# Patient Record
Sex: FEMALE | Race: BLACK OR AFRICAN AMERICAN | NOT HISPANIC OR LATINO | Employment: OTHER | URBAN - METROPOLITAN AREA
[De-identification: names, ages, dates, MRNs, and addresses within clinical notes are randomized per-mention and may not be internally consistent; named-entity substitution may affect disease eponyms.]

---

## 2017-01-16 ENCOUNTER — OFFICE VISIT (OUTPATIENT)
Dept: CARDIOLOGY | Facility: CLINIC | Age: 62
End: 2017-01-16
Payer: COMMERCIAL

## 2017-01-16 ENCOUNTER — CLINICAL SUPPORT (OUTPATIENT)
Dept: CARDIOLOGY | Facility: CLINIC | Age: 62
End: 2017-01-16
Payer: COMMERCIAL

## 2017-01-16 VITALS
SYSTOLIC BLOOD PRESSURE: 120 MMHG | DIASTOLIC BLOOD PRESSURE: 76 MMHG | BODY MASS INDEX: 39.09 KG/M2 | WEIGHT: 229 LBS | HEART RATE: 69 BPM | HEIGHT: 64 IN

## 2017-01-16 DIAGNOSIS — T45.1X5A CARDIOMYOPATHY DUE TO CHEMOTHERAPY: Primary | ICD-10-CM

## 2017-01-16 DIAGNOSIS — I10 BENIGN ESSENTIAL HTN: ICD-10-CM

## 2017-01-16 DIAGNOSIS — I10 BENIGN ESSENTIAL HTN: Primary | ICD-10-CM

## 2017-01-16 DIAGNOSIS — I42.7 CARDIOMYOPATHY DUE TO CHEMOTHERAPY: ICD-10-CM

## 2017-01-16 DIAGNOSIS — T45.1X5A CARDIOMYOPATHY DUE TO CHEMOTHERAPY: ICD-10-CM

## 2017-01-16 DIAGNOSIS — I42.7 CARDIOMYOPATHY DUE TO CHEMOTHERAPY: Primary | ICD-10-CM

## 2017-01-16 DIAGNOSIS — I10 ESSENTIAL HYPERTENSION: Chronic | ICD-10-CM

## 2017-01-16 PROCEDURE — 3078F DIAST BP <80 MM HG: CPT | Mod: S$GLB,,, | Performed by: NUCLEAR MEDICINE

## 2017-01-16 PROCEDURE — 99999 PR PBB SHADOW E&M-EST. PATIENT-LVL III: CPT | Mod: PBBFAC,,, | Performed by: NUCLEAR MEDICINE

## 2017-01-16 PROCEDURE — 3074F SYST BP LT 130 MM HG: CPT | Mod: S$GLB,,, | Performed by: NUCLEAR MEDICINE

## 2017-01-16 PROCEDURE — 99214 OFFICE O/P EST MOD 30 MIN: CPT | Mod: S$GLB,,, | Performed by: NUCLEAR MEDICINE

## 2017-01-16 PROCEDURE — 1159F MED LIST DOCD IN RCRD: CPT | Mod: S$GLB,,, | Performed by: NUCLEAR MEDICINE

## 2017-01-16 PROCEDURE — 93000 ELECTROCARDIOGRAM COMPLETE: CPT | Mod: S$GLB,,, | Performed by: NUCLEAR MEDICINE

## 2017-01-16 RX ORDER — CARVEDILOL PHOSPHATE 80 MG/1
80 CAPSULE, EXTENDED RELEASE ORAL DAILY
Qty: 90 CAPSULE | Refills: 3 | Status: SHIPPED | OUTPATIENT
Start: 2017-01-16 | End: 2018-01-10 | Stop reason: SDUPTHER

## 2017-01-16 NOTE — PROGRESS NOTES
Subjective:   Patient ID:  Josette Altamirano is a 61 y.o. female who presents for follow-up of Congestive Heart Failure (Re-establish care) and Hypertension      HPI CHRONIC HF- CHEMO INDUCED CMP-  EF HAS IMPROVED SIGNIFICANTLY- LAST ECHO 6/2015  EF 55%  NO RECENT HOSPITALIZATIONS OR ED VISITS FOR ACS  OR ADHF  NO PALPITATIONS. NO NEAR SYNCOPE OR SYNCOPE  NO EDEMA. NO INTERMITTENT CLAUDICATION. NO ABDOMINAL PAIN  NO UNUSUAL CALDERÓN WITH ORDINARY DAILY ACTIVITIES. NO ORTHOPNEA OR PND  NO FOCAL CNS SYMPTOMS OR SIGNS TO SUGGEST TIA OR STROKE  BBS. WELL TOLERATED      Review of Systems   Constitution: Negative for chills, fever, weakness, night sweats, weight gain and weight loss.   HENT: Negative for headaches and nosebleeds.    Eyes: Negative for blurred vision, double vision and visual disturbance.   Cardiovascular: Negative for chest pain, dyspnea on exertion, irregular heartbeat, leg swelling, orthopnea, palpitations, paroxysmal nocturnal dyspnea and syncope.   Respiratory: Negative for cough, hemoptysis and wheezing.    Endocrine: Negative for polydipsia and polyuria.   Hematologic/Lymphatic: Does not bruise/bleed easily.   Skin: Negative for rash.   Musculoskeletal: Negative for joint pain, joint swelling, muscle weakness and myalgias.   Gastrointestinal: Negative for abdominal pain, hematemesis, jaundice and melena.   Genitourinary: Negative for dysuria, hematuria and nocturia.   Neurological: Negative for dizziness, focal weakness and sensory change.   Psychiatric/Behavioral: Negative for depression. The patient does not have insomnia and is not nervous/anxious.      Family History   Problem Relation Age of Onset    Diabetes Mother     Diabetes Father     Stroke Father     Cancer Maternal Aunt 55     breast cancer    Stroke Paternal Aunt     Melanoma Neg Hx     Psoriasis Neg Hx     Lupus Neg Hx     Eczema Neg Hx      Past Medical History   Diagnosis Date    Arthritis of right knee     Breast cancer      She  has a history of a Stage IIA, 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 She subsequently underwent left mastectomy and sentinel node biopsy followed by axillary dissection March 23, 2007. Histopathologic evaluation demonstrated again multiple foci of intracystic papillary carcinoma, as well as DCIS noncomed    Cardiomyopathy      CHEMO INDUCED- RESOLVED    CHF (congestive heart failure)      systolic    Hypertension     Uterine fibroid      Current Outpatient Prescriptions on File Prior to Visit   Medication Sig Dispense Refill    [DISCONTINUED] carvedilol (COREG CR) 80 MG 24 hr capsule Take 1 capsule (80 mg total) by mouth once daily. 90 capsule 3     No current facility-administered medications on file prior to visit.      Review of patient's allergies indicates:  No Known Allergies    Objective:     Physical Exam   Constitutional: She is oriented to person, place, and time. She appears well-developed. No distress.   HENT:   Head: Normocephalic.   Eyes: Conjunctivae are normal. Pupils are equal, round, and reactive to light. No scleral icterus.   Neck: Normal range of motion. Neck supple. Normal carotid pulses, no hepatojugular reflux and no JVD present. Carotid bruit is not present. No edema present. No thyroid mass and no thyromegaly present.   Cardiovascular: Normal rate, regular rhythm, S1 normal, S2 normal, normal heart sounds and intact distal pulses.  PMI is not displaced.  Exam reveals no gallop and no friction rub.    No murmur heard.  Pulses:       Carotid pulses are 2+ on the right side, and 2+ on the left side.       Radial pulses are 2+ on the right side, and 2+ on the left side.        Femoral pulses are 2+ on the right side, and 2+ on the left side.       Popliteal pulses are 2+ on the right side, and 2+ on the left side.        Dorsalis pedis pulses are 2+ on the right side, and 2+ on the left side.         Posterior tibial pulses are 2+ on the right side, and 2+ on the left side.   Pulmonary/Chest: Effort normal and breath sounds normal. She has no wheezes. She has no rales. She exhibits no tenderness.   Abdominal: Soft. Bowel sounds are normal. She exhibits no pulsatile midline mass and no mass. There is no hepatosplenomegaly. There is no tenderness.   Musculoskeletal: Normal range of motion. She exhibits no edema or tenderness.        Cervical back: Normal.        Thoracic back: Normal.        Lumbar back: Normal.   Lymphadenopathy:     She has no cervical adenopathy.     She has no axillary adenopathy.        Right: No supraclavicular adenopathy present.        Left: No supraclavicular adenopathy present.   Neurological: She is alert and oriented to person, place, and time. She has normal strength and normal reflexes. No sensory deficit. Gait normal.   Skin: Skin is warm. No rash noted. No cyanosis. No pallor. Nails show no clubbing.   Psychiatric: She has a normal mood and affect. Her speech is normal and behavior is normal. Cognition and memory are normal.       Assessment:     1. Cardiomyopathy due to chemotherapy    2. Essential hypertension      ECG TODAY- SR. WNL  NO CLINICAL EVIDENCE OF ACUTE HF  NO EVIDENCE OF ACTIVE MYOCARDIAL ISCHEMIA-  NO ARRHYTHMIAS  CNS STATUS STABLE  CARD MED WELL TOLERATED  Plan:     Cardiomyopathy due to chemotherapy    Essential hypertension    1- CONTINUE PRESENT CARD MANAGEMENT    2- SCHEDULE ECHO, CMP ,BNP AND LIPIDS    3- PHONE CALL TO REVIEW RESULTS AND FURTHER RECOMMENDATIONS.

## 2017-01-16 NOTE — MR AVS SNAPSHOT
[facility] - Cardiology  [address] [facility] [address] [place] [address]  Phone: [phone]  Fax: [phone]                  Josette Altamirano   2017 9:40 AM   Office Visit    Description:  Female : 1955   Provider:  Tyrone Kwong MD   Department:  [facility]           Reason for Visit     Congestive Heart Failure     Hypertension           Diagnoses this Visit        Comments    Cardiomyopathy due to chemotherapy    -  Primary     Essential hypertension                To Do List           Future Appointments        Provider Department Dept Phone    2017 8:40 AM Omar Miranda IV, MD [facility]rology [phone]      Goals (5 Years of Data)     None      Follow-Up and Disposition     Return PHONE CALL, REVIEW RESULTS.      [facility]     [facility] On Call Nurse Care Line -  Assistance  Registered nurses in the [facility] provide clinical advisement, health education, appointment booking, and other advisory services.  Call for this free service at [phone].             Medications           Message regarding Medications     Verify the changes and/or additions to your medication regime listed below are the same as discussed with your clinician today.  If any of these changes or additions are incorrect, please notify your healthcare provider.             Verify that the below list of medications is an accurate representation of the medications you are currently taking.  If none reported, the list may be blank. If incorrect, please contact your healthcare provider. Carry this list with you in case of emergency.           Current Medications     carvedilol (COREG CR) 80 MG 24 hr capsule Take 1 capsule (80 mg total) by mouth once daily.           Clinical Reference Information           Vital Signs - Last Recorded  Most recent update: 2017 10:11 AM by Belkys Rai RN    BP Pulse Ht Wt BMI    120/76 (BP Location: Right arm, Patient Position: Sitting, BP  "Method: Manual) 69 5' 4" (1.626 m) 103.9 kg (229 lb) 39.31 kg/m2      Blood Pressure          Most Recent Value    Reason for not completing BP on both arms  (P) mastectomy    BP  120/76      Allergies as of 1/16/2017     No Known Allergies      Immunizations Administered on Date of Encounter - 1/16/2017     None      Orders Placed During Today's Visit     Future Labs/Procedures Expected by Expires    Brain natriuretic peptide  1/16/2017 (Approximate) 1/16/2018    Comprehensive metabolic panel  1/16/2017 (Approximate) 1/16/2018    Lipid panel  1/16/2017 (Approximate) 1/16/2018    2D echo with color flow doppler  As directed 1/16/2018      "

## 2017-02-07 ENCOUNTER — CLINICAL SUPPORT (OUTPATIENT)
Dept: CARDIOLOGY | Facility: CLINIC | Age: 62
End: 2017-02-07
Payer: COMMERCIAL

## 2017-02-07 ENCOUNTER — LAB VISIT (OUTPATIENT)
Dept: LAB | Facility: HOSPITAL | Age: 62
End: 2017-02-07
Attending: NUCLEAR MEDICINE
Payer: COMMERCIAL

## 2017-02-07 DIAGNOSIS — T45.1X5A CARDIOMYOPATHY DUE TO CHEMOTHERAPY: ICD-10-CM

## 2017-02-07 DIAGNOSIS — I42.7 CARDIOMYOPATHY DUE TO CHEMOTHERAPY: ICD-10-CM

## 2017-02-07 DIAGNOSIS — I10 ESSENTIAL HYPERTENSION: Chronic | ICD-10-CM

## 2017-02-07 LAB
ALBUMIN SERPL BCP-MCNC: 3.8 G/DL
ALP SERPL-CCNC: 88 U/L
ALT SERPL W/O P-5'-P-CCNC: 18 U/L
ANION GAP SERPL CALC-SCNC: 6 MMOL/L
AST SERPL-CCNC: 18 U/L
BILIRUB SERPL-MCNC: 1.2 MG/DL
BNP SERPL-MCNC: 50 PG/ML
BUN SERPL-MCNC: 16 MG/DL
CALCIUM SERPL-MCNC: 8.7 MG/DL
CHLORIDE SERPL-SCNC: 106 MMOL/L
CHOLEST/HDLC SERPL: 2.7 {RATIO}
CO2 SERPL-SCNC: 28 MMOL/L
CREAT SERPL-MCNC: 0.8 MG/DL
DIASTOLIC DYSFUNCTION: NO
EST. GFR  (AFRICAN AMERICAN): >60 ML/MIN/1.73 M^2
EST. GFR  (NON AFRICAN AMERICAN): >60 ML/MIN/1.73 M^2
ESTIMATED PA SYSTOLIC PRESSURE: 23.52
GLUCOSE SERPL-MCNC: 74 MG/DL
HDL/CHOLESTEROL RATIO: 37.3 %
HDLC SERPL-MCNC: 220 MG/DL
HDLC SERPL-MCNC: 82 MG/DL
LDLC SERPL CALC-MCNC: 127 MG/DL
NONHDLC SERPL-MCNC: 138 MG/DL
POTASSIUM SERPL-SCNC: 3.7 MMOL/L
PROT SERPL-MCNC: 7.5 G/DL
RETIRED EF AND QEF - SEE NOTES: 45 (ref 55–65)
SODIUM SERPL-SCNC: 140 MMOL/L
TRIGL SERPL-MCNC: 55 MG/DL

## 2017-02-07 PROCEDURE — 93306 TTE W/DOPPLER COMPLETE: CPT | Mod: S$GLB,,, | Performed by: INTERNAL MEDICINE

## 2017-02-07 PROCEDURE — 83880 ASSAY OF NATRIURETIC PEPTIDE: CPT

## 2017-02-07 PROCEDURE — 80061 LIPID PANEL: CPT

## 2017-02-07 PROCEDURE — 36415 COLL VENOUS BLD VENIPUNCTURE: CPT | Mod: PO

## 2017-02-07 PROCEDURE — 80053 COMPREHEN METABOLIC PANEL: CPT

## 2017-02-15 ENCOUNTER — TELEPHONE (OUTPATIENT)
Dept: CARDIOLOGY | Facility: CLINIC | Age: 62
End: 2017-02-15

## 2017-02-15 NOTE — TELEPHONE ENCOUNTER
Patient returned call, scheduled followup appointment.    Letter sent to patient to call and schedule appointment has been rescinded.

## 2017-02-15 NOTE — TELEPHONE ENCOUNTER
----- Message from Sergey Mulligan MD sent at 2/8/2017  1:39 PM CST -----  Pls. call the pt that lipid level WNL.    F/u as scheduled.    Jasmyne Rincon

## 2017-02-23 ENCOUNTER — OFFICE VISIT (OUTPATIENT)
Dept: CARDIOLOGY | Facility: CLINIC | Age: 62
End: 2017-02-23
Payer: COMMERCIAL

## 2017-02-23 VITALS
BODY MASS INDEX: 40.32 KG/M2 | WEIGHT: 242 LBS | DIASTOLIC BLOOD PRESSURE: 80 MMHG | SYSTOLIC BLOOD PRESSURE: 120 MMHG | HEART RATE: 80 BPM | HEIGHT: 65 IN

## 2017-02-23 DIAGNOSIS — I42.7 CARDIOMYOPATHY DUE TO CHEMOTHERAPY: Primary | ICD-10-CM

## 2017-02-23 DIAGNOSIS — I10 ESSENTIAL HYPERTENSION: Chronic | ICD-10-CM

## 2017-02-23 DIAGNOSIS — T45.1X5A CARDIOMYOPATHY DUE TO CHEMOTHERAPY: Primary | ICD-10-CM

## 2017-02-23 PROCEDURE — 3079F DIAST BP 80-89 MM HG: CPT | Mod: S$GLB,,, | Performed by: NUCLEAR MEDICINE

## 2017-02-23 PROCEDURE — 99214 OFFICE O/P EST MOD 30 MIN: CPT | Mod: S$GLB,,, | Performed by: NUCLEAR MEDICINE

## 2017-02-23 PROCEDURE — 99999 PR PBB SHADOW E&M-EST. PATIENT-LVL III: CPT | Mod: PBBFAC,,, | Performed by: NUCLEAR MEDICINE

## 2017-02-23 PROCEDURE — 1160F RVW MEDS BY RX/DR IN RCRD: CPT | Mod: S$GLB,,, | Performed by: NUCLEAR MEDICINE

## 2017-02-23 PROCEDURE — 3074F SYST BP LT 130 MM HG: CPT | Mod: S$GLB,,, | Performed by: NUCLEAR MEDICINE

## 2017-02-23 NOTE — PROGRESS NOTES
Subjective:   Patient ID:  Josette Altamirano is a 61 y.o. female who presents for follow-up of Congestive Heart Failure (followup); Results (Review Echocardiogram); and Hypertension      HPI 1- CMP POST CHEMO- IMPROVED HF STAGE C, FC 2  2- ESSENTIAL HTN  RECENT ECHO WAS REVIEWED . LVEF ABOUT 55-60%, RATHER THAN 45%-  NO SEGMENTAL WMA.  NO HX OF INCREASE CALDERÓN. NO CALDERÓN WITH ORDINARY DAILY ACTIVITIES. NO ORTHOPNEA OR PND  NO EDEMA. NO CALVE TENDERNESS  NO PALPITATIONS. NO SYNCOPE  NO FOCAL CNS SYMPTOMS OR SIGNS TO SUGGEST TIA OR STROKE  BBS WELL TOLERATED    Review of Systems   Constitution: Negative for chills, fever, weakness, night sweats, weight gain and weight loss.   HENT: Negative for headaches and nosebleeds.    Eyes: Negative for blurred vision, double vision and visual disturbance.   Cardiovascular: Negative for chest pain, dyspnea on exertion, irregular heartbeat, leg swelling, orthopnea, palpitations, paroxysmal nocturnal dyspnea and syncope.   Respiratory: Negative for cough, hemoptysis and wheezing.    Endocrine: Negative for polydipsia and polyuria.   Hematologic/Lymphatic: Does not bruise/bleed easily.   Skin: Negative for rash.   Musculoskeletal: Negative for joint pain, joint swelling, muscle weakness and myalgias.   Gastrointestinal: Negative for abdominal pain, hematemesis, jaundice and melena.   Genitourinary: Negative for dysuria, hematuria and nocturia.   Neurological: Negative for dizziness, focal weakness and sensory change.   Psychiatric/Behavioral: Negative for depression. The patient does not have insomnia and is not nervous/anxious.      Family History   Problem Relation Age of Onset    Diabetes Mother     Diabetes Father     Stroke Father     Cancer Maternal Aunt 55     breast cancer    Stroke Paternal Aunt     Melanoma Neg Hx     Psoriasis Neg Hx     Lupus Neg Hx     Eczema Neg Hx      Past Medical History   Diagnosis Date    Arthritis of right knee     Breast cancer      She has a  history of a Stage IIA, 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 She subsequently underwent left mastectomy and sentinel node biopsy followed by axillary dissection March 23, 2007. Histopathologic evaluation demonstrated again multiple foci of intracystic papillary carcinoma, as well as DCIS noncomed    Cardiomyopathy      CHEMO INDUCED- RESOLVED    CHF (congestive heart failure)      systolic    Hypertension     Uterine fibroid      Current Outpatient Prescriptions on File Prior to Visit   Medication Sig Dispense Refill    carvedilol (COREG CR) 80 MG 24 hr capsule Take 1 capsule (80 mg total) by mouth once daily. 90 capsule 3     No current facility-administered medications on file prior to visit.      Review of patient's allergies indicates:  No Known Allergies    Objective:     Physical Exam   Constitutional: She is oriented to person, place, and time. She appears well-developed. No distress.   HENT:   Head: Normocephalic.   Eyes: Conjunctivae are normal. Pupils are equal, round, and reactive to light. No scleral icterus.   Neck: Normal range of motion. Neck supple. Normal carotid pulses, no hepatojugular reflux and no JVD present. Carotid bruit is not present. No edema present. No thyroid mass and no thyromegaly present.   Cardiovascular: Normal rate, regular rhythm, S1 normal, S2 normal, normal heart sounds and intact distal pulses.  PMI is not displaced.  Exam reveals no gallop and no friction rub.    No murmur heard.  Pulses:       Carotid pulses are 2+ on the right side, and 2+ on the left side.       Radial pulses are 2+ on the right side, and 2+ on the left side.        Femoral pulses are 2+ on the right side, and 2+ on the left side.       Popliteal pulses are 2+ on the right side, and 2+ on the left side.        Dorsalis pedis pulses are 2+ on the right side, and 2+ on the left side.        Posterior tibial  pulses are 2+ on the right side, and 2+ on the left side.   Pulmonary/Chest: Effort normal and breath sounds normal. She has no wheezes. She has no rales. She exhibits no tenderness.   Abdominal: Soft. Bowel sounds are normal. She exhibits no pulsatile midline mass and no mass. There is no hepatosplenomegaly. There is no tenderness.   Musculoskeletal: Normal range of motion. She exhibits no edema or tenderness.        Cervical back: Normal.        Thoracic back: Normal.        Lumbar back: Normal.   Lymphadenopathy:     She has no cervical adenopathy.     She has no axillary adenopathy.        Right: No supraclavicular adenopathy present.        Left: No supraclavicular adenopathy present.   Neurological: She is alert and oriented to person, place, and time. She has normal strength and normal reflexes. No sensory deficit. Gait normal.   Skin: Skin is warm. No rash noted. No cyanosis. No pallor. Nails show no clubbing.   Psychiatric: She has a normal mood and affect. Her speech is normal and behavior is normal. Cognition and memory are normal.       Assessment:     1. Cardiomyopathy due to chemotherapy    2. Essential hypertension      STABLE CV STATUS- NO EVIDENCE OF ADHF  NO ARRHYTHMIAS. NO ACTIVE MYOCARDIAL ISCHEMIA  CNS STATUS STABLE  BP WELL CONTROLLED  CARD MED WELL TOLERATED  Plan:     Cardiomyopathy due to chemotherapy    Essential hypertension    1- CONTINUE PRESENT CARD MANAGEMENT    2- RETURN IN 6 MONTHS.

## 2017-02-23 NOTE — MR AVS SNAPSHOT
"    [facility]  [address] [place] [address]  Phone: [phone]  Fax: [phone]                  Josette Altamirano   2017 3:20 PM   Office Visit    Description:  Female : 1955   Provider:  Tyrone Kwong MD   Department:  [facility]           Reason for Visit     Congestive Heart Failure     Results     Hypertension           Diagnoses this Visit        Comments    Cardiomyopathy due to chemotherapy    -  Primary     Essential hypertension                To Do List           Future Appointments        Provider Department Dept Phone    2017 8:40 AM Omar Miranda IV, MD [facility]rology [phone]      Goals (5 Years of Data)     None      Follow-Up and Disposition     Return in about 6 months (around 2017).      [facility]     [facility] On Call Nurse [facility] -  Assistance  Registered nurses in the [facility] provide clinical advisement, health education, appointment booking, and other advisory services.  Call for this free service at [phone].             Medications           Message regarding Medications     Verify the changes and/or additions to your medication regime listed below are the same as discussed with your clinician today.  If any of these changes or additions are incorrect, please notify your healthcare provider.             Verify that the below list of medications is an accurate representation of the medications you are currently taking.  If none reported, the list may be blank. If incorrect, please contact your healthcare provider. Carry this list with you in case of emergency.           Current Medications     carvedilol (COREG CR) 80 MG 24 hr capsule Take 1 capsule (80 mg total) by mouth once daily.           Clinical Reference Information           Your Vitals Were     BP Pulse Height Weight BMI    120/80 (BP Location: Right arm, Patient Position: Sitting, BP Method: Manual) 80 5' 5" (1.651 m) 109.8 kg (242 " lb) 40.27 kg/m2      Blood Pressure          Most Recent Value    BP  120/80      Allergies as of 2/23/2017     No Known Allergies      Immunizations Administered on Date of Encounter - 2/23/2017     None      Language Assistance Services     ATTENTION: Language assistance services are available, free of charge. Please call [phone].      ATENCIÓN: Si habla leonora, tiene a alvarez disposición servicios gratuitos de asistencia lingüística. Llame al [phone].     CHÚ Ý: N?u b?n nói Ti?ng Vi?t, có các d?ch v? h? tr? ngôn ng? mi?n phí dành cho b?n. G?i s? [phone].         [facility] complies with applicable Federal civil rights laws and does not discriminate on the basis of race, color, national origin, age, disability, or sex.

## 2017-05-09 ENCOUNTER — TELEPHONE (OUTPATIENT)
Dept: UROLOGY | Facility: CLINIC | Age: 62
End: 2017-05-09

## 2017-05-09 NOTE — TELEPHONE ENCOUNTER
Called patient to reschedule appointment with Dr. Miranda. No answer; left messages at both numbers.

## 2017-06-07 ENCOUNTER — HOSPITAL ENCOUNTER (OUTPATIENT)
Dept: RADIOLOGY | Facility: HOSPITAL | Age: 62
Discharge: HOME | End: 2017-06-07
Attending: NURSE PRACTITIONER
Payer: COMMERCIAL

## 2017-06-07 ENCOUNTER — OFFICE VISIT (OUTPATIENT)
Dept: SURGERY | Facility: CLINIC | Age: 62
End: 2017-06-07
Payer: COMMERCIAL

## 2017-06-07 VITALS
OXYGEN SATURATION: 97 % | DIASTOLIC BLOOD PRESSURE: 84 MMHG | BODY MASS INDEX: 40.52 KG/M2 | WEIGHT: 243.19 LBS | HEART RATE: 70 BPM | HEIGHT: 65 IN | SYSTOLIC BLOOD PRESSURE: 132 MMHG | TEMPERATURE: 96 F | RESPIRATION RATE: 18 BRPM

## 2017-06-07 DIAGNOSIS — M54.50 BACK PAIN, LUMBOSACRAL: ICD-10-CM

## 2017-06-07 DIAGNOSIS — C50.412 MALIGNANT NEOPLASM OF UPPER-OUTER QUADRANT OF LEFT FEMALE BREAST: Primary | ICD-10-CM

## 2017-06-07 DIAGNOSIS — R79.9 ABNORMAL BLOOD CHEMISTRY: ICD-10-CM

## 2017-06-07 DIAGNOSIS — Z12.31 VISIT FOR SCREENING MAMMOGRAM: ICD-10-CM

## 2017-06-07 DIAGNOSIS — R07.89 LEFT-SIDED CHEST WALL PAIN: ICD-10-CM

## 2017-06-07 DIAGNOSIS — Z08 ENCOUNTER FOR FOLLOW-UP SURVEILLANCE OF BREAST CANCER: ICD-10-CM

## 2017-06-07 DIAGNOSIS — C50.412 MALIGNANT NEOPLASM OF UPPER-OUTER QUADRANT OF LEFT FEMALE BREAST: ICD-10-CM

## 2017-06-07 DIAGNOSIS — Z85.3 ENCOUNTER FOR FOLLOW-UP SURVEILLANCE OF BREAST CANCER: ICD-10-CM

## 2017-06-07 DIAGNOSIS — Z85.3 PERSONAL HISTORY OF BREAST CANCER: Primary | ICD-10-CM

## 2017-06-07 PROCEDURE — 99999 PR PBB SHADOW E&M-EST. PATIENT-LVL IV: CPT | Mod: PBBFAC,,, | Performed by: NURSE PRACTITIONER

## 2017-06-07 PROCEDURE — 99214 OFFICE O/P EST MOD 30 MIN: CPT | Mod: S$GLB,,, | Performed by: NURSE PRACTITIONER

## 2017-06-07 PROCEDURE — 72100 X-RAY EXAM L-S SPINE 2/3 VWS: CPT | Mod: 26,,, | Performed by: RADIOLOGY

## 2017-06-07 PROCEDURE — 72100 X-RAY EXAM L-S SPINE 2/3 VWS: CPT | Mod: TC,PO

## 2017-06-07 RX ORDER — TIZANIDINE 2 MG/1
2 TABLET ORAL NIGHTLY PRN
Qty: 30 TABLET | Refills: 1 | Status: SHIPPED | OUTPATIENT
Start: 2017-06-07 | End: 2018-01-03

## 2017-06-07 NOTE — PROGRESS NOTES
CC: Breast cancer follow-up     HPI: breast cancer follow-up    Pt  has a history of a Stage IIA, 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 She subsequently underwent left mastectomy and sentinel node biopsy followed by axillary dissection  March 23, 2007. Histopathologic evaluation demonstrated again multiple foci of intracystic papillary carcinoma, as well as DCIS noncomedo type. One of three sentinel lymph nodes demonstrated micrometastatic disease. Twelve additional axillary lymph nodes were obtained, all of which were negative for disease. Estrogen receptors were 11%. Progesterone receptors 4%. HER2/bob was not over-expressed. She subsequently underwent additional studies including a MUGA scan demonstrating an injection  fraction of 39%, thus she was treated with six cycles of Cytoxan and Taxotere which were well tolerated.    9/28/07 hormone studies suggested the pt was menopausal so she was started on Arimidex for post adjuvant therapy. Therapy completed 9/2012.    Patient presents for her 6 mon f/u exam and has the following complaints:    1. Couple of weeks has noted left chest wall - mastectomy side- soreness with movement intermittently- spasm like at times with specific movements- when questioned about any change in activity pt relates did rearrange cabinets a couple of weeks ago and activity involved some lifting and twisting- it was after this that she began to feel discomfort. Has not tried any nsaids or heat/cold topicals.     2. Low bilateral back pain that has been intermittent for a few years but over the past 2 weeks - after the increased PA noted above- has been every day- occurs mainly in the morning upon arising. Rates a 10. Describes as an ache - does not radiate to any other location and does not have any lower extremity weakness or burning.  After gets up and gets to moving it gradually improves and goes  away. Standing helps but sitting on a hard surface makes her back hurt again. Does not awaken her in her sleep. Feels it occasionally when she changes position in bed.     PMH: Chronic systolic heart failure discovered prior to chemo. Obesity, HTN, Breast cancer left, Fibrocystic breast condition. Achilles tendonitis, history of hematuria with negative workup    PSH: Lap jonathan in 2004, left modified radical mastectomy with sentinel and axillary node biospy. Multiple breast biopsies. Mediport placement and removal.    SOCIAL:Patient is . She is a teacher. She occasionally drinks alcohol. No tobacco use.     FH: Multiple relatives with diabetes.    PCP: Caridad Harrington    Last mammo: 10/4/16 - wnl.     Last gyn visit- 8/18/16    ROS: Denies any headaches, nausea or emesis. No chest pain, cough or SOB. No fever or night sweats. No abd pain or cramping. No change in bowel or bladder function or character. No hematuria- history of DUB with negative biopsy in June- attributed to fibroids.  No wt loss or wt gain. No lower extremity edema. No weakness or fatigue out of the ordinary. Denies any dysuria, no suprapubic pain, back pain as noted above, no increase in urinary frequency, no change in bowel movement frequency or character. No hematochezia, no more episodes of discolored urine.  Denies any right breast pain or nipple discharge. No breast lumps. Not exercising.     PHYSICAL EXAM:  HEAD: Atraumatic and normocephalic.  EYES:Conjunctivae nonicteric, PERRLA  NARIES: Patent with pale mucosa, scant clear mucus.  MOUTH: No oral lesions, no posterior pharyngeal exudates or erythema.  NECK: Supple. Soft, Symmetric. Trachea midline. No thyromegaly.  LYMPHATICS: There is no supraclavicular, infraclavicular or cervical adenopathy. No axillary adenopathy.  LUNGS: Clear to auscultation. Unlabored respiratory effort. No CVA tenderness  HEART: RRR with no murmur or gallop.  BREASTS: Right breast and left chest wall reveals no  visible mass, erythema, rash, dimpling or peaudeorange appearance. No palpable mass in the right breast or over the left chest wall. Her palpable tenderness over the left chest wall is over the muscle and rib spaces. No palpable abnormality. No nipple discharge noted from the right breast with compression. Has an island of glandular tissue in the upper outer quadrant of the right breast that blends with the surrounding breast tissue.   ABDOMEN: Soft, nontender with no hepatosplenomegaly, guarding or rebound. No CVA tenderness. No suprapubic tenderness. No palpable evidence of groin hernia dallin.   EXT: Bilateral lower extremity exam reveals 2+ pedal pulses. No edema. No asymmetry of the hands or feet. 5/5 strength in upper and lower extremities that is symmetric. No evidence of lymph edema left arm. No visible or palpable swelling right knee. Palpable tenderness medially. No popping or grinding noted with ROM. Low back inspection does not reveal any asymmetry and no palpable tenderness over spine or bilateral lower back region.  SKIN: Warm and dry to touch with no rash. No scaliness or dryness.  PSYCH: Affect appropriate.    LABS reviewed and revealed:     Ref. Range 2/7/2017 15:10   Sodium Latest Ref Range: 136 - 145 mmol/L 140   Potassium Latest Ref Range: 3.5 - 5.1 mmol/L 3.7   Chloride Latest Ref Range: 95 - 110 mmol/L 106   CO2 Latest Ref Range: 23 - 29 mmol/L 28   Anion Gap Latest Ref Range: 8 - 16 mmol/L 6 (L)   BUN, Bld Latest Ref Range: 6 - 20 mg/dL 16   Creatinine Latest Ref Range: 0.5 - 1.4 mg/dL 0.8   eGFR if non African American Latest Ref Range: >60 mL/min/1.73 m^2 >60.0   eGFR if African American Latest Ref Range: >60 mL/min/1.73 m^2 >60.0   Glucose Latest Ref Range: 70 - 110 mg/dL 74   Calcium Latest Ref Range: 8.7 - 10.5 mg/dL 8.7   Alkaline Phosphatase Latest Ref Range: 55 - 135 U/L 88   Total Protein Latest Ref Range: 6.0 - 8.4 g/dL 7.5   Albumin Latest Ref Range: 3.5 - 5.2 g/dL 3.8   Total  Bilirubin Latest Ref Range: 0.1 - 1.0 mg/dL 1.2 (H)   AST Latest Ref Range: 10 - 40 U/L 18   ALT Latest Ref Range: 10 - 44 U/L 18   Triglycerides Latest Ref Range: 30 - 150 mg/dL 55   Cholesterol Latest Ref Range: 120 - 199 mg/dL 220 (H)   HDL Latest Ref Range: 40 - 75 mg/dL 82 (H)   LDL Cholesterol Latest Ref Range: 63.0 - 159.0 mg/dL 127.0   Total Cholesterol/HDL Ratio Latest Ref Range: 2.0 - 5.0  2.7   BNP Latest Ref Range: 0 - 99 pg/mL 50     ASSESSMENT:  1. Breast cancer as previously described. Arimidex therapy completed.  2. Heart failure stable and followed by cardiology.  3. Obesity. Encouraged pt to continue with exercise and dietary changes.   4. hematuria workup benign- followed by urology  5. History of vaginal discharge scant with tiny blood clot - fibroids- no other episodes  6. Vaginal dryness  7. Mildly elevated bilirubin 2/2017  8. Left chest wall pain and low back pain may be related to the unusual physical activity she did prior to onset about 2 weeks ago.     PLAN/REC:  1. cbc, tsh and cmp to [facility].- [facility]  2. Lumbar spine plain films, Refer to Dr. Bonilla for evaluation and treatment of the back pain - cancel MRI if she thinks is not necessary  3. MRI of th lumbar spine to rule out recurrence and further evaluate back pain  4. Tizanidine 2 mg hs for back pain - warned pt makes sleepy and to avoid driving, operating machinery or making important decisions after taking.   5. PT for left chest wall muscle pain and low back pain evaluation and treatment.   6. BSE monthly.Call for any changes.  7. Signs of recurrence discussed with pt who verbalizes understanding.  8. rtc October for mammo and exam    Kathleen Campuzano, RN, MSN, NP-C

## 2017-06-13 ENCOUNTER — INITIAL CONSULT (OUTPATIENT)
Dept: PHYSICAL MEDICINE AND REHAB | Facility: CLINIC | Age: 62
End: 2017-06-13
Payer: COMMERCIAL

## 2017-06-13 VITALS
HEIGHT: 65 IN | HEART RATE: 69 BPM | DIASTOLIC BLOOD PRESSURE: 79 MMHG | BODY MASS INDEX: 40.48 KG/M2 | SYSTOLIC BLOOD PRESSURE: 123 MMHG | WEIGHT: 243 LBS | RESPIRATION RATE: 14 BRPM

## 2017-06-13 DIAGNOSIS — M47.818 SI JOINT ARTHRITIS: ICD-10-CM

## 2017-06-13 DIAGNOSIS — M47.816 SPONDYLOSIS OF LUMBAR REGION WITHOUT MYELOPATHY OR RADICULOPATHY: Primary | ICD-10-CM

## 2017-06-13 DIAGNOSIS — M16.0 PRIMARY OSTEOARTHRITIS OF BOTH HIPS: ICD-10-CM

## 2017-06-13 DIAGNOSIS — M62.89 MUSCLE TIGHTNESS: ICD-10-CM

## 2017-06-13 PROCEDURE — 99204 OFFICE O/P NEW MOD 45 MIN: CPT | Mod: S$GLB,,, | Performed by: PHYSICAL MEDICINE & REHABILITATION

## 2017-06-13 PROCEDURE — 99999 PR PBB SHADOW E&M-EST. PATIENT-LVL III: CPT | Mod: PBBFAC,,, | Performed by: PHYSICAL MEDICINE & REHABILITATION

## 2017-06-13 NOTE — PROGRESS NOTES
[facility] NEW PATIENT HISTORY & PHYSICAL :    Referring Provider: RAYSHAWN Campuzano    Chief Complaint   Patient presents with    Back Pain     low back pain       HPI: This is a 61 y.o.  female being seen in clinic today for evaluation of deep, achy low back pain over the past few weeks-months.  In the morning she reports tightness and stiffness across her low back that resolves with movement for about 40 minutes.  With increased activity around her house she notices increased pain.  She denies numbness, tingling, or weakness into her legs.  Zanaflex has provided some relief.     History obtained from patient    Functional History:  Walking: Not limited  Transfers: Independent  Assistive devices: No  Power mobility: No  Falls: None   Directional preference:    Employment status:educator    Needs help with:  Nothing - all ADLS normal    Review of Systems:     General- denies lethargy, weight change, fever, chills  Head/neck- denies swallowing difficulties  ENT- denies hearing changes  Cardiovascular-denies chest pain  Pulmonary- denies shortness of breath  GI- denies constipation or bowel incontinence  - denies bladder incontinence  Skin- denies wounds or rashes  Musculoskeletal- denies weakness, +pain  Neurologic- denies numbness and tingling  Psychiatric- denies depressive or psychotic features, denies anxiety  Lymphatic-denies swelling  Endocrine- denies hypoglycemic symptoms/DM history    Physical Examination:  General: Well developed, well nourished female, NAD    Spinal Examination: CERVICAL  Active ROM is within normal limits.  Inspection: No deformity of spinal alignment.  Palpation: No vertebral tenderness to percussion.    Spurling test:     Spinal Examination: LUMBAR or THORACIC  Active ROM is limited in full flex and extension  Inspection: No deformity of spinal alignment.  No palpable olisthesis.  Palpation: No vertebral tenderness to percussion.  ttp at si joints, tight paraspinals   Mild +facet loading on  right  SLR Test (seated ):negativedegrees bilaterally  Able to stand on heels and toes    Bilateral Upper and Lower Extremities:  Pulses are 2+ at radial, bilaterally.  Shoulder/Elbow/Wrist/Hand ROM   Hip/Knee/Ankle ROM wnl  Bilateral Extremities show normal capillary refill.  No signs of cyanosis, rubor, edema, skin changes, or dysvascular changes of appendages.  Nails appear intact.    Neurological Exam:  Cranial Nerves:  II-XII grossly intact    Manual Muscle Testing: (Motor 5=normal)  RIGHT Lower extremity: Hip flexion 5/5, Hip Abduction 4/5, Knee extension 5/5, Knee flexion 5/5, Ankle dorsiflexion 5/5, Extensor hallucis longus 5/5, Ankle plantarflexion 5/5  LEFT Lower extremity:  Hip flexion 5/5, Hip Abduction 4/5, Knee extension 5/5, Knee flexion 5/5, Ankle dorsiflexion 5/5, Extensor hallucis longus 5/5, Ankle plantarflexion 5/5    No focal atrophy is noted of either upper or lower extremity.    Bilateral Reflexes:hypo at patellar  No clonus at knee or ankle.    Sensation: tested to light touch  - intact in legs    Gait: Narrow base and good arm swing.    Cerebellar: Normal FNF and tandem gait.     IMPRESSION/PLAN: This is a 61 y.o.  female with lumbar DJD/DDD, si joint DJD, hip DJD, paraspinal tightness, morbid obesity:Body mass index is 40.44 kg/m².    1. PT-here?will check for official appt-core strength, hip strength, stretch, ROM, modalities  2. Ice/heat modaliteis  3. Cont zanaflex, try otc nsaid at least once to twice daily  4. Handouts on back care and exercise provided  5. Xray reviewed with patient  6. Luis Bonilla M.D.  Physical Medicine and Rehab

## 2017-06-13 NOTE — PATIENT INSTRUCTIONS
Degenerative Disk Disease    Spinal disks are gel-filled cushions between the bones, or vertebrae, of the spine. The disks act like shock absorbers. Over time, the disks may break down. This is called degenerative disk disease.  This condition can affect the neck or back. It is one of the most common causes of low back pain. It is the leading cause of disability in people under age 45 in the [place]. The pain often remains localized to the lower back or neck. Muscle spasm is often present and adds to the pain.  Disk degeneration is a natural part of aging. But it is not painful for most people. It may also occur as a result of repeated minor injuries due to daily activities, sports, or accidents. It may lead to osteoarthritis of the spine. Back pain related to disk disease may come and go. Or it may become chronic and last for months or years. The disk may bulge or rupture. This is called a slipped disk or herniated disk. That can put pressure on a nearby spinal nerve and cause neck or back pain that spreads down one arm or leg.  X-rays or an MRI may help to diagnose this condition. For acute pain, treatment includes anti-inflammatory medicines, muscle relaxants, rest, ice, or heat. Strong prescription pain medicines, called opioids, may be needed for short-term treatment if pain suddenly gets worse. Opioid medicines can be addictive. So they are not advised for long-term pain management. Other types of medicines are preferred. Surgery is generally not used to treat this condition unless there is a complication.    Home care  · For neck pain: Use a comfortable pillow that supports the head and keeps the spine in a neutral position. Your head should not be tilted forward or backward.  · For back pain: Avoid sitting for long periods of time. This puts more stress on the lower back than standing or walking. Starting a regular exercise program to strengthen the supporting muscles of the spine will make it easier  to live with degenerative disk disease.  · Apply an ice pack over the injured area for no more than 15 to 20 minutes. Do this every 3 to 6 hours for the first 24 to 48 hours. To make an ice pack, put ice cubes in a plastic bag that seals at the top. Wrap the bag in a clean, thin towel or cloth. Never put ice or an ice pack directly on the skin. Keep using ice packs to ease pain and swelling as needed. After 48 hours, apply heat (warm shower or warm bath) for 20 minutes several times a day, or switch between ice and heat.   · You may use over-the-counter pain medicine to control pain, unless another pain medicine was prescribed. If you have chronic liver or kidney disease or ever had a stomach ulcer or GI bleeding, talk with your provider before using these medicines.  Follow-up care  Follow up with your healthcare provider, or as directed.  If X-rays, a CT scan or an MRI were done, you will be notified of any new findings that may affect your care.  When to seek medical advice  Contact your healthcare provider right away if any of these occur:  · Increasing back pain  · Your foot drags when you walk, a condition called foot drop  · You have new weakness, numbness, or pain in one or both arms or legs  · Loss of bowel or bladder control  · Numbness or tingling in the buttock or groin area  Date Last Reviewed: 11/23/2015  © 3173-7453 [employer]. [address] [place], [place] [id number]. All rights reserved. This information is not intended as a substitute for professional medical care. Always follow your healthcare professional's instructions.        Back Care Tips    Caring for your back  These are things you can do to prevent a recurrence of acute back pain and to reduce symptoms from chronic back pain:  · Maintain a healthy weight. If you are overweight, losing weight will help most types of back pain.  · Exercise is an important part of recovery from most types of back pain. The muscles behind and  in front of the spine support the back. This means strengthening both the back muscles and the abdominal muscles will provide better support for your spine.   · Swimming and brisk walking are good overall exercises to improve your fitness level.  · Practice safe lifting methods (below).  · Practice good posture when sitting, standing and walking. Avoid prolonged sitting. This puts more stress on the lower back than standing or walking.  · Wear quality shoes with sufficient arch support. Foot and ankle alignment can affect back symptoms. Women should avoid wearing high heels.  · Therapeutic massage can help relax the back muscles without stretching them.  · During the first 24 to 72 hours after an acute injury or flare-up of chronic back pain, apply an ice pack to the painful area for 20 minutes and then remove it for 20 minutes, over a period of 60 to 90 minutes, or several times a day. As a safety precaution, do not use a heating pad at bedtime. Sleeping on a heating pad can lead to skin burns or tissue damage.  · You can alternate ice and heat therapies.  Medications  Talk to your healthcare provider before using medicines, especially if you have other medical problems or are taking other medicines.  · You may use acetaminophen or ibuprofen to control pain, unless your healthcare provider prescribed other pain medicine. If you have chronic conditions like diabetes, liver or kidney disease, stomach ulcers, or gastrointestinal bleeding, or are taking blood thinners, talk with your healthcare provider before taking any medicines.  · Be careful if you are given prescription pain medicines, narcotics, or medicine for muscle spasm. They can cause drowsiness, affect your coordination, reflexes, and judgment. Do not drive or operate heavy machinery while taking these types of medicines. Take prescription pain medicine only as prescribed by your healthcare provider.  Lumbar stretch  Here is a simple stretching exercise  that will help relax muscle spasm and keep your back more limber. If exercise makes your back pain worse, dont do it.  · Lie on your back with your knees bent and both feet on the ground.  · Slowly raise your left knee to your chest as you flatten your lower back against the floor. Hold for 5 seconds.  · Relax and repeat the exercise with your right knee.  · Do 10 of these exercises for each leg.  Safe lifting method  · Dont bend over at the waist to lift an object off the floor.  Instead, bend your knees and hips in a squat.   · Keep your back and head upright  · Hold the object close to your body, directly in front of you.  · Straighten your legs to lift the object.   · Lower the object to the floor in the reverse fashion.  · If you must slide something across the floor, push it.  Posture tips  Sitting  Sit in chairs with straight backs or low-back support. Keep your knees lower than your hips, with your feet flat on the floor.  When driving, sit up straight. Adjust the seat forward so you are not leaning toward the steering wheel.  A small pillow or rolled towel behind your lower back may help if you are driving long distances.   Standing  When standing for long periods, shift most of your weight to one leg at a time. Alternate legs every few minutes.   Sleeping  The best way to sleep is on your side with your knees bent. Put a low pillow under your head to support your neck in a neutral spine position. Avoid thick pillows that bend your neck to one side. Put a pillow between your legs to further relax your lower back. If you sleep on your back, put pillows under your knees to support your legs in a slightly flexed position. Use a firm mattress. If your mattress sags, replace it, or use a 1/2-inch plywood board under the mattress to add support.  Follow-up care  Follow up with your healthcare provider, or as advised.  If X-rays, a CT scan or an MRI scan were taken, they will be reviewed by a radiologist. You  will be notified of any new findings that may affect your care.  Call 911  Seek emergency medical care if any of the following occur:  · Trouble breathing  · Confusion  · Very drowsy  · Fainting or loss of consciousness  · Rapid or very slow heart rate  · Loss of  bowel or bladder control  When to seek medical care  Call your healthcare provider if any of the following occur:  · Pain becomes worse or spreads to your arms or legs  · Weakness or numbness in one or both arms or legs  · Numbness in the groin area  Date Last Reviewed: 6/1/2016  © 5542-2330 [employer]. [address] [place] [address]. All rights reserved. This information is not intended as a substitute for professional medical care. Always follow your healthcare professional's instructions.        Exercises to Strengthen Your Lower Back  Strong lower back and abdominal muscles work together to support your spine. The exercises below will help strengthen the lower back. It is important that you begin exercising slowly and increase levels gradually.  Always begin any exercise program with stretching. If you feel pain while doing any of these exercises, stop and talk to your doctor about a more specific exercise program that better suits your condition.   Low back stretch  The point of stretching is to make you more flexible and increase your range of motion. Stretch only as much as you are able. Stretch slowly. Do not push your stretch to the limit. If at any point you feel pain while stretching, this is your (temporary) limit.  · Lie on your back with your knees bent and both feet on the ground.  · Slowly raise your left knee to your chest as you flatten your lower back against the floor. Hold for 5 seconds.  · Relax and repeat the exercise with your right knee.  · Do 10 of these exercises for each leg.  · Repeat hugging both knees to your chest at the same time.  Building lower back strength  Start your exercise routine with 10  to 30 minutes a day, 1 to 3 times a day.  Initial exercises  Lying on your back:  1. Ankle pumps: Move your foot up and down, towards your head, and then away. Repeat 10 times with each foot.  2. Heel slides: Slowly bend your knee, drawing the heel of your foot towards you. Then slide your heel/foot from you, straightening your knee. Do not lift your foot off the floor (this is not a leg lift).  3. Abdominal contraction: Bend your knees and put your hands on your stomach. Tighten your stomach muscles. Hold for 5 seconds, then relax. Repeat 10 times.  4. Straight leg raise: Bend one leg at the knee and keep the other leg straight. Tighten your stomach muscles. Slowly lift your straight leg 6 to 12 inches off the floor and hold for up to 5 seconds. Repeat 10 times on each side.  Standin. Wall squats: Stand with your back against the wall. Move your feet about 12 inches away from the wall. Tighten your stomach muscles, and slowly bend your knees until they are at about a 45 degree angle. Do not go down too far. Hold about 5 seconds. Then slowly return to your starting position. Repeat 10 times.  2. Heel raises: Stand facing the wall. Slowly raise the heels of your feet up and down, while keeping your toes on the floor. If you have trouble balancing, you can touch the wall with your hands. Repeat 10 times.  More advanced exercises  When you feel comfortable enough, try these exercises.  1. Kneeling lumbar extension: Begin on your hands and knees. At the same time, raise and straighten your right arm and left leg until they are parallel to the ground. Hold for 2 seconds and come back slowly to a starting position. Repeat with left arm and right leg, alternating 10 times.  2. Prone lumbar extension: Lie face down, arms extended overhead, palms on the floor. At the same time, raise your right arm and left leg as high as comfortably possible. Hold for 10 seconds and slowly return to start. Repeat with left arm and  right leg, alternating 10 times. Gradually build up to 20 times. (Advanced: Repeat this exercise raising both arms and both legs a few inches off the floor at the same time. Hold for 5 seconds and release.)  3. Pelvic tilt: Lie on the floor on your back with your knees bent at 90 degrees. Your feet should be flat on the floor. Inhale, exhale, then slowly contract your abdominal muscles bringing your navel toward your spine. Let your pelvis rock back until your lower back is flat on the floor. Hold for 10 seconds while breathing smoothly.  4. Abdominal crunch: Perform a pelvic tilt (above) flattening your lower back against the floor. Holding the tension in your abdominal muscles, take another breath and raise your shoulder blades off the ground (this is not a full sit-up). Keep your head in line with your body (dont bend your neck forward). Hold for 2 seconds, then slowly lower.  Date Last Reviewed: 6/1/2016  © 4001-3639 [employer]. [address] [place] [address]. All rights reserved. This information is not intended as a substitute for professional medical care. Always follow your healthcare professional's instructions.        Self-Care for Low Back Pain    Most people have low back pain now and then. In many cases, it isnt serious and self-care can help. Sometimes low back pain can be a sign of a bigger problem. Call your healthcare provider if your pain returns often or gets worse over time. For the long-term care of your back, get regular exercise, lose any excess weight and learn good posture.  Take a short rest  Lying down during the day may be beneficial for short periods of time if severe pain increases with sitting or standing. Long-term bed rest could be detrimental.  Reduce pain and swelling  Cold reduces swelling. Both cold and heat can reduce pain. Protect your skin by placing a towel between your body and the ice or heat source.  · For the first few days, apply an ice pack  for 15 to 20 minutes .  · After the first few days, try heat for 15 minutes at a time to ease pain. Never sleep on a heating pad.  · Over-the-counter medicine can help control pain and swelling. Try aspirin or ibuprofen.  Exercise  Exercise can help your back heal. It also helps your back get stronger and more flexible, preventing any reinjury. Ask your healthcare provider about specific exercises for your back.  Use good posture to avoid reinjury  · When moving, bend at the hips and knees. Dont bend at the waist or twist around.  · When lifting, keep the object close to your body. Dont try to lift more than you can handle.  · When sitting, keep your lower back supported. Use a rolled-up towel as needed.  Seek immediate medical care if:  · Youre unable to stand or walk.  · You have a temperature over 100.4°F (38.0°C)  · You have frequent, painful, or bloody urination.  · You have severe abdominal pain.  · You have a sharp, stabbing pain.  · Your pain is constant.  · You have pain or numbness in your leg.  · You feel pain in a new area of your back.  · You notice that the pain isnt decreasing after more than a week.   Date Last Reviewed: 9/29/2015  © 4158-6912 [employer]. [address] [place] [address], [place] [address]. All rights reserved. This information is not intended as a substitute for professional medical care. Always follow your healthcare professional's instructions.        Relieving Back Pain  Back pain is a common problem. You can strain back muscles by lifting too much weight or just by moving the wrong way. Back strain can be uncomfortable, even painful. And it can take weeks or months to improve. To help yourself feel better and prevent future back strains, try these tips.  Important Note: Do not give aspirin to children or teens without first discussing it with your healthcare provider.      ? Ice    Ice reduces muscle pain and swelling. It helps most during the first 24 to 48 hours after  an injury.  · Wrap an ice pack or a bag of frozen peas in a thin towel. (Never place ice directly on your skin.)  · Place the ice where your back hurts the most.  · Dont ice for more than 20 minutes at a time.  · You can use ice several times a day.  ? Medicines  Over-the-counter pain relievers can include acetaminophen and anti-inflammatory medicines, which includes aspirin or ibuprofen. They can help ease discomfort. Some also reduce swelling.  · Tell your healthcare provider about any medicines you are already taking.  · Take medicines only as directed.  ? Heat  After the first 48 hours, heat can relax sore muscles and improve blood flow.  · Try a warm bath or shower. Or use a heating pad set on low. To prevent a burn, keep a cloth between you and the heating pad.  · Dont use a heating pad for more than 15 minutes at a time. Never sleep on a heating pad.  Date Last Reviewed: 9/1/2015  © 4237-5772 [employer]. [address] [place] [address]. All rights reserved. This information is not intended as a substitute for professional medical care. Always follow your healthcare professional's instructions.        Caring for Your Back Throughout the Day  Take care of your back throughout the day. You will likely have fewer back problems if you do. Try to warm up before you move. Shift positions often. Also do your best to form healthy habits.    Warm up for the day  Do a few slow, catlike stretches before starting your day. This simple warmup can soften your disks, stretch your back muscles, and help prevent injuries.  Shift positions often  At work and at home, change positions often. This helps keep your body from getting stiff. Stand up or lean back while you sit. If you can, get up and move every 1/2 hour.  Form healthy habits  Here are some suggestions:   · Keep a healthy weight. When you weigh too much, your back is under excess strain. But losing just a few extra pounds can help a  lot.  · Try not to overeat. Learn about serving sizes. The size of a serving depends on the food and the food group. Many foods list serving sizes on the labels.  · Handle minor aches with cold and heat. Apply cold the first 24 to 48 hours. Use heat after that. Always place a thin cloth between your skin and the source of cold or heat.  · Take medicines as directed. This helps keep pain under control. Always read labels, and call your healthcare provider or pharmacist if you have any questions.  Walk each day  A daily walk keeps your back and thigh muscles stretched and strong. This gives your back better support. Be sure to walk with your spines three curves aligned, by keeping your head, hips, and toes connected by a vertical line.   Date Last Reviewed: 10/18/2015  © 4046-3346 [employer]. [address] [place] [address], [place] [address]. All rights reserved. This information is not intended as a substitute for professional medical care. Always follow your healthcare professional's instructions.        Relieving Tension in Your Back  Being relaxed helps keep your mind healthy and your back ready to move. Take short breaks often. Walk around. Stretch. Switch tasks. Also give the following a try.  Make time to relax. Start by setting aside 5 minutes daily.   Deep breathing    Deep breathing is a simple way to reduce stress. You can do it almost any time you need to relax.  · Inhale slowly through your nose. Let your lungs and stomach expand.  · Hold your breath for 2 to 3 seconds.  · Exhale slowly through your mouth until your lungs feel empty. Repeat 3 to 4 times.  Relieve tension  Muscle tension can create tender spots called trigger points. The tips below may help relieve muscle tension.  · Press the trigger point if you can reach it. If not, lie on a soft tennis ball, or ask a friend to press the spot. Use steady pressure for 10 to 15 seconds. Breathe deeply. Repeat a few times.  · Massage trigger points  with ice for 2 to 5 minutes. Press lightly at first. Slowly increase firmness.  Date Last Reviewed: 10/18/2015  © 3778-1654 [employer]. [address] [place] [address]. All rights reserved. This information is not intended as a substitute for professional medical care. Always follow your healthcare professional's instructions.        Back Exercises: Back Press    Do this exercise on your hands and knees. Keep your knees under your hips and your hands under your shoulders. Keep your spine in a neutral position (not arched or sagging). Be sure to maintain your necks natural curve:  · Tighten your stomach and buttock muscles to press your back upward. Let your head drop slightly.  · Hold for 5 seconds. Return to starting position.  · Repeat 5 times.  Date Last Reviewed: 10/11/2015  © 9286-6218 [employer]. [address] [place] [address]. All rights reserved. This information is not intended as a substitute for professional medical care. Always follow your healthcare professional's instructions.        Back Exercises: Back Release  Do this exercise on your hands and knees. Keep your knees under your hips and your hands under your shoulders.  · Relax your abdominal and buttocks muscles, lift your head, and let your back sag. Be sure to keep your weight evenly distributed. Dont sit back on your hips.   · Hold for 5 seconds.  · Return to starting position.  · Tuck your head and lift (arch) your back.  · Hold for 5 seconds  · Return to starting position.  · Repeat 5 times.  Date Last Reviewed: 8/16/2015  © 3150-6017 [employer]. [address] [place] [address]. All rights reserved. This information is not intended as a substitute for professional medical care. Always follow your healthcare professional's instructions.        Back Exercises: Leg Pull        To start, lie on your back with your knees bent and feet flat on the floor. Dont press your  neck or lower back to the floor. Breathe deeply. You should feel comfortable and relaxed in this position.  · Pull one knee to your chest.  · Hold for 30 to 60 seconds. Return to starting position.  · Repeat 2 times.  · Switch legs.  · For a double leg pull, pull both legs to your chest at the same time. Repeat 2 times.  For your safety, check with your healthcare provider before starting an exercise program.   Date Last Reviewed: 8/16/2015 © 2000-2016 [employer]. [address] [place] [address]. All rights reserved. This information is not intended as a substitute for professional medical care. Always follow your healthcare professional's instructions.      Back Exercise to Keep Fit for Low Back Pain  To help in your recovery and to prevent further back problems, keep your back, abdominal muscles and legs strong. Walk daily as soon as you can. Gradually add other physical activities such as swimming and biking, which can help improve lower back strength. Begin as soon as you can do them comfortably. Do not do any exercises that make your pain a lot worse. The following are some back exercises that can help relieve low back pain.     Pelvic tilt   Repeat 5-10 times, twice per day.  Lie flat on your back (or stand with your back to a wall), knees bent, feet flat on the floor, body relaxed. Tighten your abdominal and buttock muscles, and tilt your pelvis. The curve of the small of your back should flatten towards the floor (or wall). Hold 10 seconds and then relax.       Knee raise     Repeat 5-10 times, twice per day.    Lie flat on your back, knees bent. Bring one knee slowly to your chest. Hug your knee gently. Then lower your leg toward the floor, keeping your knee bent. Do not straighten your legs. Repeat exercise with your other leg.              Partial press-up     Lie face down on a soft, firm surface. Do not turn your head to either side. Rest your arms bent at the elbows alongside  your body. Relax for a few minutes. Then raise your upper body enough to lean on your elbows. Relax your lower back and legs as much as possible. Hold this position for 30 seconds at first. Gradually work up to five minutes. Or try slow press-ups. Hold each for five seconds, and repeat five to six times.              Copyright © 2012 by [employer]   Chaitanya MATT, Yaneth D, Carlos J, Isrrael B, Regan R, Ayala B, Justin K, Patrick C, Nury B, Dev S, Gricel L, Saurabh R. [employer]. Adult Acute and Subacute Low Back Pain. Updated November 2012.     Back Exercises: Lower Back Rotation    To start, lie on your back with your knees bent and feet flat on the floor. Dont press your neck or lower back to the floor. Breathe deeply. You should feel comfortable and relaxed in this position.  · Drop both knees to one side. Turn your head to the other side. Keep your shoulders flat on the floor.  · Do not push through pain.  · Hold for 20 seconds.  · Slowly switch sides.  · Repeat 2 to 5 times.  Date Last Reviewed: 10/11/2015  © 3518-0542 [employer]. [address] [place] [address]. All rights reserved. This information is not intended as a substitute for professional medical care. Always follow your healthcare professional's instructions.        Back Exercises: Lower Back Stretch                        To start, sit in a chair with your feet flat on the floor. Shift your weight slightly forward. Relax, and keep your ears, shoulders, and hips aligned.  · Sit with your feet well apart.  · Bend forward and touch the floor with the backs of your hands. Relax and let your body drop.  · Hold for 20 seconds. Return to starting position.  · Repeat 2 times.   Date Last Reviewed: 8/16/2015  © 6509-3823 [employer]. [address] [place] [address]. All rights reserved. This information is not intended as a substitute  for professional medical care. Always follow your healthcare professional's instructions.        Lumbar Stretch (Flexibility)    1. Lie on your back on the floor, with your knees bent and your feet flat on the floor. Dont press your neck or lower back to the floor.  2. Pull one knee up toward your chest. Clasp your hands under your thigh to help pull.  3. Hold for 30 to 60 seconds. Lower your leg back down to the floor.  4. Repeat 2 times, or as instructed.  5. Switch legs and repeat.  Date Last Reviewed: 3/10/2016  © 8059-9285 [employer]. [address] [place] [address]. All rights reserved. This information is not intended as a substitute for professional medical care. Always follow your healthcare professional's instructions.        Lumbar Flexion (Flexibility)    6. Lie on your back on the floor, with your knees bent and your feet flat on the floor.  7. Gently pull your knees up toward your chest. Put your hands under your thighs to help pull your knees up.  8. Press your lower back down to the floor. Hold for 20 seconds.  9. Lower your legs back down to the floor and relax.  10. Repeat 2 times, or as instructed.  Date Last Reviewed: 3/10/2016  © 1188-3215 [employer]. [address] [place] [address]. All rights reserved. This information is not intended as a substitute for professional medical care. Always follow your healthcare professional's instructions.        Back Safety: Bending  Bending can strain or even injure your back. Follow the tips below to move safely and protect your back as you perform everyday activities.  Bending over    · Keep your feet shoulder-width apart.  · Move your whole body as one unit.  · Bend at your hips and knees, not at your waist.  · Flatten your stomach and tighten your leg muscles.  · To keep your spine straight, let your buttocks move out behind you. Dont try to tuck them under.  · If you need to, place one hand on a sturdy  object for support.     Bending to the floor  · Lower yourself to one knee. If you can, rest one hand on a sturdy object to help lower yourself.  · Rest one arm on your raised knee.  · Dont bend at the waist.  · Do not hunch your back or neck to reach to the floor. Instead, bend more at your hips and knees to get closer.  Date Last Reviewed: 8/31/2015 © 2000-2016 [employer]. [address] [place] [address]. All rights reserved. This information is not intended as a substitute for professional medical care. Always follow your healthcare professional's instructions.        Back Safety: Lifting  Lifting can strain or even injure your back. Follow these tips to keep your back safe while you bend, lift, and carry.      Protect Your Back While Lifting       Step 1:  · Face the object.  · With your back straight, get down on one knee.  · If you can, tilt the object so one side lifts off the ground.  · Keep the object close to you. Step 2:  · Tighten your stomach muscles.  · Use your legs, arms, and buttocks to lift, not your back.  · Avoid twisting.  · Lift the object to your knee.  · Grasp the object firmly. Step 3:  · Lift with your arms and legs, not your back.  · Move quickly to help make this easier.   To carry an object:  · Hold it close to your body.  · Bend your knees slightly as you walk. The heavier the object, the more you should bend your knees.  · Get help with heavy or unbalanced objects.  Date Last Reviewed: 8/31/2015 © 2000-2016 [employer]. [address] [place] [address]. All rights reserved. This information is not intended as a substitute for professional medical care. Always follow your healthcare professional's instructions.        Back Safety: Sleeping Positions  Good posture protects your back when you sit, stand, and walk. It is also important while sleeping. Keep your ears, shoulders, and hips in line. Try the tips below. Also, be sure to follow  any guidelines from your health care provider.  If you lie on your back  Safe sleeping     Place pillows under your legs from your thighs to your ankles.     Ask your healthcare provider how firm your mattress should be.  · Find a position that keeps your back aligned and comfortable.  · Fill gaps between your body and the mattress with pillows.  · Never sleep on your back without bending your legs.  · Never sleep on your stomach.  If you lie on your side  Turning in bed     Support your upper body and top leg with pillows.    · If you change positions, you will need to move your pillows. This can become so natural that you hardly wake up.  · When you turn in bed, move your whole body as one unit.  · Tighten your stomach muscles. Bend your knees slightly toward your chest.  · Roll to one side, keeping your ears, shoulders, and hips in line. Keep a pillow between your legs.  · Be careful not to bend or twist at the waist.  Date Last Reviewed: 8/31/2015  © 5287-8147 [employer]. [address] [place] [address], [place] [address]. All rights reserved. This information is not intended as a substitute for professional medical care. Always follow your healthcare professional's instructions.

## 2017-06-13 NOTE — LETTER
June 13, 2017      Kathleen Campuzano NP  [address] [place] [address]           [facility] - Physiatry  [address] [place] [address]  Phone: [phone]  Fax: [phone]          Patient: Josette Altamirano   MR Number: [id number]   YOB: 1955   Date of Visit: 6/13/2017       Dear Kathleen Campuzano:    Thank you for referring Josette Altamirano to me for evaluation. Attached you will find relevant portions of my assessment and plan of care.    If you have questions, please do not hesitate to call me. I look forward to following Josette Altamirano along with you.    Sincerely,    Sarika Bonilla MD    Enclosure  CC:  No Recipients    If you would like to receive this communication electronically, please contact [email or web address] or [phone] to request more information on [employer] access.    For providers and/or their staff who would like to refer a patient to [facility], please contact us through our one-stop-shop provider referral line, [facility], at [phone].    If you feel you have received this communication in error or would no longer like to receive these types of communications, please e-mail [email or web address]

## 2017-06-15 ENCOUNTER — TELEPHONE (OUTPATIENT)
Dept: RADIOLOGY | Facility: HOSPITAL | Age: 62
End: 2017-06-15

## 2017-06-16 ENCOUNTER — HOSPITAL ENCOUNTER (OUTPATIENT)
Dept: RADIOLOGY | Facility: HOSPITAL | Age: 62
Discharge: HOME | End: 2017-06-16
Attending: NURSE PRACTITIONER
Payer: COMMERCIAL

## 2017-06-16 DIAGNOSIS — C50.412 MALIGNANT NEOPLASM OF UPPER-OUTER QUADRANT OF LEFT FEMALE BREAST: ICD-10-CM

## 2017-06-16 DIAGNOSIS — R79.9 ABNORMAL BLOOD CHEMISTRY: ICD-10-CM

## 2017-06-16 DIAGNOSIS — M54.50 BACK PAIN, LUMBOSACRAL: ICD-10-CM

## 2017-06-16 PROCEDURE — 72148 MRI LUMBAR SPINE W/O DYE: CPT | Mod: TC,PO

## 2017-06-16 PROCEDURE — 72148 MRI LUMBAR SPINE W/O DYE: CPT | Mod: 26,,, | Performed by: RADIOLOGY

## 2017-08-02 ENCOUNTER — OFFICE VISIT (OUTPATIENT)
Dept: PHYSICAL MEDICINE AND REHAB | Facility: CLINIC | Age: 62
End: 2017-08-02
Payer: COMMERCIAL

## 2017-08-02 ENCOUNTER — HOSPITAL ENCOUNTER (OUTPATIENT)
Dept: RADIOLOGY | Facility: HOSPITAL | Age: 62
Discharge: HOME | End: 2017-08-02
Attending: PHYSICAL MEDICINE & REHABILITATION
Payer: COMMERCIAL

## 2017-08-02 VITALS
WEIGHT: 243 LBS | RESPIRATION RATE: 14 BRPM | HEART RATE: 62 BPM | BODY MASS INDEX: 40.48 KG/M2 | SYSTOLIC BLOOD PRESSURE: 149 MMHG | DIASTOLIC BLOOD PRESSURE: 79 MMHG | HEIGHT: 65 IN

## 2017-08-02 DIAGNOSIS — M75.41 ROTATOR CUFF IMPINGEMENT SYNDROME OF RIGHT SHOULDER: Primary | ICD-10-CM

## 2017-08-02 DIAGNOSIS — M75.41 ROTATOR CUFF IMPINGEMENT SYNDROME OF RIGHT SHOULDER: ICD-10-CM

## 2017-08-02 PROCEDURE — 99999 PR PBB SHADOW E&M-EST. PATIENT-LVL III: CPT | Mod: PBBFAC,,, | Performed by: PHYSICAL MEDICINE & REHABILITATION

## 2017-08-02 PROCEDURE — 73030 X-RAY EXAM OF SHOULDER: CPT | Mod: TC,PO,RT

## 2017-08-02 PROCEDURE — 73030 X-RAY EXAM OF SHOULDER: CPT | Mod: 26,RT,, | Performed by: RADIOLOGY

## 2017-08-02 PROCEDURE — 99214 OFFICE O/P EST MOD 30 MIN: CPT | Mod: S$GLB,,, | Performed by: PHYSICAL MEDICINE & REHABILITATION

## 2017-08-02 RX ORDER — METHYLPREDNISOLONE 4 MG/1
TABLET ORAL
Qty: 1 PACKAGE | Refills: 0 | Status: SHIPPED | OUTPATIENT
Start: 2017-08-02 | End: 2017-08-23

## 2017-08-02 NOTE — PROGRESS NOTES
PM&R CLINIC NOTE    Chief Complaint   Patient presents with    Arm Pain     right, from shoulder down       HPI: This is a 61 y.o.  female being seen in clinic today for evaluation of right arm pain over the past month.  She feels a nagging, gnawing soreness in her shoulder with radiation into her upper arm.  With heavy lifting or lying on her left side, her arm bothers her.  Lying on her right side or resting her arm provides some relief.  She denies weakness or numbness and tingling.    History obtained from patient    Functional History:  Walking: Not limited  Transfers: Independent  Assistive devices: No  Power mobility: No  Falls: None   Directional preference:    Employment status:educator    Needs help with:  Nothing - all ADLS normal    Review of Systems:     General- denies lethargy, weight change, fever, chills  Head/neck- denies swallowing difficulties  ENT- denies hearing changes  Cardiovascular-denies chest pain  Pulmonary- denies shortness of breath  GI- denies constipation or bowel incontinence  - denies bladder incontinence  Skin- denies wounds or rashes  Musculoskeletal- denies weakness, +pain  Neurologic- denies numbness and tingling  Psychiatric- denies depressive or psychotic features, denies anxiety  Lymphatic-denies swelling  Endocrine- denies hypoglycemic symptoms/DM history    Physical Examination:  General: Well developed, well nourished female, NAD    Spinal Examination: CERVICAL  Active ROM is within normal limits.  Inspection: No deformity of spinal alignment.  Palpation: No vertebral tenderness to percussion.  ttp at right trapezius with tight bands, teres minor, deltoid  Spurling test: neg    Spinal Examination: LUMBAR or THORACIC  Active ROM is limited in full flex and extension  Inspection: No deformity of spinal alignment.      Bilateral Upper and Lower Extremities:  Pulses are 2+ at radial, bilaterally.  Shoulder/Elbow/Wrist/Hand ROM wnl except limited in right shoulder  abduction, IR, ttp at ac jt anteriorly, ttp at deltoid bursa, biceps tendon, neg speeds, +chirinos and neers, neg drop arm   Hip/Knee/Ankle ROM wnl  Bilateral Extremities show normal capillary refill.  No signs of cyanosis, rubor, edema, skin changes, or dysvascular changes of appendages.  Nails appear intact.    Neurological Exam:  Cranial Nerves:  II-XII grossly intact    Manual Muscle Testing: (Motor 5=normal)  5/5 strength bilateral upper extremities except for 3+p/5 at right sab  No focal atrophy is noted of either upper or lower extremity.    Bilateral Reflexes:hypo at patellar  No clonus at knee or ankle.    Sensation: tested to light touch  - intact in arms  Gait: Narrow base and good arm swing.    Cerebellar: tandem gait.     IMPRESSION/PLAN: This is a 61 y.o.  female with right rotator cuff impingement, h/o lumbar DJD/DDD, si joint and hip DJD, morbid obesity:Body mass index is 40.44 kg/m².    1. Handouts on shoulder exercise, modalities, impingement info. Discussed with patient about dx and treatment plan. She voiced understanding and is agreeable   2. Ice modalities 20 min  3. Cont zanaflex, medrol dose pack  4. Xray shoulder today-will send results via [facility]  5. If not improving will refer to formal PT    Sarika Bonilla M.D.  Physical Medicine and Rehab

## 2017-08-02 NOTE — PATIENT INSTRUCTIONS
Shoulder Impingement Syndrome  The rotator cuff is a group of muscles and tendons that surround the shoulder joint. These muscles and tendons hold the arm in its joint. They help the shoulder move. The rotator cuff muscles and tendons can become irritated from repeated rubbing against the shoulder bone. This is called shoulder impingement syndrome or rotator cuff tendonitis.    If your case is mild, you may only need to rest the shoulder and then do certain exercises to strengthen the muscles. You can also take anti-inflammatory medicines. Steroid injections into the shoulder can ease inflammation. But you can have only a limited number of these. If the condition gets worse, your shoulder muscles may become thin and weak. This can lead to a rotator cuff tear.  Symptoms of shoulder impingement syndrome may include:  · Shoulder pain that gets worse when you raise your arm overhead  · Weakness of the shoulder muscles when you use your arm overhead  · Popping and clicking when you move your shoulder  · Shoulder pain that wakes you up at night, especially when you sleep on the affected shoulder  · Sudden pain in your shoulder when you lift or reach  Home care  Follow these tips to take care of yourself at home:  · Avoid activities that make your pain worse. These include raising your arms overhead, repeating the same motion over and over, or lifting heavy objects.  · Dont hold your arm in one position for a long time. Keep it moving.  · Put an ice pack on the sore area for 20 minutes every 1 to 2 hours for the first day. You can make an ice pack by putting ice cubes in a plastic bag. Wrap the bag in a towel before putting it on your shoulder. A frozen bag of peas or something similar can also be used as an ice pack. Use the ice packs 3 to 4 times a day for the next 2 days. Continue using the ice to relieve of pain and swelling as needed.  · You may take acetaminophen or ibuprofen to control pain, unless another  medicine was prescribed. If prednisone was prescribed, dont take anti-inflammatory medicines. If you have chronic liver or kidney disease or ever had a stomach ulcer or gastrointestinal bleeding, talk with your doctor before using these medicines.  · After your symptoms ease, you may get physical therapy or start a home exercise program. This can strengthen your shoulder muscles and help your range of motion. Talk with your doctor about what is best for your condition.  Follow-up care  Follow up with your healthcare provider, or as advised.  When to seek medical advice  Call your healthcare provider right away if any of these occur:  · Shoulder pain that gets worse and wakes you up at night  · Your shoulder or arm swells  · Numbness, tingling, or pain that travels down the arm to the hand  · Loss of shoulder strength  · Fever or chills  Date Last Reviewed: 8/1/2016  © 9213-7209 [employer]. [address] [place] [address]. All rights reserved. This information is not intended as a substitute for professional medical care. Always follow your healthcare professional's instructions.        Understanding Shoulder Impingement Syndrome    Shoulder impingement syndrome is a problem with the shoulder joint. It occurs when certain parts within the joint swell and are pinched. This can cause nagging pain and problems with moving the arm.  What causes shoulder impingement syndrome?  It is possible to develop impingement after years of normal shoulder use. But in most cases the condition occurs because of repeated overhead movements. These include such things as stocking shelves, painting, swimming, and throwing. These movements can irritate parts of the shoulder, leading to swelling. Swollen parts of the shoulder take up more room, making the joint space smaller. Some parts that may be involved include:  · A sac of fluid (bursa) that cushions the shoulder joint.  When the bursa is irritated, it may  lead to a condition called bursitis. This is when the bursa swells with fluid, filling and squeezing the joint space.  · Fibrous tissues (tendons) that connect muscle to bone. When tendons are irritated, they may become swollen. This is called tendonitis.  · The end (acromion) of the shoulder blade. This bone may be flat or hooked. If the acromion is hooked, the joint space may be smaller than normal. Growths (bone spurs) on the acromion can also narrow the joint space. Acromion problems dont often cause impingement. But they can make it worse.  Symptoms of shoulder impingement syndrome  Symptoms include pain, pinching, or stiffness in your shoulder. Pain often comes with movement, particularly reaching overhead or backward. It may also be felt when the shoulder is at rest. Pain at night during sleep is common.  Treatment for shoulder impingement syndrome  Treatment will depend on the cause of the problem, how bad the problem is, and if other parts of the shoulder are damaged. Treatment may include the following:  · Active rest. This allows the shoulder to heal. It means using the arm and shoulder, but avoiding activities that cause pain. These likely include reaching overhead or sleeping on your shoulder.  · Cold packs. These help reduce swelling and relieve pain.  · Prescription or over-the-counter pain medicines. These help relieve pain and swelling.  · Arm and shoulder exercises. These help keep your shoulder joint mobile as it heals. They also help improve muscle strength around the joint.  · Shots of medicine into the joint. This can help reduce swelling and pain for a short time.  If other measures dont work to relieve symptoms, you may need surgery. This opens up space in the joint to allow pain-free motion.  Possible complications of shoulder impingement syndrome  It might be tempting to stop using your shoulder completely to avoid pain. But doing so may lead to a condition called frozen shoulder. To help  prevent this, follow instructions you are given for active rest and for doing exercises to help your shoulder heal.  When to call your healthcare provider  Call your healthcare provider right away if you have any of these:  · Fever of 100.4°F (38°C) or higher, or as directed  · Symptoms that dont get better, or get worse  · New symptoms   Date Last Reviewed: 3/10/2016  © 4768-6832 [employer]. [address] [place] [address], [place] [address]. All rights reserved. This information is not intended as a substitute for professional medical care. Always follow your healthcare professional's instructions.        Nonsurgical Treatment Options for Shoulder Impingement    Rest is key to healing your shoulder. If an activity hurts, dont do it. Otherwise, you may prevent healing and increase pain. Your shoulder needs active rest. This means avoiding overhead movements and activities that cause pain. But DO NOT stop using your shoulder completely. This can cause it to stiffen or freeze. In addition to rest, impingement can be treated a number of ways. Your healthcare provider can help you find which of these is best for you.  A physical therapist can also help you with exercises specific for your condition.  ? Ice  Ice reduces inflammation and relieves pain. Apply an ice pack for about 15 minutes, 3 times a day. You can also use a bag of frozen peas instead of an ice pack. A pillow placed under your arm may help make you more comfortable.  Note: Dont put the cold item directly on your skin. Place it on top of your shirt, or wrap it in a thin towel or washcloth.  ? Heat  Heat may soothe aching muscles, but it wont reduce inflammation. Use a heating pad or take a warm shower or bath. Do this for 15 minutes at a time.  Note: Avoid heat when pain is constant. Heat is best when used for warming up before an activity. You can also alternate ice and heat.  ? Medicine  To relieve pain and inflammation, try over-the-counter  pain relievers, such as acetaminophen or ibuprofen. Or, your healthcare provider may prescribe medicines. Ask how and when to take your medicine. Be sure to follow all instructions youre given.  ? Electrical stimulation  Electrical stimulation can help reduce pain and swelling. Your healthcare provider attaches small pads to your shoulder. A mild electric current then flows into your shoulder. You may feel tingling, but you should not feel pain.  ? Ultrasound  Ultrasound can help reduce pain. First a slick gel or medicated cream is applied to your shoulder. Then your healthcare provider places a small device over the area. The device uses sound waves to loosen shoulder tightness. This treatment should be pain-free.  ? Injection therapy  Injection therapy may be used to help diagnose your problem. It may also be used to reduce pain and inflammation. The injection typically includes two medicines. One is an anesthetic to numb the shoulder. The other is a steroid, such as cortisone, to help reduce painful swelling. It can take from a few hours to a couple of days before the injection helps. Talk to your healthcare provider about the possible risks and benefits of this therapy.  Date Last Reviewed: 10/14/2015  © 7112-8801 [employer]. [address] [place] [address]. All rights reserved. This information is not intended as a substitute for professional medical care. Always follow your healthcare professional's instructions.        Preventing Repetitive Motion Injury: Shoulder    Making changes in how you use your shoulder can lessen your chances of repetitive motion injuries (RMIs). Use your shoulder wisely by following the tips below.  Position yourself well  ?Here are suggestions to prevent RMIs:  · Avoid raising your arms when working above shoulder level.  · Use a stool or stepladder when needed to prevent awkward reaching.  · Keep your work within easy reach. This helps you avoid stretching  or twisting your arms and shoulders.  Vary your tasks  Avoid repetition in the following ways:  · Vary your on-the-job activity to help reduce the risk of RMIs.  · Give your shoulder enough time to rest and recover from stressful tasks.  · If one task causes you to feel pain, stop. Rest your shoulder. Go to another task if possible.  Limit force  Here are tips to reduce strain:  · Ask for help when you need it.  · Limit activities that could strain shoulder muscles and tendons. These include heavy lifting, pushing, and pulling especially overhead.. Get help when needed, or use dollies and wheelbarrows.  · Find the best tools for each activity. The best tool lets you use the least force.  · Rest before repeating a task that requires a lot of force. The more frequent the force, the greater the risk of RMIs.  Date Last Reviewed: 10/14/2015  © 4207-9165 [employer]. [address] [place] [address]. All rights reserved. This information is not intended as a substitute for professional medical care. Always follow your healthcare professional's instructions.        Exercises at Your Workstation: Shoulders  Tight shoulders? Aching back? A few easy moves can help your shoulders and back feel better. Take a few minutes during your day to do these exercises, right at your desk. They'll loosen up your muscles, keep you more alert, and make a big difference in how you work and feel.  Breathe deeply as you do your exercises. Inhale through your nose, and exhale through your mouth.     For your shoulders  Warm-up  · Drop your head gently to your chest. While breathing in, slowly roll your head up to your left shoulder. While breathing out, slowly roll your head back to center. Repeat to the right.  · Repeat 3 times on each side.  Shoulder raise  · Slowly raise your shoulders toward your ears. Hold for a few seconds.  · Slowly bring your shoulders down and relax.  · Repeat 3 times.    Back press  · Put your  hands up, forearms raised.  · Push your arms back, squeezing your shoulder blades. Hold for a few seconds, then relax.  · Repeat 3 times.  If you feel pain while doing these stretching exercises, please stop and consult your doctor.   Date Last Reviewed: 12/28/2015  © 7611-1532 [employer]. [address] [place] [address]. All rights reserved. This information is not intended as a substitute for professional medical care. Always follow your healthcare professional's instructions.        Parts of the Shoulder  The shoulder is the most flexible part of the body. The main joint in the shoulder is called the glenohumeral joint. This is where the arm bone (humerus) rests in a shallow socket called the glenoid. The bones in the shoulder are connected by ligaments, muscles, and other strong tissues. When the shoulder is healthy, you can move your arm in almost any direction (a full range of motion).    Date Last Reviewed: 9/10/2015  © 4135-8429 [employer]. [address] [place] [address]. All rights reserved. This information is not intended as a substitute for professional medical care. Always follow your healthcare professional's instructions.        Doorway Pectoral Stretch (Flexibility)    1.  an open doorway. Raise each arm up to the side, bent at 90-degree angles with palms forward. Rest your palms on the door frame.  2. Slowly step forward with one foot. Feel the stretch in your shoulders and chest. Stand upright and dont lean forward.  3. Hold for 30 seconds. Step back and relax.  4. Repeat 3 times, or as instructed.  Date Last Reviewed: 3/10/2016  © 1302-3785 [employer]. [address] [place] [address]. All rights reserved. This information is not intended as a substitute for professional medical care. Always follow your healthcare professional's instructions.        Shoulder and Upper Back Stretch  To start, stand tall with your  ears, shoulders, and hips in line. Your feet should be slightly apart, positioned just under your hips. Focus your eyes directly in front of you.  this position for a few seconds before starting your exercise. This helps increase your awareness of proper posture.  Reach overhead and slightly back with both arms. Keep your shoulders and neck aligned and your elbows behind your shoulders:  · With your palms facing the ceiling, turn your fingers inward.  · Take a deep breath. Breathe out, and lower your elbows toward your buttocks. Hold for 5 seconds, then return to starting position.  · Repeat 3 times.       Date Last Reviewed: 8/16/2015  © 8572-8462 [employer]. [address] [place] [address]. All rights reserved. This information is not intended as a substitute for professional medical care. Always follow your healthcare professional's instructions.        Shoulder Clock Exercise    To start, stand tall with your ears, shoulders, and hips in line. Your feet should be slightly apart, positioned just under your hips. Focus your eyes directly in front of you.  this position for a few seconds before starting your exercise. This helps increase your awareness of proper posture.  · Imagine that your right shoulder is the center of a clock. With the outer point of your shoulder, roll it around to slowly trace the outer edge of the clock.  · Move clockwise first, then counterclockwise.  · Repeat 3 to 5 times. Switch shoulders.   Date Last Reviewed: 10/2/2015  © 6199-3180 [employer]. [address] [place] [address]. All rights reserved. This information is not intended as a substitute for professional medical care. Always follow your healthcare professional's instructions.        Shoulder Girdle Stretch      To start, sit in a chair with your feet flat on the floor. Your weight should be slightly forward so that youre balanced evenly on your buttocks. Relax  your shoulders and keep your head level. Using a chair with arms may help you keep your balance:  · Place 1 hand on the outside elbow of the other arm.  · Pull the arm across your body. Hold for 30 to 60 seconds. Repeat once.  · Switch sides.  For your safety, check with your healthcare provider before starting an exercise program.   Date Last Reviewed: 8/16/2015  © 9988-4769 [employer]. [address] [place] [address]. All rights reserved. This information is not intended as a substitute for professional medical care. Always follow your healthcare professional's instructions.        Shoulder Exercises      To start, sit in a chair with your feet flat on the floor. Your weight should be slightly forward so that youre balanced evenly on your buttocks. Relax your shoulders and keep your head level. Avoid arching your back or rounding your shoulders. Using a chair with arms may help you keep your balance.  · Raise your arms, elbows bent, to shoulder height.  · Slowly move your forearms together. Hold for 5 seconds.  · Return to starting position. Repeat 5 times.  Date Last Reviewed: 10/1/2015  © 6310-5500 [employer]. [address] [place] [address]. All rights reserved. This information is not intended as a substitute for professional medical care. Always follow your healthcare professional's instructions.        Pendulum (Flexibility)    5. Lean over next to a table, with your left arm supporting your weight on the table.  6. Relax your right arm and let it hang straight down.  7. Slowly begin to swing your right arm in a small Saxman. Gradually make the Saxman bigger if you can. Change direction after 1 minute of motion.  8. Next, swing your right arm backward and forward. Then move it side to side. Change direction after 1 minute of motion.  9. Repeat these movements for about 5 minutes.  10. Do this exercise 3 times a day, or as often as instructed.  Date Last  "Reviewed: 3/10/2016  © 1114-9294 [employer]. [address] [place] [address]. All rights reserved. This information is not intended as a substitute for professional medical care. Always follow your healthcare professional's instructions.        "Reaching Up" for Shoulder Flexibility    This stretch can help restore shoulder flexibility and relieve pain over time. When stretching, be sure to breathe deeply. And follow any special instructions from your doctor or therapist.  11. Raise the hand on the side you want to stretch as high as you can. Then grasp a stable surface, such as a bookcase or a doorframe, with the same hand.  12. Keeping your arm straight, lower your body by bending your knees. Stop when you feel the stretch in the shoulder. Try to hold the stretch for 5 seconds.  13. Work up to doing 3 sets of this stretch, 3 times a day. Work up to holding the stretch for 30 to 60 seconds.  Note: Your back should remain straight. To enhance the stretch over time, try to bend your knees lower. Or, raise your arm higher at the start of the stretch.     Frozen shoulder  Frozen shoulder is another name for adhesive capsulitis. This causes restricted movement in the shoulder. If you have frozen shoulder, this stretch may cause discomfort, especially when you first get started. A few months may pass before you achieve the results you want. But once your shoulder heals, it rarely becomes frozen again. So stick to your stretching program. If you have any questions, be sure to ask your healthcare provider.   Date Last Reviewed: 10/14/2015  © 4433-8324 [employer]. [address] [place] [address]. All rights reserved. This information is not intended as a substitute for professional medical care. Always follow your healthcare professional's instructions.        Exercises for Shoulder Flexibility: Broom Stretch    Improving your flexibility can reduce pain. Stretching " exercises also can help increase your range of pain-free motion. Breathe normally when you exercise. Try to use smooth, fluid movements. Never force a stretch.  · Stand up or lie on the floor. Place the palm of your hand over the end of a broomstick or cane. Grasp the stick farther down with the other hand, palm facing down.  · Push the end of the stick up on the side of your injured shoulder as high as is reasonably comfortable.  · Hold for a few seconds. Return to the starting position.  · Repeat 3 to 5 times. Build up to holding each stretch for 10 to 30 seconds.  Note: Follow any special instructions you are given. If you feel pain, stop the exercise. If the pain continues after stopping, call your healthcare provider.   Date Last Reviewed: 10/14/2015  © 8087-0697 [employer]. [address] [place] [address], [place] [address]. All rights reserved. This information is not intended as a substitute for professional medical care. Always follow your healthcare professional's instructions.

## 2017-08-03 ENCOUNTER — PATIENT MESSAGE (OUTPATIENT)
Dept: PHYSICAL MEDICINE AND REHAB | Facility: CLINIC | Age: 62
End: 2017-08-03

## 2017-11-08 ENCOUNTER — OFFICE VISIT (OUTPATIENT)
Dept: INTERNAL MEDICINE | Facility: CLINIC | Age: 62
End: 2017-11-08
Payer: COMMERCIAL

## 2017-11-08 VITALS
RESPIRATION RATE: 18 BRPM | DIASTOLIC BLOOD PRESSURE: 62 MMHG | WEIGHT: 242.5 LBS | HEART RATE: 81 BPM | BODY MASS INDEX: 40.4 KG/M2 | SYSTOLIC BLOOD PRESSURE: 104 MMHG | TEMPERATURE: 100 F | HEIGHT: 65 IN

## 2017-11-08 DIAGNOSIS — M76.62 TENDONITIS, ACHILLES, LEFT: ICD-10-CM

## 2017-11-08 DIAGNOSIS — R05.9 COUGH: ICD-10-CM

## 2017-11-08 DIAGNOSIS — J06.9 UPPER RESPIRATORY TRACT INFECTION, UNSPECIFIED TYPE: ICD-10-CM

## 2017-11-08 DIAGNOSIS — R50.9 FEVER, UNSPECIFIED FEVER CAUSE: Primary | ICD-10-CM

## 2017-11-08 PROCEDURE — 99999 PR PBB SHADOW E&M-EST. PATIENT-LVL III: CPT | Mod: PBBFAC,,, | Performed by: FAMILY MEDICINE

## 2017-11-08 PROCEDURE — 96372 THER/PROPH/DIAG INJ SC/IM: CPT | Mod: S$GLB,,, | Performed by: FAMILY MEDICINE

## 2017-11-08 PROCEDURE — 99214 OFFICE O/P EST MOD 30 MIN: CPT | Mod: 25,S$GLB,, | Performed by: FAMILY MEDICINE

## 2017-11-08 RX ORDER — METHYLPREDNISOLONE ACETATE 80 MG/ML
80 INJECTION, SUSPENSION INTRA-ARTICULAR; INTRALESIONAL; INTRAMUSCULAR; SOFT TISSUE
Status: COMPLETED | OUTPATIENT
Start: 2017-11-08 | End: 2017-11-08

## 2017-11-08 RX ORDER — PREDNISONE 20 MG/1
40 TABLET ORAL DAILY
Qty: 8 TABLET | Refills: 0 | Status: SHIPPED | OUTPATIENT
Start: 2017-11-08 | End: 2017-11-12

## 2017-11-08 RX ORDER — MELOXICAM 7.5 MG/1
7.5 TABLET ORAL 2 TIMES DAILY
Qty: 20 TABLET | Refills: 0 | Status: SHIPPED | OUTPATIENT
Start: 2017-11-08 | End: 2018-01-03

## 2017-11-08 RX ADMIN — METHYLPREDNISOLONE ACETATE 80 MG: 80 INJECTION, SUSPENSION INTRA-ARTICULAR; INTRALESIONAL; INTRAMUSCULAR; SOFT TISSUE at 10:11

## 2017-11-08 NOTE — PATIENT INSTRUCTIONS
Use over the counter ankle brace for next few weeks to help immobilize ankle joint.  Drink plenty of fluids and get plenty of rest.  Start prednisone tomorrow.  Understanding Achilles Tendonitis    Achilles tendonitis is an overuse injury. It results in inflammation of the Achilles tendon. This tendon is found on the back of the ankle. It links the calf muscle to the heel bone. It helps you do pushing-off movements like running or standing on your toes.     How to say it  -[facility], [place], [facility]   What causes Achilles tendonitis?  Achilles tendonitis can happen if you do an activity like running, walking, or jumping too much. This overuse can strain, or pull, the tendon. It may lead to minor tearing of the tendon. An injury to the lower leg or foot can also cause it.  If you dont warm up before taking part in sports such as basketball, you are more likely to suffer from this condition. You are also more prone to it if you do too much of such an activity too quickly. Proper training and rest can help prevent it.  Symptoms of Achilles tendonitis  The main symptom of Achilles tendonitis is pain. This pain mostly happens when you move the ankle. The tendon may also feel stiff after a period of no activity, such as sleeping. It may also become swollen. You may hear a crackling sound when you move your ankle.  Treatment for Achilles tendonitis  Symptoms often get better after starting treatment. A full recovery may take several months. Treatments include:  · Rest. You should stop or change the activity that caused the injury. The tendon will then have time to heal.  · Cold or heat pack. These help reduce pain and swelling.  · Prescription or over-the-counter pain medicines. These help reduce pain and swelling.  · Shoe inserts. These devices can reduce strain on the Achilles tendon when you move. You may then feel less pain.  · Stretching and strengthening exercises. Certain exercises can help you regain flexibility and  strength in your Achilles tendon.  · Surgery. This option can fix the injured tendon. But you dont often need it unless other treatments dont work.     When to call your healthcare provider   Call your healthcare provider right away if you have any of these:  · Fever of 100.4°F (38°C) or higher, or as directed  · Pain that gets worse  · Symptoms that dont get better, or get worse  · New symptoms    Date Last Reviewed: 3/10/2016  © 2482-6217 [employer]. [address] [place] [address], [place] [address]. All rights reserved. This information is not intended as a substitute for professional medical care. Always follow your healthcare professional's instructions.

## 2017-11-08 NOTE — PROGRESS NOTES
Subjective:       Patient ID: Josette Altamirano is a 62 y.o. female.    Chief Complaint: URI (Patient complains of uri sx)      Patient reports she has been feeling bad for about 3 days now. Reports she didn't wear a coat over the weekend and started feeling bad the next day. Complaining of congestion, cough, body aches, headache, increased fatigue, subjective fevers and chills. Works as teacher with multiple sick contacts.  Also complaining of pain in left posterior heel intermittently for a couple months now. Does have to be on her feet during most of the day at work but no injury or trauma to area.      URI    Associated symptoms include congestion, coughing, headaches, nausea and a sore throat. Pertinent negatives include no abdominal pain, chest pain, diarrhea, ear pain, rash, sinus pain, vomiting or wheezing.     Review of Systems   Constitutional: Positive for activity change, appetite change, chills, fatigue and fever.   HENT: Positive for congestion, postnasal drip and sore throat. Negative for ear discharge, ear pain, sinus pain and sinus pressure.    Eyes: Negative for visual disturbance.   Respiratory: Positive for cough. Negative for chest tightness, shortness of breath and wheezing.    Cardiovascular: Negative for chest pain.   Gastrointestinal: Positive for nausea. Negative for abdominal pain, diarrhea and vomiting.   Endocrine: Negative for polyuria.   Musculoskeletal: Negative for gait problem and joint swelling.   Skin: Negative for color change and rash.   Allergic/Immunologic: Negative for immunocompromised state.   Neurological: Positive for headaches. Negative for dizziness.   Hematological: Negative for adenopathy. Does not bruise/bleed easily.   Psychiatric/Behavioral: Negative for sleep disturbance.     Past Medical History:   Diagnosis Date    Arthritis of right knee     Breast cancer     She has a history of a Stage IIA, TXN1M0 intracystic papillary carcinoma with multifocal DCIS and one of  "three positive sentinel lymph nodes diagnosed in Feb 2007. She subsequently underwent left mastectomy and sentinel node biopsy followed by axillary dissection March 23, 2007. Histopathologic evaluation demonstrated again multiple foci of intracystic papillary carcinoma, as well as DCIS noncomed    Cardiomyopathy     CHEMO INDUCED- RESOLVED    CHF (congestive heart failure)     systolic    Hypertension     Uterine fibroid      Past Surgical History:   Procedure Laterality Date    BREAST BIOPSY      BREAST SURGERY      CHOLECYSTECTOMY      MASTECTOMY      left    MEDIPORT INSERTION, SINGLE       Family History   Problem Relation Age of Onset    Diabetes Mother     Diabetes Father     Stroke Father     Cancer Maternal Aunt 55     breast cancer    Stroke Paternal Aunt     Melanoma Neg Hx     Psoriasis Neg Hx     Lupus Neg Hx     Eczema Neg Hx      Social History     Social History    Marital status:      Spouse name: N/A    Number of children: N/A    Years of education: N/A     Occupational History    Not on file.     Social History Main Topics    Smoking status: Never Smoker    Smokeless tobacco: Not on file    Alcohol use 0.0 oz/week      Comment: Ocassionally    Drug use: No    Sexual activity: Yes     Partners: Male     Birth control/ protection: None, Post-menopausal     Other Topics Concern    Not on file     Social History Narrative    No narrative on file     Review of patient's allergies indicates:  No Known Allergies    Objective:       /62   Pulse 81   Temp 100.1 °F (37.8 °C) (Tympanic)   Resp 18   Ht 5' 5" (1.651 m)   Wt 110 kg (242 lb 8.1 oz)   BMI 40.36 kg/m²   Physical Exam   Constitutional: She is oriented to person, place, and time. Vital signs are normal. She appears well-developed and well-nourished. No distress.   HENT:   Head: Normocephalic and atraumatic.   Right Ear: Hearing, tympanic membrane, external ear and ear canal normal.   Left Ear: Hearing, " tympanic membrane, external ear and ear canal normal.   Nose: Nose normal.   Mouth/Throat: Uvula is midline and mucous membranes are normal. Oropharyngeal exudate and posterior oropharyngeal erythema present.   Eyes: Conjunctivae and EOM are normal. Pupils are equal, round, and reactive to light.   Neck: No tracheal deviation present. No thyromegaly present.   Cardiovascular: Normal rate, regular rhythm, normal heart sounds and intact distal pulses.    No murmur heard.  Pulmonary/Chest: Effort normal and breath sounds normal. No respiratory distress.   Abdominal: Soft. Bowel sounds are normal. No hernia.   Musculoskeletal: Normal range of motion. She exhibits no edema or deformity. Tenderness: left achilles tendon.   Normal ROM left ankle, normal pulses, cap refill.   Tenderness to achilles tendon only   Lymphadenopathy:     She has no cervical adenopathy.   Neurological: She is alert and oriented to person, place, and time.   Skin: Skin is warm and dry. Capillary refill takes less than 2 seconds. She is not diaphoretic.   Psychiatric: She has a normal mood and affect. Her behavior is normal. Judgment and thought content normal.   Nursing note and vitals reviewed.    Assessment:     1. Fever, unspecified fever cause    2. Cough    3. Tendonitis, Achilles, left    4. Upper respiratory tract infection, unspecified type      Plan:   Fever, unspecified fever cause  -     POCT Influenza A/B    Cough  -     POCT Influenza A/B    Tendonitis, Achilles, left    Upper respiratory tract infection, unspecified type    Other orders  -     meloxicam (MOBIC) 7.5 MG tablet; Take 1 tablet (7.5 mg total) by mouth 2 (two) times daily.  Dispense: 20 tablet; Refill: 0  -     predniSONE (DELTASONE) 20 MG tablet; Take 2 tablets (40 mg total) by mouth once daily.  Dispense: 8 tablet; Refill: 0  -     methylPREDNISolone acetate injection 80 mg; Inject 1 mL (80 mg total) into the muscle one time.      Medication List with Changes/Refills    New Medications    MELOXICAM (MOBIC) 7.5 MG TABLET    Take 1 tablet (7.5 mg total) by mouth 2 (two) times daily.    PREDNISONE (DELTASONE) 20 MG TABLET    Take 2 tablets (40 mg total) by mouth once daily.   Current Medications    CARVEDILOL (COREG CR) 80 MG 24 HR CAPSULE    Take 1 capsule (80 mg total) by mouth once daily.    TIZANIDINE (ZANAFLEX) 2 MG TABLET    Take 1 tablet (2 mg total) by mouth nightly as needed (for back pain).

## 2017-11-08 NOTE — LETTER
November 8, 2017      [facility]  [id number] [place] [id number]  Phone: [phone]       Patient: Josette Altamirano   YOB: 1955  Date of Visit: 11/08/2017    To Whom It May Concern:    Jess Altamirano  was at [facility] on 11/08/2017. She may return to work/school on 11/13/2017 with no restrictions. If you have any questions or concerns, or if I can be of further assistance, please do not hesitate to contact me.    Sincerely,    Juanita Phillips LPN/LORNA Dominguez M.D.

## 2018-01-02 NOTE — PROGRESS NOTES
CC: Breast cancer follow-up     HPI: breast cancer follow-up     Pt has a history of a Stage IIA, 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 She subsequently underwent left mastectomy and sentinel node biopsy followed by axillary dissection  March 23, 2007. Histopathologic evaluation demonstrated again multiple foci of intracystic papillary carcinoma, as well as DCIS noncomedo type. One of three sentinel lymph nodes demonstrated micrometastatic disease. Twelve additional axillary lymph nodes were obtained, all of which were negative for disease. Estrogen receptors were 11%. Progesterone receptors 4%. HER2/bob was not over-expressed. She subsequently underwent additional studies including a MUGA scan demonstrating an injection  fraction of 39%, thus she was treated with six cycles of Cytoxan and Taxotere which were well tolerated.    9/28/07 hormone studies suggested the pt was menopausal so she was started on Arimidex for post adjuvant therapy. Therapy completed 9/2012.    Patient presents for her 6 mon f/u exam and had her annual mammo today- results are pending.    Pt states her previous back pain has not been a bother lately. States her right shoulder and left shoulder are aching and she feels like they are getting worse. Has to sleep on her back due to the discomfort in her shoulder if she lays on one side. Denies any numbness into her hands. Describes as an aching and throbbing sensation in the shoulders that is exacerbated with movement.    Pt did not have PT but states has been doing the exercises given. Has not followed up with Dr. Bonilla regarding her persistent discomfort. States the prednisone she gave her helped but at the end of the script she began to have extremely dark urine that resolved after completion of the med. No other episodes.    Pt has not had f/u with Urology as recommended for her hematuria- denies  any episodes of visible hematuria, no vaginal discharge and no blood in her bowel movements. Due for f/u with Dr. Kwong- pt states she will make the appt soon.     PMH: Chronic systolic heart failure discovered prior to chemo. Obesity, HTN, Breast cancer left, Fibrocystic breast condition. Achilles tendonitis, history of hematuria with negative workup, DDD and bulging disc    PSH: Lap jonathan in 2004, left modified radical mastectomy with sentinel and axillary node biospy. Multiple breast biopsies. Mediport placement and removal.    SOCIAL:Patient is . She is a teacher. She occasionally drinks alcohol. No tobacco use.     FH: Multiple relatives with diabetes.    PCP: Caridad Harrington    Last mammo: 10/4/16 - wnl.     Last gyn visit- 8/18/16    ROS: Denies any headaches, nausea or emesis. No chest pain, cough or SOB. No fever or night sweats. No abd pain or cramping. No change in bowel or bladder function or character. No hematuria- history of DUB with negative biopsy attributed to fibroids.  No wt loss or wt gain. No lower extremity edema. No weakness or fatigue out of the ordinary. Denies any dysuria, no suprapubic pain, back pain improved, no increase in urinary frequency, no change in bowel movement frequency or character. No hematochezia, no more episodes of discolored urine.  Denies any right breast pain or nipple discharge. No breast lumps. Not exercising.     PHYSICAL EXAM:  HEAD: Atraumatic and normocephalic.  EYES:Conjunctivae nonicteric, PERRLA  NARIES: Patent with pale mucosa, scant clear mucus.  MOUTH: No oral lesions, no posterior pharyngeal exudates or erythema.  NECK: Supple. Soft, Symmetric. Trachea midline. No thyromegaly.  LYMPHATICS: There is no supraclavicular, infraclavicular or cervical adenopathy. No axillary adenopathy.  LUNGS: Clear to auscultation. Unlabored respiratory effort. No CVA tenderness  HEART: RRR with no murmur or gallop.  BREASTS: Right breast and left chest wall  reveals no visible mass, erythema, rash, dimpling or peaudeorange appearance. No palpable mass in the right breast or over the left chest wall. Her palpable tenderness over the left chest wall is over the muscle and rib spaces. No palpable abnormality. No nipple discharge noted from the right breast with compression. Has an island of glandular tissue in the upper outer quadrant of the right breast that blends with the surrounding breast tissue.   ABDOMEN: Soft, nontender with no hepatosplenomegaly, guarding or rebound. No CVA tenderness. No suprapubic tenderness. No palpable evidence of groin hernia dallin.   EXT: Bilateral lower extremity exam reveals 2+ pedal pulses. No edema. No asymmetry of the hands or feet. 5/5 strength in upper and lower extremities that is symmetric. No evidence of lymph edema left arm. Bilateral shoulder crepitus with ROM- negative drop sign bilaterally.   SKIN: Warm and dry to touch with no rash. No scaliness or dryness.  PSYCH: Affect appropriate.    Mammogram today- results pending    ASSESSMENT:  1. Breast cancer as previously described. Arimidex therapy completed.  2. Heart failure stable and followed by cardiology.  3. Obesity. Encouraged pt to continue with exercise and dietary changes.   4. hematuria workup benign- followed by urology - needs f/u appt  5. History of vaginal discharge scant with tiny blood clot - fibroids- no other episodes  6. Vaginal dryness  7. Mildly elevated bilirubin 2/2017 and stable June 2017- will recheck today  8. Left chest wall pain resolved. Low back pain resolved.     PLAN/REC:  1. cbc, and cmp to [facility].- will communicate lab results through [facility] - If abnormal will evaluate further  2. F/u with Dr. Bonilla regarding her bilateral shoulder pain that has persisted and exacerbated.   3. Encouraged exercise  4. BSE monthly.Call for any changes.  5. Signs of recurrence discussed with pt who verbalizes understanding.  6. Will notify of mammo results-  if wnl plan right diagnostic mammo 1/2019 and exam same day. 6 mon f/u July 2018 for clinical exam    Kathleen Campuzano, RN, MSN, NP-C

## 2018-01-03 ENCOUNTER — OFFICE VISIT (OUTPATIENT)
Dept: SURGERY | Facility: CLINIC | Age: 63
End: 2018-01-03
Payer: COMMERCIAL

## 2018-01-03 ENCOUNTER — HOSPITAL ENCOUNTER (OUTPATIENT)
Dept: RADIOLOGY | Facility: HOSPITAL | Age: 63
Discharge: HOME | End: 2018-01-03
Attending: NURSE PRACTITIONER
Payer: COMMERCIAL

## 2018-01-03 VITALS
DIASTOLIC BLOOD PRESSURE: 84 MMHG | WEIGHT: 242 LBS | RESPIRATION RATE: 18 BRPM | HEIGHT: 65 IN | BODY MASS INDEX: 40.55 KG/M2 | OXYGEN SATURATION: 96 % | SYSTOLIC BLOOD PRESSURE: 122 MMHG | WEIGHT: 243.38 LBS | HEART RATE: 71 BPM | HEIGHT: 65 IN | BODY MASS INDEX: 40.32 KG/M2

## 2018-01-03 DIAGNOSIS — M25.512 CHRONIC PAIN OF BOTH SHOULDERS: ICD-10-CM

## 2018-01-03 DIAGNOSIS — Z12.31 VISIT FOR SCREENING MAMMOGRAM: ICD-10-CM

## 2018-01-03 DIAGNOSIS — C50.412 MALIGNANT NEOPLASM OF UPPER-OUTER QUADRANT OF LEFT BREAST IN FEMALE, ESTROGEN RECEPTOR POSITIVE: Primary | ICD-10-CM

## 2018-01-03 DIAGNOSIS — G89.29 CHRONIC PAIN OF BOTH SHOULDERS: ICD-10-CM

## 2018-01-03 DIAGNOSIS — M25.511 CHRONIC PAIN OF BOTH SHOULDERS: ICD-10-CM

## 2018-01-03 DIAGNOSIS — Z17.0 MALIGNANT NEOPLASM OF UPPER-OUTER QUADRANT OF LEFT BREAST IN FEMALE, ESTROGEN RECEPTOR POSITIVE: Primary | ICD-10-CM

## 2018-01-03 DIAGNOSIS — E66.01 OBESITY, CLASS III, BMI 40-49.9 (MORBID OBESITY): ICD-10-CM

## 2018-01-03 DIAGNOSIS — Z85.3 PERSONAL HISTORY OF BREAST CANCER: ICD-10-CM

## 2018-01-03 PROCEDURE — 77067 SCR MAMMO BI INCL CAD: CPT | Mod: 26,52,, | Performed by: RADIOLOGY

## 2018-01-03 PROCEDURE — 77067 SCR MAMMO BI INCL CAD: CPT | Mod: 52,TC,PO

## 2018-01-03 PROCEDURE — 99214 OFFICE O/P EST MOD 30 MIN: CPT | Mod: S$GLB,,, | Performed by: NURSE PRACTITIONER

## 2018-01-03 PROCEDURE — 99999 PR PBB SHADOW E&M-EST. PATIENT-LVL III: CPT | Mod: PBBFAC,,, | Performed by: NURSE PRACTITIONER

## 2018-01-04 DIAGNOSIS — D50.9 MICROCYTIC ANEMIA: Primary | ICD-10-CM

## 2018-01-04 NOTE — PROGRESS NOTES
Tried to reach pt with lab results- mild microcytic anemia - needs iron studies and alpha globulin gene rearrangement study to rule out thalassemia.     Left message for her to call for results.

## 2018-01-05 ENCOUNTER — LAB VISIT (OUTPATIENT)
Dept: LAB | Facility: HOSPITAL | Age: 63
End: 2018-01-05
Attending: NURSE PRACTITIONER
Payer: COMMERCIAL

## 2018-01-05 DIAGNOSIS — D50.9 MICROCYTIC ANEMIA: ICD-10-CM

## 2018-01-05 LAB
FERRITIN SERPL-MCNC: 194 NG/ML
IRON SERPL-MCNC: 80 UG/DL
RETICS/RBC NFR AUTO: 1.5 %
SATURATED IRON: 22 %
TOTAL IRON BINDING CAPACITY: 357 UG/DL
TRANSFERRIN SERPL-MCNC: 241 MG/DL

## 2018-01-05 PROCEDURE — 83540 ASSAY OF IRON: CPT

## 2018-01-05 PROCEDURE — 82728 ASSAY OF FERRITIN: CPT

## 2018-01-05 PROCEDURE — 83020 HEMOGLOBIN ELECTROPHORESIS: CPT

## 2018-01-05 PROCEDURE — 85045 AUTOMATED RETICULOCYTE COUNT: CPT

## 2018-01-09 LAB
HGB A2 MFR BLD HPLC: 2.5 %
HGB FRACT BLD ELPH-IMP: NORMAL
HGB FRACT BLD ELPH-IMP: NORMAL

## 2018-01-10 DIAGNOSIS — I42.7 CARDIOMYOPATHY DUE TO CHEMOTHERAPY: ICD-10-CM

## 2018-01-10 DIAGNOSIS — T45.1X5A CARDIOMYOPATHY DUE TO CHEMOTHERAPY: ICD-10-CM

## 2018-01-11 RX ORDER — CARVEDILOL PHOSPHATE 80 MG/1
CAPSULE, EXTENDED RELEASE ORAL
Qty: 90 CAPSULE | Refills: 3 | Status: SHIPPED | OUTPATIENT
Start: 2018-01-11 | End: 2019-01-11 | Stop reason: SDUPTHER

## 2018-01-15 ENCOUNTER — OFFICE VISIT (OUTPATIENT)
Dept: PHYSICAL MEDICINE AND REHAB | Facility: CLINIC | Age: 63
End: 2018-01-15
Payer: COMMERCIAL

## 2018-01-15 VITALS
WEIGHT: 243 LBS | SYSTOLIC BLOOD PRESSURE: 144 MMHG | RESPIRATION RATE: 12 BRPM | DIASTOLIC BLOOD PRESSURE: 85 MMHG | HEIGHT: 65 IN | HEART RATE: 69 BPM | BODY MASS INDEX: 40.48 KG/M2

## 2018-01-15 DIAGNOSIS — M75.41 ROTATOR CUFF IMPINGEMENT SYNDROME OF RIGHT SHOULDER: ICD-10-CM

## 2018-01-15 DIAGNOSIS — M79.18 MYOFASCIAL PAIN: ICD-10-CM

## 2018-01-15 DIAGNOSIS — M75.50: Primary | ICD-10-CM

## 2018-01-15 PROCEDURE — 99999 PR PBB SHADOW E&M-EST. PATIENT-LVL IV: CPT | Mod: PBBFAC,,, | Performed by: PHYSICAL MEDICINE & REHABILITATION

## 2018-01-15 PROCEDURE — 99214 OFFICE O/P EST MOD 30 MIN: CPT | Mod: S$GLB,,, | Performed by: PHYSICAL MEDICINE & REHABILITATION

## 2018-01-15 RX ORDER — METHYLPREDNISOLONE 4 MG/1
TABLET ORAL
Qty: 1 PACKAGE | Refills: 0 | Status: SHIPPED | OUTPATIENT
Start: 2018-01-15 | End: 2018-02-05

## 2018-01-15 NOTE — PROGRESS NOTES
PM&R CLINIC NOTE    Chief Complaint   Patient presents with    Shoulder Pain     right       HPI: This is a 62 y.o.  female being seen in clinic today for return of right shoulder/arm pain with new left shoulder similar pain.  She has difficulty lying on her sides at night and isn't able to lift much with her right arm.   She denies numbness and tingling.    History obtained from patient    Functional History:  Walking: Not limited  Transfers: Independent  Assistive devices: No  Power mobility: No  Falls: None   Directional preference:    Employment status:educator    Needs help with:  Nothing - all ADLS normal    Review of Systems:     General- denies lethargy, weight change, fever, chills  Head/neck- denies swallowing difficulties  ENT- denies hearing changes  Cardiovascular-denies chest pain  Pulmonary- denies shortness of breath  GI- denies constipation or bowel incontinence  - denies bladder incontinence  Skin- denies wounds or rashes  Musculoskeletal- denies weakness, +pain  Neurologic- denies numbness and tingling  Psychiatric- denies depressive or psychotic features, denies anxiety  Lymphatic-denies swelling  Endocrine- denies hypoglycemic symptoms/DM history    Physical Examination:  General: Well developed, well nourished female, NAD  HEENT: NCAT EOMI  Pulmonary: Normal respirations    Spinal Examination: CERVICAL  Active ROM is within normal limits.  Inspection: No deformity of spinal alignment.  Palpation:  Tight and mild  ttp at bilateral trapezius, very ttp at bilateral deltoid bursas    Spinal Examination: LUMBAR or THORACIC  Active ROM is limited in full flex and extension  Inspection: No deformity of spinal alignment.      Bilateral Upper and Lower Extremities:  Pulses are 2+ at radial, bilaterally.  Shoulder/Elbow/Wrist/Hand ROM wnl except limited in full shoulder abduction bilaterally-worse on right, ttp at ac jt anteriorly, ttp at deltoid bursa, neg drop arm  Hip/Knee/Ankle ROM wnl  Bilateral  Extremities show normal capillary refill.  No signs of cyanosis, rubor, edema, skin changes, or dysvascular changes of appendages.  Nails appear intact.    Neurological Exam:  Cranial Nerves:  II-XII grossly intact    Manual Muscle Testing: (Motor 5=normal)  5/5 strength bilateral upper extremities except for 4p/5 at sab  No focal atrophy is noted of either upper or lower extremity.    Bilateral Reflexes:hypo at patellar  No clonus at knee or ankle.    Sensation: tested to light touch  - intact in arms  Gait: Narrow base and good arm swing.    Cerebellar: tandem gait.     IMPRESSION/PLAN: This is a 62 y.o.  female with bilateral mild rotator cuff impingement, deltoid bursitis, mild myofascial pain, morbid obesity:Body mass index is 40.44 kg/m².    1. Handouts on shoulder exercise, modalities, ROM. Discussed in detail with patient about dx and treatment plan. She voiced understanding and is agreeable   2. Ice modalities 20 min  3. medrol dose pack  4. formal PT-phuong--ROM, strengthening, myofascial release, stretch, modalities, HEP, dec pain  5. If not improving, will consider referral to odette Bonilla M.D.  Physical Medicine and Rehab

## 2018-01-15 NOTE — PATIENT INSTRUCTIONS
Pendulum (Flexibility)    1. Lean over next to a table, with your left arm supporting your weight on the table.  2. Relax your right arm and let it hang straight down.  3. Slowly begin to swing your right arm in a small [place]. Gradually make the [place] bigger if you can. Change direction after 1 minute of motion.  4. Next, swing your right arm backward and forward. Then move it side to side. Change direction after 1 minute of motion.  5. Repeat these movements for about 5 minutes.  6. Do this exercise 3 times a day, or as often as instructed.  Date Last Reviewed: 3/10/2016  © 7415-5319 [employer]. [address] [place] [address], [place] [address]. All rights reserved. This information is not intended as a substitute for professional medical care. Always follow your healthcare professional's instructions.        Rotator Cuff Tear  The rotator cuff is a group of muscles and tendons that surround the shoulder joint. These muscles and tendons hold the arm in its joint. They also help the shoulder to rotate. The rotator cuff can be torn from overuse or injury. Gradual wear and tear can lead to inflammation of these tendons. This can progress to gradual or sudden tears.  Symptoms of a torn rotator cuff include:  · Shoulder pain that gets worse when you raise your arm overhead  · Weakness of the shoulder muscles with overhead activity  · Popping and clicking when you move your shoulder  · Shoulder pain that wakes you up at night when sleeping on the hurt shoulder  Diagnosis is made by an MRI or arthroscopy. This is a surgical procedure to look inside the joint through a small tube. Partial rotator cuff tears can be treated by first resting, then strengthening the rotator cuff muscles.  Anti-inflammatory medicines, such as ibuprofen or naproxen, are useful. A limited number of steroid injections can be given. Surgery may be recommended for complete tears and partial tears that do not respond to medical  treatment.  Home care  · Avoid activities that make your pain worse. This includes overhead activities, doing the same motion over and over, and heavy lifting.  · You may use over-the-counter pain medicines to control pain, unless another medicine was prescribed. If you have chronic liver or kidney disease or ever had a stomach ulcer or GI bleeding, talk with your healthcare provider before using these medicines.  · If you were given a sling, use it for comfort. After your pain decreases, dont keep your arm in the sling all the time. Take your arm out several times a day and move the shoulder joint, as you are able.  · Your healthcare provider may recommend gentle pendulum exercises. Stand or sit with your arm vertical and close to your side. Relax your shoulder muscles and gently swing the arm forward and back, side to side, and in small circles for about 5 minutes. Do this once or twice a day. There should be only slight pain with this exercise.  · You may benefit from physical therapy or a home exercise program to strengthen your shoulder muscles. This will also increase your pain-free range of motion. Applying heat prior to exercises can help prepare the muscles and joint for activity. Talk to your healthcare provider about what is best for your condition.  Follow-up care  Follow up with your healthcare provider, or as advised.  When to seek medical advice  Call your healthcare provider right away if any of the following occur:  · Increasing shoulder pain  · Rapid swelling in the involved shoulder or arm  · Numbness, tingling, or pain radiating down the arm to the hand  · Loss of strength in the affected arm  Date Last Reviewed: 11/23/2015  © 5212-8485 [employer]. [address] [place] [address], [place] [address]. All rights reserved. This information is not intended as a substitute for professional medical care. Always follow your healthcare professional's instructions.        Shoulder Impingement  Syndrome  The rotator cuff is a group of muscles and tendons that surround the shoulder joint. These muscles and tendons hold the arm in its joint. They help the shoulder move. The rotator cuff muscles and tendons can become irritated from repeated rubbing against the shoulder bone. This is called shoulder impingement syndrome or rotator cuff tendonitis.     If your case is mild, you may only need to rest the shoulder and then do certain exercises to strengthen the muscles. You can also take anti-inflammatory medicines. Steroid injections into the shoulder can ease inflammation. But you can have only a limited number of these. If the condition gets worse, your shoulder muscles may become thin and weak. This can lead to a rotator cuff tear.  Symptoms of shoulder impingement syndrome may include:  · Shoulder pain that gets worse when you raise your arm overhead  · Weakness of the shoulder muscles when you use your arm overhead  · Popping and clicking when you move your shoulder  · Shoulder pain that wakes you up at night, especially when you sleep on the affected shoulder  · Sudden pain in your shoulder when you lift or reach  Home care  Follow these tips to take care of yourself at home:  · Avoid activities that make your pain worse. These include raising your arms overhead, repeating the same motion over and over, or lifting heavy objects.  · Dont hold your arm in one position for a long time. Keep it moving.  · Put an ice pack on the sore area for 20 minutes every 1 to 2 hours for the first day. You can make an ice pack by putting ice cubes in a plastic bag. Wrap the bag in a towel before putting it on your shoulder. A frozen bag of peas or something similar can also be used as an ice pack. Use the ice packs 3 to 4 times a day for the next 2 days. Continue using the ice to relieve of pain and swelling as needed.  · You may take acetaminophen or ibuprofen to control pain, unless another medicine was prescribed. If  prednisone was prescribed, dont take anti-inflammatory medicines. If you have chronic liver or kidney disease or ever had a stomach ulcer or gastrointestinal bleeding, talk with your doctor before using these medicines.  · After your symptoms ease, you may get physical therapy or start a home exercise program. This can strengthen your shoulder muscles and help your range of motion. Talk with your doctor about what is best for your condition.  Follow-up care  Follow up with your healthcare provider, or as advised.  When to seek medical advice  Call your healthcare provider right away if any of these occur:  · Shoulder pain that gets worse and wakes you up at night  · Your shoulder or arm swells  · Numbness, tingling, or pain that travels down the arm to the hand  · Loss of shoulder strength  · Fever or chills  Date Last Reviewed: 8/1/2016 © 2000-2017 [employer]. [address] [place] [address]. All rights reserved. This information is not intended as a substitute for professional medical care. Always follow your healthcare professional's instructions.        Exercises for Shoulder Flexibility: External Rotation    This stretch can help restore shoulder flexibility and relieve pain over time. When stretching, be sure to breathe deeply. Follow any special instructions from your doctor or physical therapist:  7.  a doorway. Grasp the doorjamb with the hand on the frozen side. Your arm should be bent.  8. With the other hand, hold the elbow on the frozen side firmly against your body.  9. Standing in the same spot, rotate your body away from the doorjamb. Stop when you feel the stretch in the shoulder. At first, try to hold the stretch for 5 seconds.  10. Work up to doing 3 sets of this stretch, 3 times a day. Work up to holding the stretch for 30 to 60 seconds.  Note: Keep your arms as still as you can. Over time, rotate your body a little more to enhance the stretch. But be careful  not to twist your back.  Frozen shoulder  Frozen shoulder is another name for adhesive capsulitis, which causes restricted movement in the shoulder. If you have frozen shoulder, this stretch may cause discomfort, especially when you first get started. A few months may pass before you achieve the results you want. But once your shoulder heals, it rarely becomes frozen again. So stick to your stretching program. If you have any questions, be sure to ask your doctor.   Date Last Reviewed: 8/16/2015 © 2000-2017 [employer]. [address] [place] [address]. All rights reserved. This information is not intended as a substitute for professional medical care. Always follow your healthcare professional's instructions.        Exercises for Shoulder Flexibility: Internal Rotation    This stretch can help restore shoulder flexibility and relieve pain over time. When stretching, be sure to breathe deeply. Follow any special instructions from your healthcare provider or physical therapist.  11. While seated, move the arm on the side you want to stretch toward the middle of your back. The palm of your hand should face out.  12. Cup your other hand under the hand thats behind your back. Gently push your cupped hand upward until you feel the stretch in the shoulder. Try to hold the stretch for 5 seconds.  13. Work up to doing 3 sets of this stretch, 3 times a day. Work up to holding the stretch for 30 to 60 seconds.  Note: Keep your back straight. Its OK if your hand cant reach the middle of your back. Instead, start the stretch with your hand as close as you can get it to the middle of your back.     Frozen shoulder  Frozen shoulder is another name for adhesive capsulitis. This causes restricted movement in the shoulder. If you have frozen shoulder, this stretch may cause discomfort, especially when you first get started. A few months may pass before you achieve the results you want. But once your  shoulder heals, it rarely becomes frozen again. So stick to your stretching program. If you have any questions, be sure to ask your healthcare provider.   Date Last Reviewed: 10/14/2015  © 0738-3215 [employer]. [address] [place] [address]. All rights reserved. This information is not intended as a substitute for professional medical care. Always follow your healthcare professional's instructions.        Exercises for Shoulder Flexibility: Adduction (Reaching Across)    This stretch can help restore shoulder flexibility and relieve pain over time. When stretching, be sure to breathe deeply. And follow any special instructions from your doctor or physical therapist:  14. Put the hand from the side you want to stretch on your opposite shoulder. Your elbow should point away from your body. Try to raise your elbow as close to shoulder height as you can.  15. With your other hand, push the raised elbow toward the opposite shoulder. Avoid turning your head. Stop when you feel the stretch. Try to hold the stretch for 5 seconds.  16. Work up to doing 3 sets of this stretch, 3 times a day. Work up to holding the stretch for 30 to 60 seconds.  Note: Be sure to push your elbow across your chest, not up toward your chin. Over time, try to push your elbow farther across your chest to enhance the stretch.  Frozen shoulder  Frozen shoulder is another name for adhesive capsulitis, which causes restricted movement in the shoulder. If you have frozen shoulder, this stretch may cause discomfort, especially when you first get started. A few months may pass before you achieve the results you want. Once your shoulder heals, it rarely becomes frozen again. So stick to your stretching program. If you have any questions, be sure to ask your doctor.   Date Last Reviewed: 8/16/2015  © 7519-1985 [employer]. [address] [place] [address]. All rights reserved. This information is not  intended as a substitute for professional medical care. Always follow your healthcare professional's instructions.        Exercises for Shoulder Flexibility: Back Scratch    Improving your flexibility can reduce pain. Stretching exercises also can help increase your range of pain-free motion. Breathe normally when you exercise. Try to use smooth, fluid movements. Never force a stretch.  Note: Follow any special instructions you are given. If you feel pain, stop the exercise. If the pain continues after stopping, call your healthcare provider.  · Stand straight, placing the back of your hand on the side you want to stretch flat against your lower back.  · Throw one end of a towel over your shoulder. Grab it behind your back with your other hand.  · Pull down gently on the towel with your front arm. Let your back arm slide up as high as is comfortable. Youll feel a stretch in your shoulder. Hold the stretch for a few seconds.  · Repeat 3 to 5 times. Build up to holding each stretch for 30 to 60 seconds.  For your safety, check with your healthcare provider before starting an exercise program.   Date Last Reviewed: 8/26/2015 © 2000-2017 [employer]. [address] [place] [address]. All rights reserved. This information is not intended as a substitute for professional medical care. Always follow your healthcare professional's instructions.        Exercises for Shoulder Flexibility: Wall Walk    Improving your flexibility can reduce pain. Stretching exercises also can help increase your range of pain-free motion. Breathe normally when you exercise. Use smooth, fluid movements.  Note: Follow any special instructions you are given. If you feel pain, stop the exercise. If the pain continues after stopping, call your healthcare provider:  · Stand with your shoulder about 2 feet from the wall.  · Raise your arm to shoulder level and gently walk your fingers up the wall as high as you can.  · Hold  for a few seconds. Then walk your fingers back down.  · Repeat 3 times. Move closer to the wall as you repeat.  · Build up to holding each stretch for 30 seconds.  Caution: Do this stretch only if your healthcare provider recommends it. Dont do it when you are first injured.       Date Last Reviewed: 8/16/2015  © 1635-2382 [employer]. [address] [place] [address]. All rights reserved. This information is not intended as a substitute for professional medical care. Always follow your healthcare professional's instructions.        Shoulder and Upper Back Stretch  To start, stand tall with your ears, shoulders, and hips in line. Your feet should be slightly apart, positioned just under your hips. Focus your eyes directly in front of you.  this position for a few seconds before starting your exercise. This helps increase your awareness of proper posture.          Reach overhead and slightly back with both arms. Keep your shoulders and neck aligned and your elbows behind your shoulders:  · With your palms facing the ceiling, turn your fingers inward.  · Take a deep breath. Breathe out, and lower your elbows toward your buttocks. Hold for 5 seconds, then return to starting position.  · Repeat 3 times.  Date Last Reviewed: 8/16/2015 © 2000-2017 [employer]. [address] [place] [address]. All rights reserved. This information is not intended as a substitute for professional medical care. Always follow your healthcare professional's instructions.        Shoulder Shrug Exercise    To start, sit in a chair with your feet flat on the floor. Shift your weight slightly forward to avoid rounding your back. Relax. Keep your ears, shoulders, and hips aligned:  · Raise both of your shoulders as high as you can, as if you were trying to touch them to your ears. Keep your head and neck still and relaxed.  · Hold for a count of 10. Release.  · Repeat 5 times.  For your  safety, check with your healthcare provider before starting an exercise program.   Date Last Reviewed: 8/16/2015  © 6487-8525 [employer]. [address] [place] [address]. All rights reserved. This information is not intended as a substitute for professional medical care. Always follow your healthcare professional's instructions.        Shoulder Squeeze Exercise    To start, sit in a chair with your feet flat on the floor. Shift your weight slightly forward to avoid rounding your back. Relax. Keep your ears, shoulders, and hips aligned:  · Raise your arms to shoulder height, elbows bent and palms forward.  · Move your arms back, squeezing your shoulder blades together.  · Hold for 10 seconds. Return to starting position.   · Repeat 5 times.   For your safety, check with your healthcare provider before starting an exercise program.   Date Last Reviewed: 8/16/2015  © 9689-2664 [employer]. [address] [place] [address]. All rights reserved. This information is not intended as a substitute for professional medical care. Always follow your healthcare professional's instructions.

## 2018-01-31 ENCOUNTER — PATIENT MESSAGE (OUTPATIENT)
Dept: HEMATOLOGY/ONCOLOGY | Facility: CLINIC | Age: 63
End: 2018-01-31

## 2018-01-31 ENCOUNTER — TELEPHONE (OUTPATIENT)
Dept: HEMATOLOGY/ONCOLOGY | Facility: CLINIC | Age: 63
End: 2018-01-31

## 2018-01-31 DIAGNOSIS — D50.9 MICROCYTIC ANEMIA: ICD-10-CM

## 2018-01-31 DIAGNOSIS — D56.3 ALPHA THALASSEMIA TRAIT: Primary | ICD-10-CM

## 2018-01-31 NOTE — TELEPHONE ENCOUNTER
----- Message from Diana Ruano LPN sent at 1/31/2018  5:07 PM CST -----      ----- Message -----  From: Kathleen Campuzano NP  Sent: 1/31/2018   6:23 AM  To: Diana Ruano LPN    Please schedule patient for a CBC in April at the location of her choice.  It is not necessary for her to see me afterwards we will communicates to the patient portal her results.    Thank you

## 2018-03-27 ENCOUNTER — TELEPHONE (OUTPATIENT)
Dept: PODIATRY | Facility: CLINIC | Age: 63
End: 2018-03-27

## 2018-03-27 DIAGNOSIS — M79.672 FOOT PAIN, LEFT: Primary | ICD-10-CM

## 2018-04-02 ENCOUNTER — OFFICE VISIT (OUTPATIENT)
Dept: UROLOGY | Facility: CLINIC | Age: 63
End: 2018-04-02
Payer: COMMERCIAL

## 2018-04-02 VITALS
BODY MASS INDEX: 40.81 KG/M2 | OXYGEN SATURATION: 98 % | HEIGHT: 65 IN | DIASTOLIC BLOOD PRESSURE: 78 MMHG | HEART RATE: 70 BPM | WEIGHT: 244.94 LBS | SYSTOLIC BLOOD PRESSURE: 136 MMHG

## 2018-04-02 DIAGNOSIS — N28.1 RENAL CYST: Primary | ICD-10-CM

## 2018-04-02 LAB
BILIRUB SERPL-MCNC: NORMAL MG/DL
BLOOD URINE, POC: NORMAL
COLOR, POC UA: YELLOW
GLUCOSE UR QL STRIP: NORMAL
KETONES UR QL STRIP: NORMAL
LEUKOCYTE ESTERASE URINE, POC: NORMAL
NITRITE, POC UA: NORMAL
PH, POC UA: 6
PROTEIN, POC: NORMAL
SPECIFIC GRAVITY, POC UA: 1.01
UROBILINOGEN, POC UA: NORMAL

## 2018-04-02 PROCEDURE — 81002 URINALYSIS NONAUTO W/O SCOPE: CPT | Mod: S$GLB,,, | Performed by: UROLOGY

## 2018-04-02 PROCEDURE — 3075F SYST BP GE 130 - 139MM HG: CPT | Mod: CPTII,S$GLB,, | Performed by: UROLOGY

## 2018-04-02 PROCEDURE — 3078F DIAST BP <80 MM HG: CPT | Mod: CPTII,S$GLB,, | Performed by: UROLOGY

## 2018-04-02 PROCEDURE — 99999 PR PBB SHADOW E&M-EST. PATIENT-LVL III: CPT | Mod: PBBFAC,,, | Performed by: UROLOGY

## 2018-04-02 PROCEDURE — 99213 OFFICE O/P EST LOW 20 MIN: CPT | Mod: 25,S$GLB,, | Performed by: UROLOGY

## 2018-04-02 NOTE — PROGRESS NOTES
Chief Complaint: Renal Cyst    HPI:   4/2/18: Returns for followup.  No hematuria in the interval.  MRI shows known renal cysts unchanged from 2015 to 2017.  10/19/15: CT Urogram normal except for benign left upper pole renal cyst. Cysto normal.  10/12/15: 61 yo woman referred by RAYSHAWN Campuzano for gross hematuria presumed to be UTI and not resolving with continued microhematuria after UTI treated.  No abd/pelvic pain and no exac/rel factors; except some crampy discomfort sometime.  No prior hematuria.  No urolithiasis.  No urinary bother.  No  history.  Normal sexual function.  UA confirms hematuria with -UCx on two occasions.  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    Allergies:  Patient has no known allergies.    Medications: has a current medication list which includes the following prescription(s): carvedilol.    Review of Systems:  General: No fever, chills, fatigability, or weight loss.  Skin: No rashes, itching, or changes in color or texture of skin.  Chest: Denies CALDERÓN, cyanosis, wheezing, cough, and sputum production.  Abdomen: Appetite fine. No weight loss. Denies diarrhea, abdominal pain, hematemesis, or blood in stool.  Musculoskeletal: No joint stiffness or swelling. Denies back pain.  : As above.  All other review of systems negative.    PMH:   has a past medical history of Arthritis of right knee; Breast cancer; Cardiomyopathy; CHF (congestive heart failure); Hypertension; and Uterine fibroid.    PSH:   has a past surgical history that includes Cholecystectomy; Breast biopsy; Mediport insertion, single; Breast surgery; Mastectomy; and Breast cyst excision (Right).    FamHx: family history includes Breast cancer in her maternal aunt; Cancer (age of onset: 55) in her maternal aunt; Diabetes in her father and mother; Stroke in her father and paternal aunt.    SocHx:  reports that she has never smoked. She does not have any smokeless tobacco history on file. She reports that she drinks alcohol. She  reports that she does not use drugs.     Physical Exam:  Vitals:   Vitals:    04/02/18 1019   BP: 136/78   Pulse: 70     General: A&Ox3. No apparent distress. No deformities.  Neck: No masses. Normal thyroid.  Lungs: normal inspiration. No use of accessory muscles.  Heart: normal pulse. No arrhythmias.  Abdomen: Soft. NT. ND.   Skin: The skin is warm and dry. No jaundice.  Ext: No c/c/e.  : deferred to cysto    Labs/Studies:   Urinalysis performed in clinic, summary: UA normal exc +leuk/tr prot    Impression/Plan:   1. Hematuria workup complete no adverse findings last visit.  Interval MRI reassuring.   RTC 1 year with US.

## 2018-04-04 ENCOUNTER — CLINICAL SUPPORT (OUTPATIENT)
Dept: CARDIOLOGY | Facility: CLINIC | Age: 63
End: 2018-04-04
Payer: COMMERCIAL

## 2018-04-04 ENCOUNTER — OFFICE VISIT (OUTPATIENT)
Dept: CARDIOLOGY | Facility: CLINIC | Age: 63
End: 2018-04-04
Payer: COMMERCIAL

## 2018-04-04 VITALS
WEIGHT: 248.44 LBS | DIASTOLIC BLOOD PRESSURE: 80 MMHG | SYSTOLIC BLOOD PRESSURE: 120 MMHG | BODY MASS INDEX: 41.39 KG/M2 | HEART RATE: 87 BPM | HEIGHT: 65 IN

## 2018-04-04 DIAGNOSIS — I10 BENIGN ESSENTIAL HTN: ICD-10-CM

## 2018-04-04 DIAGNOSIS — I10 BENIGN ESSENTIAL HTN: Primary | ICD-10-CM

## 2018-04-04 DIAGNOSIS — I50.9 CONGESTIVE HEART FAILURE, UNSPECIFIED CONGESTIVE HEART FAILURE CHRONICITY, UNSPECIFIED CONGESTIVE HEART FAILURE TYPE: ICD-10-CM

## 2018-04-04 DIAGNOSIS — T45.1X5A CARDIOMYOPATHY DUE TO CHEMOTHERAPY: Primary | ICD-10-CM

## 2018-04-04 DIAGNOSIS — I50.22 CHRONIC SYSTOLIC HEART FAILURE: ICD-10-CM

## 2018-04-04 DIAGNOSIS — I10 ESSENTIAL HYPERTENSION: Chronic | ICD-10-CM

## 2018-04-04 DIAGNOSIS — I42.7 CARDIOMYOPATHY DUE TO CHEMOTHERAPY: Primary | ICD-10-CM

## 2018-04-04 PROCEDURE — 3074F SYST BP LT 130 MM HG: CPT | Mod: CPTII,S$GLB,, | Performed by: NUCLEAR MEDICINE

## 2018-04-04 PROCEDURE — 99214 OFFICE O/P EST MOD 30 MIN: CPT | Mod: S$GLB,,, | Performed by: NUCLEAR MEDICINE

## 2018-04-04 PROCEDURE — 99999 PR PBB SHADOW E&M-EST. PATIENT-LVL III: CPT | Mod: PBBFAC,,, | Performed by: NUCLEAR MEDICINE

## 2018-04-04 PROCEDURE — 93000 ELECTROCARDIOGRAM COMPLETE: CPT | Mod: S$GLB,,, | Performed by: NUCLEAR MEDICINE

## 2018-04-04 PROCEDURE — 3079F DIAST BP 80-89 MM HG: CPT | Mod: CPTII,S$GLB,, | Performed by: NUCLEAR MEDICINE

## 2018-04-04 RX ORDER — NAPROXEN SODIUM 220 MG
220 TABLET ORAL
Status: ON HOLD | COMMUNITY
End: 2018-09-10

## 2018-04-04 NOTE — PROGRESS NOTES
Subjective:   Patient ID:  Josette Altamirano is a 62 y.o. female who presents for follow-up of Hypertension and Congestive Heart Failure (CHEMO- PRISCA)      HPI1- CHRONIC HFrEF, STAGE C, FC 2- POST CHEMO= PRISCA= CA OF LEFT BREAST   2- ESSENTIAL HYPERTENSION  DOING WELL. NO RECENT HOSPITALIZATIONS OR ED VISITS FOR ADHF. ARRHYTHMIAS OR ACS  NO UNUSUAL CALDERÓN. NO ORTHOPNEA OR PND  NO PALPITATIONS.NO SYNCOPE  NO EDEMA.NO ABDOMINAL DISCOMFORT  NO INTERMITTENT CLAUDICATION  NO FOCAL CNS SYMPTOMS OR SIGNS TO SUGGEST TIA OR STROKE  ECG - TODAY- SR. NO ACUTE ST T CHANGES.NO ARRHYTHMIAS  CARD MED GOOD COMPLIANCE    Review of Systems   Constitution: Negative for chills, fever, weakness, night sweats, weight gain and weight loss.   HENT: Negative for nosebleeds.    Eyes: Negative for blurred vision, double vision and visual disturbance.   Cardiovascular: Negative for chest pain, dyspnea on exertion, irregular heartbeat, leg swelling, orthopnea, palpitations, paroxysmal nocturnal dyspnea and syncope.   Respiratory: Negative for cough, hemoptysis and wheezing.    Endocrine: Negative for polydipsia and polyuria.   Hematologic/Lymphatic: Does not bruise/bleed easily.   Skin: Negative for rash.   Musculoskeletal: Negative for joint pain, joint swelling, muscle weakness and myalgias.   Gastrointestinal: Negative for abdominal pain, hematemesis, jaundice and melena.   Genitourinary: Negative for dysuria, hematuria and nocturia.   Neurological: Negative for dizziness, focal weakness, headaches and sensory change.   Psychiatric/Behavioral: Negative for depression. The patient does not have insomnia and is not nervous/anxious.      Family History   Problem Relation Age of Onset    Diabetes Mother     Diabetes Father     Stroke Father     Cancer Maternal Aunt 55     breast cancer    Breast cancer Maternal Aunt     Stroke Paternal Aunt     Melanoma Neg Hx     Psoriasis Neg Hx     Lupus Neg Hx     Eczema Neg Hx      Past Medical History:    Diagnosis Date    Arthritis of right knee     Breast cancer     She has a history of a Stage IIA, 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 She subsequently underwent left mastectomy and sentinel node biopsy followed by axillary dissection March 23, 2007. Histopathologic evaluation demonstrated again multiple foci of intracystic papillary carcinoma, as well as DCIS noncomed    Cardiomyopathy     CHEMO INDUCED- RESOLVED    CHF (congestive heart failure)     systolic    Hypertension     Uterine fibroid      Current Outpatient Prescriptions on File Prior to Visit   Medication Sig Dispense Refill    carvedilol (COREG CR) 80 MG 24 hr capsule take 1 capsule by mouth once daily 90 capsule 3     No current facility-administered medications on file prior to visit.      Review of patient's allergies indicates:  No Known Allergies    Objective:     Physical Exam   Constitutional: She is oriented to person, place, and time. She appears well-developed. No distress.   HENT:   Head: Normocephalic.   Eyes: Conjunctivae are normal. Pupils are equal, round, and reactive to light. No scleral icterus.   Neck: Normal range of motion. Neck supple. Normal carotid pulses, no hepatojugular reflux and no JVD present. Carotid bruit is not present. No edema present. No thyroid mass and no thyromegaly present.   Cardiovascular: Normal rate, regular rhythm, S1 normal, S2 normal, normal heart sounds and intact distal pulses.  PMI is not displaced.  Exam reveals no gallop and no friction rub.    No murmur heard.  Pulses:       Carotid pulses are 2+ on the right side, and 2+ on the left side.       Radial pulses are 2+ on the right side, and 2+ on the left side.        Femoral pulses are 2+ on the right side, and 2+ on the left side.       Popliteal pulses are 2+ on the right side, and 2+ on the left side.        Dorsalis pedis pulses are 2+ on the  right side, and 2+ on the left side.        Posterior tibial pulses are 2+ on the right side, and 2+ on the left side.   Pulmonary/Chest: Effort normal and breath sounds normal. She has no wheezes. She has no rales. She exhibits no tenderness.   Abdominal: Soft. Bowel sounds are normal. She exhibits no pulsatile midline mass and no mass. There is no hepatosplenomegaly. There is no tenderness.   Musculoskeletal: Normal range of motion. She exhibits no edema or tenderness.        Cervical back: Normal.        Thoracic back: Normal.        Lumbar back: Normal.   Lymphadenopathy:     She has no cervical adenopathy.     She has no axillary adenopathy.        Right: No supraclavicular adenopathy present.        Left: No supraclavicular adenopathy present.   Neurological: She is alert and oriented to person, place, and time. She has normal strength and normal reflexes. No sensory deficit. Gait normal.   Skin: Skin is warm. No rash noted. No cyanosis. No pallor. Nails show no clubbing.   Psychiatric: She has a normal mood and affect. Her speech is normal and behavior is normal. Cognition and memory are normal.       Assessment:     1. Cardiomyopathy due to chemotherapy    2. Chronic systolic heart failure    3. Essential hypertension      CLINICALLY WELL COMPENSATED HF  CONTROLLED BP  NO EVIDENCE OF ACTIVE MYOCARDIAL ISCHEMIA  NO ARRHYTHMIAS  CNS STATUS STABLE  CARD MED WELL TOLERATED  Plan:     Cardiomyopathy due to chemotherapy    Chronic systolic heart failure    Essential hypertension      1-CONTINUE PRESENT CARD MANAGEMENT    2- SCHEDULE ECHO    3- RETURN IN 6 MONTHS.

## 2018-04-17 ENCOUNTER — CLINICAL SUPPORT (OUTPATIENT)
Dept: CARDIOLOGY | Facility: CLINIC | Age: 63
End: 2018-04-17
Attending: NUCLEAR MEDICINE
Payer: COMMERCIAL

## 2018-04-17 DIAGNOSIS — I42.7 CARDIOMYOPATHY DUE TO CHEMOTHERAPY: ICD-10-CM

## 2018-04-17 DIAGNOSIS — T45.1X5A CARDIOMYOPATHY DUE TO CHEMOTHERAPY: ICD-10-CM

## 2018-04-17 DIAGNOSIS — I50.22 CHRONIC SYSTOLIC HEART FAILURE: ICD-10-CM

## 2018-04-17 LAB
DIASTOLIC DYSFUNCTION: YES
ESTIMATED PA SYSTOLIC PRESSURE: 20.14
MITRAL VALVE REGURGITATION: ABNORMAL
RETIRED EF AND QEF - SEE NOTES: 45 (ref 55–65)

## 2018-04-17 PROCEDURE — 93306 TTE W/DOPPLER COMPLETE: CPT | Mod: S$GLB,,, | Performed by: INTERNAL MEDICINE

## 2018-04-26 ENCOUNTER — OFFICE VISIT (OUTPATIENT)
Dept: CARDIOLOGY | Facility: CLINIC | Age: 63
End: 2018-04-26
Payer: COMMERCIAL

## 2018-04-26 VITALS
SYSTOLIC BLOOD PRESSURE: 120 MMHG | BODY MASS INDEX: 41.32 KG/M2 | WEIGHT: 248 LBS | HEART RATE: 76 BPM | HEIGHT: 65 IN | DIASTOLIC BLOOD PRESSURE: 76 MMHG

## 2018-04-26 DIAGNOSIS — I50.22 CHRONIC SYSTOLIC HEART FAILURE: Primary | ICD-10-CM

## 2018-04-26 DIAGNOSIS — T45.1X5A CARDIOMYOPATHY DUE TO CHEMOTHERAPY: ICD-10-CM

## 2018-04-26 DIAGNOSIS — I42.7 CARDIOMYOPATHY DUE TO CHEMOTHERAPY: ICD-10-CM

## 2018-04-26 PROCEDURE — 99213 OFFICE O/P EST LOW 20 MIN: CPT | Mod: S$GLB,,, | Performed by: NUCLEAR MEDICINE

## 2018-04-26 PROCEDURE — 3074F SYST BP LT 130 MM HG: CPT | Mod: CPTII,S$GLB,, | Performed by: NUCLEAR MEDICINE

## 2018-04-26 PROCEDURE — 99999 PR PBB SHADOW E&M-EST. PATIENT-LVL III: CPT | Mod: PBBFAC,,, | Performed by: NUCLEAR MEDICINE

## 2018-04-26 PROCEDURE — 3078F DIAST BP <80 MM HG: CPT | Mod: CPTII,S$GLB,, | Performed by: NUCLEAR MEDICINE

## 2018-04-26 NOTE — PROGRESS NOTES
CARD NOTE    PRESENTS FOR REVIEW AND DISCUSSION OF RECENT ECHO RESULTS AND INITIATION OF THERAPY FOR CHF WITH ENTRESTO    ECHO- LV EF 45%- UNCHANGED FROM PREVIOUS ECHOES - LAST 2 YRS    INDICATIONS, RATIONALITY AND POSSIBLE  ADVERSE EFFECTS OF ENTRESTO WERE REVIEWED AND DISCUSSED    NATURAL HX OF CHF  AND PROGNOSIS WERE REVIEWED AND DISCUSSED    PLAN- START ENTRESTO 24/26 MG BID     RETURN IN 4 WEEKS WITH BMP

## 2018-05-28 ENCOUNTER — OFFICE VISIT (OUTPATIENT)
Dept: PODIATRY | Facility: CLINIC | Age: 63
End: 2018-05-28
Payer: COMMERCIAL

## 2018-05-28 ENCOUNTER — HOSPITAL ENCOUNTER (OUTPATIENT)
Dept: RADIOLOGY | Facility: HOSPITAL | Age: 63
Discharge: HOME | End: 2018-05-28
Attending: PODIATRIST
Payer: COMMERCIAL

## 2018-05-28 VITALS
HEART RATE: 75 BPM | DIASTOLIC BLOOD PRESSURE: 78 MMHG | BODY MASS INDEX: 40.5 KG/M2 | WEIGHT: 243.06 LBS | HEIGHT: 65 IN | SYSTOLIC BLOOD PRESSURE: 128 MMHG

## 2018-05-28 DIAGNOSIS — M76.61 ACHILLES TENDINITIS OF RIGHT LOWER EXTREMITY: ICD-10-CM

## 2018-05-28 DIAGNOSIS — M76.62 ACHILLES TENDINITIS OF LEFT LOWER EXTREMITY: ICD-10-CM

## 2018-05-28 DIAGNOSIS — M21.6X2 ACQUIRED EQUINUS DEFORMITY OF LEFT FOOT: ICD-10-CM

## 2018-05-28 DIAGNOSIS — M79.672 INFLAMMATORY HEEL PAIN, LEFT: ICD-10-CM

## 2018-05-28 DIAGNOSIS — M76.62 ACHILLES TENDINITIS OF LEFT LOWER EXTREMITY: Primary | ICD-10-CM

## 2018-05-28 PROCEDURE — 3008F BODY MASS INDEX DOCD: CPT | Mod: CPTII,S$GLB,, | Performed by: PODIATRIST

## 2018-05-28 PROCEDURE — 73630 X-RAY EXAM OF FOOT: CPT | Mod: 26,LT,, | Performed by: RADIOLOGY

## 2018-05-28 PROCEDURE — 3074F SYST BP LT 130 MM HG: CPT | Mod: CPTII,S$GLB,, | Performed by: PODIATRIST

## 2018-05-28 PROCEDURE — 3078F DIAST BP <80 MM HG: CPT | Mod: CPTII,S$GLB,, | Performed by: PODIATRIST

## 2018-05-28 PROCEDURE — 99999 PR PBB SHADOW E&M-EST. PATIENT-LVL III: CPT | Mod: PBBFAC,,, | Performed by: PODIATRIST

## 2018-05-28 PROCEDURE — 99214 OFFICE O/P EST MOD 30 MIN: CPT | Mod: S$GLB,,, | Performed by: PODIATRIST

## 2018-05-28 PROCEDURE — 73630 X-RAY EXAM OF FOOT: CPT | Mod: TC,LT

## 2018-05-28 RX ORDER — MELOXICAM 15 MG/1
15 TABLET ORAL DAILY
Qty: 30 TABLET | Refills: 0 | Status: ON HOLD | OUTPATIENT
Start: 2018-05-28 | End: 2018-09-10

## 2018-05-28 NOTE — PATIENT INSTRUCTIONS
Understanding Achilles Tendonitis    Achilles tendonitis is an overuse injury. It results in inflammation of the Achilles tendon. This tendon is found on the back of the ankle. It links the calf muscle to the heel bone. It helps you do pushing-off movements like running or standing on your toes.     How to say it  [id number]   What causes Achilles tendonitis?  Achilles tendonitis can happen if you do an activity like running, walking, or jumping too much. This overuse can strain, or pull, the tendon. It may lead to minor tearing of the tendon. An injury to the lower leg or foot can also cause it.  If you dont warm up before taking part in sports such as basketball, you are more likely to suffer from this condition. You are also more prone to it if you do too much of such an activity too quickly. Proper training and rest can help prevent it.  Symptoms of Achilles tendonitis  The main symptom of Achilles tendonitis is pain. This pain mostly happens when you move the ankle. The tendon may also feel stiff after a period of no activity, such as sleeping. It may also become swollen. You may hear a crackling sound when you move your ankle.  Treatment for Achilles tendonitis  Symptoms often get better after starting treatment. A full recovery may take several months. Treatments include:  · Rest. You should stop or change the activity that caused the injury. The tendon will then have time to heal.  · Cold or heat pack. These help reduce pain and swelling.  · Prescription or over-the-counter pain medicines. These help reduce pain and swelling.  · Shoe inserts. These devices can reduce strain on the Achilles tendon when you move. You may then feel less pain.  · Stretching and strengthening exercises. Certain exercises can help you regain flexibility and strength in your Achilles tendon.  · Surgery. This option can fix the injured tendon. But you dont often need it unless other treatments dont work.     When  to call your healthcare provider   Call your healthcare provider right away if you have any of these:  · Fever of 100.4°F (38°C) or higher, or as directed  · Pain that gets worse  · Symptoms that dont get better, or get worse  · New symptoms    Date Last Reviewed: 3/10/2016  © 6684-5463 [employer]. [address] [place] [address], [place] [address]. All rights reserved. This information is not intended as a substitute for professional medical care. Always follow your healthcare professional's instructions.

## 2018-06-06 ENCOUNTER — LAB VISIT (OUTPATIENT)
Dept: LAB | Facility: HOSPITAL | Age: 63
End: 2018-06-06
Attending: NUCLEAR MEDICINE
Payer: COMMERCIAL

## 2018-06-06 DIAGNOSIS — T45.1X5A CARDIOMYOPATHY DUE TO CHEMOTHERAPY: ICD-10-CM

## 2018-06-06 DIAGNOSIS — I42.7 CARDIOMYOPATHY DUE TO CHEMOTHERAPY: ICD-10-CM

## 2018-06-06 DIAGNOSIS — I50.22 CHRONIC SYSTOLIC HEART FAILURE: ICD-10-CM

## 2018-06-06 LAB
ANION GAP SERPL CALC-SCNC: 5 MMOL/L
BUN SERPL-MCNC: 14 MG/DL
CALCIUM SERPL-MCNC: 8.7 MG/DL
CHLORIDE SERPL-SCNC: 106 MMOL/L
CO2 SERPL-SCNC: 29 MMOL/L
CREAT SERPL-MCNC: 0.7 MG/DL
EST. GFR  (AFRICAN AMERICAN): >60 ML/MIN/1.73 M^2
EST. GFR  (NON AFRICAN AMERICAN): >60 ML/MIN/1.73 M^2
GLUCOSE SERPL-MCNC: 109 MG/DL
POTASSIUM SERPL-SCNC: 4.4 MMOL/L
SODIUM SERPL-SCNC: 140 MMOL/L

## 2018-06-06 PROCEDURE — 80048 BASIC METABOLIC PNL TOTAL CA: CPT

## 2018-06-06 PROCEDURE — 36415 COLL VENOUS BLD VENIPUNCTURE: CPT | Mod: PO

## 2018-06-08 NOTE — PROGRESS NOTES
Subjective:     Patient ID: Josette Altamirano is a 62 y.o. female.    Chief Complaint: Tendonitis (left foot achilles, no current pain)    Josette is a 62 y.o. female who presents to the clinic complaining of heel pain in the left foot, especially with the first step in the morning. The pain is described as Aching, Throbbing and Tight. The onset of the pain was gradual and has worsened over the past several weeks. Josette rates the pain as 4/10. She denies a history of trauma. Prior treatments include none.        Patient Active Problem List   Diagnosis    Breast cancer    CHF (congestive heart failure)    Arthritis of right knee    Periostitis, without mention of osteomyelitis, ankle and foot    Enthesopathy of ankle and tarsus, unspecified    Syringoma of eyelid - Both Eyes    Refractive error - Both Eyes    Cardiomyopathy due to chemotherapy    Essential hypertension    Rotator cuff impingement syndrome of right shoulder    Alpha thalassemia trait    Chronic systolic heart failure       Medication List with Changes/Refills   New Medications    MELOXICAM (MOBIC) 15 MG TABLET    Take 1 tablet (15 mg total) by mouth once daily.   Current Medications    CARVEDILOL (COREG CR) 80 MG 24 HR CAPSULE    take 1 capsule by mouth once daily    NAPROXEN SODIUM (ANAPROX) 220 MG TABLET    Take 220 mg by mouth as needed.    SACUBITRIL-VALSARTAN (ENTRESTO) 24-26 MG PER TABLET    Take 1 tablet by mouth 2 (two) times daily. Please , start pre approval process       Review of patient's allergies indicates:  No Known Allergies    Past Surgical History:   Procedure Laterality Date    BREAST BIOPSY      BREAST CYST EXCISION Right     BREAST SURGERY      CHOLECYSTECTOMY      MASTECTOMY      left    MEDIPORT INSERTION, SINGLE         Family History   Problem Relation Age of Onset    Diabetes Mother     Diabetes Father     Stroke Father     Cancer Maternal Aunt 55        breast cancer    Breast cancer Maternal Aunt      "Stroke Paternal Aunt     Melanoma Neg Hx     Psoriasis Neg Hx     Lupus Neg Hx     Eczema Neg Hx        Social History     Social History    Marital status:      Spouse name: N/A    Number of children: N/A    Years of education: N/A     Occupational History    Not on file.     Social History Main Topics    Smoking status: Never Smoker    Smokeless tobacco: Never Used    Alcohol use 0.0 oz/week      Comment: Ocassionally    Drug use: No    Sexual activity: Yes     Partners: Male     Birth control/ protection: None, Post-menopausal     Other Topics Concern    Not on file     Social History Narrative    No narrative on file       Vitals:    05/28/18 0910   BP: 128/78   Pulse: 75   Weight: 110.2 kg (243 lb 0.9 oz)   Height: 5' 5" (1.651 m)   PainSc: 0-No pain       Hemoglobin A1C   Date Value Ref Range Status   10/26/2006 5.4 4.5 - 6.2 % Final       Review of Systems   Constitutional: Negative for chills and fever.   Respiratory: Negative for shortness of breath.    Cardiovascular: Negative for chest pain, palpitations, orthopnea, claudication and leg swelling.   Gastrointestinal: Negative for diarrhea, nausea and vomiting.   Musculoskeletal: Negative for joint pain.   Skin: Negative for rash.   Neurological: Negative for dizziness, tingling, sensory change, focal weakness and weakness.   Psychiatric/Behavioral: Negative.              Objective:      PHYSICAL EXAM: Apperance: Alert and orient in no distress,well developed, and with good attention to grooming and body habits  Patient presents ambulating in flats..  Lower Extremity Exam  VASCULAR: Dorsalis pedis pulses 2/4 bilateral and Posterior Tibial pulses 2/4 bilateral. Capillary fill time <3 seconds bilateral. No edema observed bilateral. Varicosities absent bilateral. Skin temperature of the lower extremities is warm to warm, proximal to distal. Hair growth WNL bilateral.  DERMATOLOGICAL: No skin rashes, subcutaneous nodules, lesions, or " ulcers observed bilateral.   NEUROLOGICAL: Light touch, sharp-dull, proprioception all present and equal bilaterally.    MUSCULOSKELETAL: Muscle strength 5/5 for all foot inverters, everters, plantarflexors, and dorsiflexors bilateral. Ankle joints left shows decreased ROM. left ankle ROM shows greater decrease in dorsiflexion with knee extended. Ankle joint ROM is pain free and without crepitus left. Pain with palpation of left posterior 1/3 calcaneus at region of Achilles tendon insertion. Negative pain to palpation left plantar medial tubercle. Negative pain on medial-lateral compression of the calcaneus.        Assessment:       Encounter Diagnoses   Name Primary?    Achilles tendinitis of left lower extremity - Left Foot Yes    Inflammatory heel pain, left     Acquired equinus deformity of left foot     Achilles tendinitis of right lower extremity          Plan:   Achilles tendinitis of left lower extremity - Left Foot  -     X-Ray Foot Complete Left; Future; Expected date: 05/28/2018    Inflammatory heel pain, left    Acquired equinus deformity of left foot    Achilles tendinitis of right lower extremity  -     Cancel: X-Ray Foot Complete Right; Future; Expected date: 05/28/2018  -     meloxicam (MOBIC) 15 MG tablet; Take 1 tablet (15 mg total) by mouth once daily.  Dispense: 30 tablet; Refill: 0      I counseled the patient on her conditions, regarding findings of my examination, my impressions, and usual treatment plan.   I explained to the patient that etiology and treatment options for heel pain including rest,  ice messages, stretching exercises, strappings/tappings, NSAID's, injections, new shoegear with orthotic inserts, and/or surgical treatment.   Patient agreed to oral and immobilization therapy today.   Ordered left foot x-ray to be completed today.   I gave written and verbal instructions on heel cord stretching and this was demonstrated for the patient. Patient expressed  understanding.  Prescribed Meloxicam 15mg to be taken once daily with food for the next 7 days and then as needed for inflammation. Patient advised on the possible elevation of blood pressure or heart effects and caution to take pills as needed and to discontinue use if symptoms arise, patient agreed.  Patient instructed on adequate icing techniques. Patient should ice the affected area at least once per day x 10 minutes for 10 days . I advised the  patient that extra icing would also be beneficial to ensure adequate anti inflammatory effect.   Patient was fitted with short walking boot and instructed on proper usage. This should be worn daily for a minimum of 2 weeks at all times when ambulating. Patient instructed not to drive with walking boot.   The patient and I reviewed the types of shoes she should be wearing, my recommendation includes generally the best time of the day for a shoe fitting is the afternoon, shoes with a wide toe box, very good cushion, and tennis shoes with removable inner soles. The patient and I reviewed my recommendations for over-the-counter orthotic inserts.   Patient to return in 6 weeks.                Kathryn Melvin DPM  [facility]

## 2018-06-13 ENCOUNTER — OFFICE VISIT (OUTPATIENT)
Dept: CARDIOLOGY | Facility: CLINIC | Age: 63
End: 2018-06-13
Payer: COMMERCIAL

## 2018-06-13 VITALS
SYSTOLIC BLOOD PRESSURE: 116 MMHG | HEIGHT: 65 IN | WEIGHT: 251 LBS | HEART RATE: 80 BPM | BODY MASS INDEX: 41.82 KG/M2 | DIASTOLIC BLOOD PRESSURE: 74 MMHG

## 2018-06-13 DIAGNOSIS — I50.22 CHRONIC SYSTOLIC HEART FAILURE: Primary | ICD-10-CM

## 2018-06-13 PROCEDURE — 99214 OFFICE O/P EST MOD 30 MIN: CPT | Mod: S$GLB,,, | Performed by: NUCLEAR MEDICINE

## 2018-06-13 PROCEDURE — 3008F BODY MASS INDEX DOCD: CPT | Mod: CPTII,S$GLB,, | Performed by: NUCLEAR MEDICINE

## 2018-06-13 PROCEDURE — 3074F SYST BP LT 130 MM HG: CPT | Mod: CPTII,S$GLB,, | Performed by: NUCLEAR MEDICINE

## 2018-06-13 PROCEDURE — 3078F DIAST BP <80 MM HG: CPT | Mod: CPTII,S$GLB,, | Performed by: NUCLEAR MEDICINE

## 2018-06-13 PROCEDURE — 99999 PR PBB SHADOW E&M-EST. PATIENT-LVL III: CPT | Mod: PBBFAC,,, | Performed by: NUCLEAR MEDICINE

## 2018-06-13 NOTE — PROGRESS NOTES
Subjective:   Patient ID:  Josette Altamirano is a 62 y.o. female who presents for follow-up of Congestive Heart Failure      HPI1-CHRONIC HFrEF, STAGE C, FC 2    NON ISCHEMIC CMP- POST CHEMO  STARTED ENTRESTO  2 WEEKS AGO-  NO SIDE EFFECTS  PATTERN OF CALDERÓN UNCHANGED. NO ORTHOPNEA OR PND. NO NOCTURIA  NO ABDOMINAL DISCOMFORT  NO EDEMA. NO CALVE TENDERNESS  NO PALPITATIONS  NO NEAR SYNCOPE OR SYNCOPE  NO FOCAL CNS SYMPTOMS OR SIGNS TO SUGGEST TIA OR STROKE  NO INTERMITTENT CLAUDICATION  RECENT BMP REVIEWED AND DISCUSSED- CR AND ELECTROLYTES STABLE    Review of Systems   Constitution: Negative for chills, fever, weakness, night sweats, weight gain and weight loss.   HENT: Negative for nosebleeds.    Eyes: Negative for blurred vision, double vision and visual disturbance.   Cardiovascular: Negative for chest pain, dyspnea on exertion, irregular heartbeat, leg swelling, orthopnea, palpitations, paroxysmal nocturnal dyspnea and syncope.   Respiratory: Negative for cough, hemoptysis and wheezing.    Endocrine: Negative for polydipsia and polyuria.   Hematologic/Lymphatic: Does not bruise/bleed easily.   Skin: Negative for rash.   Musculoskeletal: Negative for joint pain, joint swelling, muscle weakness and myalgias.   Gastrointestinal: Negative for abdominal pain, hematemesis, jaundice and melena.   Genitourinary: Negative for dysuria, hematuria and nocturia.   Neurological: Negative for dizziness, focal weakness, headaches and sensory change.   Psychiatric/Behavioral: Negative for depression. The patient does not have insomnia and is not nervous/anxious.      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 She subsequently underwent left mastectomy and sentinel node biopsy followed by axillary dissection March 23, 2007. Histopathologic evaluation demonstrated again multiple foci of intracystic papillary carcinoma, as well as DCIS noncomed    Cardiomyopathy     CHEMO INDUCED- RESOLVED    CHF (congestive heart failure)     systolic    Hypertension     Uterine fibroid      Current Outpatient Prescriptions on File Prior to Visit   Medication Sig Dispense Refill    carvedilol (COREG CR) 80 MG 24 hr capsule take 1 capsule by mouth once daily 90 capsule 3    sacubitril-valsartan (ENTRESTO) 24-26 mg per tablet Take 1 tablet by mouth 2 (two) times daily. Please , start pre approval process 60 tablet 2    meloxicam (MOBIC) 15 MG tablet Take 1 tablet (15 mg total) by mouth once daily. 30 tablet 0    naproxen sodium (ANAPROX) 220 MG tablet Take 220 mg by mouth as needed.       No current facility-administered medications on file prior to visit.      Review of patient's allergies indicates:  No Known Allergies    Objective:     Physical Exam   Constitutional: She is oriented to person, place, and time. She appears well-developed. No distress.   HENT:   Head: Normocephalic.   Eyes: Conjunctivae are normal. Pupils are equal, round, and reactive to light. No scleral icterus.   Neck: Normal range of motion. Neck supple. Normal carotid pulses, no hepatojugular reflux and no JVD present. Carotid bruit is not present. No edema present. No thyroid mass and no thyromegaly present.   Cardiovascular: Normal rate, regular rhythm, S1 normal, S2 normal, normal heart sounds and intact distal pulses.  PMI is not displaced.  Exam reveals no gallop and no friction rub.    No murmur heard.  Pulses:       Carotid pulses are 2+ on the right side, and 2+ on the left  side.       Radial pulses are 2+ on the right side, and 2+ on the left side.        Femoral pulses are 2+ on the right side, and 2+ on the left side.       Popliteal pulses are 2+ on the right side, and 2+ on the left side.        Dorsalis pedis pulses are 2+ on the right side, and 2+ on the left side.        Posterior tibial pulses are 2+ on the right side, and 2+ on the left side.   Pulmonary/Chest: Effort normal and breath sounds normal. She has no wheezes. She has no rales. She exhibits no tenderness.   Abdominal: Soft. Bowel sounds are normal. She exhibits no pulsatile midline mass and no mass. There is no hepatosplenomegaly. There is no tenderness.   Musculoskeletal: Normal range of motion. She exhibits no edema or tenderness.        Cervical back: Normal.        Thoracic back: Normal.        Lumbar back: Normal.   Lymphadenopathy:     She has no cervical adenopathy.     She has no axillary adenopathy.        Right: No supraclavicular adenopathy present.        Left: No supraclavicular adenopathy present.   Neurological: She is alert and oriented to person, place, and time. She has normal strength and normal reflexes. No sensory deficit. Gait normal.   Skin: Skin is warm. No rash noted. No cyanosis. No pallor. Nails show no clubbing.   Psychiatric: She has a normal mood and affect. Her speech is normal and behavior is normal. Cognition and memory are normal.       Assessment:     1. Chronic systolic heart failure      CLINICALLY WELL COMPENSATED HF  TOLERATING ENTRESTO  NO ARRHYTHMIAS  NO ACTIVE MYOCARDIAL ISCHEMIA  CNS STATUS STABLE  Plan:     Chronic systolic heart failure      1- CONTINUE PRESENT CARD MED    2- RETURN IN 4 WEEKS WITH BMP

## 2018-07-09 NOTE — PROGRESS NOTES
CC: Breast cancer follow-up     HPI: breast cancer follow-up     Pt has a history of a Stage IIA, 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 She subsequently underwent left mastectomy and sentinel node biopsy followed by axillary dissection  March 23, 2007. Histopathologic evaluation demonstrated again multiple foci of intracystic papillary carcinoma, as well as DCIS noncomedo type. One of three sentinel lymph nodes demonstrated micrometastatic disease. Twelve additional axillary lymph nodes were obtained, all of which were negative for disease. Estrogen receptors were 11%. Progesterone receptors 4%. HER2/bob was not over-expressed. She subsequently underwent additional studies including a MUGA scan demonstrating an injection  fraction of 39%, thus she was treated with six cycles of Cytoxan and Taxotere which were well tolerated.    9/28/07 hormone studies suggested the pt was menopausal so she was started on Arimidex for post adjuvant therapy. Therapy completed 9/2012.    History of chemo induced cardiomyopathy. CHF- followed by Cardiology- started new medication and last visit was in June.     Patient presents for her 6 mon f/u exam mammo 1/3/8 -  wnl.    Pt states her previous back pain has not been a bother lately. States her right shoulder and left shoulder are aching and she feels like they are getting worse. Has to sleep on her back due to the discomfort in her shoulder if she lays on one side. Denies any numbness into her hands. Describes as an aching and throbbing sensation in the shoulders that is exacerbated with movement.    Pt did not have PT but states has been doing the exercises given. Has not followed up with Dr. Bonilla regarding her persistent discomfort. States the prednisone she gave her helped but at the end of the script she began to have extremely dark urine that resolved after completion of the med. No  other episodes.    Pt has had f/u with Urology as recommended for her hematuria- denies any episodes of visible hematuria, no vaginal discharge and no blood in her bowel movements.     PMH: Chronic systolic heart failure discovered prior to chemo. Obesity, HTN, Breast cancer left, Fibrocystic breast condition. Achilles tendonitis, history of hematuria with negative workup, DDD and bulging disc    PSH: Lap jonathan in 2004, left modified radical mastectomy with sentinel and axillary node biospy. Multiple breast biopsies. Mediport placement and removal.    SOCIAL:Patient is . She is a teacher. She occasionally drinks alcohol. No tobacco use.     FH: Multiple relatives with diabetes.    PCP: Caridad Harrington    Last mammo: 1/3/18 - wnl.     Last gyn visit- 8/18/16    Colonoscopy- 12/2008- due this year    ROS: Denies any headaches, nausea or emesis. No chest pain, cough or SOB. No fever or night sweats. No abd pain or cramping. No change in bowel or bladder function or character. No hematuria- history of DUB with negative biopsy attributed to fibroids.  No wt loss or wt gain. No lower extremity edema. No weakness or fatigue out of the ordinary. Denies any dysuria, no suprapubic pain, back pain improved, no increase in urinary frequency, no change in bowel movement frequency or character. No hematochezia, no more episodes of discolored urine.  Denies any right breast pain or nipple discharge. No breast lumps. Walking for 30 minutes about 3 times a week.      PHYSICAL EXAM:  HEAD: Atraumatic and normocephalic.  EYES:Conjunctivae nonicteric, PERRLA  NARIES: Patent with pale mucosa, scant clear mucus.  MOUTH: No oral lesions, no posterior pharyngeal exudates or erythema.  NECK: Supple. Soft, Symmetric. Trachea midline. No thyromegaly.  LYMPHATICS: There is no supraclavicular, infraclavicular or cervical adenopathy. No axillary adenopathy.  LUNGS: Clear to auscultation. Unlabored respiratory effort. No CVA  tenderness  HEART: RRR with no murmur or gallop.  BREASTS: Right breast and left chest wall reveals no visible mass, erythema, rash, dimpling or peaudeorange appearance. No palpable mass in the right breast or over the left chest wall. Her palpable tenderness over the left chest wall is over the muscle and rib spaces. No palpable abnormality. No nipple discharge noted from the right breast with compression. Has an island of glandular tissue in the upper outer quadrant of the right breast that blends with the surrounding breast tissue.   ABDOMEN: Soft, nontender with no hepatosplenomegaly, guarding or rebound. No CVA tenderness. No suprapubic tenderness. No palpable evidence of groin hernia dallin.   EXT: Bilateral lower extremity exam reveals 2+ pedal pulses. No edema. No asymmetry of the hands or feet. 5/5 strength in upper and lower extremities that is symmetric. No evidence of lymph edema left arm. Bilateral shoulder crepitus with ROM- negative drop sign bilaterally.   SKIN: Warm and dry to touch with no rash. No scaliness or dryness.  PSYCH: Affect appropriate.    Mammogram 1/3/18- wnl    ASSESSMENT:  1. Breast cancer as previously described. Arimidex therapy completed.  2. Heart failure stable and followed by cardiology.  3. Obesity. Encouraged pt to continue with exercise and dietary changes.   4. hematuria workup benign- followed by urology - and is up to date  5. History of vaginal discharge scant with tiny blood clot - fibroids- no other episodes due for gyn f/u  6. Vaginal dryness  7. Mildly elevated bilirubin 2/2017 and stable since  8. Left chest wall pain resolved. Low back pain resolved.   9. History of alpha thalassemia trait  10. Shoulder pain persists- did not do PT  11. Due for colonoscopy    PLAN/REC:  1. cbc, and cmp and right breast diagnostic mammo Jan 2019 and clinical exam  2. Encouraged exercise  3. BSE monthly.Call for any changes.  4. Signs of recurrence discussed with pt who verbalizes  understanding.  5. Cbc today- f/u with Dr. Bonilla for shoulder pain that persists bilaterally  6. Colonoscopy orders placed    Kathleen Campuzano RN, MSN, NP-C    Answers for HPI/ROS submitted by the patient on 7/11/2018   appetite change : No  unexpected weight change: No  visual disturbance: No  cough: No  shortness of breath: No  chest pain: No  abdominal pain: No  diarrhea: No  frequency: No  back pain: No  rash: No  headaches: No  adenopathy: No  nervous/ anxious: No

## 2018-07-11 ENCOUNTER — LAB VISIT (OUTPATIENT)
Dept: LAB | Facility: HOSPITAL | Age: 63
End: 2018-07-11
Attending: NURSE PRACTITIONER
Payer: COMMERCIAL

## 2018-07-11 ENCOUNTER — OFFICE VISIT (OUTPATIENT)
Dept: PODIATRY | Facility: CLINIC | Age: 63
End: 2018-07-11
Payer: COMMERCIAL

## 2018-07-11 ENCOUNTER — OFFICE VISIT (OUTPATIENT)
Dept: HEMATOLOGY/ONCOLOGY | Facility: CLINIC | Age: 63
End: 2018-07-11
Payer: COMMERCIAL

## 2018-07-11 VITALS
WEIGHT: 249.13 LBS | OXYGEN SATURATION: 99 % | HEIGHT: 64 IN | DIASTOLIC BLOOD PRESSURE: 78 MMHG | HEART RATE: 83 BPM | BODY MASS INDEX: 42.53 KG/M2 | SYSTOLIC BLOOD PRESSURE: 122 MMHG

## 2018-07-11 VITALS
BODY MASS INDEX: 42.68 KG/M2 | HEIGHT: 64 IN | HEART RATE: 65 BPM | RESPIRATION RATE: 20 BRPM | SYSTOLIC BLOOD PRESSURE: 118 MMHG | WEIGHT: 250 LBS | DIASTOLIC BLOOD PRESSURE: 71 MMHG

## 2018-07-11 DIAGNOSIS — D56.3 ALPHA THALASSEMIA TRAIT: ICD-10-CM

## 2018-07-11 DIAGNOSIS — C50.412 MALIGNANT NEOPLASM OF UPPER-OUTER QUADRANT OF LEFT BREAST IN FEMALE, ESTROGEN RECEPTOR POSITIVE: ICD-10-CM

## 2018-07-11 DIAGNOSIS — C50.412 MALIGNANT NEOPLASM OF UPPER-OUTER QUADRANT OF LEFT BREAST IN FEMALE, ESTROGEN RECEPTOR POSITIVE: Primary | ICD-10-CM

## 2018-07-11 DIAGNOSIS — Z17.0 MALIGNANT NEOPLASM OF UPPER-OUTER QUADRANT OF LEFT BREAST IN FEMALE, ESTROGEN RECEPTOR POSITIVE: Primary | ICD-10-CM

## 2018-07-11 DIAGNOSIS — M76.62 ACHILLES TENDINITIS OF LEFT LOWER EXTREMITY: Primary | ICD-10-CM

## 2018-07-11 DIAGNOSIS — Z12.11 COLON CANCER SCREENING: ICD-10-CM

## 2018-07-11 DIAGNOSIS — I50.9 CONGESTIVE HEART FAILURE, UNSPECIFIED HF CHRONICITY, UNSPECIFIED HEART FAILURE TYPE: ICD-10-CM

## 2018-07-11 DIAGNOSIS — Z17.0 MALIGNANT NEOPLASM OF UPPER-OUTER QUADRANT OF LEFT BREAST IN FEMALE, ESTROGEN RECEPTOR POSITIVE: ICD-10-CM

## 2018-07-11 LAB
BASOPHILS # BLD AUTO: 0.03 K/UL
BASOPHILS NFR BLD: 0.6 %
DIFFERENTIAL METHOD: ABNORMAL
EOSINOPHIL # BLD AUTO: 0.3 K/UL
EOSINOPHIL NFR BLD: 6.2 %
ERYTHROCYTE [DISTWIDTH] IN BLOOD BY AUTOMATED COUNT: 15.3 %
HCT VFR BLD AUTO: 38.5 %
HGB BLD-MCNC: 11.3 G/DL
IMM GRANULOCYTES # BLD AUTO: 0.01 K/UL
IMM GRANULOCYTES NFR BLD AUTO: 0.2 %
LYMPHOCYTES # BLD AUTO: 1.6 K/UL
LYMPHOCYTES NFR BLD: 35 %
MCH RBC QN AUTO: 23.4 PG
MCHC RBC AUTO-ENTMCNC: 29.4 G/DL
MCV RBC AUTO: 80 FL
MONOCYTES # BLD AUTO: 0.4 K/UL
MONOCYTES NFR BLD: 7.7 %
NEUTROPHILS # BLD AUTO: 2.4 K/UL
NEUTROPHILS NFR BLD: 50.3 %
NRBC BLD-RTO: 0 /100 WBC
PLATELET # BLD AUTO: 263 K/UL
PMV BLD AUTO: 10.4 FL
RBC # BLD AUTO: 4.82 M/UL
WBC # BLD AUTO: 4.69 K/UL

## 2018-07-11 PROCEDURE — 36415 COLL VENOUS BLD VENIPUNCTURE: CPT | Mod: PO

## 2018-07-11 PROCEDURE — 3008F BODY MASS INDEX DOCD: CPT | Mod: CPTII,S$GLB,, | Performed by: NURSE PRACTITIONER

## 2018-07-11 PROCEDURE — 99214 OFFICE O/P EST MOD 30 MIN: CPT | Mod: S$GLB,,, | Performed by: NURSE PRACTITIONER

## 2018-07-11 PROCEDURE — 3078F DIAST BP <80 MM HG: CPT | Mod: CPTII,S$GLB,, | Performed by: PODIATRIST

## 2018-07-11 PROCEDURE — 99999 PR PBB SHADOW E&M-EST. PATIENT-LVL III: CPT | Mod: PBBFAC,,, | Performed by: NURSE PRACTITIONER

## 2018-07-11 PROCEDURE — 3078F DIAST BP <80 MM HG: CPT | Mod: CPTII,S$GLB,, | Performed by: NURSE PRACTITIONER

## 2018-07-11 PROCEDURE — 3074F SYST BP LT 130 MM HG: CPT | Mod: CPTII,S$GLB,, | Performed by: NURSE PRACTITIONER

## 2018-07-11 PROCEDURE — 99213 OFFICE O/P EST LOW 20 MIN: CPT | Mod: S$GLB,,, | Performed by: PODIATRIST

## 2018-07-11 PROCEDURE — 3008F BODY MASS INDEX DOCD: CPT | Mod: CPTII,S$GLB,, | Performed by: PODIATRIST

## 2018-07-11 PROCEDURE — 99999 PR PBB SHADOW E&M-EST. PATIENT-LVL III: CPT | Mod: PBBFAC,,, | Performed by: PODIATRIST

## 2018-07-11 PROCEDURE — 3074F SYST BP LT 130 MM HG: CPT | Mod: CPTII,S$GLB,, | Performed by: PODIATRIST

## 2018-07-11 PROCEDURE — 85025 COMPLETE CBC W/AUTO DIFF WBC: CPT

## 2018-07-11 NOTE — PROGRESS NOTES
Subjective:     Patient ID: Josette Altamirano is a 62 y.o. female.    Chief Complaint: Follow-up (left foot achilles)    Josette is a 62 y.o. female who presents to the clinic complaining of heel pain in the left foot, especially with the first step in the morning. Patient states the stretching exercises and the medication has helped a lot. The pain is described as Aching, Throbbing and Tight. Josette rates the pain as 1/10. She denies a history of trauma. Patient has no other pedal complaints st this time.       Patient Active Problem List   Diagnosis    Breast cancer    CHF (congestive heart failure)    Arthritis of right knee    Periostitis, without mention of osteomyelitis, ankle and foot    Enthesopathy of ankle and tarsus, unspecified    Syringoma of eyelid - Both Eyes    Refractive error - Both Eyes    Cardiomyopathy due to chemotherapy    Essential hypertension    Rotator cuff impingement syndrome of right shoulder    Alpha thalassemia trait    Chronic systolic heart failure       Medication List with Changes/Refills   New Medications    SODIUM,POTASSIUM,MAG SULFATES (SUPREP BOWEL PREP KIT) 17.5-3.13-1.6 GRAM SOLR    Use as directed.   Current Medications    CARVEDILOL (COREG CR) 80 MG 24 HR CAPSULE    take 1 capsule by mouth once daily    MELOXICAM (MOBIC) 15 MG TABLET    Take 1 tablet (15 mg total) by mouth once daily.    NAPROXEN SODIUM (ANAPROX) 220 MG TABLET    Take 220 mg by mouth as needed.    SACUBITRIL-VALSARTAN (ENTRESTO) 24-26 MG PER TABLET    Take 1 tablet by mouth 2 (two) times daily. Please , start pre approval process       Review of patient's allergies indicates:  No Known Allergies    Past Surgical History:   Procedure Laterality Date    BREAST BIOPSY      BREAST CYST EXCISION Right     BREAST SURGERY      CHOLECYSTECTOMY      MASTECTOMY      left    MEDIPORT INSERTION, SINGLE         Family History   Problem Relation Age of Onset    Diabetes Mother     Diabetes Father     Stroke  "Father     Cancer Maternal Aunt 55        breast cancer    Breast cancer Maternal Aunt     Stroke Paternal Aunt     Melanoma Neg Hx     Psoriasis Neg Hx     Lupus Neg Hx     Eczema Neg Hx        Social History     Social History    Marital status:      Spouse name: N/A    Number of children: N/A    Years of education: N/A     Occupational History    Not on file.     Social History Main Topics    Smoking status: Never Smoker    Smokeless tobacco: Never Used    Alcohol use 0.0 oz/week      Comment: Ocassionally    Drug use: No    Sexual activity: Yes     Partners: Male     Birth control/ protection: None, Post-menopausal     Other Topics Concern    Not on file     Social History Narrative    No narrative on file       Vitals:    07/11/18 0955   BP: 118/71   Pulse: 65   Resp: 20   Weight: 113.4 kg (250 lb)   Height: 5' 4" (1.626 m)   PainSc: 0-No pain       Hemoglobin A1C   Date Value Ref Range Status   10/26/2006 5.4 4.5 - 6.2 % Final       Review of Systems   Constitutional: Negative for chills and fever.   Respiratory: Negative for shortness of breath.    Cardiovascular: Negative for chest pain, palpitations, orthopnea, claudication and leg swelling.   Gastrointestinal: Negative for diarrhea, nausea and vomiting.   Musculoskeletal: Negative for joint pain.   Skin: Negative for rash.   Neurological: Negative for dizziness, tingling, sensory change, focal weakness and weakness.   Psychiatric/Behavioral: Negative.              Objective:      PHYSICAL EXAM: Apperance: Alert and orient in no distress,well developed, and with good attention to grooming and body habits  Patient presents ambulating in flats..  Lower Extremity Exam  VASCULAR: Dorsalis pedis pulses 2/4 bilateral and Posterior Tibial pulses 2/4 bilateral. Capillary fill time <3 seconds bilateral. No edema observed bilateral. Varicosities absent bilateral. Skin temperature of the lower extremities is warm to warm, proximal to distal. " Hair growth WNL bilateral.  DERMATOLOGICAL: No skin rashes, subcutaneous nodules, lesions, or ulcers observed bilateral.   NEUROLOGICAL: Light touch, sharp-dull, proprioception all present and equal bilaterally.    MUSCULOSKELETAL: Muscle strength 5/5 for all foot inverters, everters, plantarflexors, and dorsiflexors bilateral. Ankle joints left shows decreased ROM. left ankle ROM shows greater decrease in dorsiflexion with knee extended. Ankle joint ROM is pain free and without crepitus left. No pain with palpation of left posterior 1/3 calcaneus at region of Achilles tendon insertion. Negative pain to palpation left plantar medial tubercle. Negative pain on medial-lateral compression of the calcaneus.    TEST RESULTS: Radiographs of left foot reveals there is no radiographic evidence of acute osseous, articular, or soft tissue abnormality.  There is hallux valgus deformity and mild degenerative changes present at the great toe MTP joint.  No erosive changes demonstrated. Dorsal and plantar surface calcaneal enthesophytes are present.        Assessment:       Encounter Diagnosis   Name Primary?    Achilles tendinitis of left lower extremity - Left Foot Yes         Plan:   Achilles tendinitis of left lower extremity - Left Foot      I counseled the patient on her conditions, regarding findings of my examination, my impressions, and usual treatment plan.   I explained to the patient that etiology and treatment options for heel pain including rest,  ice messages, stretching exercises, strappings/tappings, NSAID's, injections, new shoegear with orthotic inserts, and/or surgical treatment.   Patient agreed to continue stretching exercises and paying attention to proper shoe wear.   Reviewed x-rays from last visit in exam room with patient.    I gave written and verbal instructions on heel cord stretching and this was demonstrated for the patient. Patient expressed understanding.  Patient instructed on adequate icing  techniques. Patient should ice the affected area at least once per day x 10 minutes for 10 days . I advised the  patient that extra icing would also be beneficial to ensure adequate anti inflammatory effect.   Patient was fitted with short walking boot and instructed on proper usage. This should be worn daily for a minimum of 2 weeks at all times when ambulating. Patient instructed not to drive with walking boot.   The patient and I reviewed the types of shoes she should be wearing, my recommendation includes generally the best time of the day for a shoe fitting is the afternoon, shoes with a wide toe box, very good cushion, and tennis shoes with removable inner soles. The patient and I reviewed my recommendations for over-the-counter orthotic inserts.   Patient to return for possible physical therapy referral.                 Kathryn Melvin DPM  [facility]

## 2018-07-13 ENCOUNTER — DOCUMENTATION ONLY (OUTPATIENT)
Dept: ENDOSCOPY | Facility: HOSPITAL | Age: 63
End: 2018-07-13

## 2018-07-13 NOTE — PROGRESS NOTES
7/13/2018 Pt returned call to schedule c-scope.  Scheduled and instructions sent via [employer].

## 2018-07-16 RX ORDER — SODIUM, POTASSIUM,MAG SULFATES 17.5-3.13G
SOLUTION, RECONSTITUTED, ORAL ORAL
Qty: 354 ML | Refills: 0 | Status: ON HOLD | OUTPATIENT
Start: 2018-07-16 | End: 2018-09-10 | Stop reason: ALTCHOICE

## 2018-07-23 DIAGNOSIS — I50.9 HEART FAILURE, UNSPECIFIED HF CHRONICITY, UNSPECIFIED HEART FAILURE TYPE: Primary | ICD-10-CM

## 2018-07-24 ENCOUNTER — LAB VISIT (OUTPATIENT)
Dept: LAB | Facility: HOSPITAL | Age: 63
End: 2018-07-24
Attending: NUCLEAR MEDICINE
Payer: COMMERCIAL

## 2018-07-24 DIAGNOSIS — I50.9 HEART FAILURE, UNSPECIFIED HF CHRONICITY, UNSPECIFIED HEART FAILURE TYPE: ICD-10-CM

## 2018-07-24 LAB
ANION GAP SERPL CALC-SCNC: 6 MMOL/L
BUN SERPL-MCNC: 13 MG/DL
CALCIUM SERPL-MCNC: 8.8 MG/DL
CHLORIDE SERPL-SCNC: 106 MMOL/L
CO2 SERPL-SCNC: 28 MMOL/L
CREAT SERPL-MCNC: 0.7 MG/DL
EST. GFR  (AFRICAN AMERICAN): >60 ML/MIN/1.73 M^2
EST. GFR  (NON AFRICAN AMERICAN): >60 ML/MIN/1.73 M^2
GLUCOSE SERPL-MCNC: 96 MG/DL
POTASSIUM SERPL-SCNC: 4 MMOL/L
SODIUM SERPL-SCNC: 140 MMOL/L

## 2018-07-24 PROCEDURE — 36415 COLL VENOUS BLD VENIPUNCTURE: CPT | Mod: PO

## 2018-07-24 PROCEDURE — 80048 BASIC METABOLIC PNL TOTAL CA: CPT

## 2018-07-25 ENCOUNTER — OFFICE VISIT (OUTPATIENT)
Dept: CARDIOLOGY | Facility: CLINIC | Age: 63
End: 2018-07-25
Payer: COMMERCIAL

## 2018-07-25 ENCOUNTER — OFFICE VISIT (OUTPATIENT)
Dept: PHYSICAL MEDICINE AND REHAB | Facility: CLINIC | Age: 63
End: 2018-07-25
Payer: COMMERCIAL

## 2018-07-25 VITALS
WEIGHT: 249 LBS | DIASTOLIC BLOOD PRESSURE: 78 MMHG | RESPIRATION RATE: 14 BRPM | BODY MASS INDEX: 42.51 KG/M2 | SYSTOLIC BLOOD PRESSURE: 117 MMHG | HEIGHT: 64 IN | HEART RATE: 79 BPM

## 2018-07-25 VITALS
WEIGHT: 249 LBS | HEIGHT: 64 IN | SYSTOLIC BLOOD PRESSURE: 110 MMHG | HEART RATE: 56 BPM | DIASTOLIC BLOOD PRESSURE: 80 MMHG | BODY MASS INDEX: 42.51 KG/M2

## 2018-07-25 DIAGNOSIS — I50.22 CHRONIC SYSTOLIC HEART FAILURE: Primary | ICD-10-CM

## 2018-07-25 DIAGNOSIS — T45.1X5A CARDIOMYOPATHY DUE TO CHEMOTHERAPY: ICD-10-CM

## 2018-07-25 DIAGNOSIS — M75.50: Primary | ICD-10-CM

## 2018-07-25 DIAGNOSIS — I42.7 CARDIOMYOPATHY DUE TO CHEMOTHERAPY: ICD-10-CM

## 2018-07-25 PROCEDURE — 3074F SYST BP LT 130 MM HG: CPT | Mod: CPTII,S$GLB,, | Performed by: NUCLEAR MEDICINE

## 2018-07-25 PROCEDURE — 3008F BODY MASS INDEX DOCD: CPT | Mod: CPTII,S$GLB,, | Performed by: NUCLEAR MEDICINE

## 2018-07-25 PROCEDURE — 3008F BODY MASS INDEX DOCD: CPT | Mod: CPTII,S$GLB,, | Performed by: PHYSICAL MEDICINE & REHABILITATION

## 2018-07-25 PROCEDURE — 99214 OFFICE O/P EST MOD 30 MIN: CPT | Mod: S$GLB,,, | Performed by: PHYSICAL MEDICINE & REHABILITATION

## 2018-07-25 PROCEDURE — 3078F DIAST BP <80 MM HG: CPT | Mod: CPTII,S$GLB,, | Performed by: PHYSICAL MEDICINE & REHABILITATION

## 2018-07-25 PROCEDURE — 99999 PR PBB SHADOW E&M-EST. PATIENT-LVL III: CPT | Mod: PBBFAC,,, | Performed by: PHYSICAL MEDICINE & REHABILITATION

## 2018-07-25 PROCEDURE — 99214 OFFICE O/P EST MOD 30 MIN: CPT | Mod: S$GLB,,, | Performed by: NUCLEAR MEDICINE

## 2018-07-25 PROCEDURE — 3074F SYST BP LT 130 MM HG: CPT | Mod: CPTII,S$GLB,, | Performed by: PHYSICAL MEDICINE & REHABILITATION

## 2018-07-25 PROCEDURE — 99999 PR PBB SHADOW E&M-EST. PATIENT-LVL III: CPT | Mod: PBBFAC,,, | Performed by: NUCLEAR MEDICINE

## 2018-07-25 PROCEDURE — 3079F DIAST BP 80-89 MM HG: CPT | Mod: CPTII,S$GLB,, | Performed by: NUCLEAR MEDICINE

## 2018-07-25 NOTE — PROGRESS NOTES
PM&R CLINIC NOTE    Chief Complaint   Patient presents with    Shoulder Pain     right        HPI: This is a 62 y.o.  female being seen in clinic today for return of right shoulder/arm pain with mild left shoulder similar pain.  She still has difficulty lying on her sides at night.  She denies numbness and tingling.  She stopped PT in the past due to improvement of symptoms.     History obtained from patient    Functional History:  Walking: Not limited  Transfers: Independent  Assistive devices: No  Power mobility: No  Falls: None   Directional preference:    Employment status:educator    Needs help with:  Nothing - all ADLS normal    Review of Systems:     General- denies lethargy, weight change, fever, chills  Head/neck- denies swallowing difficulties  ENT- denies hearing changes  Cardiovascular-denies chest pain  Pulmonary- denies shortness of breath  GI- denies constipation or bowel incontinence  - denies bladder incontinence  Skin- denies wounds or rashes  Musculoskeletal- denies weakness, +pain  Neurologic- denies numbness and tingling  Psychiatric- denies depressive or psychotic features, denies anxiety  Lymphatic-denies swelling  Endocrine- denies hypoglycemic symptoms/DM history    Physical Examination:  General: Well developed, well nourished female, NAD  HEENT: NCAT EOMI  Pulmonary: Normal respirations    Spinal Examination: CERVICAL  Active ROM is within normal limits.  Inspection: No deformity of spinal alignment.  Palpation:  Tight and mild  ttp at bilateral trapezius, very ttp at bilateral deltoid bursas    Spinal Examination: LUMBAR or THORACIC  Active ROM is limited in full flex and extension  Inspection: No deformity of spinal alignment.      Bilateral Upper and Lower Extremities:  Pulses are 2+ at radial, bilaterally.  Shoulder/Elbow/Wrist/Hand ROM wnl ttp at deltoid bursa bilaterally-worse on right, neg drop arm  Hip/Knee/Ankle ROM wnl  Bilateral Extremities show normal capillary refill.  No  signs of cyanosis, rubor, edema, skin changes, or dysvascular changes of appendages.  Nails appear intact.    Neurological Exam:  Cranial Nerves:  II-XII grossly intact    Manual Muscle Testing: (Motor 5=normal)  5/5 strength bilateral upper extremities except for 4p/5 at sab  No focal atrophy is noted of either upper or lower extremity.    Bilateral Reflexes:hypo at patellar  No clonus at knee or ankle.    Sensation: tested to light touch  - intact in arms  Gait: Narrow base and good arm swing.    Cerebellar: tandem gait.     IMPRESSION/PLAN: This is a 62 y.o.  female with bilateral mild rotator cuff impingement, deltoid bursitis, mild myofascial pain, morbid obesity:Body mass index is 42.74 kg/m².    1. Cont shoulder exercises  2. Ice modalities 20 min  3. Reorder PT-central PT--ROM, strengthening, myofascial release, stretch, modalities, HEP, dec pain    Sarika Bonilla M.D.  [facility]

## 2018-07-25 NOTE — PROGRESS NOTES
Subjective:   Patient ID:  Josette Altamirano is a 62 y.o. female who presents for follow-up of Congestive Heart Failure (1 month followup) and Pre-op Exam (colonoscopy  in September)      HPI 1- CHRONIC HFrEF, STAGE C ,FC 2  POST CHEMO CMP-ADRIAMYCIN-  TOLERATING ENTRESTO  PATTERN OF CALDERÓN UNCHANGED. NO ORTHOPNEA OR PND  NO PALPITATIONS.   NO NEAR SYNCOPE OR SYNCOPE  NO EDEMA. NO CALVE TENDERNESS  NO ABDOMINAL DISCOMFORT  NO FOCAL CNS SYMPTOMS OR SIGNS TO SUGGEST TIA OR STROKE  NO CHEST DISCOMFORT    Review of Systems   Constitution: Negative for chills, fever, weakness, night sweats, weight gain and weight loss.   HENT: Negative for nosebleeds.    Eyes: Negative for blurred vision, double vision and visual disturbance.   Cardiovascular: Negative for chest pain, dyspnea on exertion, irregular heartbeat, leg swelling, orthopnea, palpitations, paroxysmal nocturnal dyspnea and syncope.   Respiratory: Negative for cough, hemoptysis and wheezing.    Endocrine: Negative for polydipsia and polyuria.   Hematologic/Lymphatic: Does not bruise/bleed easily.   Skin: Negative for rash.   Musculoskeletal: Negative for joint pain, joint swelling, muscle weakness and myalgias.   Gastrointestinal: Negative for abdominal pain, hematemesis, jaundice and melena.   Genitourinary: Negative for dysuria, hematuria and nocturia.   Neurological: Negative for dizziness, focal weakness, headaches and sensory change.   Psychiatric/Behavioral: Negative for depression. The patient does not have insomnia and is not nervous/anxious.      Family History   Problem Relation Age of Onset    Diabetes Mother     Diabetes Father     Stroke Father     Cancer Maternal Aunt 55        breast cancer    Breast cancer Maternal Aunt     Stroke Paternal Aunt     Melanoma Neg Hx     Psoriasis Neg Hx     Lupus Neg Hx     Eczema Neg Hx      Past Medical History:   Diagnosis Date    Arthritis of right knee     Breast cancer     She has a history of a Stage IIA,  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 She subsequently underwent left mastectomy and sentinel node biopsy followed by axillary dissection March 23, 2007. Histopathologic evaluation demonstrated again multiple foci of intracystic papillary carcinoma, as well as DCIS noncomed    Cardiomyopathy     CHEMO INDUCED- RESOLVED    CHF (congestive heart failure)     systolic    Hypertension     Uterine fibroid      Current Outpatient Prescriptions on File Prior to Visit   Medication Sig Dispense Refill    carvedilol (COREG CR) 80 MG 24 hr capsule take 1 capsule by mouth once daily 90 capsule 3    meloxicam (MOBIC) 15 MG tablet Take 1 tablet (15 mg total) by mouth once daily. 30 tablet 0    naproxen sodium (ANAPROX) 220 MG tablet Take 220 mg by mouth as needed.      sodium,potassium,mag sulfates (SUPREP BOWEL PREP KIT) 17.5-3.13-1.6 gram SolR Use as directed. 354 mL 0    [DISCONTINUED] sacubitril-valsartan (ENTRESTO) 24-26 mg per tablet Take 1 tablet by mouth 2 (two) times daily. Please , start pre approval process 60 tablet 2     No current facility-administered medications on file prior to visit.      Review of patient's allergies indicates:  No Known Allergies    Objective:     Physical Exam   Constitutional: She is oriented to person, place, and time. She appears well-developed. No distress.   HENT:   Head: Normocephalic.   Eyes: Conjunctivae are normal. Pupils are equal, round, and reactive to light. No scleral icterus.   Neck: Normal range of motion. Neck supple. Normal carotid pulses, no hepatojugular reflux and no JVD present. Carotid bruit is not present. No edema present. No thyroid mass and no thyromegaly present.   Cardiovascular: Normal rate, regular rhythm, S1 normal, S2 normal, normal heart sounds and intact distal pulses.  PMI is not displaced.  Exam reveals no gallop and no friction rub.    No murmur  heard.  Pulses:       Carotid pulses are 2+ on the right side, and 2+ on the left side.       Radial pulses are 2+ on the right side, and 2+ on the left side.        Femoral pulses are 2+ on the right side, and 2+ on the left side.       Popliteal pulses are 2+ on the right side, and 2+ on the left side.        Dorsalis pedis pulses are 2+ on the right side, and 2+ on the left side.        Posterior tibial pulses are 2+ on the right side, and 2+ on the left side.   Pulmonary/Chest: Effort normal and breath sounds normal. She has no wheezes. She has no rales. She exhibits no tenderness.   Abdominal: Soft. Bowel sounds are normal. She exhibits no pulsatile midline mass and no mass. There is no hepatosplenomegaly. There is no tenderness.   Musculoskeletal: Normal range of motion. She exhibits no edema or tenderness.        Cervical back: Normal.        Thoracic back: Normal.        Lumbar back: Normal.   Lymphadenopathy:     She has no cervical adenopathy.     She has no axillary adenopathy.        Right: No supraclavicular adenopathy present.        Left: No supraclavicular adenopathy present.   Neurological: She is alert and oriented to person, place, and time. She has normal strength and normal reflexes. No sensory deficit. Gait normal.   Skin: Skin is warm. No rash noted. No cyanosis. No pallor. Nails show no clubbing.   Psychiatric: She has a normal mood and affect. Her speech is normal and behavior is normal. Cognition and memory are normal.       Assessment:     1. Chronic systolic heart failure    2. Cardiomyopathy due to chemotherapy      CLINICALLY COMPENSATED HF  RECENT BMP WAS REVIEWED AND DISCUSSED-  RENAL FUNCTION AND ELECTROLYTES STABLE  BP STABLE  NO ARRHYTHMIAS  CNS STATUS STABLE  CARD MED WELL TOLERATED  Plan:     Chronic systolic heart failure    Cardiomyopathy due to chemotherapy    Other orders  -     sacubitril-valsartan (ENTRESTO) 49-51 mg per tablet; Take 1 tablet by mouth 2 (two) times daily.   Dispense: 60 tablet; Refill: 3      1- OMT ENTRESTO    2- RETURN IN 2 MONTHS WITH BMP

## 2018-09-09 PROBLEM — Z12.11 SPECIAL SCREENING FOR MALIGNANT NEOPLASMS, COLON: Status: ACTIVE | Noted: 2018-09-09

## 2018-09-10 ENCOUNTER — PATIENT MESSAGE (OUTPATIENT)
Dept: GASTROENTEROLOGY | Facility: HOSPITAL | Age: 63
End: 2018-09-10

## 2018-09-10 ENCOUNTER — ANESTHESIA (OUTPATIENT)
Dept: ENDOSCOPY | Facility: HOSPITAL | Age: 63
End: 2018-09-10
Payer: COMMERCIAL

## 2018-09-10 ENCOUNTER — ANESTHESIA EVENT (OUTPATIENT)
Dept: ENDOSCOPY | Facility: HOSPITAL | Age: 63
End: 2018-09-10
Payer: COMMERCIAL

## 2018-09-10 ENCOUNTER — HOSPITAL ENCOUNTER (OUTPATIENT)
Facility: HOSPITAL | Age: 63
Discharge: HOME | End: 2018-09-10
Attending: INTERNAL MEDICINE | Admitting: INTERNAL MEDICINE
Payer: COMMERCIAL

## 2018-09-10 VITALS
OXYGEN SATURATION: 99 % | SYSTOLIC BLOOD PRESSURE: 153 MMHG | BODY MASS INDEX: 41.32 KG/M2 | WEIGHT: 242 LBS | HEIGHT: 64 IN | RESPIRATION RATE: 18 BRPM | TEMPERATURE: 98 F | DIASTOLIC BLOOD PRESSURE: 73 MMHG | HEART RATE: 62 BPM

## 2018-09-10 DIAGNOSIS — Z12.11 SPECIAL SCREENING FOR MALIGNANT NEOPLASMS, COLON: Primary | ICD-10-CM

## 2018-09-10 DIAGNOSIS — K57.30 DIVERTICULOSIS OF LARGE INTESTINE WITHOUT HEMORRHAGE: ICD-10-CM

## 2018-09-10 PROCEDURE — 25000003 PHARM REV CODE 250: Performed by: INTERNAL MEDICINE

## 2018-09-10 PROCEDURE — 63600175 PHARM REV CODE 636 W HCPCS: Performed by: NURSE ANESTHETIST, CERTIFIED REGISTERED

## 2018-09-10 PROCEDURE — G0121 COLON CA SCRN NOT HI RSK IND: HCPCS | Performed by: INTERNAL MEDICINE

## 2018-09-10 PROCEDURE — 37000009 HC ANESTHESIA EA ADD 15 MINS: Performed by: INTERNAL MEDICINE

## 2018-09-10 PROCEDURE — 37000008 HC ANESTHESIA 1ST 15 MINUTES: Performed by: INTERNAL MEDICINE

## 2018-09-10 PROCEDURE — G0121 COLON CA SCRN NOT HI RSK IND: HCPCS | Mod: ,,, | Performed by: INTERNAL MEDICINE

## 2018-09-10 RX ORDER — PROPOFOL 10 MG/ML
INJECTION, EMULSION INTRAVENOUS
Status: DISCONTINUED | OUTPATIENT
Start: 2018-09-10 | End: 2018-09-10

## 2018-09-10 RX ORDER — SODIUM CHLORIDE, SODIUM LACTATE, POTASSIUM CHLORIDE, CALCIUM CHLORIDE 600; 310; 30; 20 MG/100ML; MG/100ML; MG/100ML; MG/100ML
INJECTION, SOLUTION INTRAVENOUS CONTINUOUS
Status: DISCONTINUED | OUTPATIENT
Start: 2018-09-10 | End: 2018-09-10 | Stop reason: HOSPADM

## 2018-09-10 RX ORDER — LIDOCAINE HCL/PF 100 MG/5ML
SYRINGE (ML) INTRAVENOUS
Status: DISCONTINUED | OUTPATIENT
Start: 2018-09-10 | End: 2018-09-10

## 2018-09-10 RX ORDER — SODIUM CHLORIDE 0.9 % (FLUSH) 0.9 %
3 SYRINGE (ML) INJECTION
Status: DISCONTINUED | OUTPATIENT
Start: 2018-09-10 | End: 2018-09-10 | Stop reason: HOSPADM

## 2018-09-10 RX ADMIN — PROPOFOL 60 MG: 10 INJECTION, EMULSION INTRAVENOUS at 10:09

## 2018-09-10 RX ADMIN — SODIUM CHLORIDE, SODIUM LACTATE, POTASSIUM CHLORIDE, AND CALCIUM CHLORIDE: 600; 310; 30; 20 INJECTION, SOLUTION INTRAVENOUS at 10:09

## 2018-09-10 RX ADMIN — SODIUM CHLORIDE, SODIUM LACTATE, POTASSIUM CHLORIDE, AND CALCIUM CHLORIDE: 600; 310; 30; 20 INJECTION, SOLUTION INTRAVENOUS at 08:09

## 2018-09-10 RX ADMIN — PROPOFOL 70 MG: 10 INJECTION, EMULSION INTRAVENOUS at 10:09

## 2018-09-10 RX ADMIN — PROPOFOL 160 MG: 10 INJECTION, EMULSION INTRAVENOUS at 10:09

## 2018-09-10 RX ADMIN — LIDOCAINE HYDROCHLORIDE 100 MG: 20 INJECTION, SOLUTION INTRAVENOUS at 10:09

## 2018-09-10 NOTE — H&P
[facility]  Gastroenterology  H&P    Patient Name: Josette Altamirano  MRN: [id number]  Admission Date: 9/10/2018  Code Status: Full Code    Attending Provider: Indra Ramos MD   Primary Care Physician: Caridad Harrington MD  Principal Problem:Special screening for malignant neoplasms, colon    Subjective:     History of Present Illness/Reasons for procedure(s): Screening colonoscopy. Last exam in 2008.     Past Medical History:   Diagnosis Date    Arthritis of right knee     Breast cancer     She has a history of a Stage IIA, 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 She subsequently underwent left mastectomy and sentinel node biopsy followed by axillary dissection March 23, 2007. Histopathologic evaluation demonstrated again multiple foci of intracystic papillary carcinoma, as well as DCIS noncomed    Cardiomyopathy     CHEMO INDUCED- RESOLVED    CHF (congestive heart failure)     systolic    Hypertension     Uterine fibroid        No current facility-administered medications on file prior to encounter.      Current Outpatient Medications on File Prior to Encounter   Medication Sig Dispense Refill    carvedilol (COREG CR) 80 MG 24 hr capsule take 1 capsule by mouth once daily 90 capsule 3    [DISCONTINUED] meloxicam (MOBIC) 15 MG tablet Take 1 tablet (15 mg total) by mouth once daily. 30 tablet 0    [DISCONTINUED] naproxen sodium (ANAPROX) 220 MG tablet Take 220 mg by mouth as needed.         Past Surgical History:   Procedure Laterality Date    BREAST BIOPSY      BREAST CYST EXCISION Right     BREAST SURGERY      CHOLECYSTECTOMY      MASTECTOMY      left    MEDIPORT INSERTION, SINGLE         Review of patient's allergies indicates:  No Known Allergies  Family History     Problem Relation (Age of Onset)    Breast cancer Maternal Aunt    Cancer Maternal Aunt (55)    Diabetes Mother, Father    Stroke  Father, Paternal Aunt        Tobacco Use    Smoking status: Never Smoker    Smokeless tobacco: Never Used   Substance and Sexual Activity    Alcohol use: Yes     Alcohol/week: 0.0 oz     Comment: Ocassionally    Drug use: No    Sexual activity: Yes     Partners: Male     Birth control/protection: None, Post-menopausal     Review of Systems   Constitutional: Negative for activity change, chills, diaphoresis, fatigue and fever.   HENT: Negative for ear pain, facial swelling, nosebleeds, rhinorrhea, sneezing, sore throat and trouble swallowing.    Eyes: Negative for photophobia, pain and visual disturbance.   Respiratory: Negative for apnea, cough, chest tightness, shortness of breath and wheezing.    Gastrointestinal: Negative for abdominal pain, blood in stool, constipation, nausea, rectal pain and vomiting.   Genitourinary: Negative for decreased urine volume, difficulty urinating, dysuria, flank pain and hematuria.   Musculoskeletal: Negative for arthralgias, back pain, gait problem, neck pain and neck stiffness.   Skin: Negative for pallor and rash.   Neurological: Negative for seizures, syncope, speech difficulty, weakness and headaches.   Hematological: Negative for adenopathy. Does not bruise/bleed easily.   Psychiatric/Behavioral: Negative for behavioral problems, confusion and hallucinations.     Objective:     Vital Signs (Most Recent):  Temp: 98 °F (36.7 °C) (09/10/18 0838)  Pulse: 71 (09/10/18 0838)  Resp: 12 (09/10/18 0838)  BP: (!) 140/85 (09/10/18 0838)  SpO2: 100 % (09/10/18 0838) Vital Signs (24h Range):  Temp:  [98 °F (36.7 °C)] 98 °F (36.7 °C)  Pulse:  [71] 71  Resp:  [12] 12  SpO2:  [100 %] 100 %  BP: (140)/(85) 140/85     Weight: 109.8 kg (242 lb) (09/10/18 0838)  Body mass index is 41.54 kg/m².    No intake or output data in the 24 hours ending 09/10/18 0911    Lines/Drains/Airways     Peripheral Intravenous Line                 Peripheral IV - Single Lumen 09/10/18 0851 Right Hand less than  1 day                Physical Exam   Constitutional: She is oriented to person, place, and time. She appears well-developed and well-nourished.   HENT:   Head: Normocephalic and atraumatic.   Eyes: EOM are normal. Pupils are equal, round, and reactive to light.   Neck: Normal range of motion. Neck supple. No thyromegaly present.   Cardiovascular: Normal rate, regular rhythm, normal heart sounds and intact distal pulses.   No murmur heard.  Pulmonary/Chest: Effort normal and breath sounds normal. No respiratory distress. She has no wheezes. She has no rales.   Abdominal: Soft. Bowel sounds are normal. She exhibits no distension and no mass. There is no tenderness.   Musculoskeletal: Normal range of motion. She exhibits no edema or tenderness.   Lymphadenopathy:     She has no cervical adenopathy.   Neurological: She is alert and oriented to person, place, and time. She has normal reflexes. No cranial nerve deficit.   Skin: Skin is warm and dry. No erythema.   Psychiatric: She has a normal mood and affect. Her behavior is normal.       Lab Summary Latest Ref Rng & Units 6/6/2018 7/11/2018 7/24/2018   Hemoglobin 12.0 - 16.0 g/dL (None) 11.3(L) (None)   Hematocrit 37.0 - 48.5 % (None) 38.5 (None)   White count 3.90 - 12.70 K/uL (None) 4.69 (None)   Platelet count 150 - 350 K/uL (None) 263 (None)   Sodium 136 - 145 mmol/L 140 (None) 140   Potassium 3.5 - 5.1 mmol/L 4.4 (None) 4.0   Calcium 8.7 - 10.5 mg/dL 8.7 (None) 8.8   Phosphorus - (None) (None) (None)   Creatinine 0.5 - 1.4 mg/dL 0.7 (None) 0.7   AST - (None) (None) (None)   Alk Phos - (None) (None) (None)   Bilirubin - (None) (None) (None)   Glucose 70 - 110 mg/dL 109 (None) 96   Cholesterol - (None) (None) (None)   HDL cholesterol - (None) (None) (None)   Triglycerides - (None) (None) (None)   LDL calc - (None) (None) (None)   Total protein - (None) (None) (None)   Albumin - (None) (None) (None)   A1C - (None) (None) (None)   PSA Screen - (None) (None) (None)    Some recent data might be hidden        Assessment/Plan:     Active Diagnoses:    Diagnosis Date Noted POA    PRINCIPAL PROBLEM:  Special screening for malignant neoplasms, colon [Z12.11] 09/09/2018 Not Applicable      Problems Resolved During this Admission:       Risks, benefits and alternative options were discussed with the patient. Risks including but not limited to infection, bleeding, heart or respiratory problems and perforation that may require surgery were all explained to the patient. The patient had a chance for questions if there were doubts or concerns about the test. The referring provider will be notified that our consultation is complete and available through the patient's records.    Thanks for letting us participate in the care of this nice patient,      Indra Ramos MD  Gastroenterology  [facility]

## 2018-09-10 NOTE — DISCHARGE INSTRUCTIONS
If you develop fevers, severe abdominal pain, significant bleeding, nausea or vomiting, please contact us or come to our Emergency Department at [facility].  Our  contact number is [phone] available for emergencies or any question that you may have.      You may return to normal activities tomorrow.  Written discharge instructions were provided to you today and have them handy since you may not remember our conversation today.       I also recommend that you follow a high fiber diet and take calcium supplements daily as well as to continue your present medications.      If you take Aspirin, please resume taking it at your prior dose today. If we obtained biopsies or removed polyps during your procedure, we will contact you within 5 to 6 days with the results.      Thanks for trusting us with your healthcare needs.      Sincerely,     Indra Ramos M.D.        To rate your experience with Dr. Ramos, please click on the link below     [email or web address]

## 2018-09-10 NOTE — PLAN OF CARE
Dc papers,instructions all given to pt at this time.pt and family verbalized all understanding and had no questions.

## 2018-09-10 NOTE — DISCHARGE SUMMARY
Endoscopy Discharge Summary      Admit Date: 9/10/2018    Discharge Date and Time:  9/10/2018 10:30 AM    Attending Physician: Indra Ramos MD     Discharge Physician: Indra Ramos MD     Principal Admitting Diagnoses: Special screening for malignant neoplasms, colon         Discharge Diagnosis: The primary encounter diagnosis was Special screening for malignant neoplasms, colon. A diagnosis of Diverticulosis of large intestine without hemorrhage was also pertinent to this visit.     Discharged Condition: Good    Indication for Admission: Special screening for malignant neoplasms, colon     Hospital Course: Patient was admitted for an inpatient procedure and tolerated the procedure well with no complications.    Significant Diagnostic Studies:  COLON    Pathology (if any):  Specimen (12h ago, onward)    None          Disposition: Home.    Bleeding: None    No Implants         Follow Up/Patient Instructions:   Current Discharge Medication List      CONTINUE these medications which have NOT CHANGED    Details   carvedilol (COREG CR) 80 MG 24 hr capsule take 1 capsule by mouth once daily  Qty: 90 capsule, Refills: 3    Associated Diagnoses: Cardiomyopathy due to chemotherapy      sacubitril-valsartan (ENTRESTO) 49-51 mg per tablet Take 1 tablet by mouth 2 (two) times daily.  Qty: 60 tablet, Refills: 3             Discharge Procedure Orders   Diet general     Call MD for:  temperature >100.4     Call MD for:  persistent nausea and vomiting     Call MD for:  severe uncontrolled pain     Call MD for:  difficulty breathing, headache or visual disturbances     Call MD for:  redness, tenderness, or signs of infection (pain, swelling, redness, odor or green/yellow discharge around incision site)     Call MD for:  hives     Call MD for:  persistent dizziness or light-headedness     No dressing needed       Follow-up Information     Caridad Harrington MD.    Specialty:  Internal Medicine  Why:  As needed  Contact  information:  [address] [place] [address]  [phone]

## 2018-09-10 NOTE — ANESTHESIA RELEASE NOTE
"Anesthesia Release from PACU Note    Patient: Josette Altamirano    Procedure(s) Performed: Procedure(s) (LRB):  COLONOSCOPY (N/A)    Anesthesia type: MAC    Post pain: Adequate analgesia    Post assessment: no apparent anesthetic complications, tolerated procedure well and no evidence of recall    Last Vitals:   Visit Vitals  BP (!) 119/55 (BP Location: Left arm, Patient Position: Lying)   Pulse 77   Temp 36.8 °C (98.2 °F) (Oral)   Resp 16   Ht 5' 4" (1.626 m)   Wt 109.8 kg (242 lb)   SpO2 98%   Breastfeeding? No   BMI 41.54 kg/m²       Post vital signs: stable    Level of consciousness: awake, alert  and oriented    Nausea/Vomiting: no nausea/no vomiting    Complications: none    Airway Patency: patent    Respiratory: unassisted, spontaneous ventilation, room air    Cardiovascular: stable    Hydration: euvolemic  "

## 2018-09-10 NOTE — TRANSFER OF CARE
"Anesthesia Transfer of Care Note    Patient: Josette Altamirano    Procedure(s) Performed: Procedure(s) (LRB):  COLONOSCOPY (N/A)    Patient location: PACU    Anesthesia Type: MAC    Transport from OR: Transported from OR on room air with adequate spontaneous ventilation    Post pain: adequate analgesia    Post assessment: no apparent anesthetic complications    Post vital signs: stable    Level of consciousness: awake    Nausea/Vomiting: no nausea/vomiting    Complications: none    Transfer of care protocol was followed      Last vitals:   Visit Vitals  BP (!) 119/55 (BP Location: Left arm, Patient Position: Lying)   Pulse 77   Temp 36.8 °C (98.2 °F) (Oral)   Resp 16   Ht 5' 4" (1.626 m)   Wt 109.8 kg (242 lb)   SpO2 98%   Breastfeeding? No   BMI 41.54 kg/m²     "

## 2018-09-10 NOTE — ANESTHESIA POSTPROCEDURE EVALUATION
"Anesthesia Post Evaluation    Patient: Josette Altamirano    Procedure(s) Performed: Procedure(s) (LRB):  COLONOSCOPY (N/A)    Final Anesthesia Type: MAC  Patient location during evaluation: PACU  Patient participation: Yes- Able to Participate  Level of consciousness: awake and alert and oriented  Post-procedure vital signs: reviewed and stable  Pain management: adequate  Airway patency: patent  PONV status at discharge: No PONV  Anesthetic complications: no      Cardiovascular status: blood pressure returned to baseline  Respiratory status: unassisted, room air and spontaneous ventilation  Hydration status: euvolemic  Follow-up not needed.        Visit Vitals  BP (!) 119/55 (BP Location: Left arm, Patient Position: Lying)   Pulse 77   Temp 36.8 °C (98.2 °F) (Oral)   Resp 16   Ht 5' 4" (1.626 m)   Wt 109.8 kg (242 lb)   SpO2 98%   Breastfeeding? No   BMI 41.54 kg/m²       Pain/Edy Score: Pain Assessment Performed: Yes (9/10/2018 10:32 AM)  Presence of Pain: non-verbal indicators absent (9/10/2018 10:32 AM)  Pain Rating Prior to Med Admin: 0 (9/10/2018  8:41 AM)  Edy Score: 8 (9/10/2018 10:32 AM)        "

## 2018-09-10 NOTE — [employer] PATIENT INSTRUCTIONS
Discharge Summary/Instructions after an Endoscopic Procedure  Patient Name: Josette Altamirano  Patient MRN: [id number]  Patient YOB: 1955  Monday, September 10, 2018 Indra Ramos MD  RESTRICTIONS:  During your procedure today, you received medications for sedation.  These   medications may affect your judgment, balance and coordination.  Therefore,   for 24 hours, you have the following restrictions:   - DO NOT drive a car, operate machinery, make legal/financial decisions,   sign important papers or drink alcohol.    ACTIVITY:  Today: no heavy lifting, straining or running due to procedural   sedation/anesthesia.  The following day: return to full activity including work.  DIET:  Eat and drink normally unless instructed otherwise.     TREATMENT FOR COMMON SIDE EFFECTS:  - Mild abdominal pain, nausea, belching, bloating or excessive gas:  rest,   eat lightly and use a heating pad.  - Sore Throat: treat with throat lozenges and/or gargle with warm salt   water.  - Because air was used during the procedure, expelling large amounts of air   from your rectum or belching is normal.  - If a bowel prep was taken, you may not have a bowel movement for 1-3 days.    This is normal.  SYMPTOMS TO WATCH FOR AND REPORT TO YOUR PHYSICIAN:  1. Abdominal pain or bloating, other than gas cramps.  2. Chest pain.  3. Back pain.  4. Signs of infection such as: chills or fever occurring within 24 hours   after the procedure.  5. Rectal bleeding, which would show as bright red, maroon, or black stools.   (A tablespoon of blood from the rectum is not serious, especially if   hemorrhoids are present.)  6. Vomiting.  7. Weakness or dizziness.  GO DIRECTLY TO THE NEAREST EMERGENCY ROOM IF YOU HAVE ANY OF THE FOLLOWING:      Difficulty breathing              Chills and/or fever over 101 F   Persistent vomiting and/or vomiting blood   Severe abdominal pain   Severe chest pain   Black, tarry stools   Bleeding- more than one tablespoon   Any  other symptom or condition that you feel may need urgent attention  Your doctor recommends these additional instructions:  If any biopsies were taken, your doctors clinic will contact you in 1 to 2   weeks with any results.  - The patient will be observed post-procedure, until all discharge criteria   are met.   - Discharge patient to home (ambulatory).   - Patient has a contact number available for emergencies.  The signs and   symptoms of potential delayed complications were discussed with the   patient.  Return to normal activities tomorrow.  Written discharge   instructions were provided to the patient.   - Resume previous diet.   - Continue present medications.   - Repeat colonoscopy in 10 years for screening purposes.   - Return to referring physician as previously scheduled.  For questions, problems or results please call your physician Indra Ramos MD at Work:  [phone]  If you have any questions about the above instructions, call the GI   department at [phone] or call the endoscopy unit at [phone]   from 7am until 3 pm.  [facility], EMERGENCY ROOM PHONE NUMBER:   [phone]  IF A COMPLICATION OR EMERGENCY SITUATION ARISES AND YOU ARE UNABLE TO REACH   YOUR PHYSICIAN - GO DIRECTLY TO THE EMERGENCY ROOM.  I have read or have had read to me these discharge instructions for my   procedure and have received a written copy.  I understand these   instructions and will follow-up with my physician if I have any questions.     __________________________________       _____________________________________  Nurse Signature                                          Patient/Designated   Responsible Party Signature  Indra Ramos MD  9/10/2018 10:28:56 AM  This report has been verified and signed electronically.  [employer]

## 2018-09-10 NOTE — ANESTHESIA PREPROCEDURE EVALUATION
09/10/2018  Josette Altamirano is a 63 y.o., female.    Pre-op Assessment    I have reviewed the Patient Summary Reports.     I have reviewed the Nursing Notes.   I have reviewed the Medications.     Review of Systems  Anesthesia Hx:  No problems with previous Anesthesia    Social:  Non-Smoker, Social Alcohol Use    Hematology/Oncology:  Hematology Normal       -- Cancer in past history:  Breast right surgery and chemotherapy  Oncology Comments: 2007 left breast surgery  Cardiomyopathy due to chemotherapy     EENT/Dental:   chronic allergic rhinitis   Cardiovascular:   Exercise tolerance: poor Hypertension, well controlled CHF ECG has been reviewed. CONCLUSIONS     1 - Mildly depressed left ventricular systolic function (EF 45-50%).     2 - Impaired LV relaxation, normal LAP (grade 1 diastolic dysfunction).     3 - Normal right ventricular systolic function .     4 - Moderate left atrial enlargement.     5 - The estimated PA systolic pressure is 20 mmHg.   Normal sinus rhythm  Moderate voltage criteria for LVH, may be normal variant  Prolonged QT  No previous ECGs available  Confirmed by VERONICA MONZON MD ([id number]) on 4/4/2018 6:08:03 PM   Pulmonary:   snore   Renal/:  Renal/ Normal     Hepatic/GI:   Bowel Prep. 0500 last drink of fluid.  Saturday last solid    Musculoskeletal:   Arthritis     Neurological:   Neuromuscular Disease,    Endocrine:  Endocrine Normal    Dermatological:  Skin Normal    Psych:  Psychiatric Normal           Physical Exam  General:  Well nourished, Morbid Obesity    Airway/Jaw/Neck:  Airway Findings: Mallampati: III                Anesthesia Plan  Type of Anesthesia, risks & benefits discussed:  Anesthesia Type:  MAC  Patient's Preference:   Intra-op Monitoring Plan:   Intra-op Monitoring Plan Comments:   Post Op Pain Control Plan:   Post Op Pain Control Plan Comments:   Induction:    IV  Beta Blocker:  Patient is on a Beta-Blocker and has received one dose within the past 24 hours (No further documentation required).       Informed Consent: Patient understands risks and agrees with Anesthesia plan.  Questions answered. Anesthesia consent signed with patient.  ASA Score: 3     Day of Surgery Review of History & Physical: I have interviewed and examined the patient. I have reviewed the patient's H&P dated: 09/10/18.   H&P update referred to the surgeon.

## 2018-12-04 NOTE — TELEPHONE ENCOUNTER
Approved Medications      sacubitril-valsartan (ENTRESTO) 49-51 mg per tablet         Sig: Take 1 tablet by mouth 2 (two) times daily.    Disp:  60 tablet    Refills:  6    Start: 12/4/2018    Class: Normal    Authorized by: Tyrone Kwong MD        To be filled at: [facility]

## 2018-12-04 NOTE — TELEPHONE ENCOUNTER
----- Message from Edelmira PINON Jimenez sent at 12/4/2018 12:33 PM CST -----  Contact: Pt   States she's calling to get a refill and has 1 left for tonight. Her appt is in Jan & can be reached at [phone]//thanks/dbw     1. What is the name of the medication you are requesting? entresto  2. What is the dose? Mg unknown to pt   3. How do you take the medication? Orally, topically, etc? Orally   4. How often do you take this medication? Twice   5. Do you need a 30 day or 90 day supply? Until the time of appt   6. How many refills are you requesting?   7. What is your preferred pharmacy and location of the pharmacy?   8. Who can we contact with further questions? Pt     [facility]  [address] [facility] [address] [place] [address]  Phone: [phone] Fax: [phone]

## 2019-01-03 ENCOUNTER — LAB VISIT (OUTPATIENT)
Dept: LAB | Facility: HOSPITAL | Age: 64
End: 2019-01-03
Attending: NUCLEAR MEDICINE
Payer: COMMERCIAL

## 2019-01-03 ENCOUNTER — OFFICE VISIT (OUTPATIENT)
Dept: CARDIOLOGY | Facility: CLINIC | Age: 64
End: 2019-01-03
Payer: COMMERCIAL

## 2019-01-03 VITALS
HEART RATE: 65 BPM | DIASTOLIC BLOOD PRESSURE: 60 MMHG | WEIGHT: 244 LBS | SYSTOLIC BLOOD PRESSURE: 120 MMHG | HEIGHT: 65 IN | BODY MASS INDEX: 40.65 KG/M2

## 2019-01-03 DIAGNOSIS — T45.1X5A CARDIOMYOPATHY DUE TO CHEMOTHERAPY: ICD-10-CM

## 2019-01-03 DIAGNOSIS — I42.7 CARDIOMYOPATHY DUE TO CHEMOTHERAPY: ICD-10-CM

## 2019-01-03 DIAGNOSIS — I50.22 CHRONIC SYSTOLIC HEART FAILURE: Primary | ICD-10-CM

## 2019-01-03 DIAGNOSIS — I10 ESSENTIAL HYPERTENSION: Chronic | ICD-10-CM

## 2019-01-03 DIAGNOSIS — I50.22 CHRONIC SYSTOLIC HEART FAILURE: ICD-10-CM

## 2019-01-03 LAB
ANION GAP SERPL CALC-SCNC: 7 MMOL/L
BUN SERPL-MCNC: 10 MG/DL
CALCIUM SERPL-MCNC: 9.1 MG/DL
CHLORIDE SERPL-SCNC: 106 MMOL/L
CO2 SERPL-SCNC: 28 MMOL/L
CREAT SERPL-MCNC: 0.8 MG/DL
EST. GFR  (AFRICAN AMERICAN): >60 ML/MIN/1.73 M^2
EST. GFR  (NON AFRICAN AMERICAN): >60 ML/MIN/1.73 M^2
GLUCOSE SERPL-MCNC: 96 MG/DL
POTASSIUM SERPL-SCNC: 4.3 MMOL/L
SODIUM SERPL-SCNC: 141 MMOL/L

## 2019-01-03 PROCEDURE — 3008F PR BODY MASS INDEX (BMI) DOCUMENTED: ICD-10-PCS | Mod: CPTII,S$GLB,, | Performed by: NUCLEAR MEDICINE

## 2019-01-03 PROCEDURE — 3078F DIAST BP <80 MM HG: CPT | Mod: CPTII,S$GLB,, | Performed by: NUCLEAR MEDICINE

## 2019-01-03 PROCEDURE — 36415 COLL VENOUS BLD VENIPUNCTURE: CPT | Mod: PO

## 2019-01-03 PROCEDURE — 3078F PR MOST RECENT DIASTOLIC BLOOD PRESSURE < 80 MM HG: ICD-10-PCS | Mod: CPTII,S$GLB,, | Performed by: NUCLEAR MEDICINE

## 2019-01-03 PROCEDURE — 3074F SYST BP LT 130 MM HG: CPT | Mod: CPTII,S$GLB,, | Performed by: NUCLEAR MEDICINE

## 2019-01-03 PROCEDURE — 80048 BASIC METABOLIC PNL TOTAL CA: CPT | Mod: PO

## 2019-01-03 PROCEDURE — 99214 OFFICE O/P EST MOD 30 MIN: CPT | Mod: S$GLB,,, | Performed by: NUCLEAR MEDICINE

## 2019-01-03 PROCEDURE — 99999 PR PBB SHADOW E&M-EST. PATIENT-LVL III: ICD-10-PCS | Mod: PBBFAC,,, | Performed by: NUCLEAR MEDICINE

## 2019-01-03 PROCEDURE — 3074F PR MOST RECENT SYSTOLIC BLOOD PRESSURE < 130 MM HG: ICD-10-PCS | Mod: CPTII,S$GLB,, | Performed by: NUCLEAR MEDICINE

## 2019-01-03 PROCEDURE — 99999 PR PBB SHADOW E&M-EST. PATIENT-LVL III: CPT | Mod: PBBFAC,,, | Performed by: NUCLEAR MEDICINE

## 2019-01-03 PROCEDURE — 99214 PR OFFICE/OUTPT VISIT, EST, LEVL IV, 30-39 MIN: ICD-10-PCS | Mod: S$GLB,,, | Performed by: NUCLEAR MEDICINE

## 2019-01-03 PROCEDURE — 3008F BODY MASS INDEX DOCD: CPT | Mod: CPTII,S$GLB,, | Performed by: NUCLEAR MEDICINE

## 2019-01-03 NOTE — PROGRESS NOTES
Subjective:   Patient ID:  Josette Altamirano is a 63 y.o. female who presents for follow-up of Congestive Heart Failure and Hypertension      HPI CHRONIC HFrEF, STAGE C, FC 2-  CHEMO INDUCED CMP  DOING WELL. NO RECENT ED VISITS OR HOSPITALIZATIONS FOR ADHF OR ARRHYTHMIAS  NO ORTHOPNEA OR PND  NO ABDOMINAL DISCOMFORT  NO EDEMA. NO CALVE TENDERNESS. NO INTERMITTENT CLAUDICATION  NO PALPITATIONS  NO NEAR SYNCOPE OR SYNCOPE  NO FOCAL CNS SYMPTOMS OR SIGNS TO SUGGEST TIA OR STROKE  CARD MED GOOD COMPLIANCE    Review of Systems   Constitution: Negative for chills, fever, weakness, night sweats, weight gain and weight loss.   HENT: Negative for nosebleeds.    Eyes: Negative for blurred vision, double vision and visual disturbance.   Cardiovascular: Negative for chest pain, dyspnea on exertion, irregular heartbeat, leg swelling, orthopnea, palpitations, paroxysmal nocturnal dyspnea and syncope.   Respiratory: Negative for cough, hemoptysis and wheezing.    Endocrine: Negative for polydipsia and polyuria.   Hematologic/Lymphatic: Does not bruise/bleed easily.   Skin: Negative for rash.   Musculoskeletal: Negative for joint pain, joint swelling, muscle weakness and myalgias.   Gastrointestinal: Negative for abdominal pain, hematemesis, jaundice and melena.   Genitourinary: Negative for dysuria, hematuria and nocturia.   Neurological: Negative for dizziness, focal weakness, headaches and sensory change.   Psychiatric/Behavioral: Negative for depression. The patient does not have insomnia and is not nervous/anxious.      Family History   Problem Relation Age of Onset    Diabetes Mother     Diabetes Father     Stroke Father     Cancer Maternal Aunt 55        breast cancer    Breast cancer Maternal Aunt     Stroke Paternal Aunt     Melanoma Neg Hx     Psoriasis Neg Hx     Lupus Neg Hx     Eczema Neg Hx      Past Medical History:   Diagnosis Date    Arthritis of right knee     Breast cancer     She has a history of a Stage  IIA, 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 She subsequently underwent left mastectomy and sentinel node biopsy followed by axillary dissection March 23, 2007. Histopathologic evaluation demonstrated again multiple foci of intracystic papillary carcinoma, as well as DCIS noncomed    Cardiomyopathy     CHEMO INDUCED- RESOLVED    CHF (congestive heart failure)     systolic    Hypertension     Uterine fibroid      Current Outpatient Medications on File Prior to Visit   Medication Sig Dispense Refill    carvedilol (COREG CR) 80 MG 24 hr capsule take 1 capsule by mouth once daily 90 capsule 3    [DISCONTINUED] sacubitril-valsartan (ENTRESTO) 49-51 mg per tablet Take 1 tablet by mouth 2 (two) times daily. 60 tablet 6     No current facility-administered medications on file prior to visit.      Review of patient's allergies indicates:  No Known Allergies    Objective:     Physical Exam   Constitutional: She is oriented to person, place, and time. She appears well-developed. No distress.   HENT:   Head: Normocephalic.   Eyes: Conjunctivae are normal. Pupils are equal, round, and reactive to light. No scleral icterus.   Neck: Normal range of motion. Neck supple. Normal carotid pulses, no hepatojugular reflux and no JVD present. Carotid bruit is not present. No edema present. No thyroid mass and no thyromegaly present.   Cardiovascular: Normal rate, regular rhythm, S1 normal, S2 normal, normal heart sounds and intact distal pulses. PMI is not displaced. Exam reveals no gallop and no friction rub.   No murmur heard.  Pulses:       Carotid pulses are 2+ on the right side, and 2+ on the left side.       Radial pulses are 2+ on the right side, and 2+ on the left side.        Femoral pulses are 2+ on the right side, and 2+ on the left side.       Popliteal pulses are 2+ on the right side, and 2+ on the left side.        Dorsalis pedis  pulses are 2+ on the right side, and 2+ on the left side.        Posterior tibial pulses are 2+ on the right side, and 2+ on the left side.   Pulmonary/Chest: Effort normal and breath sounds normal. She has no wheezes. She has no rales. She exhibits no tenderness.   Abdominal: Soft. Bowel sounds are normal. She exhibits no pulsatile midline mass and no mass. There is no hepatosplenomegaly. There is no tenderness.   Musculoskeletal: Normal range of motion. She exhibits no edema or tenderness.        Cervical back: Normal.        Thoracic back: Normal.        Lumbar back: Normal.   Lymphadenopathy:     She has no cervical adenopathy.     She has no axillary adenopathy.        Right: No supraclavicular adenopathy present.        Left: No supraclavicular adenopathy present.   Neurological: She is alert and oriented to person, place, and time. She has normal strength and normal reflexes. No sensory deficit. Gait normal.   Skin: Skin is warm. No rash noted. No cyanosis. No pallor. Nails show no clubbing.   Psychiatric: She has a normal mood and affect. Her speech is normal and behavior is normal. Cognition and memory are normal.       Assessment:     1. Chronic systolic heart failure    2. Cardiomyopathy due to chemotherapy    3. Essential hypertension      CLINICAL WELL COMPENSATED HF  NO ARRHYTHMIAS  CNS STATUS STABLE  BP WELL CONTROLLED  CARD MED WELL TOLERATED  Plan:     Chronic systolic heart failure  -     Basic metabolic panel; Future; Expected date: 01/03/2019  -     sacubitril-valsartan (ENTRESTO)  mg per tablet; Take 1 tablet by mouth 2 (two) times daily.  Dispense: 60 tablet; Refill: 3    Cardiomyopathy due to chemotherapy  -     Basic metabolic panel; Future; Expected date: 01/03/2019  -     sacubitril-valsartan (ENTRESTO)  mg per tablet; Take 1 tablet by mouth 2 (two) times daily.  Dispense: 60 tablet; Refill: 3    Essential hypertension    1- OMT ENTRESTO    2- BMP TODAY    3- RETURN IN 3  MONTHS

## 2019-01-11 DIAGNOSIS — T45.1X5A CARDIOMYOPATHY DUE TO CHEMOTHERAPY: ICD-10-CM

## 2019-01-11 DIAGNOSIS — I42.7 CARDIOMYOPATHY DUE TO CHEMOTHERAPY: ICD-10-CM

## 2019-01-11 RX ORDER — CARVEDILOL PHOSPHATE 80 MG/1
CAPSULE, EXTENDED RELEASE ORAL
Qty: 90 CAPSULE | Refills: 0 | Status: SHIPPED | OUTPATIENT
Start: 2019-01-11 | End: 2019-04-09 | Stop reason: SDUPTHER

## 2019-03-12 NOTE — PROGRESS NOTES
CC: Breast cancer follow-up     HPI: breast cancer follow-up     Pt has a history of a Stage IIA, 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 She subsequently underwent left mastectomy and sentinel node biopsy followed by axillary dissection  March 23, 2007. Histopathologic evaluation demonstrated again multiple foci of intracystic papillary carcinoma, as well as DCIS noncomedo type. One of three sentinel lymph nodes demonstrated micrometastatic disease. Twelve additional axillary lymph nodes were obtained, all of which were negative for disease. Estrogen receptors were 11%. Progesterone receptors 4%. HER2/bob was not over-expressed. She subsequently underwent additional studies including a MUGA scan demonstrating an injection  fraction of 39%, thus she was treated with six cycles of Cytoxan and Taxotere which were well tolerated.    9/28/07 hormone studies suggested the pt was menopausal so she was started on Arimidex for post adjuvant therapy. Therapy completed 9/2012.    History of chemo induced cardiomyopathy. CHF- followed by Cardiology- started new medication and last visit was in June.     Patient presents for her 6 mon f/u exam- mammo today and lab review      Pt has had f/u with Urology as recommended for her hematuria- denies any episodes of visible hematuria, no vaginal discharge and no blood in her bowel movements.     PMH: Chronic systolic heart failure discovered prior to chemo. Obesity, HTN, Breast cancer left, Fibrocystic breast condition. Achilles tendonitis, history of hematuria with negative workup, DDD and bulging disc    PSH: Lap jonathan in 2004, left modified radical mastectomy with sentinel and axillary node biospy. Multiple breast biopsies. Mediport placement and removal.    SOCIAL:Patient is . She is a teacher. She occasionally drinks alcohol. No tobacco use.     FH: Multiple relatives with  diabetes.    PCP: Caridad Harrington    Last mammo: 1/3/18 - wnl.     Last gyn visit- 8/18/16    Colonoscopy- 9/10/18 - f/u 10 years    ROS: Denies any headaches, nausea or emesis. No chest pain, cough or SOB. No fever or night sweats. No abd pain or cramping. No change in bowel or bladder function or character. No hematuria- history of DUB with negative biopsy attributed to fibroids.  No wt loss or wt gain. No lower extremity edema. No weakness or fatigue out of the ordinary. Denies any dysuria, no suprapubic pain, back pain improved, no increase in urinary frequency, no change in bowel movement frequency or character. No hematochezia, no more episodes of discolored urine.  Denies any right breast pain or nipple discharge. No breast lumps. Walking for 45 min to 60 min about 3 times a week.      PHYSICAL EXAM:  HEAD: Atraumatic and normocephalic.  EYES:Conjunctivae nonicteric, PERRLA  NARIES: Patent with pale mucosa, scant clear mucus.  MOUTH: No oral lesions, no posterior pharyngeal exudates or erythema.  NECK: Supple. Soft, Symmetric. Trachea midline. No thyromegaly.  LYMPHATICS: There is no supraclavicular, infraclavicular or cervical adenopathy. No axillary adenopathy.  LUNGS: Clear to auscultation. Unlabored respiratory effort. No CVA tenderness  HEART: RRR with no murmur or gallop.  BREASTS: Right breast and left chest wall reveals no visible mass, erythema, rash, dimpling or peaudeorange appearance. No palpable mass in the right breast or over the left chest wall. Has a 4.5 cm soft tissue mass at the base of the left axilla and just above the mastectomy scar that has not been appreciated on previous exams. No nipple discharge noted from the right breast with compression. Has an island of glandular tissue in the upper outer quadrant of the right breast that blends with the surrounding breast tissue.   ABDOMEN: Soft, nontender with no hepatosplenomegaly, guarding or rebound. No CVA tenderness. No suprapubic  tenderness. No palpable evidence of groin hernia dallin.   EXT: Bilateral lower extremity exam reveals 2+ pedal pulses. No edema. No asymmetry of the hands or feet. 5/5 strength in upper and lower extremities that is symmetric. No evidence of lymph edema left arm. Bilateral shoulder crepitus with ROM- negative drop sign bilaterally.   SKIN: Warm and dry to touch with no rash. No scaliness or dryness.  PSYCH: Affect appropriate.    Mammogram today- results pending    ASSESSMENT:  1. Breast cancer as previously described. Arimidex therapy completed.  2. Heart failure stable and followed by cardiology.  3. Obesity. Encouraged pt to continue with exercise and dietary changes.   4. hematuria workup benign- followed by urology - and is up to date  5. History of vaginal discharge scant with tiny blood clot - fibroids- no other episodes due for gyn f/u  6. Vaginal dryness  7. Mildly elevated bilirubin 2/2017 and stable since  8. Left axillary soft tissue mass noted.   9. History of alpha thalassemia trait  10. Shoulder pain persists- did not do PT      PLAN/REC:  1. 6 mon f/u exam  2. Encouraged exercise  3. BSE monthly.Call for any changes.  4. Signs of recurrence discussed with pt who verbalizes understanding.  5. Will forward imaging and lab results over pt portal  6. Recommend left axillary ultrasound to assess the palpable mass noted- will notify pt of results over portal.     Kathleen Campuzano, RN, MSN, NP-C

## 2019-03-13 ENCOUNTER — OFFICE VISIT (OUTPATIENT)
Dept: HEMATOLOGY/ONCOLOGY | Facility: CLINIC | Age: 64
End: 2019-03-13
Payer: COMMERCIAL

## 2019-03-13 ENCOUNTER — HOSPITAL ENCOUNTER (OUTPATIENT)
Dept: RADIOLOGY | Facility: HOSPITAL | Age: 64
Discharge: HOME | End: 2019-03-13
Attending: NURSE PRACTITIONER
Payer: COMMERCIAL

## 2019-03-13 VITALS
WEIGHT: 244 LBS | HEIGHT: 65 IN | DIASTOLIC BLOOD PRESSURE: 74 MMHG | OXYGEN SATURATION: 99 % | BODY MASS INDEX: 40.65 KG/M2 | HEART RATE: 77 BPM | BODY MASS INDEX: 40.26 KG/M2 | HEIGHT: 65 IN | WEIGHT: 241.63 LBS | TEMPERATURE: 98 F | SYSTOLIC BLOOD PRESSURE: 126 MMHG | RESPIRATION RATE: 18 BRPM

## 2019-03-13 DIAGNOSIS — Z17.0 MALIGNANT NEOPLASM OF UPPER-OUTER QUADRANT OF LEFT BREAST IN FEMALE, ESTROGEN RECEPTOR POSITIVE: ICD-10-CM

## 2019-03-13 DIAGNOSIS — T45.1X5A CARDIOMYOPATHY DUE TO CHEMOTHERAPY: ICD-10-CM

## 2019-03-13 DIAGNOSIS — I42.7 CARDIOMYOPATHY DUE TO CHEMOTHERAPY: ICD-10-CM

## 2019-03-13 DIAGNOSIS — I50.9 CONGESTIVE HEART FAILURE, UNSPECIFIED HF CHRONICITY, UNSPECIFIED HEART FAILURE TYPE: ICD-10-CM

## 2019-03-13 DIAGNOSIS — D56.3 ALPHA THALASSEMIA TRAIT: ICD-10-CM

## 2019-03-13 DIAGNOSIS — R22.32 AXILLARY MASS, LEFT: Primary | ICD-10-CM

## 2019-03-13 DIAGNOSIS — C50.412 MALIGNANT NEOPLASM OF UPPER-OUTER QUADRANT OF LEFT BREAST IN FEMALE, ESTROGEN RECEPTOR POSITIVE: ICD-10-CM

## 2019-03-13 PROCEDURE — 3078F DIAST BP <80 MM HG: CPT | Mod: CPTII,S$GLB,, | Performed by: NURSE PRACTITIONER

## 2019-03-13 PROCEDURE — 77067 MAMMO DIGITAL SCREENING RIGHT WITH CAD: ICD-10-PCS | Mod: 26,52,, | Performed by: RADIOLOGY

## 2019-03-13 PROCEDURE — 77067 SCR MAMMO BI INCL CAD: CPT | Mod: 52,TC

## 2019-03-13 PROCEDURE — 99214 PR OFFICE/OUTPT VISIT, EST, LEVL IV, 30-39 MIN: ICD-10-PCS | Mod: S$GLB,,, | Performed by: NURSE PRACTITIONER

## 2019-03-13 PROCEDURE — 3078F PR MOST RECENT DIASTOLIC BLOOD PRESSURE < 80 MM HG: ICD-10-PCS | Mod: CPTII,S$GLB,, | Performed by: NURSE PRACTITIONER

## 2019-03-13 PROCEDURE — 99999 PR PBB SHADOW E&M-EST. PATIENT-LVL III: ICD-10-PCS | Mod: PBBFAC,,, | Performed by: NURSE PRACTITIONER

## 2019-03-13 PROCEDURE — 3008F BODY MASS INDEX DOCD: CPT | Mod: CPTII,S$GLB,, | Performed by: NURSE PRACTITIONER

## 2019-03-13 PROCEDURE — 3074F PR MOST RECENT SYSTOLIC BLOOD PRESSURE < 130 MM HG: ICD-10-PCS | Mod: CPTII,S$GLB,, | Performed by: NURSE PRACTITIONER

## 2019-03-13 PROCEDURE — 99214 OFFICE O/P EST MOD 30 MIN: CPT | Mod: S$GLB,,, | Performed by: NURSE PRACTITIONER

## 2019-03-13 PROCEDURE — 3008F PR BODY MASS INDEX (BMI) DOCUMENTED: ICD-10-PCS | Mod: CPTII,S$GLB,, | Performed by: NURSE PRACTITIONER

## 2019-03-13 PROCEDURE — 77067 SCR MAMMO BI INCL CAD: CPT | Mod: 26,52,, | Performed by: RADIOLOGY

## 2019-03-13 PROCEDURE — 3074F SYST BP LT 130 MM HG: CPT | Mod: CPTII,S$GLB,, | Performed by: NURSE PRACTITIONER

## 2019-03-13 PROCEDURE — 99999 PR PBB SHADOW E&M-EST. PATIENT-LVL III: CPT | Mod: PBBFAC,,, | Performed by: NURSE PRACTITIONER

## 2019-03-14 ENCOUNTER — TELEPHONE (OUTPATIENT)
Dept: UROLOGY | Facility: CLINIC | Age: 64
End: 2019-03-14

## 2019-03-14 NOTE — TELEPHONE ENCOUNTER
----- Message from Heather Singh, RT sent at 3/14/2019 11:45 AM CDT -----  Contact: Radiology  Patient needs to change her US from  to  at 6:30pm. When we tried to change appt the order will  and we can't schedule pass . Please have order extended and call patient back to confirm. Thanks Heather

## 2019-03-15 ENCOUNTER — TELEPHONE (OUTPATIENT)
Dept: UROLOGY | Facility: CLINIC | Age: 64
End: 2019-03-15

## 2019-03-15 NOTE — TELEPHONE ENCOUNTER
----- Message from Wendy Lebron sent at 3/15/2019 12:11 PM CDT -----  Contact: Pt   Pt is calling regarding requesting to have  nurse call pt back. Pt also states that she is okay with the appt on 04/23/2019.  #[phone]          ..Thank You  Brii Lebron

## 2019-04-09 DIAGNOSIS — I42.7 CARDIOMYOPATHY DUE TO CHEMOTHERAPY: ICD-10-CM

## 2019-04-09 DIAGNOSIS — T45.1X5A CARDIOMYOPATHY DUE TO CHEMOTHERAPY: ICD-10-CM

## 2019-04-09 RX ORDER — CARVEDILOL PHOSPHATE 80 MG/1
CAPSULE, EXTENDED RELEASE ORAL
Qty: 90 CAPSULE | Refills: 0 | Status: SHIPPED | OUTPATIENT
Start: 2019-04-09 | End: 2019-04-11 | Stop reason: SDUPTHER

## 2019-04-11 ENCOUNTER — TELEPHONE (OUTPATIENT)
Dept: CARDIOLOGY | Facility: CLINIC | Age: 64
End: 2019-04-11

## 2019-04-11 RX ORDER — CARVEDILOL PHOSPHATE 80 MG/1
80 CAPSULE, EXTENDED RELEASE ORAL DAILY
Qty: 90 CAPSULE | Refills: 3 | Status: SHIPPED | OUTPATIENT
Start: 2019-04-11 | End: 2019-07-12 | Stop reason: SDUPTHER

## 2019-04-11 NOTE — TELEPHONE ENCOUNTER
----- Message from Marcel Joe sent at 4/11/2019 11:52 AM CDT -----  Contact: [phone]   Pt  Would like refills called in on Rx medication [facility].  Please call back at [phone].  x-           Pt Uses:  [facility]  [address] AT [facility]  [address] [address]  Phone: [phone] Fax: [phone]

## 2019-04-11 NOTE — TELEPHONE ENCOUNTER
Approved Medications      carvedilol (COREG CR) 80 MG 24 hr capsule         Sig: TAKE 1 CAPSULE BY MOUTH ONCE DAILY    Disp:  90 capsule    Refills:  0    Start: 4/9/2019 - 4/11/2019    Class: Normal    Authorized by: Tyrone Kwong MD    For: Cardiomyopathy due to chemotherapy    Requested on: 4/9/2019         carvedilol (COREG CR) 80 MG 24 hr capsule         Sig: Take 1 capsule (80 mg total) by mouth once daily.    Disp:  90 capsule    Refills:  3    Start: 4/11/2019    Class: Normal    Authorized by: Tyrone Kwong MD    For: Cardiomyopathy due to chemotherapy        To be filled at: [facility] [address] [place] [address] [address] AT [facility] [address]

## 2019-04-15 ENCOUNTER — TELEPHONE (OUTPATIENT)
Dept: RADIOLOGY | Facility: HOSPITAL | Age: 64
End: 2019-04-15

## 2019-04-16 ENCOUNTER — HOSPITAL ENCOUNTER (OUTPATIENT)
Dept: RADIOLOGY | Facility: HOSPITAL | Age: 64
Discharge: HOME | End: 2019-04-16
Attending: NURSE PRACTITIONER
Payer: COMMERCIAL

## 2019-04-16 ENCOUNTER — HOSPITAL ENCOUNTER (OUTPATIENT)
Dept: RADIOLOGY | Facility: HOSPITAL | Age: 64
Discharge: HOME | End: 2019-04-16
Attending: UROLOGY
Payer: COMMERCIAL

## 2019-04-16 DIAGNOSIS — N28.1 RENAL CYST: ICD-10-CM

## 2019-04-16 DIAGNOSIS — R22.32 AXILLARY MASS, LEFT: ICD-10-CM

## 2019-04-16 PROCEDURE — 76770 US RETROPERITONEAL COMPLETE: ICD-10-PCS | Mod: 26,,, | Performed by: RADIOLOGY

## 2019-04-16 PROCEDURE — 76770 US EXAM ABDO BACK WALL COMP: CPT | Mod: TC

## 2019-04-16 PROCEDURE — 76882 US LMTD JT/FCL EVL NVASC XTR: CPT | Mod: 26,,, | Performed by: RADIOLOGY

## 2019-04-16 PROCEDURE — 76882 US LMTD JT/FCL EVL NVASC XTR: CPT | Mod: TC

## 2019-04-16 PROCEDURE — 76882 US SOFT TISSUE AXILLA: ICD-10-PCS | Mod: 26,,, | Performed by: RADIOLOGY

## 2019-04-16 PROCEDURE — 76770 US EXAM ABDO BACK WALL COMP: CPT | Mod: 26,,, | Performed by: RADIOLOGY

## 2019-04-17 ENCOUNTER — TELEPHONE (OUTPATIENT)
Dept: HEMATOLOGY/ONCOLOGY | Facility: CLINIC | Age: 64
End: 2019-04-17

## 2019-04-17 NOTE — TELEPHONE ENCOUNTER
----- Message from Vijaya Mchugh sent at 4/17/2019 12:37 PM CDT -----  Contact: Patient  Type:  Patient Returning Call    Who Called: Roxanne  Who Left Message for Patient: the nurse  Does the patient know what this is regarding?: n/a  Would the patient rather a call back or a response via [facility]?  Call back   Best Call Back Number:# [phone]  Additional Information: n/a    Vijaya Mora

## 2019-04-17 NOTE — TELEPHONE ENCOUNTER
Tried calling pt back and left msg for her to call us back regarding results per Kathleen Campuzano, BHARGAV

## 2019-04-18 ENCOUNTER — TELEPHONE (OUTPATIENT)
Dept: SURGERY | Facility: CLINIC | Age: 64
End: 2019-04-18

## 2019-04-18 NOTE — TELEPHONE ENCOUNTER
Spoke to pt to let her know that her ultrasound did not reveal any suspicious findings.     ----- Message from Theresa Walker LPN sent at 4/18/2019  2:45 PM CDT -----  Contact: Pt  Patient says she got a call yesterday about test results.  Please advise on results for patient so I can inform her.    Theresa   ----- Message -----  From: Paul Cedeno  Sent: 4/18/2019   2:23 PM  To: Justen RADFORD Staff    .Type:  Test Results    Who Called: Pt  Name of Test (Lab/Mammo/Etc):    Date of Test: 04/16  Ordering Provider: Justen  Where the test was performed: [facility]  Would the patient rather a call back or a response via [facility]? Call back  Best Call Back Number:  ..[phone] (home)   Additional Information:

## 2019-04-23 ENCOUNTER — OFFICE VISIT (OUTPATIENT)
Dept: CARDIOLOGY | Facility: CLINIC | Age: 64
End: 2019-04-23
Payer: COMMERCIAL

## 2019-04-23 ENCOUNTER — OFFICE VISIT (OUTPATIENT)
Dept: UROLOGY | Facility: CLINIC | Age: 64
End: 2019-04-23
Payer: COMMERCIAL

## 2019-04-23 VITALS
BODY MASS INDEX: 39.67 KG/M2 | HEIGHT: 65 IN | DIASTOLIC BLOOD PRESSURE: 89 MMHG | HEART RATE: 66 BPM | SYSTOLIC BLOOD PRESSURE: 142 MMHG | WEIGHT: 238.13 LBS

## 2019-04-23 VITALS
HEIGHT: 65 IN | DIASTOLIC BLOOD PRESSURE: 74 MMHG | BODY MASS INDEX: 40.04 KG/M2 | SYSTOLIC BLOOD PRESSURE: 126 MMHG | WEIGHT: 240.31 LBS | HEART RATE: 70 BPM

## 2019-04-23 DIAGNOSIS — N28.1 RENAL CYST: Primary | ICD-10-CM

## 2019-04-23 DIAGNOSIS — T45.1X5A CARDIOMYOPATHY DUE TO CHEMOTHERAPY: Primary | ICD-10-CM

## 2019-04-23 DIAGNOSIS — I50.22 CHRONIC SYSTOLIC HEART FAILURE: ICD-10-CM

## 2019-04-23 DIAGNOSIS — I42.7 CARDIOMYOPATHY DUE TO CHEMOTHERAPY: Primary | ICD-10-CM

## 2019-04-23 DIAGNOSIS — I10 ESSENTIAL HYPERTENSION: Chronic | ICD-10-CM

## 2019-04-23 LAB
BILIRUB SERPL-MCNC: NORMAL MG/DL
BLOOD URINE, POC: NORMAL
COLOR, POC UA: YELLOW
GLUCOSE UR QL STRIP: NORMAL
KETONES UR QL STRIP: NORMAL
LEUKOCYTE ESTERASE URINE, POC: NORMAL
NITRITE, POC UA: NORMAL
PH, POC UA: 7
PROTEIN, POC: NORMAL
SPECIFIC GRAVITY, POC UA: 1
UROBILINOGEN, POC UA: NORMAL

## 2019-04-23 PROCEDURE — 3008F PR BODY MASS INDEX (BMI) DOCUMENTED: ICD-10-PCS | Mod: CPTII,S$GLB,, | Performed by: NUCLEAR MEDICINE

## 2019-04-23 PROCEDURE — 3078F DIAST BP <80 MM HG: CPT | Mod: CPTII,S$GLB,, | Performed by: NUCLEAR MEDICINE

## 2019-04-23 PROCEDURE — 3008F BODY MASS INDEX DOCD: CPT | Mod: CPTII,S$GLB,, | Performed by: NUCLEAR MEDICINE

## 2019-04-23 PROCEDURE — 99214 PR OFFICE/OUTPT VISIT, EST, LEVL IV, 30-39 MIN: ICD-10-PCS | Mod: S$GLB,,, | Performed by: NUCLEAR MEDICINE

## 2019-04-23 PROCEDURE — 99999 PR PBB SHADOW E&M-EST. PATIENT-LVL III: ICD-10-PCS | Mod: PBBFAC,,, | Performed by: UROLOGY

## 2019-04-23 PROCEDURE — 3008F BODY MASS INDEX DOCD: CPT | Mod: CPTII,S$GLB,, | Performed by: UROLOGY

## 2019-04-23 PROCEDURE — 99999 PR PBB SHADOW E&M-EST. PATIENT-LVL III: CPT | Mod: PBBFAC,,, | Performed by: UROLOGY

## 2019-04-23 PROCEDURE — 3074F SYST BP LT 130 MM HG: CPT | Mod: CPTII,S$GLB,, | Performed by: UROLOGY

## 2019-04-23 PROCEDURE — 3074F SYST BP LT 130 MM HG: CPT | Mod: CPTII,S$GLB,, | Performed by: NUCLEAR MEDICINE

## 2019-04-23 PROCEDURE — 3008F PR BODY MASS INDEX (BMI) DOCUMENTED: ICD-10-PCS | Mod: CPTII,S$GLB,, | Performed by: UROLOGY

## 2019-04-23 PROCEDURE — 99214 OFFICE O/P EST MOD 30 MIN: CPT | Mod: S$GLB,,, | Performed by: NUCLEAR MEDICINE

## 2019-04-23 PROCEDURE — 3079F PR MOST RECENT DIASTOLIC BLOOD PRESSURE 80-89 MM HG: ICD-10-PCS | Mod: CPTII,S$GLB,, | Performed by: UROLOGY

## 2019-04-23 PROCEDURE — 99999 PR PBB SHADOW E&M-EST. PATIENT-LVL III: ICD-10-PCS | Mod: PBBFAC,,, | Performed by: NUCLEAR MEDICINE

## 2019-04-23 PROCEDURE — 3078F PR MOST RECENT DIASTOLIC BLOOD PRESSURE < 80 MM HG: ICD-10-PCS | Mod: CPTII,S$GLB,, | Performed by: NUCLEAR MEDICINE

## 2019-04-23 PROCEDURE — 81002 URINALYSIS NONAUTO W/O SCOPE: CPT | Mod: S$GLB,,, | Performed by: UROLOGY

## 2019-04-23 PROCEDURE — 99214 PR OFFICE/OUTPT VISIT, EST, LEVL IV, 30-39 MIN: ICD-10-PCS | Mod: 25,S$GLB,, | Performed by: UROLOGY

## 2019-04-23 PROCEDURE — 3074F PR MOST RECENT SYSTOLIC BLOOD PRESSURE < 130 MM HG: ICD-10-PCS | Mod: CPTII,S$GLB,, | Performed by: UROLOGY

## 2019-04-23 PROCEDURE — 3079F DIAST BP 80-89 MM HG: CPT | Mod: CPTII,S$GLB,, | Performed by: UROLOGY

## 2019-04-23 PROCEDURE — 81002 POCT URINE DIPSTICK WITHOUT MICROSCOPE: ICD-10-PCS | Mod: S$GLB,,, | Performed by: UROLOGY

## 2019-04-23 PROCEDURE — 99999 PR PBB SHADOW E&M-EST. PATIENT-LVL III: CPT | Mod: PBBFAC,,, | Performed by: NUCLEAR MEDICINE

## 2019-04-23 PROCEDURE — 99214 OFFICE O/P EST MOD 30 MIN: CPT | Mod: 25,S$GLB,, | Performed by: UROLOGY

## 2019-04-23 PROCEDURE — 3074F PR MOST RECENT SYSTOLIC BLOOD PRESSURE < 130 MM HG: ICD-10-PCS | Mod: CPTII,S$GLB,, | Performed by: NUCLEAR MEDICINE

## 2019-04-23 NOTE — PROGRESS NOTES
Chief Complaint: Renal Cyst    HPI:   4/23/19: No hematuria in the interval and none today.  Stable 5cm left renal cyst on reassuring US.  Prefers annual followup with US.  Reviewed history in detail.  4/2/18: Returns for followup.  No hematuria in the interval.  MRI shows known renal cysts unchanged from 2015 to 2017.  10/19/15: CT Urogram normal except for benign left upper pole renal cyst. Cysto normal.  10/12/15: 59 yo woman referred by RAYSHAWN Campuzano for gross hematuria presumed to be UTI and not resolving with continued microhematuria after UTI treated.  No abd/pelvic pain and no exac/rel factors; except some crampy discomfort sometime.  No prior hematuria.  No urolithiasis.  No urinary bother.  No  history.  Normal sexual function.  UA confirms hematuria with -UCx on two occasions.  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    Allergies:  Patient has no known allergies.    Medications: has a current medication list which includes the following prescription(s): carvedilol and sacubitril-valsartan.    Review of Systems:  General: No fever, chills, fatigability, or weight loss.  Skin: No rashes, itching, or changes in color or texture of skin.  Chest: Denies CALDERÓN, cyanosis, wheezing, cough, and sputum production.  Abdomen: Appetite fine. No weight loss. Denies diarrhea, abdominal pain, hematemesis, or blood in stool.  Musculoskeletal: No joint stiffness or swelling. Denies back pain.  : As above.  All other review of systems negative.    PMH:   has a past medical history of Arthritis of right knee, Breast cancer, Cardiomyopathy, CHF (congestive heart failure), Hypertension, and Uterine fibroid.    PSH:   has a past surgical history that includes Cholecystectomy; Breast biopsy; Mediport insertion, single; Breast surgery; Mastectomy; Breast cyst excision (Right); and Colonoscopy (N/A, 9/10/2018).    FamHx: family history includes Breast cancer in her maternal aunt; Cancer (age of onset: 55) in her maternal aunt;  Diabetes in her father and mother; Stroke in her father and paternal aunt.    SocHx:  reports that she has never smoked. She has never used smokeless tobacco. She reports that she drinks alcohol. She reports that she does not use drugs.     Physical Exam:  Vitals:   Vitals:    04/23/19 1533   BP: (!) 142/89   Pulse: 66     General: A&Ox3. No apparent distress. No deformities.  Neck: No masses. Normal thyroid.  Lungs: normal inspiration. No use of accessory muscles.  Heart: normal pulse. No arrhythmias.  Abdomen: Soft. NT. ND.   Skin: The skin is warm and dry. No jaundice.  Ext: No c/c/e.  : deferred     Labs/Studies:   Urinalysis performed in clinic, summary: UA normal    Impression/Plan:   1. Hematuria workup complete no adverse findings last visit.   RTC 1 year with US.

## 2019-04-23 NOTE — PROGRESS NOTES
Subjective:   Patient ID:  Josette Altamirano is a 63 y.o. female who presents for follow-up of Congestive Heart Failure; Cardiomyopathy (POST CHEMO); and Hypertension      HPI DOING WELL. NO UNUSUAL CALDERÓN WITH ORDINARY DAILY ACTIVITIES. NO DYSPNEA AT REST  NO ORTHOPNEA OR PND  NO RECENT HOSPITALIZATIONS OR ED VISITS FOR ADHF. OR ARRHYTHMIAS, OR ACS OR CVA  NO PLEURITIC CP/ NO ANGINA  NO PALPITATIONS. NO NEAR SYNCOPE OR SYNCOPE  NO ABDOMINAL DISCOMFORT. NO ABNORMAL PULSATIONS  NO EDEMA. NO CALVE TENDERNESS.  NO INTERMITTENT CLAUDICATION  NO FOCAL CNS SYMPTOMS OR SIGNS TO SUGGEST TIA  OR STROKE  CARD MED-  BBS AND ENTRESTO WELL TOLERATED    Review of Systems   Constitution: Negative for chills, fever, night sweats, weight gain and weight loss.   HENT: Negative for nosebleeds.    Eyes: Negative for blurred vision, double vision and visual disturbance.   Cardiovascular: Negative for chest pain, dyspnea on exertion, irregular heartbeat, leg swelling, orthopnea, palpitations, paroxysmal nocturnal dyspnea and syncope.   Respiratory: Negative for cough, hemoptysis and wheezing.    Endocrine: Negative for polydipsia and polyuria.   Hematologic/Lymphatic: Does not bruise/bleed easily.   Skin: Negative for rash.   Musculoskeletal: Negative for joint pain, joint swelling, muscle weakness and myalgias.   Gastrointestinal: Negative for abdominal pain, hematemesis, jaundice and melena.   Genitourinary: Negative for dysuria, hematuria and nocturia.   Neurological: Negative for dizziness, focal weakness, headaches, sensory change and weakness.   Psychiatric/Behavioral: Negative for depression. The patient does not have insomnia and is not nervous/anxious.      Family History   Problem Relation Age of Onset    Diabetes Mother     Diabetes Father     Stroke Father     Cancer Maternal Aunt 55        breast cancer    Breast cancer Maternal Aunt     Stroke Paternal Aunt     Melanoma Neg Hx     Psoriasis Neg Hx     Lupus Neg Hx      Eczema Neg Hx      Past Medical History:   Diagnosis Date    Arthritis of right knee     Breast cancer     She has a history of a Stage IIA, 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 She subsequently underwent left mastectomy and sentinel node biopsy followed by axillary dissection March 23, 2007. Histopathologic evaluation demonstrated again multiple foci of intracystic papillary carcinoma, as well as DCIS noncomed    Cardiomyopathy     CHEMO INDUCED- RESOLVED    CHF (congestive heart failure)     systolic    Hypertension     Uterine fibroid      Social History     Socioeconomic History    Marital status:      Spouse name: Not on file    Number of children: Not on file    Years of education: Not on file    Highest education level: Not on file   Occupational History    Not on file   Social Needs    Financial resource strain: Not on file    Food insecurity:     Worry: Not on file     Inability: Not on file    Transportation needs:     Medical: Not on file     Non-medical: Not on file   Tobacco Use    Smoking status: Never Smoker    Smokeless tobacco: Never Used   Substance and Sexual Activity    Alcohol use: Yes     Alcohol/week: 0.0 oz     Comment: Ocassionally    Drug use: No    Sexual activity: Yes     Partners: Male     Birth control/protection: None, Post-menopausal   Lifestyle    Physical activity:     Days per week: Not on file     Minutes per session: Not on file    Stress: Not on file   Relationships    Social connections:     Talks on phone: Not on file     Gets together: Not on file     Attends L[employer] service: Not on file     Active member of club or organization: Not on file     Attends meetings of clubs or organizations: Not on file     Relationship status: Not on file   Other Topics Concern    Are you pregnant or think you may be? Not Asked    Breast-feeding Not Asked   Social  History Narrative    Not on file     Current Outpatient Medications on File Prior to Visit   Medication Sig Dispense Refill    carvedilol (COREG CR) 80 MG 24 hr capsule Take 1 capsule (80 mg total) by mouth once daily. 90 capsule 3    sacubitril-valsartan (ENTRESTO)  mg per tablet Take 1 tablet by mouth 2 (two) times daily. 60 tablet 3     No current facility-administered medications on file prior to visit.      Review of patient's allergies indicates:  No Known Allergies    Objective:     Physical Exam   Constitutional: She is oriented to person, place, and time. She appears well-developed. No distress.   HENT:   Head: Normocephalic.   Eyes: Pupils are equal, round, and reactive to light. Conjunctivae are normal. No scleral icterus.   Neck: Normal range of motion. Neck supple. Normal carotid pulses, no hepatojugular reflux and no JVD present. Carotid bruit is not present. No edema present. No thyroid mass and no thyromegaly present.   Cardiovascular: Normal rate, regular rhythm, S1 normal, S2 normal, normal heart sounds and intact distal pulses. PMI is not displaced. Exam reveals no gallop and no friction rub.   No murmur heard.  Pulses:       Carotid pulses are 2+ on the right side, and 2+ on the left side.       Radial pulses are 2+ on the right side, and 2+ on the left side.        Femoral pulses are 2+ on the right side, and 2+ on the left side.       Popliteal pulses are 2+ on the right side, and 2+ on the left side.        Dorsalis pedis pulses are 2+ on the right side, and 2+ on the left side.        Posterior tibial pulses are 2+ on the right side, and 2+ on the left side.   Pulmonary/Chest: Effort normal and breath sounds normal. She has no wheezes. She has no rales. She exhibits no tenderness.   Abdominal: Soft. Bowel sounds are normal. She exhibits no pulsatile midline mass and no mass. There is no hepatosplenomegaly. There is no tenderness.   Musculoskeletal: Normal range of motion. She  exhibits no edema or tenderness.        Cervical back: Normal.        Thoracic back: Normal.        Lumbar back: Normal.   Lymphadenopathy:     She has no cervical adenopathy.     She has no axillary adenopathy.        Right: No supraclavicular adenopathy present.        Left: No supraclavicular adenopathy present.   Neurological: She is alert and oriented to person, place, and time. She has normal strength and normal reflexes. No sensory deficit. Gait normal.   Skin: Skin is warm. No rash noted. No cyanosis. No pallor. Nails show no clubbing.   Psychiatric: She has a normal mood and affect. Her speech is normal and behavior is normal. Cognition and memory are normal.       Assessment:     1. Cardiomyopathy due to chemotherapy    2. Chronic systolic heart failure    3. Essential hypertension        Plan:     Cardiomyopathy due to chemotherapy  NO CLINICAL EVIDENCE OF ACTIVE MYOCARDIAL ISCHEMIA  NO ARRTHYTHMIAS    Chronic systolic heart failure  CLINICALLY WELL COMPENSATED  CARD MED WELL TOLERATED    Essential hypertension  BP WELL CONTROLLED  CNS STATUS STABLE    CONTINUE PRESENT CARD MANAGEMENT  RETURN IN 6 MONTHS

## 2019-06-06 DIAGNOSIS — I42.7 CARDIOMYOPATHY DUE TO CHEMOTHERAPY: ICD-10-CM

## 2019-06-06 DIAGNOSIS — T45.1X5A CARDIOMYOPATHY DUE TO CHEMOTHERAPY: ICD-10-CM

## 2019-06-06 DIAGNOSIS — I50.22 CHRONIC SYSTOLIC HEART FAILURE: ICD-10-CM

## 2019-07-12 DIAGNOSIS — T45.1X5A CARDIOMYOPATHY DUE TO CHEMOTHERAPY: ICD-10-CM

## 2019-07-12 DIAGNOSIS — I42.7 CARDIOMYOPATHY DUE TO CHEMOTHERAPY: ICD-10-CM

## 2019-07-12 RX ORDER — CARVEDILOL PHOSPHATE 80 MG/1
CAPSULE, EXTENDED RELEASE ORAL
Qty: 90 CAPSULE | Refills: 0 | Status: SHIPPED | OUTPATIENT
Start: 2019-07-12 | End: 2019-10-13 | Stop reason: SDUPTHER

## 2019-10-13 DIAGNOSIS — T45.1X5A CARDIOMYOPATHY DUE TO CHEMOTHERAPY: ICD-10-CM

## 2019-10-13 DIAGNOSIS — I42.7 CARDIOMYOPATHY DUE TO CHEMOTHERAPY: ICD-10-CM

## 2019-10-13 RX ORDER — CARVEDILOL PHOSPHATE 80 MG/1
CAPSULE, EXTENDED RELEASE ORAL
Qty: 90 CAPSULE | Refills: 0 | Status: SHIPPED | OUTPATIENT
Start: 2019-10-13 | End: 2020-05-06

## 2019-10-14 NOTE — PROGRESS NOTES
amSubjective:       Patient ID: Josette Altamirano is a 64 y.o. female.    Chief Complaint: Breast Cancer (f/u)    HPI: Breast cancer follow-up    Pt has a history of a Stage IIA, 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 She subsequently underwent left mastectomy and sentinel node biopsy followed by axillary dissection  March 23, 2007. Histopathologic evaluation demonstrated again multiple foci of intracystic papillary carcinoma, as well as DCIS noncomedo type. One of three sentinel lymph nodes demonstrated micrometastatic disease. Twelve additional axillary lymph nodes were obtained, all of which were negative for disease. Estrogen receptors were 11%. Progesterone receptors 4%. HER2/bob was not over-expressed. She subsequently underwent additional studies including a MUGA scan demonstrating an injection  fraction of 39%, thus she was treated with six cycles of Cytoxan and Taxotere which were well tolerated.     9/28/07 hormone studies suggested the pt was menopausal so she was started on Arimidex for post adjuvant therapy. Therapy completed 9/2012.     History of chemo induced cardiomyopathy. CHF- followed by Cardiology- started new medication and last visit was in June.     Pt has had f/u with Urology as recommended for her hematuria- denies any episodes of visible hematuria, no vaginal discharge and no blood in her bowel movements.     Last mammo- 3/13/19- diagnostic right    Left axilla ultrasound 4/16/19- 2 small nonenlarged lymph nodes    Last gyn- 8/18/16- due for exam    Last colonoscopy- 9/10/18- F/u recommended 9/2028    Review of Systems   Constitutional: Negative.    HENT: Negative.    Eyes: Negative.    Respiratory: Negative.    Cardiovascular: Negative.    Gastrointestinal: Negative.         No reflux   Endocrine: Negative.    Genitourinary: Negative.    Musculoskeletal: Negative.         3-4 months of bilateral aching  feet the day after walking on treadmill for about 50 minutes. Hurts morning after and eases as she walks more. AM is the worse.    Skin: Negative.    Allergic/Immunologic: Negative.    Neurological: Negative.    Hematological: Negative.  Negative for adenopathy.   Psychiatric/Behavioral: Negative.        Objective:      Physical Exam   Constitutional: She is oriented to person, place, and time. She appears well-developed and well-nourished.   HENT:   Head: Normocephalic and atraumatic.   Right Ear: External ear normal.   Left Ear: External ear normal.   Mouth/Throat: No oropharyngeal exudate.   Eyes: Pupils are equal, round, and reactive to light. Conjunctivae and EOM are normal. Right eye exhibits no discharge. Left eye exhibits no discharge. No scleral icterus.   Neck: Normal range of motion. Neck supple. No thyromegaly present.   Cardiovascular: Normal rate, regular rhythm and normal heart sounds.   Pulmonary/Chest: Effort normal and breath sounds normal. Right breast exhibits no inverted nipple, no mass, no nipple discharge, no skin change and no tenderness. Left breast exhibits no inverted nipple, no mass, no nipple discharge, no skin change and no tenderness.   Abdominal: Soft. Bowel sounds are normal.   Musculoskeletal: Normal range of motion. She exhibits no edema.        Right shoulder: She exhibits no crepitus and normal strength.   Lymphadenopathy:        Head (right side): No submental, no submandibular, no tonsillar, no preauricular, no posterior auricular and no occipital adenopathy present.        Head (left side): No submental, no submandibular, no tonsillar, no preauricular, no posterior auricular and no occipital adenopathy present.     She has no cervical adenopathy.        Right cervical: No superficial cervical and no posterior cervical adenopathy present.       Left cervical: No superficial cervical and no posterior cervical adenopathy present.     She has no axillary adenopathy.        Right:  No supraclavicular adenopathy present.        Left: No supraclavicular adenopathy present.   Neurological: She is alert and oriented to person, place, and time. She has normal reflexes.   Skin: Skin is warm and dry. No rash noted. No erythema. No pallor.   Psychiatric: She has a normal mood and affect. Her behavior is normal. Judgment and thought content normal.       Assessment:       1. Malignant neoplasm of upper-outer quadrant of left breast in female, estrogen receptor positive    2. Cardiomyopathy due to chemotherapy    3. Chronic systolic heart failure    4. Encounter for follow-up surveillance of breast cancer    5. Alpha thalassemia trait    6. Anemia, unspecified type    7. Lipid screening    8. Foot pain, bilateral        Plan:      1. Breast cancer as previously described. Arimidex therapy completed X 5 years.  2. Heart failure stable and followed by cardiology.  3. Obesity. Encouraged pt to continue with exercise and dietary changes.   4. hematuria workup benign- followed by urology - and is up to date  5. History of vaginal discharge scant with tiny blood clot - fibroids- no other episodes due for gyn f/u  6. Vaginal dryness stable  7. Mildly elevated bilirubin 2/2017 and stable since  8. Left axillary soft tissue mass noted.   9. History of alpha thalassemia trait  10. Bilateral foot pain day after exercise that eases throughout the day- will refer to podiatry for evaluation. Could be related to shoes wearing vs arthritis   11. Needs lipids, tsh, cbc, smp today  12. Due for mammo right breast March 2020 and exam

## 2019-10-15 ENCOUNTER — LAB VISIT (OUTPATIENT)
Dept: LAB | Facility: HOSPITAL | Age: 64
End: 2019-10-15
Attending: NURSE PRACTITIONER
Payer: COMMERCIAL

## 2019-10-15 ENCOUNTER — OFFICE VISIT (OUTPATIENT)
Dept: HEMATOLOGY/ONCOLOGY | Facility: CLINIC | Age: 64
End: 2019-10-15
Payer: COMMERCIAL

## 2019-10-15 VITALS
HEIGHT: 64 IN | HEART RATE: 75 BPM | DIASTOLIC BLOOD PRESSURE: 89 MMHG | BODY MASS INDEX: 41.37 KG/M2 | TEMPERATURE: 98 F | WEIGHT: 242.31 LBS | SYSTOLIC BLOOD PRESSURE: 130 MMHG | OXYGEN SATURATION: 98 %

## 2019-10-15 DIAGNOSIS — Z13.220 LIPID SCREENING: ICD-10-CM

## 2019-10-15 DIAGNOSIS — C50.412 MALIGNANT NEOPLASM OF UPPER-OUTER QUADRANT OF LEFT BREAST IN FEMALE, ESTROGEN RECEPTOR POSITIVE: ICD-10-CM

## 2019-10-15 DIAGNOSIS — T45.1X5A CARDIOMYOPATHY DUE TO CHEMOTHERAPY: ICD-10-CM

## 2019-10-15 DIAGNOSIS — Z85.3 ENCOUNTER FOR FOLLOW-UP SURVEILLANCE OF BREAST CANCER: ICD-10-CM

## 2019-10-15 DIAGNOSIS — I50.22 CHRONIC SYSTOLIC HEART FAILURE: ICD-10-CM

## 2019-10-15 DIAGNOSIS — Z08 ENCOUNTER FOR FOLLOW-UP SURVEILLANCE OF BREAST CANCER: ICD-10-CM

## 2019-10-15 DIAGNOSIS — D64.9 ANEMIA, UNSPECIFIED TYPE: ICD-10-CM

## 2019-10-15 DIAGNOSIS — C50.412 MALIGNANT NEOPLASM OF UPPER-OUTER QUADRANT OF LEFT BREAST IN FEMALE, ESTROGEN RECEPTOR POSITIVE: Primary | ICD-10-CM

## 2019-10-15 DIAGNOSIS — D56.3 ALPHA THALASSEMIA TRAIT: ICD-10-CM

## 2019-10-15 DIAGNOSIS — I42.7 CARDIOMYOPATHY DUE TO CHEMOTHERAPY: ICD-10-CM

## 2019-10-15 DIAGNOSIS — Z17.0 MALIGNANT NEOPLASM OF UPPER-OUTER QUADRANT OF LEFT BREAST IN FEMALE, ESTROGEN RECEPTOR POSITIVE: ICD-10-CM

## 2019-10-15 DIAGNOSIS — M79.671 FOOT PAIN, BILATERAL: ICD-10-CM

## 2019-10-15 DIAGNOSIS — M79.672 FOOT PAIN, BILATERAL: ICD-10-CM

## 2019-10-15 DIAGNOSIS — Z17.0 MALIGNANT NEOPLASM OF UPPER-OUTER QUADRANT OF LEFT BREAST IN FEMALE, ESTROGEN RECEPTOR POSITIVE: Primary | ICD-10-CM

## 2019-10-15 LAB
ALBUMIN SERPL BCP-MCNC: 3.6 G/DL (ref 3.5–5.2)
ALP SERPL-CCNC: 92 U/L (ref 55–135)
ALT SERPL W/O P-5'-P-CCNC: 18 U/L (ref 10–44)
ANION GAP SERPL CALC-SCNC: 8 MMOL/L (ref 8–16)
AST SERPL-CCNC: 17 U/L (ref 10–40)
BASOPHILS # BLD AUTO: 0.04 K/UL (ref 0–0.2)
BASOPHILS NFR BLD: 0.8 % (ref 0–1.9)
BILIRUB SERPL-MCNC: 0.8 MG/DL (ref 0.1–1)
BUN SERPL-MCNC: 10 MG/DL (ref 8–23)
CALCIUM SERPL-MCNC: 9.1 MG/DL (ref 8.7–10.5)
CHLORIDE SERPL-SCNC: 106 MMOL/L (ref 95–110)
CHOLEST SERPL-MCNC: 237 MG/DL (ref 120–199)
CHOLEST/HDLC SERPL: 2.9 {RATIO} (ref 2–5)
CO2 SERPL-SCNC: 28 MMOL/L (ref 23–29)
CREAT SERPL-MCNC: 0.8 MG/DL (ref 0.5–1.4)
DIFFERENTIAL METHOD: ABNORMAL
EOSINOPHIL # BLD AUTO: 0.2 K/UL (ref 0–0.5)
EOSINOPHIL NFR BLD: 5 % (ref 0–8)
ERYTHROCYTE [DISTWIDTH] IN BLOOD BY AUTOMATED COUNT: 14.2 % (ref 11.5–14.5)
EST. GFR  (AFRICAN AMERICAN): >60 ML/MIN/1.73 M^2
EST. GFR  (NON AFRICAN AMERICAN): >60 ML/MIN/1.73 M^2
GLUCOSE SERPL-MCNC: 106 MG/DL (ref 70–110)
HCT VFR BLD AUTO: 42.1 % (ref 37–48.5)
HDLC SERPL-MCNC: 83 MG/DL (ref 40–75)
HDLC SERPL: 35 % (ref 20–50)
HGB BLD-MCNC: 12.6 G/DL (ref 12–16)
IMM GRANULOCYTES # BLD AUTO: 0.01 K/UL (ref 0–0.04)
IMM GRANULOCYTES NFR BLD AUTO: 0.2 % (ref 0–0.5)
LDLC SERPL CALC-MCNC: 139.8 MG/DL (ref 63–159)
LYMPHOCYTES # BLD AUTO: 1.1 K/UL (ref 1–4.8)
LYMPHOCYTES NFR BLD: 23.8 % (ref 18–48)
MCH RBC QN AUTO: 23.9 PG (ref 27–31)
MCHC RBC AUTO-ENTMCNC: 29.9 G/DL (ref 32–36)
MCV RBC AUTO: 80 FL (ref 82–98)
MONOCYTES # BLD AUTO: 0.4 K/UL (ref 0.3–1)
MONOCYTES NFR BLD: 8.4 % (ref 4–15)
NEUTROPHILS # BLD AUTO: 3 K/UL (ref 1.8–7.7)
NEUTROPHILS NFR BLD: 62 % (ref 38–73)
NONHDLC SERPL-MCNC: 154 MG/DL
NRBC BLD-RTO: 0 /100 WBC
PLATELET # BLD AUTO: 261 K/UL (ref 150–350)
PMV BLD AUTO: 9.1 FL (ref 9.2–12.9)
POTASSIUM SERPL-SCNC: 4.4 MMOL/L (ref 3.5–5.1)
PROT SERPL-MCNC: 7.5 G/DL (ref 6–8.4)
RBC # BLD AUTO: 5.27 M/UL (ref 4–5.4)
SODIUM SERPL-SCNC: 142 MMOL/L (ref 136–145)
TRIGL SERPL-MCNC: 71 MG/DL (ref 30–150)
WBC # BLD AUTO: 4.78 K/UL (ref 3.9–12.7)

## 2019-10-15 PROCEDURE — 99999 PR PBB SHADOW E&M-EST. PATIENT-LVL III: ICD-10-PCS | Mod: PBBFAC,,, | Performed by: NURSE PRACTITIONER

## 2019-10-15 PROCEDURE — 3079F DIAST BP 80-89 MM HG: CPT | Mod: CPTII,S$GLB,, | Performed by: NURSE PRACTITIONER

## 2019-10-15 PROCEDURE — 99999 PR PBB SHADOW E&M-EST. PATIENT-LVL III: CPT | Mod: PBBFAC,,, | Performed by: NURSE PRACTITIONER

## 2019-10-15 PROCEDURE — 3075F PR MOST RECENT SYSTOLIC BLOOD PRESS GE 130-139MM HG: ICD-10-PCS | Mod: CPTII,S$GLB,, | Performed by: NURSE PRACTITIONER

## 2019-10-15 PROCEDURE — 3008F BODY MASS INDEX DOCD: CPT | Mod: CPTII,S$GLB,, | Performed by: NURSE PRACTITIONER

## 2019-10-15 PROCEDURE — 80061 LIPID PANEL: CPT

## 2019-10-15 PROCEDURE — 99214 PR OFFICE/OUTPT VISIT, EST, LEVL IV, 30-39 MIN: ICD-10-PCS | Mod: S$GLB,,, | Performed by: NURSE PRACTITIONER

## 2019-10-15 PROCEDURE — 3079F PR MOST RECENT DIASTOLIC BLOOD PRESSURE 80-89 MM HG: ICD-10-PCS | Mod: CPTII,S$GLB,, | Performed by: NURSE PRACTITIONER

## 2019-10-15 PROCEDURE — 84443 ASSAY THYROID STIM HORMONE: CPT

## 2019-10-15 PROCEDURE — 80053 COMPREHEN METABOLIC PANEL: CPT

## 2019-10-15 PROCEDURE — 85025 COMPLETE CBC W/AUTO DIFF WBC: CPT

## 2019-10-15 PROCEDURE — 3075F SYST BP GE 130 - 139MM HG: CPT | Mod: CPTII,S$GLB,, | Performed by: NURSE PRACTITIONER

## 2019-10-15 PROCEDURE — 3008F PR BODY MASS INDEX (BMI) DOCUMENTED: ICD-10-PCS | Mod: CPTII,S$GLB,, | Performed by: NURSE PRACTITIONER

## 2019-10-15 PROCEDURE — 36415 COLL VENOUS BLD VENIPUNCTURE: CPT

## 2019-10-15 PROCEDURE — 99214 OFFICE O/P EST MOD 30 MIN: CPT | Mod: S$GLB,,, | Performed by: NURSE PRACTITIONER

## 2019-10-16 LAB — TSH SERPL DL<=0.005 MIU/L-ACNC: 0.55 UIU/ML (ref 0.4–4)

## 2019-11-04 DIAGNOSIS — I50.22 CHRONIC SYSTOLIC HEART FAILURE: Primary | ICD-10-CM

## 2019-11-06 ENCOUNTER — OFFICE VISIT (OUTPATIENT)
Dept: CARDIOLOGY | Facility: CLINIC | Age: 64
End: 2019-11-06
Payer: COMMERCIAL

## 2019-11-06 ENCOUNTER — OFFICE VISIT (OUTPATIENT)
Dept: PODIATRY | Facility: CLINIC | Age: 64
End: 2019-11-06
Payer: COMMERCIAL

## 2019-11-06 ENCOUNTER — CLINICAL SUPPORT (OUTPATIENT)
Dept: CARDIOLOGY | Facility: CLINIC | Age: 64
End: 2019-11-06
Payer: COMMERCIAL

## 2019-11-06 VITALS
HEIGHT: 64 IN | BODY MASS INDEX: 41.48 KG/M2 | DIASTOLIC BLOOD PRESSURE: 70 MMHG | HEIGHT: 64 IN | SYSTOLIC BLOOD PRESSURE: 132 MMHG | DIASTOLIC BLOOD PRESSURE: 87 MMHG | HEART RATE: 75 BPM | WEIGHT: 242.94 LBS | WEIGHT: 242 LBS | HEART RATE: 82 BPM | BODY MASS INDEX: 41.32 KG/M2 | SYSTOLIC BLOOD PRESSURE: 118 MMHG

## 2019-11-06 DIAGNOSIS — I10 ESSENTIAL HYPERTENSION: Chronic | ICD-10-CM

## 2019-11-06 DIAGNOSIS — M24.572 CONTRACTURE, LEFT ANKLE: ICD-10-CM

## 2019-11-06 DIAGNOSIS — I50.22 CHRONIC SYSTOLIC HEART FAILURE: ICD-10-CM

## 2019-11-06 DIAGNOSIS — M79.672 PAIN IN LEFT FOOT: ICD-10-CM

## 2019-11-06 DIAGNOSIS — M24.571 CONTRACTURE, RIGHT ANKLE: ICD-10-CM

## 2019-11-06 DIAGNOSIS — M72.2 PLANTAR FASCIITIS: Primary | ICD-10-CM

## 2019-11-06 DIAGNOSIS — I42.7 CARDIOMYOPATHY DUE TO CHEMOTHERAPY: Primary | ICD-10-CM

## 2019-11-06 DIAGNOSIS — E66.01 MORBID OBESITY: ICD-10-CM

## 2019-11-06 DIAGNOSIS — M79.671 PAIN IN RIGHT FOOT: ICD-10-CM

## 2019-11-06 DIAGNOSIS — T45.1X5A CARDIOMYOPATHY DUE TO CHEMOTHERAPY: Primary | ICD-10-CM

## 2019-11-06 PROCEDURE — 3008F PR BODY MASS INDEX (BMI) DOCUMENTED: ICD-10-PCS | Mod: CPTII,S$GLB,, | Performed by: PODIATRIST

## 2019-11-06 PROCEDURE — 3008F BODY MASS INDEX DOCD: CPT | Mod: CPTII,S$GLB,, | Performed by: NUCLEAR MEDICINE

## 2019-11-06 PROCEDURE — 99214 OFFICE O/P EST MOD 30 MIN: CPT | Mod: S$GLB,,, | Performed by: NUCLEAR MEDICINE

## 2019-11-06 PROCEDURE — 3008F PR BODY MASS INDEX (BMI) DOCUMENTED: ICD-10-PCS | Mod: CPTII,S$GLB,, | Performed by: NUCLEAR MEDICINE

## 2019-11-06 PROCEDURE — 93010 EKG 12-LEAD: ICD-10-PCS | Mod: S$GLB,,, | Performed by: INTERNAL MEDICINE

## 2019-11-06 PROCEDURE — 3078F PR MOST RECENT DIASTOLIC BLOOD PRESSURE < 80 MM HG: ICD-10-PCS | Mod: CPTII,S$GLB,, | Performed by: NUCLEAR MEDICINE

## 2019-11-06 PROCEDURE — 99999 PR PBB SHADOW E&M-EST. PATIENT-LVL III: CPT | Mod: PBBFAC,,, | Performed by: NUCLEAR MEDICINE

## 2019-11-06 PROCEDURE — 99213 PR OFFICE/OUTPT VISIT, EST, LEVL III, 20-29 MIN: ICD-10-PCS | Mod: 25,S$GLB,, | Performed by: PODIATRIST

## 2019-11-06 PROCEDURE — 3075F SYST BP GE 130 - 139MM HG: CPT | Mod: CPTII,S$GLB,, | Performed by: PODIATRIST

## 2019-11-06 PROCEDURE — 3075F PR MOST RECENT SYSTOLIC BLOOD PRESS GE 130-139MM HG: ICD-10-PCS | Mod: CPTII,S$GLB,, | Performed by: PODIATRIST

## 2019-11-06 PROCEDURE — 99999 PR PBB SHADOW E&M-EST. PATIENT-LVL III: ICD-10-PCS | Mod: PBBFAC,,, | Performed by: NUCLEAR MEDICINE

## 2019-11-06 PROCEDURE — 3078F DIAST BP <80 MM HG: CPT | Mod: CPTII,S$GLB,, | Performed by: NUCLEAR MEDICINE

## 2019-11-06 PROCEDURE — 3074F PR MOST RECENT SYSTOLIC BLOOD PRESSURE < 130 MM HG: ICD-10-PCS | Mod: CPTII,S$GLB,, | Performed by: NUCLEAR MEDICINE

## 2019-11-06 PROCEDURE — 20550 PR INJECT TENDON SHEATH/LIGAMENT: ICD-10-PCS | Mod: 50,S$GLB,, | Performed by: PODIATRIST

## 2019-11-06 PROCEDURE — 20550 NJX 1 TENDON SHEATH/LIGAMENT: CPT | Mod: 50,S$GLB,, | Performed by: PODIATRIST

## 2019-11-06 PROCEDURE — 99214 PR OFFICE/OUTPT VISIT, EST, LEVL IV, 30-39 MIN: ICD-10-PCS | Mod: S$GLB,,, | Performed by: NUCLEAR MEDICINE

## 2019-11-06 PROCEDURE — 3074F SYST BP LT 130 MM HG: CPT | Mod: CPTII,S$GLB,, | Performed by: NUCLEAR MEDICINE

## 2019-11-06 PROCEDURE — 93005 EKG 12-LEAD: ICD-10-PCS | Mod: S$GLB,,, | Performed by: NUCLEAR MEDICINE

## 2019-11-06 PROCEDURE — 3008F BODY MASS INDEX DOCD: CPT | Mod: CPTII,S$GLB,, | Performed by: PODIATRIST

## 2019-11-06 PROCEDURE — 93010 ELECTROCARDIOGRAM REPORT: CPT | Mod: S$GLB,,, | Performed by: INTERNAL MEDICINE

## 2019-11-06 PROCEDURE — 99999 PR PBB SHADOW E&M-EST. PATIENT-LVL III: ICD-10-PCS | Mod: PBBFAC,,, | Performed by: PODIATRIST

## 2019-11-06 PROCEDURE — 3079F DIAST BP 80-89 MM HG: CPT | Mod: CPTII,S$GLB,, | Performed by: PODIATRIST

## 2019-11-06 PROCEDURE — 93005 ELECTROCARDIOGRAM TRACING: CPT | Mod: S$GLB,,, | Performed by: NUCLEAR MEDICINE

## 2019-11-06 PROCEDURE — 3079F PR MOST RECENT DIASTOLIC BLOOD PRESSURE 80-89 MM HG: ICD-10-PCS | Mod: CPTII,S$GLB,, | Performed by: PODIATRIST

## 2019-11-06 PROCEDURE — 99999 PR PBB SHADOW E&M-EST. PATIENT-LVL III: CPT | Mod: PBBFAC,,, | Performed by: PODIATRIST

## 2019-11-06 PROCEDURE — 99213 OFFICE O/P EST LOW 20 MIN: CPT | Mod: 25,S$GLB,, | Performed by: PODIATRIST

## 2019-11-06 RX ORDER — DEXAMETHASONE SODIUM PHOSPHATE 4 MG/ML
4 INJECTION, SOLUTION INTRA-ARTICULAR; INTRALESIONAL; INTRAMUSCULAR; INTRAVENOUS; SOFT TISSUE ONCE
Status: COMPLETED | OUTPATIENT
Start: 2019-11-06 | End: 2019-11-06

## 2019-11-06 RX ORDER — METHYLPREDNISOLONE ACETATE 40 MG/ML
40 INJECTION, SUSPENSION INTRA-ARTICULAR; INTRALESIONAL; INTRAMUSCULAR; SOFT TISSUE
Status: COMPLETED | OUTPATIENT
Start: 2019-11-06 | End: 2019-11-06

## 2019-11-06 RX ADMIN — METHYLPREDNISOLONE ACETATE 40 MG: 40 INJECTION, SUSPENSION INTRA-ARTICULAR; INTRALESIONAL; INTRAMUSCULAR; SOFT TISSUE at 09:11

## 2019-11-06 RX ADMIN — DEXAMETHASONE SODIUM PHOSPHATE 4 MG: 4 INJECTION, SOLUTION INTRA-ARTICULAR; INTRALESIONAL; INTRAMUSCULAR; INTRAVENOUS; SOFT TISSUE at 09:11

## 2019-11-06 NOTE — PROGRESS NOTES
Subjective:       Patient ID: Josette Altamirano is a 64 y.o. female.    Chief Complaint: Foot Pain (Pt c/o bilat foot pain in the plantar aspect of foot rating 6/10. Non diabetic pt. Wears slip on shoes w/o socks. PCP Dr Harrington.)      HPI: Josette Altamirano complains of mild-to-moderate pains to the left and right foot. States pains are sharp and stabbing-like in nature. Pains are to the plantar foot, mostly with walking and standing. Rates the pains at approx. 6/10. States post-static dyskinesia. Denies any recent identifiable trauma. Does limp with gait. No NSAID medications thus far. Pains have been present for the past several weeks to months and the pains have worsened over the past couple of weeks.  She does have a history of plantar fasciitis. States walking and standing causes and/or exacerbates the symptoms. Patient has had no corticosteroid injection(s) to the left the right foot, prior. Patient's Primary Care Provider is Caridad Harrington MD.     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 She subsequently underwent left mastectomy and sentinel node biopsy followed by axillary dissection March 23, 2007. Histopathologic evaluation demonstrated again multiple foci of intracystic papillary carcinoma, as well as DCIS noncomed    Cardiomyopathy     CHEMO INDUCED- RESOLVED    CHF (congestive heart failure)     systolic    Hypertension     Uterine fibroid        Family History   Problem Relation Age of Onset    Diabetes Mother     Diabetes Father     Stroke Father     Cancer Maternal Aunt 55        breast cancer    Breast cancer Maternal Aunt     Stroke Paternal Aunt     Melanoma Neg Hx     Psoriasis Neg Hx     Lupus Neg Hx     Eczema  Neg Hx        Social History     Socioeconomic History    Marital status:      Spouse name: Not on file    Number of children: Not on file    Years of education: Not on file    Highest education level: Not on file   Occupational History    Not on file   Social Needs    Financial resource strain: Not on file    Food insecurity:     Worry: Not on file     Inability: Not on file    Transportation needs:     Medical: Not on file     Non-medical: Not on file   Tobacco Use    Smoking status: Never Smoker    Smokeless tobacco: Never Used   Substance and Sexual Activity    Alcohol use: Yes     Alcohol/week: 0.0 standard drinks     Comment: Ocassionally    Drug use: No    Sexual activity: Yes     Partners: Male     Birth control/protection: None, Post-menopausal   Lifestyle    Physical activity:     Days per week: Not on file     Minutes per session: Not on file    Stress: Not on file   Relationships    Social connections:     Talks on phone: Not on file     Gets together: Not on file     Attends [employer] service: Not on file     Active member of club or organization: Not on file     Attends meetings of clubs or organizations: Not on file     Relationship status: Not on file   Other Topics Concern    Are you pregnant or think you may be? Not Asked    Breast-feeding Not Asked   Social History Narrative    Not on file       Past Surgical History:   Procedure Laterality Date    BREAST BIOPSY      BREAST CYST EXCISION Right     BREAST SURGERY      CHOLECYSTECTOMY      COLONOSCOPY N/A 9/10/2018    Procedure: COLONOSCOPY;  Surgeon: Indra Ramos MD;  Location: [facility];  Service: Endoscopy;  Laterality: N/A;    MASTECTOMY      left    MEDIPORT INSERTION, SINGLE         Review of Systems   Constitutional: Negative for chills, fatigue and fever.   HENT: Negative for hearing loss.    Eyes: Negative for photophobia and visual disturbance.   Respiratory: Negative for cough, chest tightness, shortness  "of breath and wheezing.    Cardiovascular: Negative for chest pain and palpitations.   Gastrointestinal: Negative for constipation, diarrhea, nausea and vomiting.   Endocrine: Negative for cold intolerance and heat intolerance.   Genitourinary: Negative for flank pain.   Musculoskeletal: Positive for gait problem. Negative for neck pain and neck stiffness.   Skin: Positive for wound.   Neurological: Negative for light-headedness and headaches.   Psychiatric/Behavioral: Negative for sleep disturbance.         Objective:   /87   Pulse 82   Ht 5' 4" (1.626 m)   Wt 109.8 kg (242 lb)   BMI 41.54 kg/m²         Physical Exam   LOWER EXTREMITY PHYSICAL EXAMINATION    VASCULAR: The right DP pulse is 2/4 and the left DP is 2/4. The right PT pulse is 2/4 and the left PT pulse is 2/4. Proximal to distal, warm to warm. No dependent rubor or elevation palor is noted. Capillary refill time is less than 3 seconds. Hair growth is appreciated to the dorsal foot and digits.    NEUROLOGY: Proprioception is intact, bilateral. Sensation to light touch is intact. Negative Tinel's Sign and negative Valleix Sign. No neurological sensations with compression of the area of Landry's Nerve in the area of the Abductor Hallucis muscle belly.    ORTHOPEDIC:  There is moderate tenderness to palpation of the area around the plantar medial calcaneal tubercle on the left and right foot. Pains are characterized as sharp and stabbing-like with direct palpation of the area. There is also mild pain to palpation of the immediate plantar aspect of the heel, and no pains to the lateral band of the fascia. No edema is noted. No fullness is noted. No pains or defects are noted within the plantar fascia at the arch. No plantar fibromas are noted. No defects are noted within the ligament. Dorsiflexion of the MTPJs with simultaneous palpation of the fascia at the arch, does worsen and exacerbate the pains. No pains with medial to lateral compression of " the heel. Equinus contracture is noted. Antalgic gait pattern is noted.     DERMATOLOGY: No ecchymosis is noted.  Skin is supple, dry and intact. Skin is supple.  No hyperkeratosis noted. No calluses.  No open wounds or ulcerations are noted.  No palpable plantar fibromas noted.    Assessment:     1. Plantar fasciitis    2. Pain in right foot    3. Pain in left foot    4. Contracture, right ankle    5. Contracture, left ankle    6. Morbid obesity          Plan:     Plantar fasciitis  Pain in right foot  Pain in left foot  -     methylPREDNISolone acetate injection 40 mg  -     dexamethasone injection 4 mg    Following sterile preparation with alcohol swab and/or betadine paint, injection was given into and around the area of the left and right plantar medial calcaneal tubercle, using 25-gauge needle. Injection consisted of approximately 0.5cc of Dexamethasone Sodium Phosphate, 0.5cc of Depo-Medrol (40mg/dL) and 0.5cc of 1% Lidocaine w/ or w/o Epinephrine or 0.25% or 0.50% Marcaine plain. Patient tolerated procedure well, and without complications or complaints. Patient educated that the area of pathology, might actually be slightly more painful, due to the injection, over the course of the next one to two days.     Did discuss proper and supportive shoe gear in detail and at length with the patient.  These are shoes with firm and robust arch support; medial counter.  Shoes which only bend at the metatarsophalangeal joint and which are rigid in the midfoot and hindfoot. Patient urged to purchase running type or cross training type shoes gear which are designed for pronation control.     Contracture, right ankle  Contracture, left ankle  Stretching exercises are discussed, taught, and are demonstrates to the patient this afternoon due to concomitant diagnosis of equinus contracture. The relationship between equinus contracture and the other aforementioned pathologies are detailed and outlined to the patient. The  patient does acknowledge understanding, and we will embark on a vigorous stretching algorithm for the lower extremity.    Morbid obesity  Patient is counseled and reminded concerning the importance of good nutrition and healthy eating habits, which may include blood sugar control, to prevent and/or help podiatric foot and ankle complications.          Future Appointments   Date Time Provider Department Center   11/6/2019 10:00 AM Tyrone Kwong MD [facility] CARDIO [facility]   11/6/2019 10:00 AM EKG, KARUNA CARDIO [facility] EKG [facility]   11/8/2019  9:30 AM Mariposa Osullivan NP [facility] OBGYN [facility]   4/22/2020 10:00 AM [facility] US1 [facility] ULS[facility]   4/27/2020  9:30 AM Omar Miranda IV, MD [facility] UROLOGY [facility]

## 2019-11-06 NOTE — PROGRESS NOTES
Subjective:   Patient ID:  Josette Altamirano is a 64 y.o. female who presents for follow-up of Follow-up (6 mo); Congestive Heart Failure (HFrEF); and Cardiomyopathy (CHEMO)      HPI DOING WELL. NO RECENT ED VISITS OR HOSPITALIZATIONS FOR ADHF OR ARRHYTHMIAS  NO UNUSUAL CALDERÓN WITH ORDINARY DAILY ACTIVITIES  NO ORTHOPNEA OR PND  NO ABDOMINAL DISCOMFORT  NO EDEMA. NO CALVE TENDERNESS  NO PALPITATIONS  NO FALLS, NO NEAR SYNCOPE OR SYNCOPE  NO EDEMA. NO CALVE TENDERNESS  NO FOCAL CNS SYMPTOMS OR SIGNS TO SUGGEST TIA OR STROKE  CARD MED GOOD COMPLIANCE - NO SIDE EFFECTS FROM ENTRESTO OR  COREG  RECENT BMP- NORMAL RENAL FUNCTION AND ELECTROLYTES     Review of Systems   Constitution: Negative for chills, fever, night sweats, weight gain and weight loss.   HENT: Negative for nosebleeds.    Eyes: Negative for blurred vision, double vision and visual disturbance.   Cardiovascular: Negative for chest pain, dyspnea on exertion, irregular heartbeat, leg swelling, orthopnea, palpitations, paroxysmal nocturnal dyspnea and syncope.   Respiratory: Negative for cough, hemoptysis and wheezing.    Endocrine: Negative for polydipsia and polyuria.   Hematologic/Lymphatic: Does not bruise/bleed easily.   Skin: Negative for rash.   Musculoskeletal: Negative for joint pain, joint swelling, muscle weakness and myalgias.   Gastrointestinal: Negative for abdominal pain, hematemesis, jaundice and melena.   Genitourinary: Negative for dysuria, hematuria and nocturia.   Neurological: Negative for dizziness, focal weakness, headaches, sensory change and weakness.   Psychiatric/Behavioral: Negative for depression. The patient does not have insomnia and is not nervous/anxious.      Family History   Problem Relation Age of Onset    Diabetes Mother     Diabetes Father     Stroke Father     Cancer Maternal Aunt 55        breast cancer    Breast cancer Maternal Aunt     Stroke Paternal Aunt     Melanoma Neg Hx     Psoriasis Neg Hx     Lupus Neg Hx      Eczema Neg Hx      Past Medical History:   Diagnosis Date    Arthritis of right knee     Breast cancer     She has a history of a Stage IIA, 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 She subsequently underwent left mastectomy and sentinel node biopsy followed by axillary dissection March 23, 2007. Histopathologic evaluation demonstrated again multiple foci of intracystic papillary carcinoma, as well as DCIS noncomed    Cardiomyopathy     CHEMO INDUCED- RESOLVED    CHF (congestive heart failure)     systolic    Hypertension     Uterine fibroid      Social History     Socioeconomic History    Marital status:      Spouse name: Not on file    Number of children: Not on file    Years of education: Not on file    Highest education level: Not on file   Occupational History    Not on file   Social Needs    Financial resource strain: Not on file    Food insecurity:     Worry: Not on file     Inability: Not on file    Transportation needs:     Medical: Not on file     Non-medical: Not on file   Tobacco Use    Smoking status: Never Smoker    Smokeless tobacco: Never Used   Substance and Sexual Activity    Alcohol use: Yes     Alcohol/week: 0.0 standard drinks     Comment: Ocassionally    Drug use: No    Sexual activity: Yes     Partners: Male     Birth control/protection: None, Post-menopausal   Lifestyle    Physical activity:     Days per week: Not on file     Minutes per session: Not on file    Stress: Not on file   Relationships    Social connections:     Talks on phone: Not on file     Gets together: Not on file     Attends [employer]ism service: Not on file     Active member of club or organization: Not on file     Attends meetings of clubs or organizations: Not on file     Relationship status: Not on file   Other Topics Concern    Are you pregnant or think you may be? Not Asked    Breast-feeding Not  Asked   Social History Narrative    Not on file     Current Outpatient Medications on File Prior to Visit   Medication Sig Dispense Refill    carvedilol (COREG CR) 80 MG 24 hr capsule TAKE 1 CAPSULE BY MOUTH ONCE DAILY 90 capsule 0    sacubitril-valsartan (ENTRESTO)  mg per tablet Take 1 tablet by mouth 2 (two) times daily. 60 tablet 6     No current facility-administered medications on file prior to visit.      Review of patient's allergies indicates:  No Known Allergies    Objective:     Physical Exam   Constitutional: She is oriented to person, place, and time. She appears well-developed. No distress.   HENT:   Head: Normocephalic.   Eyes: Pupils are equal, round, and reactive to light. Conjunctivae are normal. No scleral icterus.   Neck: Normal range of motion. Neck supple. Normal carotid pulses, no hepatojugular reflux and no JVD present. Carotid bruit is not present. No edema present. No thyroid mass and no thyromegaly present.   Cardiovascular: Normal rate, regular rhythm, S1 normal, S2 normal, normal heart sounds and intact distal pulses. PMI is not displaced. Exam reveals no gallop and no friction rub.   No murmur heard.  Pulses:       Carotid pulses are 2+ on the right side, and 2+ on the left side.       Radial pulses are 2+ on the right side, and 2+ on the left side.        Femoral pulses are 2+ on the right side, and 2+ on the left side.       Popliteal pulses are 2+ on the right side, and 2+ on the left side.        Dorsalis pedis pulses are 2+ on the right side, and 2+ on the left side.        Posterior tibial pulses are 2+ on the right side, and 2+ on the left side.   Pulmonary/Chest: Effort normal and breath sounds normal. She has no wheezes. She has no rales. She exhibits no tenderness.   Abdominal: Soft. Bowel sounds are normal. She exhibits no pulsatile midline mass and no mass. There is no hepatosplenomegaly. There is no tenderness.   Musculoskeletal: Normal range of motion. She  exhibits no edema or tenderness.        Cervical back: Normal.        Thoracic back: Normal.        Lumbar back: Normal.   Lymphadenopathy:     She has no cervical adenopathy.     She has no axillary adenopathy.        Right: No supraclavicular adenopathy present.        Left: No supraclavicular adenopathy present.   Neurological: She is alert and oriented to person, place, and time. She has normal strength and normal reflexes. No sensory deficit. Gait normal.   Skin: Skin is warm. No rash noted. No cyanosis. No pallor. Nails show no clubbing.   Psychiatric: She has a normal mood and affect. Her speech is normal and behavior is normal. Cognition and memory are normal.       Assessment:     1. Cardiomyopathy due to chemotherapy    2. Chronic systolic heart failure    3. Essential hypertension        Plan:     Cardiomyopathy due to chemotherapy  NO CARDIO EMBOLISM  NO ARRHYTHMIAS  ECG TODAY- SR.75, WNL    Chronic systolic heart failure  CLINICALLY WELL COMPENSATED  CARD MED WELL TOLERATED    Essential hypertension  WELL CONTROLLED  CNS STATUS STABLE    CONTINUE PRESENT CARD MANAGEMENT  RETURN IN 6 MONTHS

## 2019-11-08 ENCOUNTER — OFFICE VISIT (OUTPATIENT)
Dept: OBSTETRICS AND GYNECOLOGY | Facility: CLINIC | Age: 64
End: 2019-11-08
Payer: COMMERCIAL

## 2019-11-08 VITALS
DIASTOLIC BLOOD PRESSURE: 80 MMHG | SYSTOLIC BLOOD PRESSURE: 130 MMHG | HEIGHT: 64 IN | WEIGHT: 243.38 LBS | BODY MASS INDEX: 41.55 KG/M2

## 2019-11-08 DIAGNOSIS — Z78.0 POSTMENOPAUSAL: ICD-10-CM

## 2019-11-08 DIAGNOSIS — Z01.419 ENCOUNTER FOR GYNECOLOGICAL EXAMINATION WITHOUT ABNORMAL FINDING: Primary | ICD-10-CM

## 2019-11-08 PROCEDURE — 99999 PR PBB SHADOW E&M-EST. PATIENT-LVL III: CPT | Mod: PBBFAC,,, | Performed by: NURSE PRACTITIONER

## 2019-11-08 PROCEDURE — 3075F SYST BP GE 130 - 139MM HG: CPT | Mod: CPTII,S$GLB,, | Performed by: NURSE PRACTITIONER

## 2019-11-08 PROCEDURE — 3079F PR MOST RECENT DIASTOLIC BLOOD PRESSURE 80-89 MM HG: ICD-10-PCS | Mod: CPTII,S$GLB,, | Performed by: NURSE PRACTITIONER

## 2019-11-08 PROCEDURE — 87624 HPV HI-RISK TYP POOLED RSLT: CPT

## 2019-11-08 PROCEDURE — 3075F PR MOST RECENT SYSTOLIC BLOOD PRESS GE 130-139MM HG: ICD-10-PCS | Mod: CPTII,S$GLB,, | Performed by: NURSE PRACTITIONER

## 2019-11-08 PROCEDURE — 88175 CYTOPATH C/V AUTO FLUID REDO: CPT | Performed by: PATHOLOGY

## 2019-11-08 PROCEDURE — 99386 PR PREVENTIVE VISIT,NEW,40-64: ICD-10-PCS | Mod: S$GLB,,, | Performed by: NURSE PRACTITIONER

## 2019-11-08 PROCEDURE — 99386 PREV VISIT NEW AGE 40-64: CPT | Mod: S$GLB,,, | Performed by: NURSE PRACTITIONER

## 2019-11-08 PROCEDURE — 3079F DIAST BP 80-89 MM HG: CPT | Mod: CPTII,S$GLB,, | Performed by: NURSE PRACTITIONER

## 2019-11-08 PROCEDURE — 88141 CYTOPATH C/V INTERPRET: CPT | Mod: ,,, | Performed by: PATHOLOGY

## 2019-11-08 PROCEDURE — 88141 LIQUID-BASED PAP SMEAR, SCREENING: ICD-10-PCS | Mod: ,,, | Performed by: PATHOLOGY

## 2019-11-08 PROCEDURE — 99999 PR PBB SHADOW E&M-EST. PATIENT-LVL III: ICD-10-PCS | Mod: PBBFAC,,, | Performed by: NURSE PRACTITIONER

## 2019-11-08 NOTE — PATIENT INSTRUCTIONS

## 2019-11-08 NOTE — PROGRESS NOTES
CC: Well woman exam    Josette Altamirano is a 64 y.o. female  presents for well woman exam.  LMP: No LMP recorded. Patient is postmenopausal..  No issues, problems, or complaints.      Past Medical History:   Diagnosis Date    Arthritis of right knee     Breast cancer     She has a history of a Stage IIA, TXN1M0 intracystic papillary carcinoma with multifocal DCIS and one of three positive sentinel lymph nodes diagnosed in 2007. She subsequently underwent left mastectomy and sentinel node biopsy followed by axillary dissection 2007. Histopathologic evaluation demonstrated again multiple foci of intracystic papillary carcinoma, as well as DCIS noncomed    Cardiomyopathy     CHEMO INDUCED- RESOLVED    CHF (congestive heart failure)     systolic    Hypertension     Uterine fibroid      Past Surgical History:   Procedure Laterality Date    BREAST BIOPSY      BREAST CYST EXCISION Right     BREAST SURGERY      CHOLECYSTECTOMY      COLONOSCOPY N/A 9/10/2018    Procedure: COLONOSCOPY;  Surgeon: Indra Ramos MD;  Location: [facility];  Service: Endoscopy;  Laterality: N/A;    MASTECTOMY      left    MEDIPORT INSERTION, SINGLE       Social History     Socioeconomic History    Marital status:      Spouse name: Not on file    Number of children: Not on file    Years of education: Not on file    Highest education level: Not on file   Occupational History    Not on file   Social Needs    Financial resource strain: Not on file    Food insecurity:     Worry: Not on file     Inability: Not on file    Transportation needs:     Medical: Not on file     Non-medical: Not on file   Tobacco Use    Smoking status: Never Smoker    Smokeless tobacco: Never Used   Substance and Sexual Activity    Alcohol use: Yes     Alcohol/week: 0.0 standard drinks     Comment: Ocassionally    Drug use: No    Sexual activity: Yes     Partners: Male     Birth control/protection: None, Post-menopausal  "  Lifestyle    Physical activity:     Days per week: Not on file     Minutes per session: Not on file    Stress: Not on file   Relationships    Social connections:     Talks on phone: Not on file     Gets together: Not on file     Attends Denominational service: Not on file     Active member of club or organization: Not on file     Attends meetings of clubs or organizations: Not on file     Relationship status: Not on file   Other Topics Concern    Are you pregnant or think you may be? Not Asked    Breast-feeding Not Asked   Social History Narrative    Not on file     Family History   Problem Relation Age of Onset    Diabetes Mother     Diabetes Father     Stroke Father     Cancer Maternal Aunt 55        breast cancer    Breast cancer Maternal Aunt     Stroke Paternal Aunt     Melanoma Neg Hx     Psoriasis Neg Hx     Lupus Neg Hx     Eczema Neg Hx      OB History        2    Para   2    Term   2            AB        Living   2       SAB        TAB        Ectopic        Multiple        Live Births                     /80   Ht 5' 4" (1.626 m)   Wt 110.4 kg (243 lb 6.2 oz)   BMI 41.78 kg/m²       ROS:  GENERAL: Denies weight gain or weight loss. Feeling well overall.   SKIN: Denies rash or lesions.   HEAD: Denies head injury or headache.   NODES: Denies enlarged lymph nodes.   CHEST: Denies chest pain or shortness of breath.   CARDIOVASCULAR: Denies palpitations or left sided chest pain.   ABDOMEN: No abdominal pain, constipation, diarrhea, nausea, vomiting or rectal bleeding.   URINARY: No frequency, dysuria, hematuria, or burning on urination.  REPRODUCTIVE: See HPI.   BREASTS: The patient performs breast self-examination and denies pain, lumps, or nipple discharge.   HEMATOLOGIC: No easy bruisability or excessive bleeding.   MUSCULOSKELETAL: Denies joint pain or swelling.   NEUROLOGIC: Denies syncope or weakness.   PSYCHIATRIC: Denies depression, anxiety or mood swings.    PHYSICAL " EXAM:  APPEARANCE: Well nourished, well developed, in no acute distress.  AFFECT: WNL, alert and oriented x 3  SKIN: No acne or hirsutism  NECK: Neck symmetric without masses or thyromegaly  NODES: No inguinal, cervical, axillary, or femoral lymph node enlargement  CHEST: Good respiratory effect  ABDOMEN: Soft.  No tenderness or masses.  No hepatosplenomegaly.  No hernias.  BREASTS: pt declined done by another provider  PELVIC: Normal external genitalia without lesions.  Normal hair distribution.  Adequate perineal body, normal urethral meatus.  Vagina moist and well rugated without lesions or discharge.  Cervix pink, without lesions, discharge or tenderness.  No significant cystocele or rectocele.  Bimanual exam shows uterus to be normal size, regular, mobile and nontender.  Adnexa without masses or tenderness.    EXTREMITIES: No edema.  Physical Exam    1. Encounter for gynecological examination without abnormal finding  Liquid-based pap smear, screening   2. Postmenopausal  Liquid-based pap smear, screening    AND PLAN:    Patient was counseled today on A.C.S. Pap guidelines and recommendations for yearly pelvic exams, mammograms and monthly self breast exams; to see her PCP for other health maintenance.

## 2019-11-20 LAB
HPV HR 12 DNA SPEC QL NAA+PROBE: NEGATIVE
HPV16 AG SPEC QL: NEGATIVE
HPV18 DNA SPEC QL NAA+PROBE: NEGATIVE

## 2019-11-21 ENCOUNTER — PATIENT MESSAGE (OUTPATIENT)
Dept: OBSTETRICS AND GYNECOLOGY | Facility: CLINIC | Age: 64
End: 2019-11-21

## 2020-01-03 ENCOUNTER — OFFICE VISIT (OUTPATIENT)
Dept: OBSTETRICS AND GYNECOLOGY | Facility: CLINIC | Age: 65
End: 2020-01-03
Payer: COMMERCIAL

## 2020-01-03 VITALS
WEIGHT: 239.44 LBS | SYSTOLIC BLOOD PRESSURE: 132 MMHG | BODY MASS INDEX: 40.88 KG/M2 | DIASTOLIC BLOOD PRESSURE: 82 MMHG | HEIGHT: 64 IN

## 2020-01-03 DIAGNOSIS — R30.0 DYSURIA: Primary | ICD-10-CM

## 2020-01-03 LAB
BACTERIA #/AREA URNS HPF: ABNORMAL /HPF
BILIRUB UR QL STRIP: NEGATIVE
CLARITY UR: ABNORMAL
COLOR UR: YELLOW
GLUCOSE UR QL STRIP: NEGATIVE
HGB UR QL STRIP: ABNORMAL
HYALINE CASTS #/AREA URNS LPF: 0 /LPF
KETONES UR QL STRIP: NEGATIVE
LEUKOCYTE ESTERASE UR QL STRIP: ABNORMAL
MICROSCOPIC COMMENT: ABNORMAL
NITRITE UR QL STRIP: NEGATIVE
PH UR STRIP: 7 [PH] (ref 5–8)
PROT UR QL STRIP: ABNORMAL
RBC #/AREA URNS HPF: 12 /HPF (ref 0–4)
SP GR UR STRIP: 1.01 (ref 1–1.03)
SQUAMOUS #/AREA URNS HPF: 1 /HPF
URN SPEC COLLECT METH UR: ABNORMAL
WBC #/AREA URNS HPF: 80 /HPF (ref 0–5)

## 2020-01-03 PROCEDURE — 99213 OFFICE O/P EST LOW 20 MIN: CPT | Mod: 25,S$GLB,, | Performed by: NURSE PRACTITIONER

## 2020-01-03 PROCEDURE — 99999 PR PBB SHADOW E&M-EST. PATIENT-LVL III: CPT | Mod: PBBFAC,,, | Performed by: NURSE PRACTITIONER

## 2020-01-03 PROCEDURE — 3075F SYST BP GE 130 - 139MM HG: CPT | Mod: CPTII,S$GLB,, | Performed by: NURSE PRACTITIONER

## 2020-01-03 PROCEDURE — 3008F BODY MASS INDEX DOCD: CPT | Mod: CPTII,S$GLB,, | Performed by: NURSE PRACTITIONER

## 2020-01-03 PROCEDURE — 3079F PR MOST RECENT DIASTOLIC BLOOD PRESSURE 80-89 MM HG: ICD-10-PCS | Mod: CPTII,S$GLB,, | Performed by: NURSE PRACTITIONER

## 2020-01-03 PROCEDURE — 99999 PR PBB SHADOW E&M-EST. PATIENT-LVL III: ICD-10-PCS | Mod: PBBFAC,,, | Performed by: NURSE PRACTITIONER

## 2020-01-03 PROCEDURE — 87186 SC STD MICRODIL/AGAR DIL: CPT

## 2020-01-03 PROCEDURE — 81000 URINALYSIS NONAUTO W/SCOPE: CPT

## 2020-01-03 PROCEDURE — 99213 PR OFFICE/OUTPT VISIT, EST, LEVL III, 20-29 MIN: ICD-10-PCS | Mod: 25,S$GLB,, | Performed by: NURSE PRACTITIONER

## 2020-01-03 PROCEDURE — 3075F PR MOST RECENT SYSTOLIC BLOOD PRESS GE 130-139MM HG: ICD-10-PCS | Mod: CPTII,S$GLB,, | Performed by: NURSE PRACTITIONER

## 2020-01-03 PROCEDURE — 81002 URINALYSIS NONAUTO W/O SCOPE: CPT | Mod: S$GLB,,, | Performed by: NURSE PRACTITIONER

## 2020-01-03 PROCEDURE — 87077 CULTURE AEROBIC IDENTIFY: CPT

## 2020-01-03 PROCEDURE — 87088 URINE BACTERIA CULTURE: CPT

## 2020-01-03 PROCEDURE — 81002 PR URINALYSIS NONAUTO W/O SCOPE: ICD-10-PCS | Mod: S$GLB,,, | Performed by: NURSE PRACTITIONER

## 2020-01-03 PROCEDURE — 87086 URINE CULTURE/COLONY COUNT: CPT

## 2020-01-03 PROCEDURE — 3079F DIAST BP 80-89 MM HG: CPT | Mod: CPTII,S$GLB,, | Performed by: NURSE PRACTITIONER

## 2020-01-03 PROCEDURE — 3008F PR BODY MASS INDEX (BMI) DOCUMENTED: ICD-10-PCS | Mod: CPTII,S$GLB,, | Performed by: NURSE PRACTITIONER

## 2020-01-03 RX ORDER — NITROFURANTOIN 25; 75 MG/1; MG/1
100 CAPSULE ORAL 2 TIMES DAILY
Qty: 14 CAPSULE | Refills: 0 | Status: SHIPPED | OUTPATIENT
Start: 2020-01-03 | End: 2020-01-10

## 2020-01-03 NOTE — PATIENT INSTRUCTIONS
"  Dysuria     Painful urination (dysuria) is often caused by a problem in the urinary tract.   Dysuria is pain felt during urination. It is often described as a burning. Learn more about this problem and how it can be treated.  What causes dysuria?  Possible causes include:  · Infection with a bacteria or virus such as a urinary tract infection (UTI or a sexually transmitted infection (STI)  · Sensitivity or allergy to chemicals such as those found in lotions and other products  · Prostate or bladder problems  · Radiation therapy to the pelvic area  How is dysuria diagnosed?  Your healthcare provider will examine you. He or she will ask about your symptoms and health. After talking with you and doing a physical exam, your healthcare provider may know what is causing your dysuria. He or she will usually request  a sample of your urine. Tests of your urine, or a "urinalysis," are done. A urinalysis may include:  · Looking at the urine sample (visual exam)  · Checking for substances (chemical exam)  · Looking at a small amount under a microscope (microscopic exam)  Some parts of the urinalysis may be done in the provider's office and some in a lab. And, the urine sample may be checked for bacteria and yeast (urine culture). Your healthcare provider will tell you more about these tests if they are needed.  How is dysuria treated?  Treatment depends on the cause. If you have a bacterial infection, you may need antibiotics. You may be given medicines to make it easier for you to urinate and help relieve pain. Your healthcare provider can tell you more about your treatment options. Untreated, symptoms may get worse.  When to call your healthcare provider  Call the healthcare provider right away if you have any of the following:  · Fever of 100.4°F (38°C) or higher   · No improvement after three days of treatment  · Trouble urinating because of pain  · New or increased discharge from the vagina or penis  · Rash or joint " pain  · Increased back or abdominal pain  · Enlarged painful lymph nodes (lumps) in the groin   Date Last Reviewed: 1/1/2017 © 2000-2017 [employer]. [address] [place] [address], [place] [address]. All rights reserved. This information is not intended as a substitute for professional medical care. Always follow your healthcare professional's instructions.        Dysuria with Uncertain Cause (Adult)    The urethra is the tube that allows urine to pass out of the body. In a woman, the urethra is the opening above the vagina. In men, the urethra is the opening on the tip of the penis. Dysuria is the feeling of pain or burning in the urethra when passing urine.  Dysuria can be caused by anything that irritates or inflames the urethra. An infection or chemical irritation can cause this reaction. A bladder infection is the most common cause of dysuria in adults. A urine test can diagnose this. A bladder infection needs antibiotic treatment.  Soaps, lotions, colognes and feminine hygiene products can cause dysuria. So can birth control jellies, creams, and foams. It will go away 1 to 3 days after using these irritants.  Sexually transmitted diseases (STDs) such as chlamydia or gonorrhea can cause dysuria. Your healthcare provider may take a culture sample. Your provider may start you on antibiotic medicine before the culture test returns.  In women who have gone through menopause, dysuria can be from dryness in the lining of the urethra. This can be treated with hormones. Dysuria becomes long-term (chronic) when it lasts for weeks or months. You may need to see a specialist (urologist) to diagnose and treat chronic dysuria.  Home care  These home care tips may help:  · Don't use any chemicals or products that you think may be causing your symptoms.  · If you were given a prescription medicine, take as directed. Be sure to take it until it is all used up.  · If a culture was taken, don't have sex until you have  been told that it is negative. This means you don't have an infection. Then follow your healthcare provider's advice to treat your condition.  If a culture was done and it is positive:  · Both you and your sexual partner may need to be treated. This is true even if your partner has no symptoms.  · Contact your healthcare provider or go to an urgent care clinic or the public health department to be looked at and treated.  · Don't have sex until both you and your partner(s) have finished all antibiotics and your healthcare provider says you are no longer contagious.  · Learn about and use safe sex practices. The safest sex is with a partner who has tested negative and only has sex with you. Condoms can prevent STDs from spreading, but they aren't a guarantee.  Follow-up care  Follow up with your healthcare provider, or as advised. If a culture was taken, you may call as directed for the results. If you have an STD, follow up with your provider or the public health department for a complete STD screening, including HIV testing. For more information, contact CDC-INFO at 334-826-5261.  When to seek medical advice  Call your healthcare provider right away if any of these occur:  · You aren't better after 3 days of treatment  · Fever of 100.4ºF (38ºC) or higher, or as directed by your healthcare provider  · Back or belly pain that gets worse  · You can't urinate because of pain  · New discharge from the urethra, vagina, or penis  · Painful sores on the penis  · Rash or joint pain  · Painful lumps (lymph nodes) in the groin  · Testicle pain or swelling of the scrotum  Date Last Reviewed: 11/1/2016 © 2000-2017 [employer]. [address] [place] [address]. All rights reserved. This information is not intended as a substitute for professional medical care. Always follow your healthcare professional's instructions.

## 2020-01-03 NOTE — PROGRESS NOTES
"Josette Altamirano is a 64 y.o. female  presents with complaint of burning with urination starting on 2020.    Past Medical History:   Diagnosis Date    Arthritis of right knee     Breast cancer     She has a history of a Stage IIA, TXN1M0 intracystic papillary carcinoma with multifocal DCIS and one of three positive sentinel lymph nodes diagnosed in 2007. She subsequently underwent left mastectomy and sentinel node biopsy followed by axillary dissection 2007. Histopathologic evaluation demonstrated again multiple foci of intracystic papillary carcinoma, as well as DCIS noncomed    Cardiomyopathy     CHEMO INDUCED- RESOLVED    CHF (congestive heart failure)     systolic    Hypertension     Uterine fibroid      Past Surgical History:   Procedure Laterality Date    BREAST BIOPSY      BREAST CYST EXCISION Right     BREAST SURGERY      CHOLECYSTECTOMY      COLONOSCOPY N/A 9/10/2018    Procedure: COLONOSCOPY;  Surgeon: Indra Ramos MD;  Location: [facility];  Service: Endoscopy;  Laterality: N/A;    MASTECTOMY      left    MEDIPORT INSERTION, SINGLE       Social History     Tobacco Use    Smoking status: Never Smoker    Smokeless tobacco: Never Used   Substance Use Topics    Alcohol use: Yes     Alcohol/week: 0.0 standard drinks     Comment: Ocassionally    Drug use: No     Family History   Problem Relation Age of Onset    Diabetes Mother     Diabetes Father     Stroke Father     Cancer Maternal Aunt 55        breast cancer    Breast cancer Maternal Aunt     Stroke Paternal Aunt     Melanoma Neg Hx     Psoriasis Neg Hx     Lupus Neg Hx     Eczema Neg Hx      OB History    Para Term  AB Living   2 2 2     2   SAB TAB Ectopic Multiple Live Births                  # Outcome Date GA Lbr Jan/2nd Weight Sex Delivery Anes PTL Lv   2 Term            1 Term                /82   Ht 5' 4" (1.626 m)   Wt 108.6 kg (239 lb 6.7 oz)   BMI 41.10 kg/m²     ROS:  Per " hpi    PHYSICAL EXAM:  examination not indicated    ASSESSMENT and PLAN:  1. Dysuria  POCT urine dipstick without microscope    Urinalysis    Urine culture    nitrofurantoin, macrocrystal-monohydrate, (MACROBID) 100 MG capsule       Urine dip with large protein, leukocytes and blood  Start macrobid  Urine to lab

## 2020-01-06 ENCOUNTER — OFFICE VISIT (OUTPATIENT)
Dept: PODIATRY | Facility: CLINIC | Age: 65
End: 2020-01-06
Payer: COMMERCIAL

## 2020-01-06 ENCOUNTER — TELEPHONE (OUTPATIENT)
Dept: OBSTETRICS AND GYNECOLOGY | Facility: CLINIC | Age: 65
End: 2020-01-06

## 2020-01-06 VITALS
SYSTOLIC BLOOD PRESSURE: 127 MMHG | WEIGHT: 239.19 LBS | HEART RATE: 78 BPM | HEIGHT: 64 IN | DIASTOLIC BLOOD PRESSURE: 78 MMHG | BODY MASS INDEX: 40.83 KG/M2

## 2020-01-06 DIAGNOSIS — M24.571 CONTRACTURE, RIGHT ANKLE: ICD-10-CM

## 2020-01-06 DIAGNOSIS — M79.672 PAIN IN LEFT FOOT: ICD-10-CM

## 2020-01-06 DIAGNOSIS — E66.01 MORBID OBESITY: ICD-10-CM

## 2020-01-06 DIAGNOSIS — M79.671 PAIN IN RIGHT FOOT: ICD-10-CM

## 2020-01-06 DIAGNOSIS — M24.572 CONTRACTURE, LEFT ANKLE: ICD-10-CM

## 2020-01-06 DIAGNOSIS — M72.2 PLANTAR FASCIITIS: Primary | ICD-10-CM

## 2020-01-06 LAB — BACTERIA UR CULT: ABNORMAL

## 2020-01-06 PROCEDURE — 99999 PR PBB SHADOW E&M-EST. PATIENT-LVL III: ICD-10-PCS | Mod: PBBFAC,,, | Performed by: PODIATRIST

## 2020-01-06 PROCEDURE — 3074F SYST BP LT 130 MM HG: CPT | Mod: CPTII,S$GLB,, | Performed by: PODIATRIST

## 2020-01-06 PROCEDURE — 3008F BODY MASS INDEX DOCD: CPT | Mod: CPTII,S$GLB,, | Performed by: PODIATRIST

## 2020-01-06 PROCEDURE — 20550 PR INJECT TENDON SHEATH/LIGAMENT: ICD-10-PCS | Mod: RT,S$GLB,, | Performed by: PODIATRIST

## 2020-01-06 PROCEDURE — 3008F PR BODY MASS INDEX (BMI) DOCUMENTED: ICD-10-PCS | Mod: CPTII,S$GLB,, | Performed by: PODIATRIST

## 2020-01-06 PROCEDURE — 99999 PR PBB SHADOW E&M-EST. PATIENT-LVL III: CPT | Mod: PBBFAC,,, | Performed by: PODIATRIST

## 2020-01-06 PROCEDURE — 3078F PR MOST RECENT DIASTOLIC BLOOD PRESSURE < 80 MM HG: ICD-10-PCS | Mod: CPTII,S$GLB,, | Performed by: PODIATRIST

## 2020-01-06 PROCEDURE — 3074F PR MOST RECENT SYSTOLIC BLOOD PRESSURE < 130 MM HG: ICD-10-PCS | Mod: CPTII,S$GLB,, | Performed by: PODIATRIST

## 2020-01-06 PROCEDURE — 20550 NJX 1 TENDON SHEATH/LIGAMENT: CPT | Mod: RT,S$GLB,, | Performed by: PODIATRIST

## 2020-01-06 PROCEDURE — 99213 OFFICE O/P EST LOW 20 MIN: CPT | Mod: 25,S$GLB,, | Performed by: PODIATRIST

## 2020-01-06 PROCEDURE — 99213 PR OFFICE/OUTPT VISIT, EST, LEVL III, 20-29 MIN: ICD-10-PCS | Mod: 25,S$GLB,, | Performed by: PODIATRIST

## 2020-01-06 PROCEDURE — 3078F DIAST BP <80 MM HG: CPT | Mod: CPTII,S$GLB,, | Performed by: PODIATRIST

## 2020-01-06 RX ORDER — METHYLPREDNISOLONE ACETATE 40 MG/ML
40 INJECTION, SUSPENSION INTRA-ARTICULAR; INTRALESIONAL; INTRAMUSCULAR; SOFT TISSUE
Status: COMPLETED | OUTPATIENT
Start: 2020-01-06 | End: 2020-01-06

## 2020-01-06 RX ORDER — DEXAMETHASONE SODIUM PHOSPHATE 4 MG/ML
4 INJECTION, SOLUTION INTRA-ARTICULAR; INTRALESIONAL; INTRAMUSCULAR; INTRAVENOUS; SOFT TISSUE ONCE
Status: COMPLETED | OUTPATIENT
Start: 2020-01-06 | End: 2020-01-06

## 2020-01-06 RX ADMIN — METHYLPREDNISOLONE ACETATE 40 MG: 40 INJECTION, SUSPENSION INTRA-ARTICULAR; INTRALESIONAL; INTRAMUSCULAR; SOFT TISSUE at 01:01

## 2020-01-06 RX ADMIN — DEXAMETHASONE SODIUM PHOSPHATE 4 MG: 4 INJECTION, SOLUTION INTRA-ARTICULAR; INTRALESIONAL; INTRAMUSCULAR; INTRAVENOUS; SOFT TISSUE at 01:01

## 2020-01-06 NOTE — TELEPHONE ENCOUNTER
Spoke with patient regarding UTI with e coli, encouraged patient to complete medications and return in 4-5 weeks for a recheck. Patient verbalized understanding.

## 2020-01-06 NOTE — PROGRESS NOTES
Subjective:       Patient ID: Josette Altamirano is a 64 y.o. female.    Chief Complaint: Foot Pain (R. heel throbbing, rates pain 5/10, tennis w/socks, PCP Dr. Harrington.)      HPI: Josette Altamirano presents to the office today, for follow-up concerning plantar fasciitis of the right and left foot. At this time, pains are 5/10 and are described as moderate in nature. States analgic gait pattern. States walking and standing is just pain was great prior. To this juncture, patient has had 1 cortisone injections to the left and right foot. States frequent NSAIDs. Patient's Primary Care Provider is Caridad Harrington MD.  Patient has complete resolution of symptoms to the left plantar heel.    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 She subsequently underwent left mastectomy and sentinel node biopsy followed by axillary dissection March 23, 2007. Histopathologic evaluation demonstrated again multiple foci of intracystic papillary carcinoma, as well as DCIS noncomed    Cardiomyopathy     CHEMO INDUCED- RESOLVED    CHF (congestive heart failure)     systolic    Hypertension     Uterine fibroid        Family History   Problem Relation Age of Onset    Diabetes Mother     Diabetes Father     Stroke Father     Cancer Maternal Aunt 55        breast cancer    Breast cancer Maternal Aunt     Stroke Paternal Aunt     Melanoma Neg Hx     Psoriasis Neg Hx     Lupus Neg Hx     Eczema Neg Hx        Social History     Socioeconomic History    Marital status:      Spouse name: Not on file    Number of children: Not on file    Years of education: Not on file    Highest education level: Not on file   Occupational History    Not on file   Social Needs     Financial resource strain: Not on file    Food insecurity:     Worry: Not on file     Inability: Not on file    Transportation needs:     Medical: Not on file     Non-medical: Not on file   Tobacco Use    Smoking status: Never Smoker    Smokeless tobacco: Never Used   Substance and Sexual Activity    Alcohol use: Yes     Alcohol/week: 0.0 standard drinks     Comment: Ocassionally    Drug use: No    Sexual activity: Yes     Partners: Male     Birth control/protection: None, Post-menopausal   Lifestyle    Physical activity:     Days per week: Not on file     Minutes per session: Not on file    Stress: Not on file   Relationships    Social connections:     Talks on phone: Not on file     Gets together: Not on file     Attends Rastafari service: Not on file     Active member of club or organization: Not on file     Attends meetings of clubs or organizations: Not on file     Relationship status: Not on file   Other Topics Concern    Are you pregnant or think you may be? Not Asked    Breast-feeding Not Asked   Social History Narrative    Not on file       Past Surgical History:   Procedure Laterality Date    BREAST BIOPSY      BREAST CYST EXCISION Right     BREAST SURGERY      CHOLECYSTECTOMY      COLONOSCOPY N/A 9/10/2018    Procedure: COLONOSCOPY;  Surgeon: Indra Ramos MD;  Location: [facility];  Service: Endoscopy;  Laterality: N/A;    MASTECTOMY      left    MEDIPORT INSERTION, SINGLE         Review of Systems   Constitutional: Negative for chills, fatigue and fever.   HENT: Negative for hearing loss.    Eyes: Negative for photophobia and visual disturbance.   Respiratory: Negative for cough, chest tightness, shortness of breath and wheezing.    Cardiovascular: Negative for chest pain and palpitations.   Gastrointestinal: Negative for constipation, diarrhea, nausea and vomiting.   Endocrine: Negative for cold intolerance and heat intolerance.   Genitourinary: Negative for flank pain.  "  Musculoskeletal: Positive for gait problem. Negative for neck pain and neck stiffness.   Skin: Negative for wound.   Neurological: Negative for light-headedness and headaches.   Psychiatric/Behavioral: Negative for sleep disturbance.         Objective:   /78 (BP Location: Right arm, Patient Position: Sitting, BP Method: Medium (Automatic))   Pulse 78   Ht 5' 4" (1.626 m)   Wt 108.5 kg (239 lb 3.2 oz)   BMI 41.06 kg/m²         Physical Exam    LOWER EXTREMITY PHYSICAL EXAMINATION    NEUROLOGY: Negative Tinel's Sign and negative Valleix Sign. No neurological sensations with compression of the area of Landry's Nerve in the area of the Abductor Hallucis muscle belly.    ORTHOPEDIC:  There is severe tenderness to palpation of the area around the plantar medial calcaneal tubercle on the right foot and no pain to the left foot. Pains are characterized as sharp and stabbing-like with direct palpation of the area. There is also mild pain to palpation of the immediate plantar aspect of the heel, and mild pains to the lateral band of the fascia. No edema is noted. No fullness is noted. No pains or defects are noted within the plantar fascia at the arch. No plantar fibromas are noted. No defects are noted within the ligament. No pains with medial to lateral compression of the heel. Equinus contracture is noted. Antalgic gait pattern is noted.     DERMATOLOGY: No ecchymosis is noted.  Skin is supple, dry and intact. Skin is supple.  No hyperkeratosis noted. No calluses.  No open wounds or ulcerations are noted.  No palpable plantar fibromas noted.    VASCULAR: On the right left foot, the dorsalis pedis pulse is 2/4 and the posterior tibial pulse is 2/4. Capillary refill time is less than 3 seconds. Hair growth is present on the dorsum of the foot and at the digits. No rubor is present. Proximal to distal temperature is warm to warm.    Assessment:     1. Plantar fasciitis    2. Pain in right foot    3. Contracture, " right ankle    4. Morbid obesity    5. Pain in left foot    6. Contracture, left ankle          Plan:     Plantar fasciitis  Pain in right foot  Following sterile preparation with alcohol swab and/or betadine paint, injection was given into and around the area of the right plantar medial calcaneal tubercle, using 25-gauge needle. Injection consisted of approximately 0.5cc of Dexamethasone Sodium Phosphate, 0.5cc of Depo-Medrol (40mg/dL) and 0.5cc of 1% Lidocaine w/ or w/o Epinephrine or 0.25% or 0.50% Marcaine plain. Bandage application thereafter. Patient tolerated procedure well, and without complications or complaints. Patient educated that the area of pathology, might actually be slightly more painful, due to the injection, over the course of the next one to two days.       Contracture, right ankle  Contracture, left ankle  Stretching exercises are discussed, taught, and are demonstrates to the patient this afternoon due to concomitant diagnosis of equinus contracture. The relationship between equinus contracture and the other aforementioned pathologies are detailed and outlined to the patient. The patient does acknowledge understanding, and we will embark on a vigorous stretching algorithm for the lower extremity.    Morbid obesity  Patient is counseled and reminded concerning the importance of good nutrition and healthy eating habits, which may include blood sugar control, to prevent and/or help podiatric foot and ankle complications.    Plantar fasciitis  Pain in left foot  Resolved symptomology to the left foot, post cortisone injection. Did discuss proper and supportive shoe gear in detail and at length with the patient.  These are shoes with firm and robust arch support; medial counter.  Shoes which only bend at the metatarsophalangeal joint and which are rigid in the midfoot and hindfoot. Patient urged to purchase running type or cross training type shoes gear which are designed for pronation control.                 Future Appointments   Date Time Provider Department Center   4/22/2020 10:00 AM [facility]   4/27/2020  9:30 AM Omar Miranda IV, MD ON UROLOGY  Medical

## 2020-01-06 NOTE — TELEPHONE ENCOUNTER
----- Message from Mariposa Osullivan NP sent at 1/6/2020  7:51 AM CST -----  Urine does show uti with e coli - she was treated at time of visit with macrobid which is sensitive to, needs to complete the meds  And come back for nurse visit to recheck urine in about 4- 5 weeks

## 2020-01-07 DIAGNOSIS — T45.1X5A CARDIOMYOPATHY DUE TO CHEMOTHERAPY: ICD-10-CM

## 2020-01-07 DIAGNOSIS — I42.7 CARDIOMYOPATHY DUE TO CHEMOTHERAPY: ICD-10-CM

## 2020-01-07 DIAGNOSIS — I50.22 CHRONIC SYSTOLIC HEART FAILURE: ICD-10-CM

## 2020-02-03 ENCOUNTER — HOSPITAL ENCOUNTER (OUTPATIENT)
Dept: RADIOLOGY | Facility: HOSPITAL | Age: 65
Discharge: HOME | End: 2020-02-03
Attending: NURSE PRACTITIONER
Payer: COMMERCIAL

## 2020-02-03 ENCOUNTER — OFFICE VISIT (OUTPATIENT)
Dept: SURGERY | Facility: CLINIC | Age: 65
End: 2020-02-03
Payer: COMMERCIAL

## 2020-02-03 VITALS
SYSTOLIC BLOOD PRESSURE: 131 MMHG | TEMPERATURE: 98 F | HEART RATE: 72 BPM | WEIGHT: 236.13 LBS | BODY MASS INDEX: 40.53 KG/M2 | DIASTOLIC BLOOD PRESSURE: 80 MMHG

## 2020-02-03 DIAGNOSIS — Z17.0 MALIGNANT NEOPLASM OF UPPER-OUTER QUADRANT OF LEFT BREAST IN FEMALE, ESTROGEN RECEPTOR POSITIVE: ICD-10-CM

## 2020-02-03 DIAGNOSIS — R10.84 GENERALIZED ABDOMINAL PAIN: ICD-10-CM

## 2020-02-03 DIAGNOSIS — M54.6 THORACIC SPINE PAIN: ICD-10-CM

## 2020-02-03 DIAGNOSIS — R10.9 PAIN IN THE ABDOMEN: Primary | ICD-10-CM

## 2020-02-03 DIAGNOSIS — Z87.440 HISTORY OF UTI: ICD-10-CM

## 2020-02-03 DIAGNOSIS — M54.6 ACUTE RIGHT-SIDED THORACIC BACK PAIN: Primary | ICD-10-CM

## 2020-02-03 DIAGNOSIS — C50.412 MALIGNANT NEOPLASM OF UPPER-OUTER QUADRANT OF LEFT BREAST IN FEMALE, ESTROGEN RECEPTOR POSITIVE: ICD-10-CM

## 2020-02-03 DIAGNOSIS — N28.1 RENAL CYST, ACQUIRED, LEFT: ICD-10-CM

## 2020-02-03 PROCEDURE — 74177 CT ABD & PELVIS W/CONTRAST: CPT | Mod: TC

## 2020-02-03 PROCEDURE — 99214 PR OFFICE/OUTPT VISIT, EST, LEVL IV, 30-39 MIN: ICD-10-PCS | Mod: S$GLB,,, | Performed by: NURSE PRACTITIONER

## 2020-02-03 PROCEDURE — 3079F PR MOST RECENT DIASTOLIC BLOOD PRESSURE 80-89 MM HG: ICD-10-PCS | Mod: CPTII,S$GLB,, | Performed by: NURSE PRACTITIONER

## 2020-02-03 PROCEDURE — 72070 X-RAY EXAM THORAC SPINE 2VWS: CPT | Mod: TC

## 2020-02-03 PROCEDURE — 3075F PR MOST RECENT SYSTOLIC BLOOD PRESS GE 130-139MM HG: ICD-10-PCS | Mod: CPTII,S$GLB,, | Performed by: NURSE PRACTITIONER

## 2020-02-03 PROCEDURE — 72070 X-RAY EXAM THORAC SPINE 2VWS: CPT | Mod: 26,,, | Performed by: RADIOLOGY

## 2020-02-03 PROCEDURE — 99999 PR PBB SHADOW E&M-EST. PATIENT-LVL III: CPT | Mod: PBBFAC,,, | Performed by: NURSE PRACTITIONER

## 2020-02-03 PROCEDURE — 99999 PR PBB SHADOW E&M-EST. PATIENT-LVL III: ICD-10-PCS | Mod: PBBFAC,,, | Performed by: NURSE PRACTITIONER

## 2020-02-03 PROCEDURE — 3008F PR BODY MASS INDEX (BMI) DOCUMENTED: ICD-10-PCS | Mod: CPTII,S$GLB,, | Performed by: NURSE PRACTITIONER

## 2020-02-03 PROCEDURE — 3079F DIAST BP 80-89 MM HG: CPT | Mod: CPTII,S$GLB,, | Performed by: NURSE PRACTITIONER

## 2020-02-03 PROCEDURE — 3008F BODY MASS INDEX DOCD: CPT | Mod: CPTII,S$GLB,, | Performed by: NURSE PRACTITIONER

## 2020-02-03 PROCEDURE — 72070 XR THORACIC SPINE AP LATERAL: ICD-10-PCS | Mod: 26,,, | Performed by: RADIOLOGY

## 2020-02-03 PROCEDURE — 25500020 PHARM REV CODE 255: Performed by: NURSE PRACTITIONER

## 2020-02-03 PROCEDURE — 3075F SYST BP GE 130 - 139MM HG: CPT | Mod: CPTII,S$GLB,, | Performed by: NURSE PRACTITIONER

## 2020-02-03 PROCEDURE — 99214 OFFICE O/P EST MOD 30 MIN: CPT | Mod: S$GLB,,, | Performed by: NURSE PRACTITIONER

## 2020-02-03 RX ADMIN — IOHEXOL 30 ML: 350 INJECTION, SOLUTION INTRAVENOUS at 04:02

## 2020-02-03 RX ADMIN — IOHEXOL 100 ML: 350 INJECTION, SOLUTION INTRAVENOUS at 05:02

## 2020-02-03 NOTE — PROGRESS NOTES
amSubjective:       Patient ID: Josette Altamirano is a 64 y.o. female.    Chief Complaint: Back Pain; dyspepsia; and f/u uti    HPI:  Patient presents with a couple of complaints today.    Follow-up UTI recheck  Dyspepsia, bloating and change in bowel movement consistency but not frequency.  Three weeks  Mid right back pain x3 weeks    Pt has a history of a Stage IIA, 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 She subsequently underwent left mastectomy and sentinel node biopsy followed by axillary dissection  March 23, 2007. Histopathologic evaluation demonstrated again multiple foci of intracystic papillary carcinoma, as well as DCIS noncomedo type. One of three sentinel lymph nodes demonstrated micrometastatic disease. Twelve additional axillary lymph nodes were obtained, all of which were negative for disease. Estrogen receptors were 11%. Progesterone receptors 4%. HER2/bob was not over-expressed. She subsequently underwent additional studies including a MUGA scan demonstrating an injection  fraction of 39%, thus she was treated with six cycles of Cytoxan and Taxotere which were well tolerated.     9/28/07 hormone studies suggested the pt was menopausal so she was started on Arimidex for post adjuvant therapy. Therapy completed 9/2012.     History of chemo induced cardiomyopathy. CHF- followed by Cardiology- started new medication and last visit was in June.     Pt has had f/u with Urology 4/23/19-as recommended for her hematuria- has left upper pole renal cyst.     1/3/2020 pt treated by GYN for dysuria.  Patient had UTI, E coli.  Was treated with Macrobid.    Denies any visible hematuria, no vaginal discharge and no blood in her bowel movements. Pt relates dysuria has resolved.  She denies any suprapubic discomfort or lower abdominal pain.    Patient states approximately 3 weeks ago she noticed that her bowel movements have become  softer which is unusual for her.  She has begun to drink more water and is eating more vegetables.  She states that for the these past 3 weeks she has noticed an increase in dyspepsia, abdominal bloating but no abdominal pain. Has not tried any over-the-counter tums or pepcid-no fever    Approximately 3 weeks ago patient noticed right midback stabbing like sensation whenever she rolls over in bed are moves a certain direction with her upper body.  She denies any new medications prior to onset.  She denies any unusual physical activity prior to onset.  She does have known degenerative disc changes in the lumbar spine.  Patient denies any loss of control of bowel or bladder function.  She has not tried NSAIDs or heat or ice.      Last mammo- 3/13/19- diagnostic right    Left axilla ultrasound 4/16/19- 2 small nonenlarged lymph nodes    Last gyn- 8/18/16- due for exam    Last colonoscopy- 9/10/18- F/u recommended 9/2028    Review of Systems   Constitutional: Negative.    HENT: Negative.    Eyes: Negative.    Respiratory: Negative.    Cardiovascular: Negative.    Gastrointestinal: Positive for abdominal distention.        Change in bm from firm to very soft - no change in frequency and no blood in bowel movement. Has bloating and increased gas daily for about 3 weeks. Denies change in meds or supplements. Has been eating more veges.    Endocrine: Negative.    Genitourinary: Negative.    Musculoskeletal: Positive for back pain.        Right mid back twinges with upper body movement especially at night when moves on to side. Can be sharp. Denies any loss of bowel or bladder control. No unusual physical activity prior to the pain.    Skin: Negative.    Allergic/Immunologic: Negative.    Neurological: Negative.    Hematological: Negative.  Negative for adenopathy.   Psychiatric/Behavioral: Negative.        Objective:      Physical Exam   Constitutional: She is oriented to person, place, and time. She appears well-developed  and well-nourished.   HENT:   Head: Normocephalic and atraumatic.   Right Ear: External ear normal.   Left Ear: External ear normal.   Mouth/Throat: No oropharyngeal exudate.   Eyes: Pupils are equal, round, and reactive to light. Conjunctivae and EOM are normal. Right eye exhibits no discharge. Left eye exhibits no discharge. No scleral icterus.   Neck: Normal range of motion. Neck supple. No thyromegaly present.   Cardiovascular: Normal rate, regular rhythm and normal heart sounds.   Pulmonary/Chest: Effort normal and breath sounds normal. Right breast exhibits no inverted nipple, no mass, no nipple discharge, no skin change and no tenderness. Left breast exhibits no inverted nipple, no mass, no nipple discharge, no skin change and no tenderness.   Abdominal: Soft. Bowel sounds are normal. She exhibits no distension and no mass. There is no hepatosplenomegaly. There is no tenderness. There is no rigidity, no rebound, no guarding, no CVA tenderness, no tenderness at McBurney's point and negative Rockwell's sign. No hernia.   Musculoskeletal: Normal range of motion. She exhibits no edema.        Right shoulder: She exhibits no crepitus and normal strength.        Arms:  Lymphadenopathy:        Head (right side): No submental, no submandibular, no tonsillar, no preauricular, no posterior auricular and no occipital adenopathy present.        Head (left side): No submental, no submandibular, no tonsillar, no preauricular, no posterior auricular and no occipital adenopathy present.     She has no cervical adenopathy.        Right cervical: No superficial cervical and no posterior cervical adenopathy present.       Left cervical: No superficial cervical and no posterior cervical adenopathy present.     She has no axillary adenopathy.        Right: No supraclavicular adenopathy present.        Left: No supraclavicular adenopathy present.   Neurological: She is alert and oriented to person, place, and time. She has normal  reflexes.   Skin: Skin is warm and dry. No rash noted. No erythema. No pallor.   Psychiatric: She has a normal mood and affect. Her behavior is normal. Judgment and thought content normal.       Assessment:       1. Acute right-sided thoracic back pain    2. Renal cyst, acquired, left    3. Malignant neoplasm of upper-outer quadrant of left breast in female, estrogen receptor positive    4. Generalized abdominal pain    5. Thoracic spine pain    6. History of UTI        Plan:      1. Breast cancer as previously described. Arimidex therapy completed X 5 years.  2. Heart failure stable and followed by cardiology.  3. Obesity. Encouraged pt to continue with exercise and dietary changes.   4. hematuria workup benign- in past- left upper pole renal cyst-recent uti - needs to have f/u urinalysis to make sure cleared the infection.  5. Change in bowel movement character- could be related to diet changes and completion of antibiotic 4 weeks ago. Up to date with colonoscopy. Will obtain cbc, cmp, ct of abd and pelvis for further evaluation of bloating and dyspepsia - pt had cholecystectomy years ago- try tums and pepcid to see if improves symptoms- if imaging wnl will refer to gi for eval  6. Right mid back pain- could be related to muscle skeletal strain- will obtain pa and lateral of thoracic spine and MRI of thoracic spine due to cancer history - try heat and ice- nsaids for relief  7. Will notify pt of results via pt portal  8. Mildly elevated bilirubin 2/2017 and stable since  9. History of alpha thalassemia trait  10. Due for mammo right breast March 2020 and exam

## 2020-02-04 ENCOUNTER — TELEPHONE (OUTPATIENT)
Dept: HEMATOLOGY/ONCOLOGY | Facility: CLINIC | Age: 65
End: 2020-02-04

## 2020-02-05 ENCOUNTER — PATIENT MESSAGE (OUTPATIENT)
Dept: HEMATOLOGY/ONCOLOGY | Facility: CLINIC | Age: 65
End: 2020-02-05

## 2020-02-10 ENCOUNTER — PATIENT MESSAGE (OUTPATIENT)
Dept: HEMATOLOGY/ONCOLOGY | Facility: CLINIC | Age: 65
End: 2020-02-10

## 2020-02-11 RX ORDER — SULFAMETHOXAZOLE AND TRIMETHOPRIM 800; 160 MG/1; MG/1
1 TABLET ORAL 2 TIMES DAILY
Qty: 20 TABLET | Refills: 0 | Status: SHIPPED | OUTPATIENT
Start: 2020-02-11 | End: 2020-06-30

## 2020-02-12 ENCOUNTER — TELEPHONE (OUTPATIENT)
Dept: RADIOLOGY | Facility: HOSPITAL | Age: 65
End: 2020-02-12

## 2020-02-13 NOTE — TELEPHONE ENCOUNTER
----- Message from Lillian Avila sent at 1/11/2018  4:06 PM CST -----  Contact: pt  The pt request a call concerning a medication refill, the pt can be reached at [phone]///[id number]  
no

## 2020-02-24 ENCOUNTER — OFFICE VISIT (OUTPATIENT)
Dept: GASTROENTEROLOGY | Facility: CLINIC | Age: 65
End: 2020-02-24
Payer: COMMERCIAL

## 2020-02-24 ENCOUNTER — LAB VISIT (OUTPATIENT)
Dept: LAB | Facility: HOSPITAL | Age: 65
End: 2020-02-24
Attending: PHYSICIAN ASSISTANT
Payer: COMMERCIAL

## 2020-02-24 VITALS
HEIGHT: 64 IN | BODY MASS INDEX: 39.45 KG/M2 | HEART RATE: 81 BPM | DIASTOLIC BLOOD PRESSURE: 60 MMHG | SYSTOLIC BLOOD PRESSURE: 100 MMHG | WEIGHT: 231.06 LBS

## 2020-02-24 DIAGNOSIS — R93.3 ABNORMAL CT SCAN, COLON: ICD-10-CM

## 2020-02-24 DIAGNOSIS — K59.00 CONSTIPATION, UNSPECIFIED CONSTIPATION TYPE: ICD-10-CM

## 2020-02-24 DIAGNOSIS — K59.00 CONSTIPATION, UNSPECIFIED CONSTIPATION TYPE: Primary | ICD-10-CM

## 2020-02-24 LAB
BASOPHILS # BLD AUTO: 0.01 K/UL (ref 0–0.2)
BASOPHILS NFR BLD: 0.4 % (ref 0–1.9)
CRP SERPL-MCNC: 9.3 MG/L (ref 0–8.2)
DIFFERENTIAL METHOD: ABNORMAL
EOSINOPHIL # BLD AUTO: 0.4 K/UL (ref 0–0.5)
EOSINOPHIL NFR BLD: 14.7 % (ref 0–8)
ERYTHROCYTE [DISTWIDTH] IN BLOOD BY AUTOMATED COUNT: 15.5 % (ref 11.5–14.5)
ERYTHROCYTE [SEDIMENTATION RATE] IN BLOOD BY WESTERGREN METHOD: 12 MM/HR (ref 0–36)
HCT VFR BLD AUTO: 41.5 % (ref 37–48.5)
HGB BLD-MCNC: 11.9 G/DL (ref 12–16)
IMM GRANULOCYTES # BLD AUTO: 0 K/UL (ref 0–0.04)
IMM GRANULOCYTES NFR BLD AUTO: 0 % (ref 0–0.5)
IRON SERPL-MCNC: 44 UG/DL (ref 30–160)
LYMPHOCYTES # BLD AUTO: 0.6 K/UL (ref 1–4.8)
LYMPHOCYTES NFR BLD: 22.6 % (ref 18–48)
MCH RBC QN AUTO: 23.6 PG (ref 27–31)
MCHC RBC AUTO-ENTMCNC: 28.7 G/DL (ref 32–36)
MCV RBC AUTO: 82 FL (ref 82–98)
MONOCYTES # BLD AUTO: 0.4 K/UL (ref 0.3–1)
MONOCYTES NFR BLD: 16.6 % (ref 4–15)
NEUTROPHILS # BLD AUTO: 1.2 K/UL (ref 1.8–7.7)
NEUTROPHILS NFR BLD: 45.7 % (ref 38–73)
NRBC BLD-RTO: 0 /100 WBC
PLATELET # BLD AUTO: 201 K/UL (ref 150–350)
PMV BLD AUTO: 10.7 FL (ref 9.2–12.9)
RBC # BLD AUTO: 5.05 M/UL (ref 4–5.4)
SATURATED IRON: 13 % (ref 20–50)
TOTAL IRON BINDING CAPACITY: 330 UG/DL (ref 250–450)
TRANSFERRIN SERPL-MCNC: 223 MG/DL (ref 200–375)
WBC # BLD AUTO: 2.65 K/UL (ref 3.9–12.7)

## 2020-02-24 PROCEDURE — 99203 PR OFFICE/OUTPT VISIT, NEW, LEVL III, 30-44 MIN: ICD-10-PCS | Mod: S$GLB,,, | Performed by: PHYSICIAN ASSISTANT

## 2020-02-24 PROCEDURE — 86140 C-REACTIVE PROTEIN: CPT

## 2020-02-24 PROCEDURE — 99203 OFFICE O/P NEW LOW 30 MIN: CPT | Mod: S$GLB,,, | Performed by: PHYSICIAN ASSISTANT

## 2020-02-24 PROCEDURE — 3078F DIAST BP <80 MM HG: CPT | Mod: CPTII,S$GLB,, | Performed by: PHYSICIAN ASSISTANT

## 2020-02-24 PROCEDURE — 3008F PR BODY MASS INDEX (BMI) DOCUMENTED: ICD-10-PCS | Mod: CPTII,S$GLB,, | Performed by: PHYSICIAN ASSISTANT

## 2020-02-24 PROCEDURE — 85652 RBC SED RATE AUTOMATED: CPT

## 2020-02-24 PROCEDURE — 99999 PR PBB SHADOW E&M-EST. PATIENT-LVL III: ICD-10-PCS | Mod: PBBFAC,,, | Performed by: PHYSICIAN ASSISTANT

## 2020-02-24 PROCEDURE — 3078F PR MOST RECENT DIASTOLIC BLOOD PRESSURE < 80 MM HG: ICD-10-PCS | Mod: CPTII,S$GLB,, | Performed by: PHYSICIAN ASSISTANT

## 2020-02-24 PROCEDURE — 3074F SYST BP LT 130 MM HG: CPT | Mod: CPTII,S$GLB,, | Performed by: PHYSICIAN ASSISTANT

## 2020-02-24 PROCEDURE — 99999 PR PBB SHADOW E&M-EST. PATIENT-LVL III: CPT | Mod: PBBFAC,,, | Performed by: PHYSICIAN ASSISTANT

## 2020-02-24 PROCEDURE — 83540 ASSAY OF IRON: CPT

## 2020-02-24 PROCEDURE — 36415 COLL VENOUS BLD VENIPUNCTURE: CPT

## 2020-02-24 PROCEDURE — 3074F PR MOST RECENT SYSTOLIC BLOOD PRESSURE < 130 MM HG: ICD-10-PCS | Mod: CPTII,S$GLB,, | Performed by: PHYSICIAN ASSISTANT

## 2020-02-24 PROCEDURE — 3008F BODY MASS INDEX DOCD: CPT | Mod: CPTII,S$GLB,, | Performed by: PHYSICIAN ASSISTANT

## 2020-02-24 PROCEDURE — 85025 COMPLETE CBC W/AUTO DIFF WBC: CPT

## 2020-02-24 NOTE — PROGRESS NOTES
"Clinic Consult:  [facility] Consultation Note    Reason for Consult:  The primary encounter diagnosis was Constipation, unspecified constipation type. A diagnosis of Abnormal CT scan, colon was also pertinent to this visit.    PCP: Caridad Harrington   [address] [facility] / [place] [id number]    CC: Gas and belching      HPI:  This is a 64 y.o. female here for evaluation of the above. Patient was referred by Kathleen Campuzano NP who patient sees every 6 months due to previous breast cancer and alpha thalassemia, for changes in bowel habits and abnormal CT. Patient is currently on second round of Bactrim for UTI. It was during her time on her first round of Bactrim that her stools became more soft. A few episodes with watery stool which has resolved. She denies any abdominal pain, blood in the stool, black stools, nausea, vomiting. She does mention, however, an increase in bloating, gas and belching. She has tried Pepcid over the counter " a few times" which has been helpful and shown improvement. She notices her gas is more prominent after eating spicy foods. She will hear her stomach start to "grumble" - she will sit to have a bowel movement and it is only gas. Recent CT showed minimal thickening and inflammatory changes seen within the ascending and transverse colon.  Mild colitis not excluded. Mild fatty infiltration seen within the wall of the right and transverse colon which can be seen with chronic inflammatory processes.  Mild constipation within the ascending and transverse colon.   Last colonoscopy was 9/2018 - repeat 10 yrs.      Review of Systems   Constitutional: Negative for appetite change, chills, diaphoresis, fatigue, fever and unexpected weight change.   HENT: Negative for trouble swallowing.    Eyes: Negative for visual disturbance.   Respiratory: Negative for chest tightness and shortness of breath.    Cardiovascular: Negative for chest pain.   Gastrointestinal: Positive for " abdominal distention. Negative for abdominal pain, anal bleeding, blood in stool, constipation, diarrhea, nausea and vomiting.   Genitourinary: Negative for dysuria and hematuria.        Symptoms of UTI have resolved.   Musculoskeletal: Negative for back pain and myalgias.   Skin: Negative for rash.   Neurological: Negative for dizziness, weakness, light-headedness and headaches.   Psychiatric/Behavioral: Negative for agitation, confusion and dysphoric mood. The patient is not nervous/anxious.         Medical History:   Past Medical History:   Diagnosis Date    Arthritis of right knee     Breast cancer     She has a history of a Stage IIA, 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 She subsequently underwent left mastectomy and sentinel node biopsy followed by axillary dissection March 23, 2007. Histopathologic evaluation demonstrated again multiple foci of intracystic papillary carcinoma, as well as DCIS noncomed    Cardiomyopathy     CHEMO INDUCED- RESOLVED    CHF (congestive heart failure)     systolic    Hypertension     Uterine fibroid        Surgical History:   Past Surgical History:   Procedure Laterality Date    BREAST BIOPSY      BREAST CYST EXCISION Right     BREAST SURGERY      CHOLECYSTECTOMY      COLONOSCOPY N/A 9/10/2018    Procedure: COLONOSCOPY;  Surgeon: Indra Ramos MD;  Location: [facility];  Service: Endoscopy;  Laterality: N/A;    MASTECTOMY      left    MEDIPORT INSERTION, SINGLE         Family History:    Family History   Problem Relation Age of Onset    Diabetes Mother     Diabetes Father     Stroke Father     Cancer Maternal Aunt 55        breast cancer    Breast cancer Maternal Aunt     Stroke Paternal Aunt     Melanoma Neg Hx     Psoriasis Neg Hx     Lupus Neg Hx     Eczema Neg Hx        Social History:   Social History     Socioeconomic History    Marital status:       "Spouse name: Not on file    Number of children: Not on file    Years of education: Not on file    Highest education level: Not on file   Occupational History    Not on file   Social Needs    Financial resource strain: Not on file    Food insecurity:     Worry: Not on file     Inability: Not on file    Transportation needs:     Medical: Not on file     Non-medical: Not on file   Tobacco Use    Smoking status: Never Smoker    Smokeless tobacco: Never Used   Substance and Sexual Activity    Alcohol use: Yes     Alcohol/week: 0.0 standard drinks     Comment: Ocassionally    Drug use: No    Sexual activity: Yes     Partners: Male     Birth control/protection: None, Post-menopausal   Lifestyle    Physical activity:     Days per week: Not on file     Minutes per session: Not on file    Stress: Not on file   Relationships    Social connections:     Talks on phone: Not on file     Gets together: Not on file     Attends [employer] service: Not on file     Active member of club or organization: Not on file     Attends meetings of clubs or organizations: Not on file     Relationship status: Not on file   Other Topics Concern    Are you pregnant or think you may be? Not Asked    Breast-feeding Not Asked   Social History Narrative    Not on file       Allergies:   Review of patient's allergies indicates:  No Known Allergies    Home Medications:   Current Outpatient Medications on File Prior to Visit   Medication Sig Dispense Refill    carvedilol (COREG CR) 80 MG 24 hr capsule TAKE 1 CAPSULE BY MOUTH ONCE DAILY 90 capsule 0    sacubitril-valsartan (ENTRESTO)  mg per tablet Take 1 tablet by mouth 2 (two) times daily. 60 tablet 6    sulfamethoxazole-trimethoprim 800-160mg (BACTRIM DS) 800-160 mg Tab Take 1 tablet by mouth 2 (two) times daily. 20 tablet 0     No current facility-administered medications on file prior to visit.        Physical Exam:  /60   Pulse 81   Ht 5' 4" (1.626 m)   Wt 104.8 kg " (231 lb 0.7 oz)   BMI 39.66 kg/m²   Body mass index is 39.66 kg/m².  Physical Exam   Constitutional: She is oriented to person, place, and time. She appears well-developed and well-nourished. No distress.   HENT:   Head: Normocephalic and atraumatic.   Eyes: EOM are normal. Right eye exhibits no discharge. Left eye exhibits no discharge.   Neck: Normal range of motion. No tracheal deviation present.   Cardiovascular: Normal rate, regular rhythm and normal heart sounds.   No murmur heard.  Pulmonary/Chest: Effort normal and breath sounds normal. No stridor. No respiratory distress. She has no wheezes.   Abdominal: Soft. Bowel sounds are normal. She exhibits no distension and no mass. There is no tenderness. There is no rebound and no guarding.   Musculoskeletal: Normal range of motion. She exhibits no deformity.   Neurological: She is alert and oriented to person, place, and time.   Skin: Skin is warm and dry. She is not diaphoretic.   Psychiatric: She has a normal mood and affect. Her behavior is normal. Judgment and thought content normal.         Labs: Pertinent labs reviewed.    CRC Screenin    Assessment:  1. Constipation, unspecified constipation type    2. Abnormal CT scan, colon         Recommendations:  -Begin with Miralax daily for constipation noted on CT scan.  -Pepcid daily,as patient noted this was helpful with belching.  -May take Gas X as needed for gas and bloating.  -Will discuss PPI medication if Pepcid not successful.  -Continue with current antibiotic for UTI  -Lab work today - colitis/IBD. Recheck hemoglobin to ensure not continuing to drop.  -Iron studies to rule out LAURENT.  -Due to patient being on Bactrim currently, no recent elevated white count, no abdominal pain, resolved changes in stool, will hold off on more antibiotics for a possible mild colitis. Will see what repeat labs suggest.    Constipation, unspecified constipation type  -     CBC auto differential; Future; Expected date:  02/24/2020  -     IRON AND TIBC; Future; Expected date: 02/24/2020  -     C-reactive protein; Future; Expected date: 02/24/2020  -     ESR (SEDIMENTATION RATE, MANUAL); Future; Expected date: 02/24/2020  -     Calprotectin; Future; Expected date: 02/24/2020    Abnormal CT scan, colon  -     CBC auto differential; Future; Expected date: 02/24/2020  -     IRON AND TIBC; Future; Expected date: 02/24/2020  -     C-reactive protein; Future; Expected date: 02/24/2020  -     ESR (SEDIMENTATION RATE, MANUAL); Future; Expected date: 02/24/2020  -     Calprotectin; Future; Expected date: 02/24/2020        Follow up for Discussion after labs.    Thank you so much for allowing me to participate in the care of Josette Cramer PA-C

## 2020-02-24 NOTE — PATIENT INSTRUCTIONS
-Begin with Miralax daily for constipation noted on CT scan.  -Pepcid daily,as patient noted this was helpful with belching.  -May take Gas X as needed for gas and bloating.  -Will discuss PPI medication if Pepcid not successful.  -Continue with current antibiotic for UTI

## 2020-02-24 NOTE — LETTER
February 24, 2020      Kathleen Campuzano NP  [id number] [facility]  [place] [address]           [facility]  [address] [place] [address]  Phone: [phone]  Fax: [phone]          Patient: Josette Altamirano   MR Number: [id number]   YOB: 1955   Date of Visit: 2/24/2020       Dear Kathleen Campuzano:    Thank you for referring Josette Altamirano to me for evaluation. Attached you will find relevant portions of my assessment and plan of care.    If you have questions, please do not hesitate to call me. I look forward to following Josette Altamirano along with you.    Sincerely,    Juanita Cramer PA-C    Enclosure  CC:  No Recipients    If you would like to receive this communication electronically, please contact [email or web address] or [phone] to request more information on [facility] access.    For providers and/or their staff who would like to refer a patient to [facility], please contact us through our one-stop-shop provider referral line, [facility] Arce, at [phone].    If you feel you have received this communication in error or would no longer like to receive these types of communications, please e-mail [email or web address]

## 2020-02-26 ENCOUNTER — LAB VISIT (OUTPATIENT)
Dept: LAB | Facility: HOSPITAL | Age: 65
End: 2020-02-26
Attending: PHYSICIAN ASSISTANT
Payer: COMMERCIAL

## 2020-02-26 DIAGNOSIS — K59.00 CONSTIPATION, UNSPECIFIED CONSTIPATION TYPE: ICD-10-CM

## 2020-02-26 DIAGNOSIS — R93.3 ABNORMAL CT SCAN, COLON: ICD-10-CM

## 2020-02-26 PROCEDURE — 83993 ASSAY FOR CALPROTECTIN FECAL: CPT

## 2020-02-27 ENCOUNTER — TELEPHONE (OUTPATIENT)
Dept: RADIOLOGY | Facility: HOSPITAL | Age: 65
End: 2020-02-27

## 2020-02-27 DIAGNOSIS — D64.9 ANEMIA, UNSPECIFIED TYPE: ICD-10-CM

## 2020-02-27 DIAGNOSIS — R19.4 ALTERED BOWEL HABITS: ICD-10-CM

## 2020-02-27 DIAGNOSIS — R93.3 ABNORMAL CT SCAN, COLON: Primary | ICD-10-CM

## 2020-02-27 RX ORDER — SODIUM, POTASSIUM,MAG SULFATES 17.5-3.13G
1 SOLUTION, RECONSTITUTED, ORAL ORAL DAILY
Qty: 1 KIT | Refills: 0 | Status: SHIPPED | OUTPATIENT
Start: 2020-02-27 | End: 2020-02-29

## 2020-02-28 ENCOUNTER — TELEPHONE (OUTPATIENT)
Dept: ENDOSCOPY | Facility: HOSPITAL | Age: 65
End: 2020-02-28

## 2020-02-29 ENCOUNTER — HOSPITAL ENCOUNTER (OUTPATIENT)
Dept: RADIOLOGY | Facility: HOSPITAL | Age: 65
Discharge: HOME | End: 2020-02-29
Attending: NURSE PRACTITIONER
Payer: COMMERCIAL

## 2020-02-29 DIAGNOSIS — M54.6 THORACIC SPINE PAIN: ICD-10-CM

## 2020-02-29 PROCEDURE — 25500020 PHARM REV CODE 255: Performed by: NURSE PRACTITIONER

## 2020-02-29 PROCEDURE — 72157 MRI CHEST SPINE W/O & W/DYE: CPT | Mod: 26,,, | Performed by: RADIOLOGY

## 2020-02-29 PROCEDURE — 72157 MRI CHEST SPINE W/O & W/DYE: CPT | Mod: TC

## 2020-02-29 PROCEDURE — A9585 GADOBUTROL INJECTION: HCPCS | Performed by: NURSE PRACTITIONER

## 2020-02-29 PROCEDURE — 72157 MRI THORACIC SPINE W WO CONTRAST: ICD-10-PCS | Mod: 26,,, | Performed by: RADIOLOGY

## 2020-02-29 RX ORDER — GADOBUTROL 604.72 MG/ML
10 INJECTION INTRAVENOUS
Status: COMPLETED | OUTPATIENT
Start: 2020-02-29 | End: 2020-02-29

## 2020-02-29 RX ADMIN — GADOBUTROL 10 ML: 604.72 INJECTION INTRAVENOUS at 10:02

## 2020-03-02 ENCOUNTER — TELEPHONE (OUTPATIENT)
Dept: SURGERY | Facility: CLINIC | Age: 65
End: 2020-03-02

## 2020-03-02 ENCOUNTER — TELEPHONE (OUTPATIENT)
Dept: HEMATOLOGY/ONCOLOGY | Facility: CLINIC | Age: 65
End: 2020-03-02

## 2020-03-02 NOTE — TELEPHONE ENCOUNTER
----- Message from Carito Kohli sent at 3/2/2020 11:29 AM CST -----  Contact: pt   Pt needing a call back regarding missed call about her test results    Pt contact #[phone] after 4

## 2020-03-02 NOTE — TELEPHONE ENCOUNTER
----- Message from Kathleen Campuzano NP sent at 3/2/2020  9:23 AM CST -----  Please let pt know that her Thoracic MRI is normal

## 2020-03-03 ENCOUNTER — DOCUMENTATION ONLY (OUTPATIENT)
Dept: HEMATOLOGY/ONCOLOGY | Facility: CLINIC | Age: 65
End: 2020-03-03

## 2020-03-03 LAB — CALPROTECTIN STL-MCNT: <27.1 MCG/G

## 2020-03-05 ENCOUNTER — TELEPHONE (OUTPATIENT)
Dept: GASTROENTEROLOGY | Facility: CLINIC | Age: 65
End: 2020-03-05

## 2020-03-05 NOTE — TELEPHONE ENCOUNTER
"1. Have you been admitted overnight to the hospital in the past 3 months? no   If yes, schedule an appointment with PCP before scheduling endoscopic procedure.     2. Have you had a stent placed in the last 12 months? no   If yes, for a screening visit, cancel and message the ordering provider.  The patient will need a new order when the time is appropriate.     3. Have you had a stroke or heart attack in the past 6 months? no   If yes, cancel and refer patient to ordering provider for clearance, also message ordering provider to inform.     4. Have you had any chest pain in the past 3 months? no   If yes, Have you been evaluated by your PCP and/or cardiologist and it was determined to not be heart related? not applicable   If No, Pt needs to be seen by PCP or Cardiologist .  Pt can be scheduled once clearance obtained by either of those providers.     5. Do you take prescription weight loss medications?  no   If yes, must stop for 2 weeks prior to procedure.     6. Have you been diagnosed with diverticulitis within the past 3 months? no   If yes, must have been seen by GI within the last 3 months, if not schedule with GI [facility].    If pt has been seen by GI, schedule procedure 8-12 weeks post antibiotic treatment.     7. Are you on Dialysis? no  If yes, schedule procedure for the day AFTER dialysis.  Appt time should be 9am or later, patient arrival time is 2 hours prior.  Nulytely or miralax prep for all patients with kidney disease.     8. Are you diabetic?  no   If yes, schedule morning appt. Advise pt to hold all diabetic meds day of procedure.     9. If pt is older than 80 years of age and HAS NOT been seen by GI or PCP within the last 6 months, needs appt with GI [facility].   If pt has been seen by the GI provider or PCP within the past 6  months AND meets criteria, schedule procedure AND send message to the endoscopist.     10. Is patient on a "high risk" medication (blood thinner/antiplatelet agent)?  no   If yes, " has cardiac clearance been obtained within the last 60 days? N/A   If no, a new clearance needs to be obtained.       I have reviewed the last colonoscopy for recommendations regarding next procedure bowel prep.  yes  I have reviewed medications and allergies.  yes  I have verified the pharmacy information and appropriate prep sent if needed. yes  Prep instructions have been mailed or sent to portal per patient request. yes    If answers yes to any of the following, schedule at [facility] ONLY. If No, OK for either location.     Is BMI over 45? no  Any complaints of chest pain, new onset or at rest? no  Does pt have an AICD? no  Is there a diagnosis of heart failure? yes  Is patient on dialysis? no  Does patient have an insulin pump? no  If procedure for esophageal banding? no

## 2020-03-09 ENCOUNTER — TELEPHONE (OUTPATIENT)
Dept: SURGERY | Facility: CLINIC | Age: 65
End: 2020-03-09

## 2020-03-10 ENCOUNTER — TELEPHONE (OUTPATIENT)
Dept: HEMATOLOGY/ONCOLOGY | Facility: CLINIC | Age: 65
End: 2020-03-10

## 2020-03-10 NOTE — TELEPHONE ENCOUNTER
----- Message from Shahnaz Sheridan sent at 3/10/2020  3:51 PM CDT -----  Contact: pt   .Type:  Patient Returning Call    Who Called:pt  Who Left Message for Patient:Joanna   Does the patient know what this is regarding?:results  Would the patient rather a call back or a response via the [facility]? Call back  Best Call Back Number:[phone]  Additional Information:

## 2020-03-16 ENCOUNTER — TELEPHONE (OUTPATIENT)
Dept: RADIOLOGY | Facility: HOSPITAL | Age: 65
End: 2020-03-16

## 2020-04-01 ENCOUNTER — PATIENT MESSAGE (OUTPATIENT)
Dept: HEMATOLOGY/ONCOLOGY | Facility: CLINIC | Age: 65
End: 2020-04-01

## 2020-04-06 ENCOUNTER — TELEPHONE (OUTPATIENT)
Dept: HEMATOLOGY/ONCOLOGY | Facility: CLINIC | Age: 65
End: 2020-04-06

## 2020-04-06 NOTE — PROGRESS NOTES
amSubjective:       Patient ID: Josette Altamirano is a 64 y.o. female.    Chief Complaint: No chief complaint on file.    HPI:  Patient presents with a couple of complaints today.    The patient location is: [place], [place]  The chief complaint leading to consultation is: f/u - uti and gi issues  Visit type: Virtual visit with synchronous audio and video  Total time spent with patient: 20 minutes  Each patient to whom he or she provides medical services by telemedicine is:  (1) informed of the relationship between the physician and patient and the respective role of any other health care provider with respect to management of the patient; and (2) notified that he or she may decline to receive medical services by telemedicine and may withdraw from such care at any time.    Last visit with me was 2/3/2020    Pt was complaining of dyspepsia and bloating- change in bm- pt was referred to GI   Recent CT showed minimal thickening and inflammatory changes seen within the ascending and transverse colon.  Mild colitis not excluded. Mild fatty infiltration seen within the wall of the right and transverse colon which can be seen with chronic inflammatory processes.  Mild constipation within the ascending and transverse colon.     Was noted to have mild iron def anemia.     Pt has been scheduled for upper and lower endoscopy by GI- postponed due to the pandemic and will be rescheduled    Workup of mid right back discomfort: MRI and plain films of thoracic spine wnl. Pt states that her back pain has greatly improved. Not as sharp and does not interrupt her sleep as it did previously. States BM's back to normal now. No blood. Taking pepcid as needed.     Denies any UTI symptoms- no hematuria or dysuria. Has f/u with urology at the end of this month    CT abd/pelvis revealed abn as noted above    Pt has a history of a Stage IIA, TXN1M0 intracystic papillary carcinoma with multifocal DCIS and one of three positive sentinel lymph  nodes diagnosed in Feb 2007. She subsequently underwent left mastectomy and sentinel node biopsy followed by axillary dissection  March 23, 2007. Histopathologic evaluation demonstrated again multiple foci of intracystic papillary carcinoma, as well as DCIS noncomedo type. One of three sentinel lymph nodes demonstrated micrometastatic disease. Twelve additional axillary lymph nodes were obtained, all of which were negative for disease. Estrogen receptors were 11%. Progesterone receptors 4%. HER2/bob was not over-expressed. She subsequently underwent additional studies including a MUGA scan demonstrating an injection  fraction of 39%, thus she was treated with six cycles of Cytoxan and Taxotere which were well tolerated.     9/28/07 hormone studies suggested the pt was menopausal so she was started on Arimidex for post adjuvant therapy. Therapy completed 9/2012.     History of chemo induced cardiomyopathy. CHF- followed by Cardiology- started new medication and last visit was in June.     Pt has had f/u with Urology 4/23/19-as recommended for her hematuria- has left upper pole renal cyst.     1/3/2020 pt treated by GYN for dysuria.  Patient had UTI, E coli.  Was treated with Macrobid.    Denies any visible hematuria, no vaginal discharge and no blood in her bowel movements. Pt relates dysuria has resolved.  She denies any suprapubic discomfort or lower abdominal pain.    GI symptoms- no abd pain and no dyspepsia- bm back to normal. No hematochezia or bloating.     Last mammo- 3/13/19- diagnostic right    Left axilla ultrasound 4/16/19- 2 small nonenlarged lymph nodes    Last gyn- 8/18/16- due for exam    Last colonoscopy- 9/10/18- Scheduled for upper and lower endoscopy in near future- waiting for pandemic to clear    Review of Systems   Constitutional: Negative.    HENT: Negative.    Eyes: Negative.    Respiratory: Negative.    Cardiovascular: Negative.    Gastrointestinal: Negative for abdominal distention.    Endocrine: Negative.    Genitourinary: Negative.    Musculoskeletal: Positive for back pain.        Right mid back twinges with upper body movement especially at night when moves on to side. Much improved.    Skin: Negative.    Allergic/Immunologic: Negative.    Neurological: Negative.    Hematological: Negative.  Negative for adenopathy.   Psychiatric/Behavioral: Negative.        Objective:    General: awake, alert and oriented X 3 adult female- speaks in full sentences  Head: atraumatic and normocephalic  Neck- supple and no edema- symmetric  Eyes- non iteric, conjunctiva not injected  Resp- unlabored. Regular- normal rate      Assessment:       1. History of UTI    2. Abnormal CT of the abdomen    3. Malignant neoplasm of upper-outer quadrant of left breast in female, estrogen receptor positive        Plan:      1. Breast cancer as previously described. Arimidex therapy completed X 5 years.  2. Heart failure stable and followed by cardiology.  3. Obesity. Encouraged pt to continue with exercise and dietary changes.   4. hematuria workup benign- in past- left upper pole renal cyst-recent uti - needs to have f/u urinalysis to make sure cleared the infection. Pt has visit with urology this month with ultrasound- will have u/a at that time  5. Change in bowel movement character- abn CT abd- GI workup- scheduled for upper and lower endoscopy.  6. Right mid back pain- could be related to muscle skeletal strain- plain films and MRI wnl- pt states is much better  7. Mildly elevated bilirubin 2/2017 and stable since  8. History of alpha thalassemia trait, iron saturation decreased  9. Due for mammo right breast March 2020 and exam- unable to perform due to the pandemic[facility]

## 2020-04-06 NOTE — TELEPHONE ENCOUNTER
Spoke with patient and video visit confirmed for tomorrow. Went over instructions with her again. She acknowledged. Call ended well.

## 2020-04-07 ENCOUNTER — OFFICE VISIT (OUTPATIENT)
Dept: HEMATOLOGY/ONCOLOGY | Facility: CLINIC | Age: 65
End: 2020-04-07
Payer: COMMERCIAL

## 2020-04-07 DIAGNOSIS — R93.5 ABNORMAL CT OF THE ABDOMEN: ICD-10-CM

## 2020-04-07 DIAGNOSIS — C50.412 MALIGNANT NEOPLASM OF UPPER-OUTER QUADRANT OF LEFT BREAST IN FEMALE, ESTROGEN RECEPTOR POSITIVE: ICD-10-CM

## 2020-04-07 DIAGNOSIS — Z87.440 HISTORY OF UTI: Primary | ICD-10-CM

## 2020-04-07 DIAGNOSIS — Z17.0 MALIGNANT NEOPLASM OF UPPER-OUTER QUADRANT OF LEFT BREAST IN FEMALE, ESTROGEN RECEPTOR POSITIVE: ICD-10-CM

## 2020-04-07 PROCEDURE — 99213 PR OFFICE/OUTPT VISIT, EST, LEVL III, 20-29 MIN: ICD-10-PCS | Mod: 95,,, | Performed by: NURSE PRACTITIONER

## 2020-04-07 PROCEDURE — 99213 OFFICE O/P EST LOW 20 MIN: CPT | Mod: 95,,, | Performed by: NURSE PRACTITIONER

## 2020-04-21 ENCOUNTER — TELEPHONE (OUTPATIENT)
Dept: RADIOLOGY | Facility: HOSPITAL | Age: 65
End: 2020-04-21

## 2020-04-22 ENCOUNTER — HOSPITAL ENCOUNTER (OUTPATIENT)
Dept: RADIOLOGY | Facility: HOSPITAL | Age: 65
Discharge: HOME | End: 2020-04-22
Attending: UROLOGY
Payer: COMMERCIAL

## 2020-04-22 DIAGNOSIS — N28.1 RENAL CYST: ICD-10-CM

## 2020-04-22 PROCEDURE — 76770 US RETROPERITONEAL COMPLETE: ICD-10-PCS | Mod: 26,,, | Performed by: RADIOLOGY

## 2020-04-22 PROCEDURE — 76770 US EXAM ABDO BACK WALL COMP: CPT | Mod: 26,,, | Performed by: RADIOLOGY

## 2020-04-22 PROCEDURE — 76770 US EXAM ABDO BACK WALL COMP: CPT | Mod: TC

## 2020-04-23 ENCOUNTER — TELEPHONE (OUTPATIENT)
Dept: ENDOSCOPY | Facility: HOSPITAL | Age: 65
End: 2020-04-23

## 2020-04-23 DIAGNOSIS — Z12.11 SPECIAL SCREENING FOR MALIGNANT NEOPLASMS, COLON: Primary | ICD-10-CM

## 2020-04-23 NOTE — TELEPHONE ENCOUNTER
"1. Have you been admitted overnight to the hospital in the past 3 months? no   If yes, schedule an appointment with PCP before scheduling endoscopic procedure.     2. Have you had a stent placed in the last 12 months? no   If yes, for a screening visit, cancel and message the ordering provider.  The patient will need a new order when the time is appropriate.     3. Have you had a stroke or heart attack in the past 6 months? no   If yes, cancel and refer patient to ordering provider for clearance, also message ordering provider to inform.     4. Have you had any chest pain in the past 3 months? no   If yes, Have you been evaluated by your PCP and/or cardiologist and it was determined to not be heart related? not applicable   If No, Pt needs to be seen by PCP or Cardiologist .  Pt can be scheduled once clearance obtained by either of those providers.     5. Do you take prescription weight loss medications?  no   If yes, must stop for 2 weeks prior to procedure.     6. Have you been diagnosed with diverticulitis within the past 3 months? no   If yes, must have been seen by GI within the last 3 months, if not schedule with GI [facility].    If pt has been seen by GI, schedule procedure 8-12 weeks post antibiotic treatment.     7. Are you on Dialysis? no  If yes, schedule procedure for the day AFTER dialysis.  Appt time should be 9am or later, patient arrival time is 2 hours prior.  Nulytely or miralax prep for all patients with kidney disease.     8. Are you diabetic?  no   If yes, schedule morning appt. Advise pt to hold all diabetic meds day of procedure.     9. If pt is older than 80 years of age and HAS NOT been seen by GI or PCP within the last 6 months, needs appt with GI [facility].   If pt has been seen by the GI provider or PCP within the past 6  months AND meets criteria, schedule procedure AND send message to the endoscopist.     10. Is patient on a "high risk" medication (blood thinner/antiplatelet agent)?  no   If yes, " has cardiac clearance been obtained within the last 60 days? N/A   If no, a new clearance needs to be obtained.       I have reviewed the last colonoscopy for recommendations regarding next procedure bowel prep.  yes  I have reviewed medications and allergies.  yes  I have verified the pharmacy information and appropriate prep sent if needed. yes  Prep instructions have been mailed or sent to portal per patient request. yes    If answers yes to any of the following, schedule at [facility] ONLY. If No, OK for either location.     Is BMI over 45?   Any complaints of chest pain, new onset or at rest?  Does pt have an AICD?  Is there a diagnosis of heart failure?  Is patient on dialysis?  Does patient have an insulin pump?  If procedure for esophageal banding?

## 2020-04-25 ENCOUNTER — APPOINTMENT (OUTPATIENT)
Dept: INTERNAL MEDICINE | Facility: CLINIC | Age: 65
End: 2020-04-25
Payer: COMMERCIAL

## 2020-04-25 DIAGNOSIS — Z12.11 SPECIAL SCREENING FOR MALIGNANT NEOPLASMS, COLON: ICD-10-CM

## 2020-04-25 PROCEDURE — U0002 COVID-19 LAB TEST NON-CDC: HCPCS

## 2020-04-26 ENCOUNTER — ANESTHESIA EVENT (OUTPATIENT)
Dept: ENDOSCOPY | Facility: HOSPITAL | Age: 65
End: 2020-04-26
Payer: COMMERCIAL

## 2020-04-26 LAB — SARS-COV-2 RNA RESP QL NAA+PROBE: NOT DETECTED

## 2020-04-27 NOTE — ANESTHESIA PREPROCEDURE EVALUATION
04/27/2020  Josette Altamirano is a 64 y.o., female.    Anesthesia Evaluation    I have reviewed the Patient Summary Reports.    I have reviewed the Nursing Notes.   I have reviewed the Medications.     Review of Systems  Anesthesia Hx:  No problems with previous Anesthesia    Social:  Non-Smoker, Social Alcohol Use    Hematology/Oncology:  Hematology Normal       -- Cancer in past history:  Breast right surgery and chemotherapy  Oncology Comments: 2007 left breast surgery  Cardiomyopathy due to chemotherapy     EENT/Dental:   chronic allergic rhinitis   Cardiovascular:   Exercise tolerance: poor Hypertension CHF ECG has been reviewed. CONCLUSIONS     1 - Mildly depressed left ventricular systolic function (EF 45-50%).     2 - Impaired LV relaxation, normal LAP (grade 1 diastolic dysfunction).     3 - Normal right ventricular systolic function .     4 - Moderate left atrial enlargement.     5 - The estimated PA systolic pressure is 20 mmHg.   Normal sinus rhythm  Moderate voltage criteria for LVH, may be normal variant  Prolonged QT  No previous ECGs available  Confirmed by VERONICA MONZON MD ([id number]) on 4/4/2018 6:08:03 PM   Pulmonary:   Snores, no dx GIULIA.   Renal/:  Renal/ Normal     Hepatic/GI:   Bowel Prep.    Musculoskeletal:   Arthritis     Neurological:   Neuromuscular Disease,    Endocrine:  Endocrine Normal    Dermatological:  Skin Normal    Psych:  Psychiatric Normal           Physical Exam  General:  Obesity    Airway/Jaw/Neck:  Airway Findings: Mouth Opening: Normal Tongue: Normal  General Airway Assessment: Adult  Mallampati: II  TM Distance: Normal, at least 6 cm  Jaw/Neck Findings:  Neck ROM: Normal ROM  Neck Findings:     Eyes/Ears/Nose:  Eyes/Ears/Nose Findings:    Dental:  Dental Findings: In tact    Chest/Lungs:  Chest/Lungs Findings: Clear to auscultation, Normal Respiratory Rate      Heart/Vascular:  Heart Findings: Rate: Normal  Rhythm: Regular Rhythm  Sounds: Normal  Heart murmur: negative Vascular Findings: Normal    Abdomen:  Abdomen Findings: Normal    Musculoskeletal:  Musculoskeletal Findings: Normal   Skin:  Skin Findings: Normal    Mental Status:  Mental Status Findings:  Alert and Oriented         Anesthesia Plan  Type of Anesthesia, risks & benefits discussed:  Anesthesia Type:  general  Patient's Preference:   Intra-op Monitoring Plan: standard ASA monitors  Intra-op Monitoring Plan Comments:   Post Op Pain Control Plan: per primary service following discharge from PACU  Post Op Pain Control Plan Comments:   Induction:   IV  Beta Blocker:  Patient is on a Beta-Blocker and has received one dose within the past 24 hours (No further documentation required).       Informed Consent: Patient understands risks and agrees with Anesthesia plan.  Questions answered. Anesthesia consent signed with patient.  ASA Score: 3     Day of Surgery Review of History & Physical:    H&P update referred to the surgeon.         Ready For Surgery From Anesthesia Perspective.

## 2020-04-27 NOTE — PRE-PROCEDURE INSTRUCTIONS
PAT call completed and patient and educated on the bowel prep, clear liquid diet and procedure instructions. Medical history discussed and patient informed of  arrival time 0700 and 2nd prep dose 0330. Pt will be accompanied by her  and is made aware of strict no-visitor policy. All questions and concerns addressed. Patient already has bowel prep and verbalized full understanding of all teaching. Instructed to administer AM doses of entresto and coreg.  Patient verbalizes understanding of all instructions.     Travel Screening:  Have you traveled internationally in the last 30 days? No   If yes, which country did you travel to? ([place], [place], [place], [place], [place]) n/a   Are you experiencing flu-type symptoms? No

## 2020-04-28 ENCOUNTER — ANESTHESIA (OUTPATIENT)
Dept: ENDOSCOPY | Facility: HOSPITAL | Age: 65
End: 2020-04-28
Payer: COMMERCIAL

## 2020-04-28 ENCOUNTER — HOSPITAL ENCOUNTER (OUTPATIENT)
Facility: HOSPITAL | Age: 65
Discharge: HOME | End: 2020-04-28
Attending: INTERNAL MEDICINE | Admitting: INTERNAL MEDICINE
Payer: COMMERCIAL

## 2020-04-28 VITALS
HEART RATE: 66 BPM | TEMPERATURE: 99 F | WEIGHT: 229.81 LBS | RESPIRATION RATE: 17 BRPM | HEIGHT: 64 IN | DIASTOLIC BLOOD PRESSURE: 79 MMHG | OXYGEN SATURATION: 100 % | SYSTOLIC BLOOD PRESSURE: 163 MMHG | BODY MASS INDEX: 39.23 KG/M2

## 2020-04-28 DIAGNOSIS — D64.9 ANEMIA, UNSPECIFIED TYPE: ICD-10-CM

## 2020-04-28 DIAGNOSIS — R19.4 ALTERED BOWEL HABITS: ICD-10-CM

## 2020-04-28 DIAGNOSIS — R93.3 ABNORMAL CT SCAN, COLON: Primary | ICD-10-CM

## 2020-04-28 PROCEDURE — D9220A PRA ANESTHESIA: ICD-10-PCS | Mod: CRNA,,, | Performed by: NURSE ANESTHETIST, CERTIFIED REGISTERED

## 2020-04-28 PROCEDURE — 37000009 HC ANESTHESIA EA ADD 15 MINS: Performed by: INTERNAL MEDICINE

## 2020-04-28 PROCEDURE — 88342 CHG IMMUNOCYTOCHEMISTRY: ICD-10-PCS | Mod: 26,,, | Performed by: PATHOLOGY

## 2020-04-28 PROCEDURE — 45382 COLONOSCOPY W/CONTROL BLEED: CPT | Performed by: INTERNAL MEDICINE

## 2020-04-28 PROCEDURE — 88342 IMHCHEM/IMCYTCHM 1ST ANTB: CPT | Mod: 26,,, | Performed by: PATHOLOGY

## 2020-04-28 PROCEDURE — 88305 TISSUE EXAM BY PATHOLOGIST: CPT | Mod: 26,,, | Performed by: PATHOLOGY

## 2020-04-28 PROCEDURE — 27201038 HC PROBE, BI-POLAR: Performed by: INTERNAL MEDICINE

## 2020-04-28 PROCEDURE — 43239 EGD BIOPSY SINGLE/MULTIPLE: CPT | Performed by: INTERNAL MEDICINE

## 2020-04-28 PROCEDURE — D9220A PRA ANESTHESIA: Mod: CRNA,,, | Performed by: NURSE ANESTHETIST, CERTIFIED REGISTERED

## 2020-04-28 PROCEDURE — 88312 PR  SPECIAL STAINS,GROUP I: ICD-10-PCS | Mod: 26,,, | Performed by: PATHOLOGY

## 2020-04-28 PROCEDURE — 27201012 HC FORCEPS, HOT/COLD, DISP: Performed by: INTERNAL MEDICINE

## 2020-04-28 PROCEDURE — 43239 PR EGD, FLEX, W/BIOPSY, SGL/MULTI: ICD-10-PCS | Mod: 51,,, | Performed by: INTERNAL MEDICINE

## 2020-04-28 PROCEDURE — D9220A PRA ANESTHESIA: Mod: ANES,,, | Performed by: ANESTHESIOLOGY

## 2020-04-28 PROCEDURE — D9220A PRA ANESTHESIA: ICD-10-PCS | Mod: ANES,,, | Performed by: ANESTHESIOLOGY

## 2020-04-28 PROCEDURE — 88341 IMHCHEM/IMCYTCHM EA ADD ANTB: CPT | Mod: 26,,, | Performed by: PATHOLOGY

## 2020-04-28 PROCEDURE — 88305 TISSUE EXAM BY PATHOLOGIST: ICD-10-PCS | Mod: 26,,, | Performed by: PATHOLOGY

## 2020-04-28 PROCEDURE — 45388 COLONOSCOPY W/ABLATION: CPT | Mod: ,,, | Performed by: INTERNAL MEDICINE

## 2020-04-28 PROCEDURE — 63600175 PHARM REV CODE 636 W HCPCS: Performed by: NURSE ANESTHETIST, CERTIFIED REGISTERED

## 2020-04-28 PROCEDURE — 88342 IMHCHEM/IMCYTCHM 1ST ANTB: CPT | Mod: 59 | Performed by: PATHOLOGY

## 2020-04-28 PROCEDURE — 88305 TISSUE EXAM BY PATHOLOGIST: CPT | Mod: 59 | Performed by: PATHOLOGY

## 2020-04-28 PROCEDURE — 37000008 HC ANESTHESIA 1ST 15 MINUTES: Performed by: INTERNAL MEDICINE

## 2020-04-28 PROCEDURE — 88312 SPECIAL STAINS GROUP 1: CPT | Mod: 26,,, | Performed by: PATHOLOGY

## 2020-04-28 PROCEDURE — 43239 EGD BIOPSY SINGLE/MULTIPLE: CPT | Mod: 51,,, | Performed by: INTERNAL MEDICINE

## 2020-04-28 PROCEDURE — 88312 SPECIAL STAINS GROUP 1: CPT | Performed by: PATHOLOGY

## 2020-04-28 PROCEDURE — 45388: ICD-10-PCS | Mod: ,,, | Performed by: INTERNAL MEDICINE

## 2020-04-28 PROCEDURE — 25000003 PHARM REV CODE 250: Performed by: NURSE ANESTHETIST, CERTIFIED REGISTERED

## 2020-04-28 PROCEDURE — 88341 IMHCHEM/IMCYTCHM EA ADD ANTB: CPT | Performed by: PATHOLOGY

## 2020-04-28 PROCEDURE — 88341 PR IHC OR ICC EACH ADD'L SINGLE ANTIBODY  STAINPR: ICD-10-PCS | Mod: 26,,, | Performed by: PATHOLOGY

## 2020-04-28 RX ORDER — LIDOCAINE HYDROCHLORIDE 10 MG/ML
1 INJECTION, SOLUTION EPIDURAL; INFILTRATION; INTRACAUDAL; PERINEURAL ONCE
Status: DISCONTINUED | OUTPATIENT
Start: 2020-04-28 | End: 2020-04-28 | Stop reason: HOSPADM

## 2020-04-28 RX ORDER — LIDOCAINE HCL/PF 100 MG/5ML
SYRINGE (ML) INTRAVENOUS
Status: DISCONTINUED | OUTPATIENT
Start: 2020-04-28 | End: 2020-04-28

## 2020-04-28 RX ORDER — GLYCOPYRROLATE 0.2 MG/ML
INJECTION INTRAMUSCULAR; INTRAVENOUS
Status: DISCONTINUED | OUTPATIENT
Start: 2020-04-28 | End: 2020-04-28

## 2020-04-28 RX ORDER — PANTOPRAZOLE SODIUM 20 MG/1
20 TABLET, DELAYED RELEASE ORAL DAILY
Qty: 90 TABLET | Refills: 3 | Status: SHIPPED | OUTPATIENT
Start: 2020-04-28 | End: 2023-04-24

## 2020-04-28 RX ORDER — ONDANSETRON 2 MG/ML
4 INJECTION INTRAMUSCULAR; INTRAVENOUS DAILY PRN
Status: DISCONTINUED | OUTPATIENT
Start: 2020-04-28 | End: 2020-04-28 | Stop reason: HOSPADM

## 2020-04-28 RX ORDER — PROPOFOL 10 MG/ML
INJECTION, EMULSION INTRAVENOUS
Status: DISCONTINUED | OUTPATIENT
Start: 2020-04-28 | End: 2020-04-28

## 2020-04-28 RX ORDER — SODIUM CHLORIDE, SODIUM LACTATE, POTASSIUM CHLORIDE, CALCIUM CHLORIDE 600; 310; 30; 20 MG/100ML; MG/100ML; MG/100ML; MG/100ML
INJECTION, SOLUTION INTRAVENOUS CONTINUOUS PRN
Status: DISCONTINUED | OUTPATIENT
Start: 2020-04-28 | End: 2020-04-28

## 2020-04-28 RX ORDER — SODIUM CHLORIDE, SODIUM LACTATE, POTASSIUM CHLORIDE, CALCIUM CHLORIDE 600; 310; 30; 20 MG/100ML; MG/100ML; MG/100ML; MG/100ML
INJECTION, SOLUTION INTRAVENOUS CONTINUOUS
Status: DISCONTINUED | OUTPATIENT
Start: 2020-04-28 | End: 2020-04-28 | Stop reason: HOSPADM

## 2020-04-28 RX ADMIN — PROPOFOL 50 MG: 10 INJECTION, EMULSION INTRAVENOUS at 08:04

## 2020-04-28 RX ADMIN — PROPOFOL 100 MG: 10 INJECTION, EMULSION INTRAVENOUS at 08:04

## 2020-04-28 RX ADMIN — GLYCOPYRROLATE 0.1 MG: 0.2 INJECTION INTRAMUSCULAR; INTRAVENOUS at 08:04

## 2020-04-28 RX ADMIN — SODIUM CHLORIDE, SODIUM LACTATE, POTASSIUM CHLORIDE, AND CALCIUM CHLORIDE: .6; .31; .03; .02 INJECTION, SOLUTION INTRAVENOUS at 08:04

## 2020-04-28 RX ADMIN — Medication 50 MG: at 08:04

## 2020-04-28 NOTE — PROVATION PATIENT INSTRUCTIONS
Discharge Summary/Instructions after an Endoscopic Procedure  Patient Name: Josette Altamirano  Patient MRN: [id number]  Patient YOB: 1955 Tuesday, April 28, 2020  Dulce Maria Siegel MD  RESTRICTIONS:  During your procedure today, you received medications for sedation.  These   medications may affect your judgment, balance and coordination.  Therefore,   for 24 hours, you have the following restrictions:   - DO NOT drive a car, operate machinery, make legal/financial decisions,   sign important papers or drink alcohol.    ACTIVITY:  Today: no heavy lifting, straining or running due to procedural   sedation/anesthesia.  The following day: return to full activity including work.  DIET:  Eat and drink normally unless instructed otherwise.     TREATMENT FOR COMMON SIDE EFFECTS:  - Mild abdominal pain, nausea, belching, bloating or excessive gas:  rest,   eat lightly and use a heating pad.  - Sore Throat: treat with throat lozenges and/or gargle with warm salt   water.  - Because air was used during the procedure, expelling large amounts of air   from your rectum or belching is normal.  - If a bowel prep was taken, you may not have a bowel movement for 1-3 days.    This is normal.  SYMPTOMS TO WATCH FOR AND REPORT TO YOUR PHYSICIAN:  1. Abdominal pain or bloating, other than gas cramps.  2. Chest pain.  3. Back pain.  4. Signs of infection such as: chills or fever occurring within 24 hours   after the procedure.  5. Rectal bleeding, which would show as bright red, maroon, or black stools.   (A tablespoon of blood from the rectum is not serious, especially if   hemorrhoids are present.)  6. Vomiting.  7. Weakness or dizziness.  GO DIRECTLY TO THE NEAREST EMERGENCY ROOM IF YOU HAVE ANY OF THE FOLLOWING:      Difficulty breathing              Chills and/or fever over 101 F   Persistent vomiting and/or vomiting blood   Severe abdominal pain   Severe chest pain   Black, tarry stools   Bleeding- more than one tablespoon   Any  other symptom or condition that you feel may need urgent attention  Your doctor recommends these additional instructions:  If any biopsies were taken, your doctors clinic will contact you in 1 to 2   weeks with any results.  - Discharge patient to home (with escort).   - Resume previous diet.   - Continue present medications.   - Patient has a contact number available for emergencies.  The signs and   symptoms of potential delayed complications were discussed with the   patient.  Return to normal activities tomorrow.  Written discharge   instructions were provided to the patient.   - See upper endoscopy recommendations.  - If the pathology report is benign, then repeat colonoscopy for screening   purposes in 10 years.  For questions, problems or results please call your physician Dulce Maria Siegel MD at Work:  [phone]  If you have any questions about the above instructions, call the GI   department at [phone] or call the endoscopy unit at [phone]   from 7am until 3 pm.  [facility] [place], EMERGENCY ROOM PHONE NUMBER:   [phone]  IF A COMPLICATION OR EMERGENCY SITUATION ARISES AND YOU ARE UNABLE TO REACH   YOUR PHYSICIAN - GO DIRECTLY TO THE EMERGENCY ROOM.  I have read or have had read to me these discharge instructions for my   procedure and have received a written copy.  I understand these   instructions and will follow-up with my physician if I have any questions.     __________________________________       _____________________________________  Nurse Signature                                          Patient/Designated   Responsible Party Signature  MD Dulce Maria Powell MD  4/28/2020 9:11:45 AM  This report has been verified and signed electronically.  [employer]ATION

## 2020-04-28 NOTE — H&P
PRE PROCEDURE H&P    Patient Name: Josette Altamirano  MRN: [id number]  : 1955  Date of Procedure:  2020  Referring Physician: Juanita Cramer PA-C  Primary Physician: Caridad Harrington MD  Procedure Physician: Dulce Maria Siegel MD       Planned Procedure: Colonoscopy and EGD  Diagnosis: constipation and abnormal imaging of the colon on CT scan  Chief Complaint: Same as above    HPI: Patient is an 64 y.o. female is here for the above. She saw Juanita Cramer PA-C in  for complaints of constipation, abdominal bloating, belching, and abnormal imaging of the colon. The transverse and ascending colon were noted to be thickened. Patient denies hematochezia, or weight loss. Her last colonoscopy was in 2018. It was normal per patient. She also has alpha thalasemmia but has not required a blood transfusion in the past. She occasional heartburn symptoms.     Last colonoscopy:   Family history: no  Anticoagulation: no    Past Medical History:   Past Medical History:   Diagnosis Date    Arthritis of right knee     Breast cancer     She has a history of a Stage IIA, TXN1M0 intracystic papillary carcinoma with multifocal DCIS and one of three positive sentinel lymph nodes diagnosed in 2007. She subsequently underwent left mastectomy and sentinel node biopsy followed by axillary dissection 2007. Histopathologic evaluation demonstrated again multiple foci of intracystic papillary carcinoma, as well as DCIS noncomed    Cardiomyopathy     CHEMO INDUCED- RESOLVED    CHF (congestive heart failure)     systolic    Hypertension     Uterine fibroid         Past Surgical History:  Past Surgical History:   Procedure Laterality Date    BREAST BIOPSY      BREAST CYST EXCISION Right     BREAST SURGERY      CHOLECYSTECTOMY      COLONOSCOPY N/A 9/10/2018    Procedure: COLONOSCOPY;  Surgeon: Indra Ramos MD;  Location: [facility];  Service: Endoscopy;  Laterality: N/A;    MASTECTOMY      left    [facility]  INSERTION, SINGLE          Home Medications:  Prior to Admission medications    Medication Sig Start Date End Date Taking? Authorizing Provider   carvedilol (COREG CR) 80 MG 24 hr capsule TAKE 1 CAPSULE BY MOUTH ONCE DAILY 10/13/19  Yes Tyrone Kwong MD   sacubitriL-valsartan (ENTRESTO)  mg per tablet Take 1 tablet by mouth 2 (two) times daily. 1/7/20  Yes Tyrone Kwong MD   sulfamethoxazole-trimethoprim 800-160mg (BACTRIM DS) 800-160 mg Tab Take 1 tablet by mouth 2 (two) times daily. 2/11/20   Kathleen Campuzano NP        Allergies:  Review of patient's allergies indicates:  No Known Allergies     Social History:   Social History     Socioeconomic History    Marital status:      Spouse name: Not on file    Number of children: Not on file    Years of education: Not on file    Highest education level: Not on file   Occupational History    Not on file   Social Needs    Financial resource strain: Not on file    Food insecurity:     Worry: Not on file     Inability: Not on file    Transportation needs:     Medical: Not on file     Non-medical: Not on file   Tobacco Use    Smoking status: Never Smoker    Smokeless tobacco: Never Used   Substance and Sexual Activity    Alcohol use: Yes     Alcohol/week: 0.0 standard drinks     Comment: Ocassionally    Drug use: No    Sexual activity: Yes     Partners: Male     Birth control/protection: None, Post-menopausal   Lifestyle    Physical activity:     Days per week: Not on file     Minutes per session: Not on file    Stress: Not on file   Relationships    Social connections:     Talks on phone: Not on file     Gets together: Not on file     Attends [employer] service: Not on file     Active member of club or organization: Not on file     Attends meetings of clubs or organizations: Not on file     Relationship status: Not on file   Other Topics Concern    Are you pregnant or think you may be? Not Asked    Breast-feeding Not Asked   Social  "History Narrative    Not on file       Family History:  Family History   Problem Relation Age of Onset    Diabetes Mother     Diabetes Father     Stroke Father     Cancer Maternal Aunt 55        breast cancer    Breast cancer Maternal Aunt     Stroke Paternal Aunt     Melanoma Neg Hx     Psoriasis Neg Hx     Lupus Neg Hx     Eczema Neg Hx        ROS: No acute cardiac events, no acute respiratory complaints.     Physical Exam (all patients):    BP (!) 162/89 (BP Location: Right arm, Patient Position: Sitting)   Pulse 75   Temp 98.8 °F (37.1 °C) (Temporal)   Resp 18   Ht 5' 4" (1.626 m)   Wt 104.2 kg (229 lb 13.3 oz)   LMP  (Approximate) Comment: 12  years  SpO2 100%   Breastfeeding? No   BMI 39.45 kg/m²   Lungs: Clear to auscultation bilaterally, respirations unlabored  Heart: Regular rate and rhythm, S1 and S2 normal, no obvious murmurs  Abdomen:         Soft, non-tender, bowel sounds normal, no masses, no organomegaly    Lab Results   Component Value Date    WBC 2.65 (L) 02/24/2020    MCV 82 02/24/2020    RDW 15.5 (H) 02/24/2020     02/24/2020    GLU 91 02/03/2020    HGBA1C 5.4 10/26/2006    BUN 10 02/03/2020     02/03/2020    K 4.0 02/03/2020     02/03/2020        SEDATION PLAN: per anesthesia      History reviewed, vital signs satisfactory, cardiopulmonary status satisfactory, sedation options, risks and plans have been discussed with the patient  All their questions were answered and the patient agrees to the sedation procedures as planned and the patient is deemed an appropriate candidate for the sedation as planned.    The risks, benefits and alternatives of the procedure were discussed with the patient in detail. The risks include, risks of adverse reaction to sedation requiring the use of reversal agents, bleeding requiring blood transfusion, perforation requiring surgical intervention and technical failure. Other risks include aspiration leading to respiratory distress " and respiratory failure resulting in endotracheal intubation and mechanical ventilation including death. If anesthesia is being utilized for this procedure, it is up to the anesthesiologist to determine airway safety including elective endotracheal intubation. Questions were answered, they agree to proceed. There was no language barriers.     Procedure explained to patient, informed consent obtained and placed in chart.    Dulce Maria Siegel  4/28/2020  8:05 AM

## 2020-04-28 NOTE — ANESTHESIA POSTPROCEDURE EVALUATION
Anesthesia Post Evaluation    Patient: Josette Altamirano    Procedure(s) Performed: Procedure(s) (LRB):  COLONOSCOPY (N/A)  EGD (ESOPHAGOGASTRODUODENOSCOPY) (N/A)    Final Anesthesia Type: general    Patient location during evaluation: PACU  Patient participation: Yes- Able to Participate  Level of consciousness: awake and alert and oriented  Post-procedure vital signs: reviewed and stable  Pain management: adequate  Airway patency: patent    PONV status at discharge: No PONV  Anesthetic complications: no      Cardiovascular status: blood pressure returned to baseline, stable and hemodynamically stable  Respiratory status: unassisted  Hydration status: euvolemic  Follow-up not needed.          Vitals Value Taken Time   /79 4/28/2020  9:40 AM   Temp 36.9 °C (98.5 °F) 4/28/2020  9:40 AM   Pulse 66 4/28/2020  9:40 AM   Resp 17 4/28/2020  9:40 AM   SpO2 100 % 4/28/2020  9:40 AM         Event Time     Out of Recovery 09:40:00          Pain/Edy Score: Edy Score: 10 (4/28/2020  9:40 AM)

## 2020-04-28 NOTE — PROVATION PATIENT INSTRUCTIONS
Discharge Summary/Instructions after an Endoscopic Procedure  Patient Name: Josette Altamirano  Patient MRN: [id number]  Patient YOB: 1955 Tuesday, April 28, 2020  Dulce Maria Siegel MD  RESTRICTIONS:  During your procedure today, you received medications for sedation.  These   medications may affect your judgment, balance and coordination.  Therefore,   for 24 hours, you have the following restrictions:   - DO NOT drive a car, operate machinery, make legal/financial decisions,   sign important papers or drink alcohol.    ACTIVITY:  Today: no heavy lifting, straining or running due to procedural   sedation/anesthesia.  The following day: return to full activity including work.  DIET:  Eat and drink normally unless instructed otherwise.     TREATMENT FOR COMMON SIDE EFFECTS:  - Mild abdominal pain, nausea, belching, bloating or excessive gas:  rest,   eat lightly and use a heating pad.  - Sore Throat: treat with throat lozenges and/or gargle with warm salt   water.  - Because air was used during the procedure, expelling large amounts of air   from your rectum or belching is normal.  - If a bowel prep was taken, you may not have a bowel movement for 1-3 days.    This is normal.  SYMPTOMS TO WATCH FOR AND REPORT TO YOUR PHYSICIAN:  1. Abdominal pain or bloating, other than gas cramps.  2. Chest pain.  3. Back pain.  4. Signs of infection such as: chills or fever occurring within 24 hours   after the procedure.  5. Rectal bleeding, which would show as bright red, maroon, or black stools.   (A tablespoon of blood from the rectum is not serious, especially if   hemorrhoids are present.)  6. Vomiting.  7. Weakness or dizziness.  GO DIRECTLY TO THE NEAREST EMERGENCY ROOM IF YOU HAVE ANY OF THE FOLLOWING:      Difficulty breathing              Chills and/or fever over 101 F   Persistent vomiting and/or vomiting blood   Severe abdominal pain   Severe chest pain   Black, tarry stools   Bleeding- more than one tablespoon   Any  other symptom or condition that you feel may need urgent attention  Your doctor recommends these additional instructions:  If any biopsies were taken, your doctors clinic will contact you in 1 to 2   weeks with any results.  - Use Protonix (pantoprazole) 20 mg PO BID.   - Proceed with colonoscopy.  - Discharge patient to home (with escort).  For questions, problems or results please call your physician Dulce Maria Siegel MD at Work:  [phone]  If you have any questions about the above instructions, call the GI   department at [phone] or call the endoscopy unit at [phone]   from 7am until 3 pm.  [facility], EMERGENCY ROOM PHONE NUMBER:   [phone]  IF A COMPLICATION OR EMERGENCY SITUATION ARISES AND YOU ARE UNABLE TO REACH   YOUR PHYSICIAN - GO DIRECTLY TO THE EMERGENCY ROOM.  I have read or have had read to me these discharge instructions for my   procedure and have received a written copy.  I understand these   instructions and will follow-up with my physician if I have any questions.     __________________________________       _____________________________________  Nurse Signature                                          Patient/Designated   Responsible Party Signature  MD Dulce Maria Powell MD  4/28/2020 9:05:28 AM  This report has been verified and signed electronically.  [employer]ATION

## 2020-04-28 NOTE — TRANSFER OF CARE
"Anesthesia Transfer of Care Note    Patient: Josette Altamirano    Procedure(s) Performed: Procedure(s) (LRB):  COLONOSCOPY (N/A)  EGD (ESOPHAGOGASTRODUODENOSCOPY) (N/A)    Patient location: PACU    Anesthesia Type: MAC    Transport from OR: Transported from OR on room air with adequate spontaneous ventilation    Post pain: adequate analgesia    Post assessment: no apparent anesthetic complications and tolerated procedure well    Post vital signs: stable    Level of consciousness: awake    Nausea/Vomiting: no nausea/vomiting    Complications: none    Transfer of care protocol was followed      Last vitals:   Visit Vitals  BP (!) 162/89 (BP Location: Right arm, Patient Position: Sitting)   Pulse (P) 85   Temp (P) 36.7 °C (98.1 °F) (Temporal)   Resp (P) 15   Ht 5' 4" (1.626 m)   Wt 104.2 kg (229 lb 13.3 oz)   LMP  (Approximate)   SpO2 (P) 100%   Breastfeeding? No   BMI 39.45 kg/m²     "

## 2020-04-28 NOTE — DISCHARGE INSTRUCTIONS
Diverticulosis    Diverticulosis means that small pouches have formed in the wall of your large intestine (colon). Most often, this problem causes no symptoms and is common as people age. But the pouches in the colon are at risk of becoming infected. When this happens, the condition is called diverticulitis. Although most people with diverticulosis never develop diverticulitis, it is still not uncommon. Rectal bleeding can also occur and in less common situations, a type of colon inflammation called colitis.  While most people do not have symptoms, some people with diverticulosis may have:  · Abdominal cramps and pain  · Bloating  · Constipation  · Change in bowel habits  Causes  The exact cause of diverticulosis (and diverticulitis) has not been proved, but a few things are associated with the condition:  · Low-fiber diet  · Constipation  · Lack of exercise  Your healthcare provider will talk with you about how to manage your condition. Diet changes may be all that are needed to help control diverticulosis and prevent progression to diverticulitis. If you develop diverticulitis, you will likely need other treatments.  Home care  You may be told to take fiber supplements daily. Fiber adds bulk to the stool so that it passes through the colon more easily. Stool softeners may be recommended. You may also be given medications for pain relief. Be sure to take all medications as directed.  In the past, people were told to avoid corn, nuts, and seeds. This is no longer necessary.  Follow these guidelines when caring for yourself at home:  · Eat unprocessed foods that are high in fiber. Whole grains, fruits, and vegetables are good choices.  · Drink 6 to 8 glasses of water every day unless your healthcare provider has you limit how much fluid you should have.  · Watch for changes in your bowel movements. Tell your provider if you notice any changes.  · Begin an exercise program. Ask your provider how to get started.  Generally, walking is the best.  · Get plenty of rest and sleep.  Follow-up care  Follow up with your healthcare provider, or as advised. Regular visits may be needed to check on your health. Sometimes special procedures such as colonoscopy, are needed after an episode of diverticulitis or blooding. Be sure to keep all your appointments.  If a stool sample was taken, or cultures were done, you should be told if they are positive, or if your treatment needs to be changed. You can call as directed for the results.  If X-rays were done, a radiologist will look at them. You will be told if there is a change in your treatment.  If antibiotics were prescribed, be sure to finish them all.  When to seek medical advice  Call your healthcare provider right away if any of these occur:  · Fever of 100.4°F (38°C) or higher, or as directed by your healthcare provider  · Severe cramps in the lower left side of the abdomen or pain that is getting worse  · Tenderness in the lower left side of the abdomen or worsening pain throughout the abdomen  · Diarrhea or constipation that doesn't get better within 24 hours  · Nausea and vomiting  · Bleeding from the rectum  Call 911  Call emergency services if any of the following occur:  · Trouble breathing  · Confusion  · Very drowsy or trouble awakening  · Fainting or loss of consciousness  · Rapid heart rate  · Chest pain  Date Last Reviewed: 12/30/2015 © 2000-2017 [id number]. [address] [place] [id number]. All rights reserved. This information is not intended as a substitute for professional medical care. Always follow your healthcare professional's instructions.        Esophagitis     With esophagitis, the lining of the esophagus is inflamed.   Do you often have burning pain in your chest? You may have esophagitis. This is when the lining of the esophagus becomes red and swollen (inflamed). The esophagus is the tube that connects your throat to your stomach.  This sheet tells you more about esophagitis. It also explains your treatment options.  Main types of esophagitis  Reflux esophagitis. This is the more common type. It is caused by GERD (gastroesophageal reflux disease). Stomach contents with stomach acid flow back up into the esophagus. This happens over and over. It leads to inflammation. Risk factors can include:  · Being overweight  · Asthma  · Smoking  · Pregnancy  · Frequent vomiting  · Certain medicines (such as aspirin and other anti-inflammatories)  · Hiatal hernia  Infectious esophagitis. This is caused by an infection. You are more at risk for this if you have a weakened immune system and poor nutrition. Antibiotic use can also be a factor. The infection is often due to the following:  · A type of fungus (typically candida)  · A virus, such as herpes simplex virus 1 (HSV-1) or cytomegalovirus (CMV)  Eosinophilic esophagitis. Foods or other things around you can give you an allergic reaction. This triggers an immune response and leads to esophagitis.  Pill-induced esophagitis. Certain types of medicines can cause inflammation and ulcers in the esophagus. These include doxycycline, aspirin, NSAIDs, alendronate, potassium, quinidine, iron.  Symptoms of esophagitis  The following symptoms can occur with esophagitis:  · Pain when swallowing, or trouble swallowing  · Pain behind your breastbone (heartburn)  · Acid regurgitation  · Chronic sore throat  · Gum Inflammation  · Cavities  · Bad breath  · Nausea  · Pain in your upper belly (abdomen)  · Bleeding (indicated by bright red vomit or black, tarry stool)  These symptoms occur more often with reflux esophagitis:  · Coughing, wheezing, or asthma  · Hoarseness  Diagnosis of esophagitis  Your healthcare provider will ask about your health history and symptoms. Youll also be examined. Sometimes certain tests are needed. These may include:  · Upper endoscopy. A thin, flexible tube with a tiny light and camera is  used. It is inserted through the mouth down into the esophagus. This lets the provider look for damage. A small sample of tissue (biopsy) may also be removed. The sample is sent to a lab for testing.  · Upper GI X-ray with barium. An X-ray is done after you drink a substance called barium. Barium may make problems in the esophagus easier to see on an x-ray.  · Esophageal pH. A soft, thin tube is passed into the esophagus through the nose or mouth for 24 hours. It measures the acid level in the esophagus.  · Esophageal manometry. A soft, thin tube is passed into the esophagus through the nose or mouth. It measures muscle contractions in the esophagus.  Treatment of esophagitis  Medicines. Different medicines can help treat esophagitis. The medicine used will depend on the type of esophagitis you have. Talk with your healthcare provider.  Lifestyle changes. Making the following changes can help reduce irritation and ease your symptoms:  · Avoid spicy foods (pepper, chili powder, godoy). Also avoid hard foods (nuts, crackers, raw vegetables) and acidic foods and drinks (tomatoes, citrus fruits and juices). Other problem foods include chocolate, peppermint, nutmeg, and foods high in fat.  · Until you can swallow without pain, follow a combined liquid and soft diet. Try foods such as cooked cereals, mashed potatoes, and soups.  · Take small bites and chew your food thoroughly.  · Avoid large meals and heavy evening meals. Don't lie down within 2 to 3 hours of eating.  · Get to or stay at a healthy weight.  · Avoid alcohol, caffeine, and smoking or tobacco products.  · Brush and floss your teeth  · Raise your upper body by 4 to 6 inches when lying in bed. This can be done using a foam wedge. Or put blocks under the legs at the head of your bed.  Surgery. This may be needed for severe reflux esophagitis. Other noninvasive procedures to treat GERD and esophagitis are being studied. Your provider can tell you more.  Why  treatment Is important  Without treatment, esophagitis can get worse. This is especially true with severe reflux esophagitis. For instance, continued symptoms can cause scarring of the esophagus. Over time, this can cause a narrowing the esophagus (stricture). This can make it hard to pass food down to the stomach. As symptoms go on they can also cause changes in the lining of the esophagus. These changes can put you at a slightly higher risk of cancer of the esophagus.   Date Last Reviewed: 7/1/2016 © 2000-2017 [employer]. [address] [place] [address], [place] [address]. All rights reserved. This information is not intended as a substitute for professional medical care. Always follow your healthcare professional's instructions.        Gastritis (Adult)    Gastritis is inflammation and irritation of the stomach lining. It can be present for a short time (acute) or be long lasting (chronic). Gastritis is often caused by infection with bacteria called H pylori. More than a third of people in the  have this bacteria in their bodies. In many cases, H pylori causes no problems or symptoms. In some people, though, the infection irritates the stomach lining and causes gastritis. Other causes of stomach irritation include drinking alcohol or taking pain-relieving medicines called NSAIDs (such as aspirin or ibuprofen).   Symptoms of gastritis can include:  · Abdominal pain or bloating  · Loss of appetite  · Nausea or vomiting  · Vomiting blood or having black stools  · Feeling more tired than usual  An inflamed and irritated stomach lining is more likely to develop a sore called an ulcer. To help prevent this, gastritis should be treated.  Home care  If needed, medicines may be prescribed. If you have H pylori infection, treating it will likely relieve your symptoms. Other changes can help reduce stomach irritation and help it heal.  · If you have been prescribed medicines for H pylori infection, take them as  directed. Take all of the medicine until it is finished or your healthcare provider tells you to stop, even if you feel better.  · Your healthcare provider may recommend avoiding NSAIDs. If you take daily aspirin for your heart or other medical reasons, do not stop without talking to your healthcare provider first.  · Avoid drinking alcohol.  · Stop smoking. Smoking can irritate the stomach and delay healing. As much as possible, stay away from second hand smoke.  Follow-up care  Follow up with your healthcare provider, or as advised by our staff. Testing may be needed to check for inflammation or an ulcer.  When to seek medical advice  Call your healthcare provider for any of the following:  · Stomach pain that gets worse or moves to the lower right abdomen (appendix area)  · Chest pain that appears or gets worse, or spreads to the back, neck, shoulder, or arm  · Frequent vomiting (cant keep down liquids)  · Blood in the stool or vomit (red or black in color)  · Feeling weak or dizzy  · Fever of 100.4ºF (38ºC) or higher, or as directed by your healthcare provider  Date Last Reviewed: 6/22/2015 © 2000-2017 [employer]. [address] [place] [address]. All rights reserved. This information is not intended as a substitute for professional medical care. Always follow your healthcare professional's instructions.        What Is a Hiatal Hernia?    Hiatal hernia is when the area where the stomach and esophagus meet bulges up through the diaphragm into the chest cavity. In some cases, part of the stomach may bulge above the diaphragm. Stomach acid may move up into the esophagus and cause symptoms. The symptoms are often blamed on gastroesophageal reflux disease (GERD). You may only know about the hernia when it shows up on an X-ray taken for other reasons.   What you may feel  The hiatus is a normal hole in the diaphragm. The esophagus passes through this hole and leads to the stomach. In some  cases, part of the stomach may bulge above the diaphragm. This bulge is called a hernia. Stomach acid may move up into the esophagus and cause symptoms.  When you eat, the muscle at the hiatus relaxes to allow food to pass into the stomach. It tightens again to keep food and digestive acids in the stomach.  Many people with hiatal hernias have mild symptoms. You may notice the following GERD symptoms:  · Heartburn or other chest discomfort  · A feeling of chest fullness after a meal  · Frequent burping  · Acid taste in the mouth  · Trouble swallowing  Treating symptoms  If you have been diagnosed with hiatal hernia, these suggestions may help improve symptoms:  · Lose excess weight. Extra weight puts pressure on the stomach and esophagus.  · Dont lie down after eating. Sit up for at least an hour after eating. Lying down after eating can increase symptoms.  · Avoid certain foods and drinks. These include fatty foods, chocolate, coffee, mint, and other foods that cause symptoms for you.  · Dont smoke or drink alcohol. These can worsen symptoms.  · Look at your medicines. Discuss your medicines with your healthcare provider. Many medicines can cause symptoms.  · Consider an antacid medicine. Ask your healthcare provider about over-the-counter and prescription medicines that may help.  · Ask about surgery, if needed. Surgery is a treatment choice for some people. Your healthcare provider can determine if surgery is an option for you.    Date Last Reviewed: 10/1/2016  © 6853-4860 [employer]. [address] [place] [address], [place] [address]. All rights reserved. This information is not intended as a substitute for professional medical care. Always follow your healthcare professional's instructions.

## 2020-04-28 NOTE — PLAN OF CARE
Pt recovery complete, with discharge instructions and escript reviewed. Pt verbalized understanding with no further questions at this time, aware she is awaiting biopsy results with MD telephone number given per AVS. VSS on RA, no complaints of pain noted, will continue to monitor. Waiting on Dr. Siegel to speak with pt prior to discharge.

## 2020-04-28 NOTE — PLAN OF CARE
Dr. Siegel at bedside, reviewing pt procedures and answering her questions, with endoscopy and colonoscopy report given.

## 2020-04-30 ENCOUNTER — TELEPHONE (OUTPATIENT)
Dept: GASTROENTEROLOGY | Facility: CLINIC | Age: 65
End: 2020-04-30

## 2020-04-30 LAB
FINAL PATHOLOGIC DIAGNOSIS: NORMAL
GROSS: NORMAL
Lab: NORMAL

## 2020-04-30 NOTE — PROGRESS NOTES
"Juanita: She was sent to us for abdominal bloating/belching. Saw evidence of gastritis and mild esophagitis on EGD. I started her on a low dose PPI BID. This will also help her constipaton. 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 The "abnormal imaging" seen on CT scan is an over call. No pathology found. Please arrange a GI follow up in 2-4 weeks. She may need Miralax for constipation if that has not been tried or Linzess or Amitiza.     AN staff: repeat colonoscopy 10 years, place in recall."

## 2020-05-05 ENCOUNTER — OFFICE VISIT (OUTPATIENT)
Dept: CARDIOLOGY | Facility: CLINIC | Age: 65
End: 2020-05-05
Payer: COMMERCIAL

## 2020-05-05 DIAGNOSIS — I10 ESSENTIAL HYPERTENSION: Chronic | ICD-10-CM

## 2020-05-05 DIAGNOSIS — I42.7 CARDIOMYOPATHY DUE TO CHEMOTHERAPY: ICD-10-CM

## 2020-05-05 DIAGNOSIS — I50.22 CHRONIC SYSTOLIC HEART FAILURE: Primary | ICD-10-CM

## 2020-05-05 DIAGNOSIS — T45.1X5A CARDIOMYOPATHY DUE TO CHEMOTHERAPY: ICD-10-CM

## 2020-05-05 PROCEDURE — 99213 OFFICE O/P EST LOW 20 MIN: CPT | Mod: 95,,, | Performed by: NUCLEAR MEDICINE

## 2020-05-05 PROCEDURE — 99213 PR OFFICE/OUTPT VISIT, EST, LEVL III, 20-29 MIN: ICD-10-PCS | Mod: 95,,, | Performed by: NUCLEAR MEDICINE

## 2020-05-05 NOTE — PROGRESS NOTES
The patient location is: HOME  The chief complaint leading to consultation is: CHRONIC HFrEF.  DILATED  CMP POST CHEMO  Visit type: audiovisual  Total time spent with patient: 20 MINUTES  Each patient to whom he or she provides medical services by telemedicine is:  (1) informed of the relationship between the physician and patient and the respective role of any other health care provider with respect to management of the patient; and (2) notified that he or she may decline to receive medical services by telemedicine and may withdraw from such care at any time.    Notes: 1- CHRONIC HFrEF, STAGE C, FC 2-  2-DILATED CMP  POST CHEMO  3- ESSENTIAL HTN  DOING WELL. NO RECENT HOSPITALIZATIONS OR ED VISITS  NO INCREASE WEIGHT  FOLLOWING HER LOW SALT DIET  NO CHANGES IN THE PATTERN OF CALDERÓN- MODERATE EXERTION  NO ORTHOPNEA OR PND  NO INCREASE NOCTURIA  NO CHEST DISCOMFORT  NO PALPITATIONS  NO ABDOMINAL DISCOMFORT  NO EDEMA OR CALVE TENDERNESS  NO FOCAL CNS SYMPTOMS OR SIGNS TO SUGGEST TIA OR STROKE  CARD MED GOOD COMPLIANCE, NO SIDE EFFECTS    ASSESSMENT:  CLINICALLY WELL COMPENSATED HF  NO CLINICAL EVIDENCE OF ARRHYTHMIAS OR ACTIVE MYOCARDIAL ISCHEMIA  NO EXACERBATION OF ARRHYTHMIAS  CARD MED WELL TOLERATED    PLAN-  CONTINUE PRESENT CARD MANAGEMENT  RETURN IN 3-4 MONTHS WITH BMP

## 2020-05-06 DIAGNOSIS — T45.1X5A CARDIOMYOPATHY DUE TO CHEMOTHERAPY: ICD-10-CM

## 2020-05-06 DIAGNOSIS — I42.7 CARDIOMYOPATHY DUE TO CHEMOTHERAPY: ICD-10-CM

## 2020-05-06 RX ORDER — CARVEDILOL PHOSPHATE 80 MG/1
CAPSULE, EXTENDED RELEASE ORAL
Qty: 90 CAPSULE | Refills: 0 | Status: SHIPPED | OUTPATIENT
Start: 2020-05-06 | End: 2020-08-06

## 2020-05-12 ENCOUNTER — NURSE TRIAGE (OUTPATIENT)
Dept: ADMINISTRATIVE | Facility: CLINIC | Age: 65
End: 2020-05-12

## 2020-05-12 NOTE — TELEPHONE ENCOUNTER
POST PROCEDURE FOLLOW UP CALL  Pt reports no issues. Pt is asymptomatic at this time.  Day 14  Reason for Disposition   [1] Follow-up call to recent contact AND [2] information only call, no triage required    Protocols used: INFORMATION ONLY CALL-A-

## 2020-05-13 ENCOUNTER — OFFICE VISIT (OUTPATIENT)
Dept: GASTROENTEROLOGY | Facility: CLINIC | Age: 65
End: 2020-05-13
Payer: COMMERCIAL

## 2020-05-13 DIAGNOSIS — D64.9 ANEMIA, UNSPECIFIED TYPE: ICD-10-CM

## 2020-05-13 DIAGNOSIS — K59.00 CONSTIPATION, UNSPECIFIED CONSTIPATION TYPE: ICD-10-CM

## 2020-05-13 DIAGNOSIS — R19.4 ALTERED BOWEL HABITS: Primary | ICD-10-CM

## 2020-05-13 PROCEDURE — 99213 OFFICE O/P EST LOW 20 MIN: CPT | Mod: 95,,, | Performed by: PHYSICIAN ASSISTANT

## 2020-05-13 PROCEDURE — 99213 PR OFFICE/OUTPT VISIT, EST, LEVL III, 20-29 MIN: ICD-10-PCS | Mod: 95,,, | Performed by: PHYSICIAN ASSISTANT

## 2020-05-13 NOTE — PROGRESS NOTES
Subjective:      Patient ID: Josette Altamirano is a 64 y.o. female.    Chief Complaint: No chief complaint on file.  The patient location is: [place]  The chief complaint leading to consultation is: follow up from colonoscopy/EGD  Visit type: audiovisual  Total time spent with patient: 5-10 mins  Each patient to whom he or she provides medical services by telemedicine is:  (1) informed of the relationship between the physician and patient and the respective role of any other health care provider with respect to management of the patient; and (2) notified that he or she may decline to receive medical services by telemedicine and may withdraw from such care at any time.    HPI:  Patient reports today via telemedicine for follow up and to discuss findings of EGD and colonoscopy. She reports that her bowels have been moving well since the procedure. Daily. Denies any abdominal pain, heartburn or indigestion, nausea, vomiting, constipation, diarrhea, lightheadedness, weakness. EGD/colon 4/28. Colonoscopy showed             - The examined portion of the ileum was normal.                        - A single colonic angiodysplastic lesion. Treated                         with a monopolar probe.                        - Moderate diverticulosis in the sigmoid colon and                         in the descending colon. There was no evidence of                         diverticular bleeding.                        - Hemorrhoids found on perianal exam.                        - The examination was otherwise normal.                        - No specimens collected.  EGD showed - Normal duodenal bulb and second portion of the                         duodenum.                        - Chronic gastritis with hemorrhage. Biopsied.                        - 4 cm hiatal hernia.                        - Z-line irregular, 35 cm from the incisors.                         Biopsied.                        - [place] Grade A esophagitis.                        -  Normal upper third of esophagus and middle third                         of esophagus.  She is taking Protonix 20mg BID on empty stomach.  Previous CT showed possible colitis. This was not visualized on scope.     Review of Systems   Constitutional: Negative for appetite change, chills, diaphoresis, fatigue and fever.   HENT: Negative for trouble swallowing.    Respiratory: Negative for chest tightness and shortness of breath.    Cardiovascular: Negative for chest pain.   Gastrointestinal: Negative for abdominal distention, abdominal pain, blood in stool, constipation, diarrhea, nausea and vomiting.   Neurological: Negative for dizziness, weakness and light-headedness.   Psychiatric/Behavioral: Negative for dysphoric mood. The patient is not nervous/anxious.        Medical History: Reviewed    Social History: Reviewed    Allergies: Reviewed    Objective:     Physical Exam   Constitutional: She is oriented to person, place, and time. She appears well-developed and well-nourished. No distress.   HENT:   Head: Normocephalic and atraumatic.   Eyes: EOM are normal.   Neck: Normal range of motion.   Pulmonary/Chest: Effort normal. No respiratory distress.   Neurological: She is alert and oriented to person, place, and time.   Skin: She is not diaphoretic.   Psychiatric: She has a normal mood and affect. Her behavior is normal.       Assessment:     1. Altered bowel habits    2. Constipation, unspecified constipation type    3. Anemia, unspecified type        Plan:     -Follow up in 3 months to ensure continued improvement. If doing well, may drop PPI to once daily.  -Discuss repeat labs.  -No NSAIDs    Diagnoses and all orders for this visit:    Altered bowel habits    Constipation, unspecified constipation type    Anemia, unspecified type        Follow up in about 3 months (around 8/13/2020).    Thank you for the opportunity to participate in the care of this patient.   Juanita Cramer PA-C.

## 2020-06-18 ENCOUNTER — OFFICE VISIT (OUTPATIENT)
Dept: UROLOGY | Facility: CLINIC | Age: 65
End: 2020-06-18
Payer: COMMERCIAL

## 2020-06-18 VITALS
SYSTOLIC BLOOD PRESSURE: 126 MMHG | WEIGHT: 235.88 LBS | HEIGHT: 64 IN | TEMPERATURE: 98 F | DIASTOLIC BLOOD PRESSURE: 72 MMHG | BODY MASS INDEX: 40.27 KG/M2 | HEART RATE: 71 BPM

## 2020-06-18 DIAGNOSIS — I10 HYPERTENSION, UNSPECIFIED TYPE: ICD-10-CM

## 2020-06-18 DIAGNOSIS — N28.1 RENAL CYST: Primary | ICD-10-CM

## 2020-06-18 DIAGNOSIS — E66.9 OBESITY, UNSPECIFIED CLASSIFICATION, UNSPECIFIED OBESITY TYPE, UNSPECIFIED WHETHER SERIOUS COMORBIDITY PRESENT: ICD-10-CM

## 2020-06-18 LAB
BILIRUB SERPL-MCNC: NORMAL MG/DL
BLOOD URINE, POC: NORMAL
CLARITY, POC UA: CLEAR
COLOR, POC UA: YELLOW
GLUCOSE UR QL STRIP: NORMAL
KETONES UR QL STRIP: NORMAL
LEUKOCYTE ESTERASE URINE, POC: NORMAL
NITRITE, POC UA: NORMAL
PH, POC UA: 5
PROTEIN, POC: NORMAL
SPECIFIC GRAVITY, POC UA: 1.02
UROBILINOGEN, POC UA: NORMAL

## 2020-06-18 PROCEDURE — 3008F BODY MASS INDEX DOCD: CPT | Mod: CPTII,S$GLB,, | Performed by: UROLOGY

## 2020-06-18 PROCEDURE — 99214 OFFICE O/P EST MOD 30 MIN: CPT | Mod: 25,S$GLB,, | Performed by: UROLOGY

## 2020-06-18 PROCEDURE — 3074F PR MOST RECENT SYSTOLIC BLOOD PRESSURE < 130 MM HG: ICD-10-PCS | Mod: CPTII,S$GLB,, | Performed by: UROLOGY

## 2020-06-18 PROCEDURE — 3008F PR BODY MASS INDEX (BMI) DOCUMENTED: ICD-10-PCS | Mod: CPTII,S$GLB,, | Performed by: UROLOGY

## 2020-06-18 PROCEDURE — 81002 URINALYSIS NONAUTO W/O SCOPE: CPT | Mod: S$GLB,,, | Performed by: UROLOGY

## 2020-06-18 PROCEDURE — 81002 POCT URINE DIPSTICK WITHOUT MICROSCOPE: ICD-10-PCS | Mod: S$GLB,,, | Performed by: UROLOGY

## 2020-06-18 PROCEDURE — 99999 PR PBB SHADOW E&M-EST. PATIENT-LVL III: CPT | Mod: PBBFAC,,, | Performed by: UROLOGY

## 2020-06-18 PROCEDURE — 99214 PR OFFICE/OUTPT VISIT, EST, LEVL IV, 30-39 MIN: ICD-10-PCS | Mod: 25,S$GLB,, | Performed by: UROLOGY

## 2020-06-18 PROCEDURE — 99999 PR PBB SHADOW E&M-EST. PATIENT-LVL III: ICD-10-PCS | Mod: PBBFAC,,, | Performed by: UROLOGY

## 2020-06-18 PROCEDURE — 3078F PR MOST RECENT DIASTOLIC BLOOD PRESSURE < 80 MM HG: ICD-10-PCS | Mod: CPTII,S$GLB,, | Performed by: UROLOGY

## 2020-06-18 PROCEDURE — 3078F DIAST BP <80 MM HG: CPT | Mod: CPTII,S$GLB,, | Performed by: UROLOGY

## 2020-06-18 PROCEDURE — 3074F SYST BP LT 130 MM HG: CPT | Mod: CPTII,S$GLB,, | Performed by: UROLOGY

## 2020-06-18 NOTE — PROGRESS NOTES
Chief Complaint: Renal Cyst    HPI:   6/18/20:  Indep assessment of imaging agree with report:  Left renal cyst about the same no sig changes mildly complex.  Reviewed history in detail. Doing well overall.   4/23/19: No hematuria in the interval and none today.  Stable 5cm left renal cyst on reassuring US.  Prefers annual followup with US.  Reviewed history in detail.  4/2/18: Returns for followup.  No hematuria in the interval.  MRI shows known renal cysts unchanged from 2015 to 2017.  10/19/15: CT Urogram normal except for benign left upper pole renal cyst. Cysto normal.  10/12/15: 61 yo woman referred by RAYSHAWN Campuzano for gross hematuria presumed to be UTI and not resolving with continued microhematuria after UTI treated.  No abd/pelvic pain and no exac/rel factors; except some crampy discomfort sometime.  No prior hematuria.  No urolithiasis.  No urinary bother.  No  history.  Normal sexual function.  UA confirms hematuria with -UCx on two occasions.  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    Allergies:  Patient has no known allergies.    Medications: has a current medication list which includes the following prescription(s): carvedilol, pantoprazole, sacubitril-valsartan, and sulfamethoxazole-trimethoprim 800-160mg.    Review of Systems:  General: No fever, chills, fatigability, or weight loss.  Skin: No rashes, itching, or changes in color or texture of skin.  Chest: Denies CALDERÓN, cyanosis, wheezing, cough, and sputum production.  Abdomen: Appetite fine. No weight loss. Denies diarrhea, abdominal pain, hematemesis, or blood in stool.  Musculoskeletal: No joint stiffness or swelling. Denies back pain.  : As above.  All other review of systems negative.    PMH:   has a past medical history of Arthritis of right knee, Breast cancer, Cardiomyopathy, CHF (congestive heart failure), Hypertension, and Uterine fibroid.    PSH:   has a past surgical history that includes Cholecystectomy; Breast biopsy; Mediport  insertion, single; Breast surgery; Mastectomy; Breast cyst excision (Right); Colonoscopy (N/A, 9/10/2018); Colonoscopy (N/A, 4/28/2020); and Esophagogastroduodenoscopy (N/A, 4/28/2020).    FamHx: family history includes Breast cancer in her maternal aunt; Cancer (age of onset: 55) in her maternal aunt; Diabetes in her father and mother; Stroke in her father and paternal aunt.    SocHx:  reports that she has never smoked. She has never used smokeless tobacco. She reports current alcohol use. She reports that she does not use drugs.     Physical Exam:  Vitals:   Vitals:    06/18/20 0905   BP: 126/72   Pulse: 71   Temp: 97.9 °F (36.6 °C)     General: A&Ox3. No apparent distress. No deformities.  Neck: No masses. Normal thyroid.  Lungs: normal inspiration. No use of accessory muscles.  Heart: normal pulse. No arrhythmias.  Abdomen: Soft. NT. ND.   Skin: The skin is warm and dry. No jaundice.  Ext: No c/c/e.  : deferred     Labs/Studies:   Urinalysis performed in clinic, summary: UA normal    Impression/Plan:   1. Hematuria workup complete no adverse findings last visit.  Renal cyst stable, US 1 year  2. HTN: discussed; meds controlling  3. Obesity; discussed; reccs given

## 2020-06-23 NOTE — PROGRESS NOTES
amSubjective:       Patient ID: Josette Altamirano is a 64 y.o. female.    Chief Complaint: Breast Cancer (f/u)    HPI:  Patient presents for breast cancer follow-up. Last visit with me in clinic was 2/3/20.  Virtual visit 4/7/20    Pt was complaining of dyspepsia and bloating- change in bm- pt was referred to GI   CT showed minimal thickening and inflammatory changes seen within the ascending and transverse colon.  Mild colitis not excluded. Mild fatty infiltration seen within the wall of the right and transverse colon which can be seen with chronic inflammatory processes.  Mild constipation within the ascending and transverse colon.      Was noted to have mild iron def anemia.      Pt was scheduled for upper and lower endoscopy by GI- postponed due to the pandemic and was rescheduled for 4/28/10  Last colonoscopy- and egd- 4/28/20  - Normal duodenal bulb and second portion of the                         duodenum.                         - Chronic gastritis with hemorrhage. Biopsied.                         - 4 cm hiatal hernia.                         - Z-line irregular, 35 cm from the incisors.                         Biopsied.                         - LA Grade A esophagitis.                         - Normal upper third of esophagus and middle third                         of esophagus.     The examined portion of the ileum was normal.                         - A single colonic angiodysplastic lesion. Treated                         with a monopolar probe.                         - Moderate diverticulosis in the sigmoid colon and                         in the descending colon. There was no evidence of                         diverticular bleeding.                         - Hemorrhoids found on perianal exam.                         - The examination was otherwise normal.                         - No specimens collected.        Workup of mid right back discomfort: MRI and plain films of thoracic spine wnl. Pt states  that her back pain has greatly improved. Not as sharp and does not interrupt her sleep as it did previously. States BM's back to normal now. No blood. Taking pepcid as needed.      Denies any UTI symptoms- no hematuria or dysuria. Has f/u with urology at the end of this month    Pt has a history of a Stage IIA, 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 She subsequently underwent left mastectomy and sentinel node biopsy followed by axillary dissection  March 23, 2007. Histopathologic evaluation demonstrated again multiple foci of intracystic papillary carcinoma, as well as DCIS noncomedo type. One of three sentinel lymph nodes demonstrated micrometastatic disease. Twelve additional axillary lymph nodes were obtained, all of which were negative for disease. Estrogen receptors were 11%. Progesterone receptors 4%. HER2/bob was not over-expressed. She subsequently underwent additional studies including a MUGA scan demonstrating an injection  fraction of 39%, thus she was treated with six cycles of Cytoxan and Taxotere which were well tolerated.     9/28/07 hormone studies suggested the pt was menopausal so she was started on Arimidex for post adjuvant therapy. Therapy completed 9/2012.     History of chemo induced cardiomyopathy. CHF- followed by Cardiology- started new medication and last visit was in June.     Pt has had f/u with Urology 6/18/20-as recommended for her hematuria- has left upper pole renal cyst that is stable.     1/3/2020 pt treated by GYN for dysuria.  Patient had UTI, E coli.  Was treated with Macrobid.    Denies any visible hematuria, no vaginal discharge and no blood in her bowel movements. Pt relates dysuria has resolved.  She denies any suprapubic discomfort or lower abdominal pain.    Last mammo- 3/13/19- diagnostic right- mammo today- results pending    Left axilla ultrasound 4/16/19- 2 small nonenlarged  lymph nodes    Last gyn- 8/18/16- due for exam      Review of Systems   Constitutional: Negative.    HENT: Negative.    Eyes: Negative.    Respiratory: Negative.    Cardiovascular: Negative.    Gastrointestinal: Positive for abdominal distention.        Change in bm from firm to very soft - no change in frequency and no blood in bowel movement. Has bloating and increased gas daily for about 3 weeks. Denies change in meds or supplements. Has been eating more veges.    Endocrine: Negative.    Genitourinary: Negative.    Musculoskeletal: Positive for back pain.        Right mid back twinges with upper body movement especially at night when moves on to side. Can be sharp. Denies any loss of bowel or bladder control. No unusual physical activity prior to the pain.    Skin: Negative.    Allergic/Immunologic: Negative.    Neurological: Negative.    Hematological: Negative.  Negative for adenopathy.   Psychiatric/Behavioral: Negative.        Objective:      Physical Exam  Constitutional:       Appearance: She is well-developed.   HENT:      Head: Normocephalic and atraumatic.      Right Ear: External ear normal.      Left Ear: External ear normal.      Mouth/Throat:      Pharynx: No oropharyngeal exudate.   Eyes:      General: No scleral icterus.        Right eye: No discharge.         Left eye: No discharge.      Conjunctiva/sclera: Conjunctivae normal.      Pupils: Pupils are equal, round, and reactive to light.   Neck:      Musculoskeletal: Normal range of motion and neck supple.      Thyroid: No thyromegaly.   Cardiovascular:      Rate and Rhythm: Normal rate and regular rhythm.      Heart sounds: Normal heart sounds.   Pulmonary:      Effort: Pulmonary effort is normal.      Breath sounds: Normal breath sounds.   Chest:      Breasts:         Right: No inverted nipple, mass, nipple discharge, skin change or tenderness.         Left: No inverted nipple, mass, nipple discharge, skin change or tenderness.   Abdominal:       General: Bowel sounds are normal. There is no distension.      Palpations: Abdomen is soft. Abdomen is not rigid. There is no mass.      Tenderness: There is no abdominal tenderness. There is no guarding or rebound. Negative signs include Rockwell's sign and McBurney's sign.      Hernia: No hernia is present.   Musculoskeletal: Normal range of motion.      Right shoulder: She exhibits no crepitus and normal strength.   Lymphadenopathy:      Head:      Right side of head: No submental, submandibular, tonsillar, preauricular, posterior auricular or occipital adenopathy.      Left side of head: No submental, submandibular, tonsillar, preauricular, posterior auricular or occipital adenopathy.      Cervical: No cervical adenopathy.      Right cervical: No superficial or posterior cervical adenopathy.     Left cervical: No superficial or posterior cervical adenopathy.      Upper Body:      Right upper body: No supraclavicular adenopathy.      Left upper body: No supraclavicular adenopathy.   Skin:     General: Skin is warm and dry.      Coloration: Skin is not pale.      Findings: No erythema or rash.   Neurological:      Mental Status: She is alert and oriented to person, place, and time.      Deep Tendon Reflexes: Reflexes are normal and symmetric.   Psychiatric:         Behavior: Behavior normal.         Thought Content: Thought content normal.         Judgment: Judgment normal.         Assessment:       1. Malignant neoplasm of upper-outer quadrant of left breast in female, estrogen receptor positive    2. Alpha thalassemia trait    3. Encounter for follow-up surveillance of breast cancer    4. Counseling and coordination of care    5. Counseling on health promotion and disease prevention    6. Health education/counseling        Plan:      1. Breast cancer as previously described. Arimidex therapy completed X 5 years. Negative clinical exam  2. Heart failure stable and followed by cardiology.  3. Obesity. Encouraged pt  to continue with exercise and dietary changes.   4. hematuria workup benign- in past- left upper pole renal cyst-stable June 2020.  5. Change in bowel movement character- Upper and lower endoscopy revealed esophogitis, hiatal hernia, colon angiodysplastic lesion and diverticulosis- symptoms improved with pepcid  6. Right mid back pain- resolved  7. Mildly elevated bilirubin 2/2017 and stable since  8. History of alpha thalassemia trait - needs recent cbc- will do today  9. Mammo today- results pending- will forward results via pt portal  10. Cbc today for evaluation counts  11. RTC Dec for recheck

## 2020-06-30 ENCOUNTER — HOSPITAL ENCOUNTER (OUTPATIENT)
Dept: RADIOLOGY | Facility: HOSPITAL | Age: 65
Discharge: HOME | End: 2020-06-30
Attending: NURSE PRACTITIONER
Payer: COMMERCIAL

## 2020-06-30 ENCOUNTER — LAB VISIT (OUTPATIENT)
Dept: LAB | Facility: HOSPITAL | Age: 65
End: 2020-06-30
Attending: NURSE PRACTITIONER
Payer: COMMERCIAL

## 2020-06-30 ENCOUNTER — OFFICE VISIT (OUTPATIENT)
Dept: HEMATOLOGY/ONCOLOGY | Facility: CLINIC | Age: 65
End: 2020-06-30
Payer: COMMERCIAL

## 2020-06-30 VITALS
HEART RATE: 89 BPM | BODY MASS INDEX: 40.23 KG/M2 | SYSTOLIC BLOOD PRESSURE: 124 MMHG | WEIGHT: 234.38 LBS | DIASTOLIC BLOOD PRESSURE: 83 MMHG

## 2020-06-30 VITALS — HEIGHT: 64 IN | BODY MASS INDEX: 40.27 KG/M2 | WEIGHT: 235.88 LBS

## 2020-06-30 DIAGNOSIS — Z71.9 HEALTH EDUCATION/COUNSELING: ICD-10-CM

## 2020-06-30 DIAGNOSIS — Z08 ENCOUNTER FOR FOLLOW-UP SURVEILLANCE OF BREAST CANCER: ICD-10-CM

## 2020-06-30 DIAGNOSIS — Z17.0 MALIGNANT NEOPLASM OF UPPER-OUTER QUADRANT OF LEFT BREAST IN FEMALE, ESTROGEN RECEPTOR POSITIVE: ICD-10-CM

## 2020-06-30 DIAGNOSIS — Z71.89 COUNSELING AND COORDINATION OF CARE: ICD-10-CM

## 2020-06-30 DIAGNOSIS — C50.412 MALIGNANT NEOPLASM OF UPPER-OUTER QUADRANT OF LEFT BREAST IN FEMALE, ESTROGEN RECEPTOR POSITIVE: Primary | ICD-10-CM

## 2020-06-30 DIAGNOSIS — D56.3 ALPHA THALASSEMIA TRAIT: ICD-10-CM

## 2020-06-30 DIAGNOSIS — Z85.3 ENCOUNTER FOR FOLLOW-UP SURVEILLANCE OF BREAST CANCER: ICD-10-CM

## 2020-06-30 DIAGNOSIS — Z71.89 COUNSELING ON HEALTH PROMOTION AND DISEASE PREVENTION: ICD-10-CM

## 2020-06-30 DIAGNOSIS — C50.412 MALIGNANT NEOPLASM OF UPPER-OUTER QUADRANT OF LEFT BREAST IN FEMALE, ESTROGEN RECEPTOR POSITIVE: ICD-10-CM

## 2020-06-30 DIAGNOSIS — Z17.0 MALIGNANT NEOPLASM OF UPPER-OUTER QUADRANT OF LEFT BREAST IN FEMALE, ESTROGEN RECEPTOR POSITIVE: Primary | ICD-10-CM

## 2020-06-30 LAB
BASOPHILS # BLD AUTO: 0.03 K/UL (ref 0–0.2)
BASOPHILS NFR BLD: 0.8 % (ref 0–1.9)
DIFFERENTIAL METHOD: ABNORMAL
EOSINOPHIL # BLD AUTO: 0.2 K/UL (ref 0–0.5)
EOSINOPHIL NFR BLD: 5.7 % (ref 0–8)
ERYTHROCYTE [DISTWIDTH] IN BLOOD BY AUTOMATED COUNT: 14.6 % (ref 11.5–14.5)
HCT VFR BLD AUTO: 37.9 % (ref 37–48.5)
HGB BLD-MCNC: 11.5 G/DL (ref 12–16)
IMM GRANULOCYTES # BLD AUTO: 0 K/UL (ref 0–0.04)
IMM GRANULOCYTES NFR BLD AUTO: 0 % (ref 0–0.5)
LYMPHOCYTES # BLD AUTO: 1.3 K/UL (ref 1–4.8)
LYMPHOCYTES NFR BLD: 33.5 % (ref 18–48)
MCH RBC QN AUTO: 23.9 PG (ref 27–31)
MCHC RBC AUTO-ENTMCNC: 30.3 G/DL (ref 32–36)
MCV RBC AUTO: 79 FL (ref 82–98)
MONOCYTES # BLD AUTO: 0.4 K/UL (ref 0.3–1)
MONOCYTES NFR BLD: 9.1 % (ref 4–15)
NEUTROPHILS # BLD AUTO: 2 K/UL (ref 1.8–7.7)
NEUTROPHILS NFR BLD: 50.9 % (ref 38–73)
NRBC BLD-RTO: 0 /100 WBC
PLATELET # BLD AUTO: 241 K/UL (ref 150–350)
PMV BLD AUTO: 9.4 FL (ref 9.2–12.9)
RBC # BLD AUTO: 4.81 M/UL (ref 4–5.4)
WBC # BLD AUTO: 3.85 K/UL (ref 3.9–12.7)

## 2020-06-30 PROCEDURE — 3079F PR MOST RECENT DIASTOLIC BLOOD PRESSURE 80-89 MM HG: ICD-10-PCS | Mod: CPTII,S$GLB,, | Performed by: NURSE PRACTITIONER

## 2020-06-30 PROCEDURE — 77067 SCR MAMMO BI INCL CAD: CPT | Mod: TC

## 2020-06-30 PROCEDURE — 77067 MAMMO DIGITAL SCREENING RIGHT WITH TOMOSYNTHESIS_CAD: ICD-10-PCS | Mod: 26,52,, | Performed by: RADIOLOGY

## 2020-06-30 PROCEDURE — 3074F PR MOST RECENT SYSTOLIC BLOOD PRESSURE < 130 MM HG: ICD-10-PCS | Mod: CPTII,S$GLB,, | Performed by: NURSE PRACTITIONER

## 2020-06-30 PROCEDURE — 99214 OFFICE O/P EST MOD 30 MIN: CPT | Mod: S$GLB,,, | Performed by: NURSE PRACTITIONER

## 2020-06-30 PROCEDURE — 77063 MAMMO DIGITAL SCREENING RIGHT WITH TOMOSYNTHESIS_CAD: ICD-10-PCS | Mod: 26,52,, | Performed by: RADIOLOGY

## 2020-06-30 PROCEDURE — 3008F BODY MASS INDEX DOCD: CPT | Mod: CPTII,S$GLB,, | Performed by: NURSE PRACTITIONER

## 2020-06-30 PROCEDURE — 3079F DIAST BP 80-89 MM HG: CPT | Mod: CPTII,S$GLB,, | Performed by: NURSE PRACTITIONER

## 2020-06-30 PROCEDURE — 3008F PR BODY MASS INDEX (BMI) DOCUMENTED: ICD-10-PCS | Mod: CPTII,S$GLB,, | Performed by: NURSE PRACTITIONER

## 2020-06-30 PROCEDURE — 3074F SYST BP LT 130 MM HG: CPT | Mod: CPTII,S$GLB,, | Performed by: NURSE PRACTITIONER

## 2020-06-30 PROCEDURE — 77063 BREAST TOMOSYNTHESIS BI: CPT | Mod: 26,52,, | Performed by: RADIOLOGY

## 2020-06-30 PROCEDURE — 99999 PR PBB SHADOW E&M-EST. PATIENT-LVL III: CPT | Mod: PBBFAC,,, | Performed by: NURSE PRACTITIONER

## 2020-06-30 PROCEDURE — 99214 PR OFFICE/OUTPT VISIT, EST, LEVL IV, 30-39 MIN: ICD-10-PCS | Mod: S$GLB,,, | Performed by: NURSE PRACTITIONER

## 2020-06-30 PROCEDURE — 77067 SCR MAMMO BI INCL CAD: CPT | Mod: 26,52,, | Performed by: RADIOLOGY

## 2020-06-30 PROCEDURE — 99999 PR PBB SHADOW E&M-EST. PATIENT-LVL III: ICD-10-PCS | Mod: PBBFAC,,, | Performed by: NURSE PRACTITIONER

## 2020-06-30 PROCEDURE — 36415 COLL VENOUS BLD VENIPUNCTURE: CPT

## 2020-06-30 PROCEDURE — 85025 COMPLETE CBC W/AUTO DIFF WBC: CPT

## 2020-08-14 DIAGNOSIS — T45.1X5A CARDIOMYOPATHY DUE TO CHEMOTHERAPY: ICD-10-CM

## 2020-08-14 DIAGNOSIS — I42.7 CARDIOMYOPATHY DUE TO CHEMOTHERAPY: ICD-10-CM

## 2020-08-14 DIAGNOSIS — I50.22 CHRONIC SYSTOLIC HEART FAILURE: ICD-10-CM

## 2020-08-25 ENCOUNTER — TELEPHONE (OUTPATIENT)
Dept: CARDIOLOGY | Facility: CLINIC | Age: 65
End: 2020-08-25

## 2020-08-25 DIAGNOSIS — I10 ESSENTIAL HYPERTENSION: Chronic | ICD-10-CM

## 2020-08-25 DIAGNOSIS — I42.7 CARDIOMYOPATHY DUE TO CHEMOTHERAPY: ICD-10-CM

## 2020-08-25 DIAGNOSIS — T45.1X5A CARDIOMYOPATHY DUE TO CHEMOTHERAPY: ICD-10-CM

## 2020-08-25 DIAGNOSIS — I50.22 CHRONIC SYSTOLIC HEART FAILURE: Primary | ICD-10-CM

## 2020-08-26 ENCOUNTER — TELEPHONE (OUTPATIENT)
Dept: CARDIOLOGY | Facility: CLINIC | Age: 65
End: 2020-08-26

## 2020-08-26 DIAGNOSIS — I50.22 CHRONIC SYSTOLIC HEART FAILURE: Primary | ICD-10-CM

## 2020-08-27 ENCOUNTER — OFFICE VISIT (OUTPATIENT)
Dept: GASTROENTEROLOGY | Facility: CLINIC | Age: 65
End: 2020-08-27
Payer: COMMERCIAL

## 2020-08-27 DIAGNOSIS — Z87.19 HISTORY OF GASTRITIS: ICD-10-CM

## 2020-08-27 DIAGNOSIS — K59.00 CONSTIPATION, UNSPECIFIED CONSTIPATION TYPE: Primary | ICD-10-CM

## 2020-08-27 DIAGNOSIS — D64.9 ANEMIA, UNSPECIFIED TYPE: ICD-10-CM

## 2020-08-27 DIAGNOSIS — Z87.19 HISTORY OF ESOPHAGITIS: ICD-10-CM

## 2020-08-27 PROCEDURE — 99213 PR OFFICE/OUTPT VISIT, EST, LEVL III, 20-29 MIN: ICD-10-PCS | Mod: 95,,, | Performed by: PHYSICIAN ASSISTANT

## 2020-08-27 PROCEDURE — 99213 OFFICE O/P EST LOW 20 MIN: CPT | Mod: 95,,, | Performed by: PHYSICIAN ASSISTANT

## 2020-08-27 NOTE — PROGRESS NOTES
Subjective:      Patient ID: Josette Altamirano is a 65 y.o. female.    Chief Complaint: No chief complaint on file.  The patient location is: home    The chief complaint leading to consultation is: follow up constipation, gastritis, esophagitis  Visit type: audiovisual    Face to Face time with patient: 5 min  15 minutes of total time spent on the encounter, which includes face to face time and non-face to face time preparing to see the patient (eg, review of tests), Obtaining and/or reviewing separately obtained history, Documenting clinical information in the electronic or other health record, Independently interpreting results (not separately reported) and communicating results to the patient/family/caregiver, or Care coordination (not separately reported).     Each patient to whom he or she provides medical services by telemedicine is:  (1) informed of the relationship between the physician and patient and the respective role of any other health care provider with respect to management of the patient; and (2) notified that he or she may decline to receive medical services by telemedicine and may withdraw from such care at any time.    Notes:     HPI:  Patient reports today via telemedicine for follow up of her constipation, gastritis and esophagitis. Patient had colonoscopy and EGD in April. After scopes, patient was started on Protonix 20mg BID for findings of inflammation with contact bleeding found throughout the stomach. Colonoscopy was essentially normal. Patient states she has only been taking her medication 3x per week with meals and has no complaints. Her bowels are much improved and she is having daily bowel movements. She denies abdominal pain, blood in the stool, black stools, indigestion, reflux. No additional complaints or concerns.    Review of Systems   Constitutional: Negative for activity change, appetite change, chills, diaphoresis, fatigue, fever and unexpected weight change.   Respiratory:  Negative for cough and shortness of breath.    Cardiovascular: Negative for chest pain.   Gastrointestinal: Negative for abdominal pain, anal bleeding, blood in stool, constipation, diarrhea, nausea and vomiting.   Neurological: Negative for dizziness, weakness and light-headedness.   Psychiatric/Behavioral: Negative for dysphoric mood. The patient is not nervous/anxious.        Medical History: Reviewed    Social History: Reviewed    Allergies: Reviewed    Objective:     Physical Exam  Constitutional:       General: She is not in acute distress.     Appearance: Normal appearance. She is not ill-appearing.   Neurological:      Mental Status: She is alert.         Assessment:     1. Constipation, unspecified constipation type    2. Anemia, unspecified type    3. History of gastritis    4. History of esophagitis        Plan:     -Patient feels overall much improved with taking Protonix 3x per week. She is taking with meals, instructed that she take the medication 30 mins-1hr prior to her meal.  -Constipation resolved  -Anemia is stable based on labs from June. No obvious blood loss.  -Continue to monitor stools.  -Patient will contact provider with any new onset symptoms. F/u as needed.    Diagnoses and all orders for this visit:    Constipation, unspecified constipation type    Anemia, unspecified type    History of gastritis    History of esophagitis        No follow-ups on file.    Thank you for the opportunity to participate in the care of this patient.   Juaniat Cramer PA-C.

## 2020-09-08 ENCOUNTER — LAB VISIT (OUTPATIENT)
Dept: LAB | Facility: HOSPITAL | Age: 65
End: 2020-09-08
Attending: NUCLEAR MEDICINE
Payer: COMMERCIAL

## 2020-09-08 DIAGNOSIS — I50.22 CHRONIC SYSTOLIC HEART FAILURE: ICD-10-CM

## 2020-09-08 LAB
ANION GAP SERPL CALC-SCNC: 6 MMOL/L (ref 8–16)
BUN SERPL-MCNC: 11 MG/DL (ref 8–23)
CALCIUM SERPL-MCNC: 8.8 MG/DL (ref 8.7–10.5)
CHLORIDE SERPL-SCNC: 108 MMOL/L (ref 95–110)
CO2 SERPL-SCNC: 27 MMOL/L (ref 23–29)
CREAT SERPL-MCNC: 0.8 MG/DL (ref 0.5–1.4)
EST. GFR  (AFRICAN AMERICAN): >60 ML/MIN/1.73 M^2
EST. GFR  (NON AFRICAN AMERICAN): >60 ML/MIN/1.73 M^2
GLUCOSE SERPL-MCNC: 101 MG/DL (ref 70–110)
POTASSIUM SERPL-SCNC: 4.3 MMOL/L (ref 3.5–5.1)
SODIUM SERPL-SCNC: 141 MMOL/L (ref 136–145)

## 2020-09-08 PROCEDURE — 80048 BASIC METABOLIC PNL TOTAL CA: CPT

## 2020-09-08 PROCEDURE — 36415 COLL VENOUS BLD VENIPUNCTURE: CPT

## 2020-09-09 ENCOUNTER — HOSPITAL ENCOUNTER (OUTPATIENT)
Dept: CARDIOLOGY | Facility: HOSPITAL | Age: 65
Discharge: HOME | End: 2020-09-09
Attending: NUCLEAR MEDICINE
Payer: COMMERCIAL

## 2020-09-09 ENCOUNTER — OFFICE VISIT (OUTPATIENT)
Dept: CARDIOLOGY | Facility: CLINIC | Age: 65
End: 2020-09-09
Payer: COMMERCIAL

## 2020-09-09 VITALS
DIASTOLIC BLOOD PRESSURE: 68 MMHG | HEIGHT: 64 IN | SYSTOLIC BLOOD PRESSURE: 114 MMHG | BODY MASS INDEX: 40.49 KG/M2 | HEART RATE: 75 BPM | OXYGEN SATURATION: 98 % | WEIGHT: 237.19 LBS

## 2020-09-09 DIAGNOSIS — T45.1X5A CARDIOMYOPATHY DUE TO CHEMOTHERAPY: ICD-10-CM

## 2020-09-09 DIAGNOSIS — I50.22 CHRONIC SYSTOLIC HEART FAILURE: ICD-10-CM

## 2020-09-09 DIAGNOSIS — I42.7 CARDIOMYOPATHY DUE TO CHEMOTHERAPY: ICD-10-CM

## 2020-09-09 DIAGNOSIS — I50.22 NYHA CLASS 2 AND ACA/AHA STAGE C CHRONIC SYSTOLIC CONGESTIVE HEART FAILURE: Primary | Chronic | ICD-10-CM

## 2020-09-09 DIAGNOSIS — I10 ESSENTIAL HYPERTENSION: Chronic | ICD-10-CM

## 2020-09-09 PROCEDURE — 3008F PR BODY MASS INDEX (BMI) DOCUMENTED: ICD-10-PCS | Mod: CPTII,S$GLB,, | Performed by: NUCLEAR MEDICINE

## 2020-09-09 PROCEDURE — 93005 ELECTROCARDIOGRAM TRACING: CPT

## 2020-09-09 PROCEDURE — 99999 PR PBB SHADOW E&M-EST. PATIENT-LVL III: ICD-10-PCS | Mod: PBBFAC,,, | Performed by: NUCLEAR MEDICINE

## 2020-09-09 PROCEDURE — 93010 EKG 12-LEAD: ICD-10-PCS | Mod: ,,, | Performed by: INTERNAL MEDICINE

## 2020-09-09 PROCEDURE — 93010 ELECTROCARDIOGRAM REPORT: CPT | Mod: ,,, | Performed by: INTERNAL MEDICINE

## 2020-09-09 PROCEDURE — 99999 PR PBB SHADOW E&M-EST. PATIENT-LVL III: CPT | Mod: PBBFAC,,, | Performed by: NUCLEAR MEDICINE

## 2020-09-09 PROCEDURE — 99214 PR OFFICE/OUTPT VISIT, EST, LEVL IV, 30-39 MIN: ICD-10-PCS | Mod: S$GLB,,, | Performed by: NUCLEAR MEDICINE

## 2020-09-09 PROCEDURE — 3078F PR MOST RECENT DIASTOLIC BLOOD PRESSURE < 80 MM HG: ICD-10-PCS | Mod: CPTII,S$GLB,, | Performed by: NUCLEAR MEDICINE

## 2020-09-09 PROCEDURE — 3074F PR MOST RECENT SYSTOLIC BLOOD PRESSURE < 130 MM HG: ICD-10-PCS | Mod: CPTII,S$GLB,, | Performed by: NUCLEAR MEDICINE

## 2020-09-09 PROCEDURE — 3074F SYST BP LT 130 MM HG: CPT | Mod: CPTII,S$GLB,, | Performed by: NUCLEAR MEDICINE

## 2020-09-09 PROCEDURE — 99214 OFFICE O/P EST MOD 30 MIN: CPT | Mod: S$GLB,,, | Performed by: NUCLEAR MEDICINE

## 2020-09-09 PROCEDURE — 3078F DIAST BP <80 MM HG: CPT | Mod: CPTII,S$GLB,, | Performed by: NUCLEAR MEDICINE

## 2020-09-09 PROCEDURE — 3008F BODY MASS INDEX DOCD: CPT | Mod: CPTII,S$GLB,, | Performed by: NUCLEAR MEDICINE

## 2020-09-09 NOTE — PROGRESS NOTES
Pt received Trazodone 50 mg PO at 2157 for sleep  Pt sleeping at 2315  Subjective:   Patient ID:  Josette Altamirano is a 65 y.o. female who presents for follow-up of Congestive Heart Failure (POST CHEMO CMP) and Hypertension      HPI DOING WELL. NO RECENT HOSPITALIZATIONS OR ED VISITS FOR ADHF OR ARRHYTHMIAS  NO CHEST DISCOMFORT- ANGINAL OR PLEURITIC  NO UNUSUAL CALDERÓN. NO ORTHOPNEA OR PND  NO PALPITATIONS  NO ABDOMINAL DISCOMFORT  NO EDEMA. OR CALVE TENDERNESS  NO FOCAL CNS SYMPTOMS OR SIGNS TO SUGGEST TIA OR STROKE  NO NEAR SYNCOPE OR SYNCOPE  ECG TODAY- SR, 74 BMP. LAD, NO ACUTE S T T CHANGES  CARD MED GOOD COMPLIANCE . NO SIDE EFFECTS    Review of Systems   Constitution: Negative for chills, fever, night sweats, weight gain and weight loss.   HENT: Negative for nosebleeds.    Eyes: Negative for blurred vision, double vision and visual disturbance.   Cardiovascular: Negative for chest pain, dyspnea on exertion, irregular heartbeat, leg swelling, orthopnea, palpitations, paroxysmal nocturnal dyspnea and syncope.   Respiratory: Negative for cough, hemoptysis and wheezing.    Endocrine: Negative for polydipsia and polyuria.   Hematologic/Lymphatic: Does not bruise/bleed easily.   Skin: Negative for rash.   Musculoskeletal: Negative for joint pain, joint swelling, muscle weakness and myalgias.   Gastrointestinal: Negative for abdominal pain, hematemesis, jaundice and melena.   Genitourinary: Negative for dysuria, hematuria and nocturia.   Neurological: Negative for dizziness, focal weakness, headaches, sensory change and weakness.   Psychiatric/Behavioral: Negative for depression. The patient does not have insomnia and is not nervous/anxious.      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 She subsequently underwent left mastectomy and sentinel node biopsy followed by axillary dissection March 23, 2007. Histopathologic evaluation demonstrated again multiple foci of intracystic papillary carcinoma, as well as DCIS noncomed    Cardiomyopathy     CHEMO INDUCED- RESOLVED    CHF (congestive heart failure)     systolic    Hypertension     Uterine fibroid      Social History     Socioeconomic History    Marital status:      Spouse name: Not on file    Number of children: Not on file    Years of education: Not on file    Highest education level: Not on file   Occupational History    Not on file   Social Needs    Financial resource strain: Not on file    Food insecurity     Worry: Not on file     Inability: Not on file    Transportation needs     Medical: Not on file     Non-medical: Not on file   Tobacco Use    Smoking status: Never Smoker    Smokeless tobacco: Never Used   Substance and Sexual Activity    Alcohol use: Yes     Alcohol/week: 0.0 standard drinks     Comment: Ocassionally    Drug use: No    Sexual activity: Yes     Partners: Male     Birth control/protection: None, Post-menopausal   Lifestyle    Physical activity     Days per week: Not on file     Minutes per session: Not on file    Stress: Not on file   Relationships    Social connections     Talks on phone: Not on file     Gets together: Not on file     Attends [employer] service: Not on file     Active member of club or organization: Not on file     Attends meetings of clubs or organizations: Not on file     Relationship status: Not on file   Other Topics Concern    Are you pregnant or think you may be? Not Asked    Breast-feeding Not Asked   Social History Narrative    Not on file     Current Outpatient Medications  on File Prior to Visit   Medication Sig Dispense Refill    carvedilol (COREG CR) 80 MG 24 hr capsule TAKE 1 CAPSULE(80 MG) BY MOUTH EVERY DAY 90 capsule 3    pantoprazole (PROTONIX) 20 MG tablet Take 1 tablet (20 mg total) by mouth once daily. 90 tablet 3    sacubitriL-valsartan (ENTRESTO)  mg per tablet Take 1 tablet by mouth 2 (two) times daily. 60 tablet 6     No current facility-administered medications on file prior to visit.      Review of patient's allergies indicates:  No Known Allergies    Objective:     Physical Exam   Constitutional: She is oriented to person, place, and time. She appears well-developed. No distress.   HENT:   Head: Normocephalic.   Eyes: Pupils are equal, round, and reactive to light. Conjunctivae are normal. No scleral icterus.   Neck: Normal range of motion. Neck supple. Normal carotid pulses, no hepatojugular reflux and no JVD present. Carotid bruit is not present. No edema present. No thyroid mass and no thyromegaly present.   Cardiovascular: Normal rate, regular rhythm, S1 normal, S2 normal, normal heart sounds and intact distal pulses. PMI is not displaced. Exam reveals no gallop and no friction rub.   No murmur heard.  Pulses:       Carotid pulses are 2+ on the right side and 2+ on the left side.       Radial pulses are 2+ on the right side and 2+ on the left side.        Femoral pulses are 2+ on the right side and 2+ on the left side.       Popliteal pulses are 2+ on the right side and 2+ on the left side.        Dorsalis pedis pulses are 2+ on the right side and 2+ on the left side.        Posterior tibial pulses are 2+ on the right side and 2+ on the left side.   Pulmonary/Chest: Effort normal and breath sounds normal. She has no wheezes. She has no rales. She exhibits no tenderness.   Abdominal: Soft. Bowel sounds are normal. She exhibits no pulsatile midline mass and no mass. There is no hepatosplenomegaly. There is no abdominal tenderness.   Musculoskeletal: Normal  range of motion.         General: No tenderness or edema.      Cervical back: Normal.      Thoracic back: Normal.      Lumbar back: Normal.   Lymphadenopathy:     She has no cervical adenopathy.     She has no axillary adenopathy.        Right: No supraclavicular adenopathy present.        Left: No supraclavicular adenopathy present.   Neurological: She is alert and oriented to person, place, and time. She has normal strength and normal reflexes. No sensory deficit. Gait normal.   Skin: Skin is warm. No rash noted. No cyanosis. No pallor. Nails show no clubbing.   Psychiatric: She has a normal mood and affect. Her speech is normal and behavior is normal. Cognition and memory are normal.       Assessment:     1. NYHA class 2 and LINDA/AHA stage C chronic systolic congestive heart failure    2. Cardiomyopathy due to chemotherapy    3. Essential hypertension        Plan:     NYHA class 2 and LINDA/AHA stage C chronic systolic congestive heart failure  CLINICALLY WELL COMPENSATED  NO ARRHYTHMIAS  NO ACTIVE MYOCARDIAL ISCHEMIA  CARD MED WELL TOLERATED  -     Echo Color Flow Doppler? Yes; Future    Cardiomyopathy due to chemotherapy  NO CARDIO EMBOLISM  -     Echo Color Flow Doppler? Yes; Future    Essential hypertension  BP WELL CONTROLLED  CNS STATUS STABLE    CONTINUE PRESENT CARD MANAGEMENT  RETURN IN 6 MONTHS.

## 2020-10-21 ENCOUNTER — HOSPITAL ENCOUNTER (OUTPATIENT)
Dept: CARDIOLOGY | Facility: HOSPITAL | Age: 65
Discharge: HOME | End: 2020-10-21
Attending: NUCLEAR MEDICINE
Payer: COMMERCIAL

## 2020-10-21 ENCOUNTER — IMMUNIZATION (OUTPATIENT)
Dept: PHARMACY | Facility: CLINIC | Age: 65
End: 2020-10-21
Payer: COMMERCIAL

## 2020-10-21 VITALS
WEIGHT: 240 LBS | HEIGHT: 64 IN | SYSTOLIC BLOOD PRESSURE: 114 MMHG | DIASTOLIC BLOOD PRESSURE: 68 MMHG | BODY MASS INDEX: 40.97 KG/M2

## 2020-10-21 DIAGNOSIS — I42.7 CARDIOMYOPATHY DUE TO CHEMOTHERAPY: ICD-10-CM

## 2020-10-21 DIAGNOSIS — I50.22 NYHA CLASS 2 AND ACC/AHA STAGE C CHRONIC SYSTOLIC CONGESTIVE HEART FAILURE: ICD-10-CM

## 2020-10-21 DIAGNOSIS — T45.1X5A CARDIOMYOPATHY DUE TO CHEMOTHERAPY: ICD-10-CM

## 2020-10-21 LAB
AORTIC ROOT ANNULUS: 3.49 CM
ASCENDING AORTA: 3.42 CM
AV INDEX (PROSTH): 0.8
AV MEAN GRADIENT: 3 MMHG
AV PEAK GRADIENT: 4 MMHG
AV VALVE AREA: 2.75 CM2
AV VELOCITY RATIO: 0.82
BSA FOR ECHO PROCEDURE: 2.22 M2
CV ECHO LV RWT: 0.35 CM
DOP CALC AO PEAK VEL: 1.02 M/S
DOP CALC AO VTI: 24.17 CM
DOP CALC LVOT AREA: 3.4 CM2
DOP CALC LVOT DIAMETER: 2.09 CM
DOP CALC LVOT PEAK VEL: 0.84 M/S
DOP CALC LVOT STROKE VOLUME: 66.42 CM3
DOP CALC RVOT PEAK VEL: 0.57 M/S
DOP CALC RVOT VTI: 12.42 CM
DOP CALCLVOT PEAK VEL VTI: 19.37 CM
E WAVE DECELERATION TIME: 110.62 MSEC
E/A RATIO: 0.48
E/E' RATIO: 7.23 M/S
ECHO LV POSTERIOR WALL: 0.89 CM (ref 0.6–1.1)
FRACTIONAL SHORTENING: 25 % (ref 28–44)
INTERVENTRICULAR SEPTUM: 0.93 CM (ref 0.6–1.1)
IVRT: 112.27 MSEC
LA MAJOR: 5.96 CM
LA MINOR: 5.52 CM
LA WIDTH: 4.07 CM
LEFT ATRIUM SIZE: 3.85 CM
LEFT ATRIUM VOLUME INDEX: 36.1 ML/M2
LEFT ATRIUM VOLUME: 76.34 CM3
LEFT INTERNAL DIMENSION IN SYSTOLE: 3.84 CM (ref 2.1–4)
LEFT VENTRICLE DIASTOLIC VOLUME INDEX: 59.51 ML/M2
LEFT VENTRICLE DIASTOLIC VOLUME: 125.78 ML
LEFT VENTRICLE MASS INDEX: 79 G/M2
LEFT VENTRICLE SYSTOLIC VOLUME INDEX: 30 ML/M2
LEFT VENTRICLE SYSTOLIC VOLUME: 63.47 ML
LEFT VENTRICULAR INTERNAL DIMENSION IN DIASTOLE: 5.13 CM (ref 3.5–6)
LEFT VENTRICULAR MASS: 167.58 G
LV LATERAL E/E' RATIO: 5.88 M/S
LV SEPTAL E/E' RATIO: 9.4 M/S
MV PEAK A VEL: 0.97 M/S
MV PEAK E VEL: 0.47 M/S
MV STENOSIS PRESSURE HALF TIME: 32.08 MS
MV VALVE AREA P 1/2 METHOD: 6.86 CM2
PISA TR MAX VEL: 2.57 M/S
PULM VEIN S/D RATIO: 0.62
PV MEAN GRADIENT: 0.72 MMHG
PV PEAK D VEL: 0.63 M/S
PV PEAK S VEL: 0.39 M/S
PV PEAK VELOCITY: 0.72 CM/S
RA MAJOR: 4.71 CM
RA PRESSURE: 3 MMHG
RA WIDTH: 4.71 CM
RIGHT VENTRICULAR END-DIASTOLIC DIMENSION: 2.59 CM
SINUS: 3.32 CM
STJ: 3.08 CM
TDI LATERAL: 0.08 M/S
TDI SEPTAL: 0.05 M/S
TDI: 0.07 M/S
TR MAX PG: 26 MMHG
TRICUSPID ANNULAR PLANE SYSTOLIC EXCURSION: 2.08 CM
TV REST PULMONARY ARTERY PRESSURE: 29 MMHG

## 2020-10-21 PROCEDURE — 93306 ECHO (CUPID ONLY): ICD-10-PCS | Mod: 26,,, | Performed by: INTERNAL MEDICINE

## 2020-10-21 PROCEDURE — 93306 TTE W/DOPPLER COMPLETE: CPT | Mod: 26,,, | Performed by: INTERNAL MEDICINE

## 2020-10-21 PROCEDURE — 93306 TTE W/DOPPLER COMPLETE: CPT

## 2020-12-09 ENCOUNTER — LAB VISIT (OUTPATIENT)
Dept: PRIMARY CARE CLINIC | Facility: OTHER | Age: 65
End: 2020-12-09
Attending: INTERNAL MEDICINE
Payer: COMMERCIAL

## 2020-12-09 DIAGNOSIS — Z03.818 ENCOUNTER FOR OBSERVATION FOR SUSPECTED EXPOSURE TO OTHER BIOLOGICAL AGENTS RULED OUT: Primary | ICD-10-CM

## 2020-12-09 DIAGNOSIS — R05.9 COUGH: ICD-10-CM

## 2020-12-09 PROCEDURE — U0003 INFECTIOUS AGENT DETECTION BY NUCLEIC ACID (DNA OR RNA); SEVERE ACUTE RESPIRATORY SYNDROME CORONAVIRUS 2 (SARS-COV-2) (CORONAVIRUS DISEASE [COVID-19]), AMPLIFIED PROBE TECHNIQUE, MAKING USE OF HIGH THROUGHPUT TECHNOLOGIES AS DESCRIBED BY CMS-2020-01-R: HCPCS

## 2020-12-11 LAB — SARS-COV-2 RNA RESP QL NAA+PROBE: NOT DETECTED

## 2021-01-25 DIAGNOSIS — R07.9 CHEST PAIN, UNSPECIFIED TYPE: Primary | ICD-10-CM

## 2021-02-03 ENCOUNTER — HOSPITAL ENCOUNTER (OUTPATIENT)
Dept: CARDIOLOGY | Facility: HOSPITAL | Age: 66
Discharge: HOME | End: 2021-02-03
Attending: NUCLEAR MEDICINE
Payer: COMMERCIAL

## 2021-02-03 ENCOUNTER — OFFICE VISIT (OUTPATIENT)
Dept: TRANSPLANT | Facility: CLINIC | Age: 66
End: 2021-02-03
Payer: COMMERCIAL

## 2021-02-03 VITALS
SYSTOLIC BLOOD PRESSURE: 130 MMHG | DIASTOLIC BLOOD PRESSURE: 70 MMHG | OXYGEN SATURATION: 100 % | WEIGHT: 240.75 LBS | BODY MASS INDEX: 41.32 KG/M2 | HEART RATE: 68 BPM

## 2021-02-03 DIAGNOSIS — I10 ESSENTIAL HYPERTENSION: Chronic | ICD-10-CM

## 2021-02-03 DIAGNOSIS — T45.1X5A CARDIOMYOPATHY DUE TO CHEMOTHERAPY: ICD-10-CM

## 2021-02-03 DIAGNOSIS — R07.89 ATYPICAL CHEST PAIN: Primary | ICD-10-CM

## 2021-02-03 DIAGNOSIS — I50.22 NYHA CLASS 2 AND ACC/AHA STAGE C CHRONIC SYSTOLIC CONGESTIVE HEART FAILURE: Chronic | ICD-10-CM

## 2021-02-03 DIAGNOSIS — I42.7 CARDIOMYOPATHY DUE TO CHEMOTHERAPY: ICD-10-CM

## 2021-02-03 DIAGNOSIS — R07.9 CHEST PAIN, UNSPECIFIED TYPE: ICD-10-CM

## 2021-02-03 PROCEDURE — 99214 PR OFFICE/OUTPT VISIT, EST, LEVL IV, 30-39 MIN: ICD-10-PCS | Mod: S$GLB,,, | Performed by: NUCLEAR MEDICINE

## 2021-02-03 PROCEDURE — 3078F DIAST BP <80 MM HG: CPT | Mod: CPTII,S$GLB,, | Performed by: NUCLEAR MEDICINE

## 2021-02-03 PROCEDURE — 93010 ELECTROCARDIOGRAM REPORT: CPT | Mod: ,,, | Performed by: INTERNAL MEDICINE

## 2021-02-03 PROCEDURE — 99999 PR PBB SHADOW E&M-EST. PATIENT-LVL III: CPT | Mod: PBBFAC,,, | Performed by: NUCLEAR MEDICINE

## 2021-02-03 PROCEDURE — 99999 PR PBB SHADOW E&M-EST. PATIENT-LVL III: ICD-10-PCS | Mod: PBBFAC,,, | Performed by: NUCLEAR MEDICINE

## 2021-02-03 PROCEDURE — 99214 OFFICE O/P EST MOD 30 MIN: CPT | Mod: S$GLB,,, | Performed by: NUCLEAR MEDICINE

## 2021-02-03 PROCEDURE — 93005 ELECTROCARDIOGRAM TRACING: CPT

## 2021-02-03 PROCEDURE — 93010 EKG 12-LEAD: ICD-10-PCS | Mod: ,,, | Performed by: INTERNAL MEDICINE

## 2021-02-03 PROCEDURE — 3008F BODY MASS INDEX DOCD: CPT | Mod: CPTII,S$GLB,, | Performed by: NUCLEAR MEDICINE

## 2021-02-03 PROCEDURE — 3075F SYST BP GE 130 - 139MM HG: CPT | Mod: CPTII,S$GLB,, | Performed by: NUCLEAR MEDICINE

## 2021-02-03 PROCEDURE — 3008F PR BODY MASS INDEX (BMI) DOCUMENTED: ICD-10-PCS | Mod: CPTII,S$GLB,, | Performed by: NUCLEAR MEDICINE

## 2021-02-03 PROCEDURE — 3078F PR MOST RECENT DIASTOLIC BLOOD PRESSURE < 80 MM HG: ICD-10-PCS | Mod: CPTII,S$GLB,, | Performed by: NUCLEAR MEDICINE

## 2021-02-03 PROCEDURE — 3075F PR MOST RECENT SYSTOLIC BLOOD PRESS GE 130-139MM HG: ICD-10-PCS | Mod: CPTII,S$GLB,, | Performed by: NUCLEAR MEDICINE

## 2021-03-16 ENCOUNTER — OFFICE VISIT (OUTPATIENT)
Dept: OTOLARYNGOLOGY | Facility: CLINIC | Age: 66
End: 2021-03-16
Payer: COMMERCIAL

## 2021-03-16 VITALS
TEMPERATURE: 98 F | HEART RATE: 82 BPM | HEIGHT: 64 IN | SYSTOLIC BLOOD PRESSURE: 118 MMHG | BODY MASS INDEX: 36.43 KG/M2 | DIASTOLIC BLOOD PRESSURE: 73 MMHG | WEIGHT: 213.38 LBS

## 2021-03-16 DIAGNOSIS — H69.92 DYSFUNCTION OF LEFT EUSTACHIAN TUBE: ICD-10-CM

## 2021-03-16 DIAGNOSIS — J30.89 NON-SEASONAL ALLERGIC RHINITIS, UNSPECIFIED TRIGGER: Primary | ICD-10-CM

## 2021-03-16 PROCEDURE — 3008F BODY MASS INDEX DOCD: CPT | Mod: CPTII,S$GLB,, | Performed by: PHYSICIAN ASSISTANT

## 2021-03-16 PROCEDURE — 1126F PR PAIN SEVERITY QUANTIFIED, NO PAIN PRESENT: ICD-10-PCS | Mod: S$GLB,,, | Performed by: PHYSICIAN ASSISTANT

## 2021-03-16 PROCEDURE — 3008F PR BODY MASS INDEX (BMI) DOCUMENTED: ICD-10-PCS | Mod: CPTII,S$GLB,, | Performed by: PHYSICIAN ASSISTANT

## 2021-03-16 PROCEDURE — 3078F DIAST BP <80 MM HG: CPT | Mod: CPTII,S$GLB,, | Performed by: PHYSICIAN ASSISTANT

## 2021-03-16 PROCEDURE — 99203 PR OFFICE/OUTPT VISIT, NEW, LEVL III, 30-44 MIN: ICD-10-PCS | Mod: S$GLB,,, | Performed by: PHYSICIAN ASSISTANT

## 2021-03-16 PROCEDURE — 3074F SYST BP LT 130 MM HG: CPT | Mod: CPTII,S$GLB,, | Performed by: PHYSICIAN ASSISTANT

## 2021-03-16 PROCEDURE — 3078F PR MOST RECENT DIASTOLIC BLOOD PRESSURE < 80 MM HG: ICD-10-PCS | Mod: CPTII,S$GLB,, | Performed by: PHYSICIAN ASSISTANT

## 2021-03-16 PROCEDURE — 1126F AMNT PAIN NOTED NONE PRSNT: CPT | Mod: S$GLB,,, | Performed by: PHYSICIAN ASSISTANT

## 2021-03-16 PROCEDURE — 99999 PR PBB SHADOW E&M-EST. PATIENT-LVL III: CPT | Mod: PBBFAC,,, | Performed by: PHYSICIAN ASSISTANT

## 2021-03-16 PROCEDURE — 99999 PR PBB SHADOW E&M-EST. PATIENT-LVL III: ICD-10-PCS | Mod: PBBFAC,,, | Performed by: PHYSICIAN ASSISTANT

## 2021-03-16 PROCEDURE — 3074F PR MOST RECENT SYSTOLIC BLOOD PRESSURE < 130 MM HG: ICD-10-PCS | Mod: CPTII,S$GLB,, | Performed by: PHYSICIAN ASSISTANT

## 2021-03-16 PROCEDURE — 99203 OFFICE O/P NEW LOW 30 MIN: CPT | Mod: S$GLB,,, | Performed by: PHYSICIAN ASSISTANT

## 2021-03-16 RX ORDER — LEVOCETIRIZINE DIHYDROCHLORIDE 5 MG/1
5 TABLET, FILM COATED ORAL NIGHTLY
Qty: 30 TABLET | Refills: 11 | Status: SHIPPED | OUTPATIENT
Start: 2021-03-16 | End: 2023-04-24

## 2021-03-16 RX ORDER — FLUTICASONE PROPIONATE 50 MCG
2 SPRAY, SUSPENSION (ML) NASAL 2 TIMES DAILY
Qty: 9.9 ML | Refills: 11 | Status: SHIPPED | OUTPATIENT
Start: 2021-03-16 | End: 2021-04-15

## 2021-03-25 DIAGNOSIS — I50.22 CHRONIC SYSTOLIC HEART FAILURE: ICD-10-CM

## 2021-03-25 DIAGNOSIS — T45.1X5A CARDIOMYOPATHY DUE TO CHEMOTHERAPY: ICD-10-CM

## 2021-03-25 DIAGNOSIS — I42.7 CARDIOMYOPATHY DUE TO CHEMOTHERAPY: ICD-10-CM

## 2021-03-25 RX ORDER — SACUBITRIL AND VALSARTAN 97; 103 MG/1; MG/1
1 TABLET, FILM COATED ORAL 2 TIMES DAILY
Qty: 60 TABLET | Refills: 6 | Status: SHIPPED | OUTPATIENT
Start: 2021-03-25 | End: 2021-10-26 | Stop reason: SDUPTHER

## 2021-04-27 ENCOUNTER — TELEPHONE (OUTPATIENT)
Dept: RADIOLOGY | Facility: HOSPITAL | Age: 66
End: 2021-04-27

## 2021-04-27 ENCOUNTER — LAB VISIT (OUTPATIENT)
Dept: LAB | Facility: HOSPITAL | Age: 66
End: 2021-04-27
Attending: NURSE PRACTITIONER
Payer: COMMERCIAL

## 2021-04-27 ENCOUNTER — OFFICE VISIT (OUTPATIENT)
Dept: OBSTETRICS AND GYNECOLOGY | Facility: CLINIC | Age: 66
End: 2021-04-27
Payer: COMMERCIAL

## 2021-04-27 ENCOUNTER — OFFICE VISIT (OUTPATIENT)
Dept: HEMATOLOGY/ONCOLOGY | Facility: CLINIC | Age: 66
End: 2021-04-27
Payer: COMMERCIAL

## 2021-04-27 VITALS
DIASTOLIC BLOOD PRESSURE: 78 MMHG | WEIGHT: 241.19 LBS | DIASTOLIC BLOOD PRESSURE: 82 MMHG | HEIGHT: 64 IN | BODY MASS INDEX: 41.21 KG/M2 | SYSTOLIC BLOOD PRESSURE: 130 MMHG | WEIGHT: 241.38 LBS | RESPIRATION RATE: 14 BRPM | OXYGEN SATURATION: 98 % | HEART RATE: 77 BPM | SYSTOLIC BLOOD PRESSURE: 121 MMHG | HEIGHT: 64 IN | TEMPERATURE: 97 F | BODY MASS INDEX: 41.18 KG/M2

## 2021-04-27 DIAGNOSIS — Z71.89 COUNSELING ON HEALTH PROMOTION AND DISEASE PREVENTION: ICD-10-CM

## 2021-04-27 DIAGNOSIS — D50.0 IRON DEFICIENCY ANEMIA DUE TO CHRONIC BLOOD LOSS: ICD-10-CM

## 2021-04-27 DIAGNOSIS — D56.3 ALPHA THALASSEMIA TRAIT: ICD-10-CM

## 2021-04-27 DIAGNOSIS — Z85.3 ENCOUNTER FOR FOLLOW-UP SURVEILLANCE OF BREAST CANCER: ICD-10-CM

## 2021-04-27 DIAGNOSIS — C50.412 MALIGNANT NEOPLASM OF UPPER-OUTER QUADRANT OF LEFT BREAST IN FEMALE, ESTROGEN RECEPTOR POSITIVE: ICD-10-CM

## 2021-04-27 DIAGNOSIS — Z85.3 HISTORY OF BREAST CANCER: ICD-10-CM

## 2021-04-27 DIAGNOSIS — Z08 ENCOUNTER FOR FOLLOW-UP SURVEILLANCE OF BREAST CANCER: ICD-10-CM

## 2021-04-27 DIAGNOSIS — Z13.220 LIPID SCREENING: ICD-10-CM

## 2021-04-27 DIAGNOSIS — Z12.31 ENCOUNTER FOR SCREENING MAMMOGRAM FOR MALIGNANT NEOPLASM OF BREAST: ICD-10-CM

## 2021-04-27 DIAGNOSIS — Z12.31 ENCOUNTER FOR SCREENING MAMMOGRAM FOR BREAST CANCER: ICD-10-CM

## 2021-04-27 DIAGNOSIS — Z71.9 HEALTH EDUCATION/COUNSELING: ICD-10-CM

## 2021-04-27 DIAGNOSIS — Z71.89 COUNSELING AND COORDINATION OF CARE: ICD-10-CM

## 2021-04-27 DIAGNOSIS — Z13.21 ENCOUNTER FOR VITAMIN DEFICIENCY SCREENING: ICD-10-CM

## 2021-04-27 DIAGNOSIS — Z78.0 POSTMENOPAUSAL: ICD-10-CM

## 2021-04-27 DIAGNOSIS — Z17.0 MALIGNANT NEOPLASM OF UPPER-OUTER QUADRANT OF LEFT BREAST IN FEMALE, ESTROGEN RECEPTOR POSITIVE: Primary | ICD-10-CM

## 2021-04-27 DIAGNOSIS — Z91.89 AT RISK FOR DECREASED BONE DENSITY: ICD-10-CM

## 2021-04-27 DIAGNOSIS — Z17.0 MALIGNANT NEOPLASM OF UPPER-OUTER QUADRANT OF LEFT BREAST IN FEMALE, ESTROGEN RECEPTOR POSITIVE: ICD-10-CM

## 2021-04-27 DIAGNOSIS — Z01.419 ENCOUNTER FOR GYNECOLOGICAL EXAMINATION WITHOUT ABNORMAL FINDING: Primary | ICD-10-CM

## 2021-04-27 DIAGNOSIS — R14.0 ABDOMINAL DISTENTION: ICD-10-CM

## 2021-04-27 DIAGNOSIS — C50.412 MALIGNANT NEOPLASM OF UPPER-OUTER QUADRANT OF LEFT BREAST IN FEMALE, ESTROGEN RECEPTOR POSITIVE: Primary | ICD-10-CM

## 2021-04-27 LAB
ALBUMIN SERPL BCP-MCNC: 3.7 G/DL (ref 3.5–5.2)
ALP SERPL-CCNC: 74 U/L (ref 55–135)
ALT SERPL W/O P-5'-P-CCNC: 18 U/L (ref 10–44)
ANION GAP SERPL CALC-SCNC: 9 MMOL/L (ref 8–16)
AST SERPL-CCNC: 17 U/L (ref 10–40)
BASOPHILS # BLD AUTO: 0.03 K/UL (ref 0–0.2)
BASOPHILS NFR BLD: 0.8 % (ref 0–1.9)
BILIRUB SERPL-MCNC: 0.6 MG/DL (ref 0.1–1)
BUN SERPL-MCNC: 11 MG/DL (ref 8–23)
CALCIUM SERPL-MCNC: 8.9 MG/DL (ref 8.7–10.5)
CHLORIDE SERPL-SCNC: 106 MMOL/L (ref 95–110)
CHOLEST SERPL-MCNC: 226 MG/DL (ref 120–199)
CHOLEST/HDLC SERPL: 2.8 {RATIO} (ref 2–5)
CO2 SERPL-SCNC: 28 MMOL/L (ref 23–29)
CREAT SERPL-MCNC: 0.8 MG/DL (ref 0.5–1.4)
DIFFERENTIAL METHOD: ABNORMAL
EOSINOPHIL # BLD AUTO: 0.2 K/UL (ref 0–0.5)
EOSINOPHIL NFR BLD: 6.2 % (ref 0–8)
ERYTHROCYTE [DISTWIDTH] IN BLOOD BY AUTOMATED COUNT: 14.6 % (ref 11.5–14.5)
EST. GFR  (AFRICAN AMERICAN): >60 ML/MIN/1.73 M^2
EST. GFR  (NON AFRICAN AMERICAN): >60 ML/MIN/1.73 M^2
GLUCOSE SERPL-MCNC: 107 MG/DL (ref 70–110)
HCT VFR BLD AUTO: 38.4 % (ref 37–48.5)
HDLC SERPL-MCNC: 80 MG/DL (ref 40–75)
HDLC SERPL: 35.4 % (ref 20–50)
HGB BLD-MCNC: 11.8 G/DL (ref 12–16)
IMM GRANULOCYTES # BLD AUTO: 0 K/UL (ref 0–0.04)
IMM GRANULOCYTES NFR BLD AUTO: 0 % (ref 0–0.5)
LDLC SERPL CALC-MCNC: 131.4 MG/DL (ref 63–159)
LYMPHOCYTES # BLD AUTO: 1 K/UL (ref 1–4.8)
LYMPHOCYTES NFR BLD: 25.8 % (ref 18–48)
MCH RBC QN AUTO: 24.3 PG (ref 27–31)
MCHC RBC AUTO-ENTMCNC: 30.7 G/DL (ref 32–36)
MCV RBC AUTO: 79 FL (ref 82–98)
MONOCYTES # BLD AUTO: 0.3 K/UL (ref 0.3–1)
MONOCYTES NFR BLD: 7.8 % (ref 4–15)
NEUTROPHILS # BLD AUTO: 2.2 K/UL (ref 1.8–7.7)
NEUTROPHILS NFR BLD: 59.4 % (ref 38–73)
NONHDLC SERPL-MCNC: 146 MG/DL
NRBC BLD-RTO: 0 /100 WBC
PLATELET # BLD AUTO: 246 K/UL (ref 150–450)
PMV BLD AUTO: 9 FL (ref 9.2–12.9)
POTASSIUM SERPL-SCNC: 3.9 MMOL/L (ref 3.5–5.1)
PROT SERPL-MCNC: 7.4 G/DL (ref 6–8.4)
RBC # BLD AUTO: 4.86 M/UL (ref 4–5.4)
SODIUM SERPL-SCNC: 143 MMOL/L (ref 136–145)
TRIGL SERPL-MCNC: 73 MG/DL (ref 30–150)
WBC # BLD AUTO: 3.72 K/UL (ref 3.9–12.7)

## 2021-04-27 PROCEDURE — 99396 PR PREVENTIVE VISIT,EST,40-64: ICD-10-PCS | Mod: S$GLB,,, | Performed by: NURSE PRACTITIONER

## 2021-04-27 PROCEDURE — 85025 COMPLETE CBC W/AUTO DIFF WBC: CPT | Performed by: NURSE PRACTITIONER

## 2021-04-27 PROCEDURE — 99999 PR PBB SHADOW E&M-EST. PATIENT-LVL III: CPT | Mod: PBBFAC,,, | Performed by: NURSE PRACTITIONER

## 2021-04-27 PROCEDURE — 3008F BODY MASS INDEX DOCD: CPT | Mod: CPTII,S$GLB,, | Performed by: NURSE PRACTITIONER

## 2021-04-27 PROCEDURE — 99999 PR PBB SHADOW E&M-EST. PATIENT-LVL V: ICD-10-PCS | Mod: PBBFAC,,, | Performed by: NURSE PRACTITIONER

## 2021-04-27 PROCEDURE — 3008F PR BODY MASS INDEX (BMI) DOCUMENTED: ICD-10-PCS | Mod: CPTII,S$GLB,, | Performed by: NURSE PRACTITIONER

## 2021-04-27 PROCEDURE — 84443 ASSAY THYROID STIM HORMONE: CPT | Performed by: NURSE PRACTITIONER

## 2021-04-27 PROCEDURE — 99214 PR OFFICE/OUTPT VISIT, EST, LEVL IV, 30-39 MIN: ICD-10-PCS | Mod: S$GLB,,, | Performed by: NURSE PRACTITIONER

## 2021-04-27 PROCEDURE — 87624 HPV HI-RISK TYP POOLED RSLT: CPT | Performed by: NURSE PRACTITIONER

## 2021-04-27 PROCEDURE — 88141 CYTOPATH C/V INTERPRET: CPT | Mod: ,,, | Performed by: PATHOLOGY

## 2021-04-27 PROCEDURE — 83540 ASSAY OF IRON: CPT | Performed by: NURSE PRACTITIONER

## 2021-04-27 PROCEDURE — 1126F PR PAIN SEVERITY QUANTIFIED, NO PAIN PRESENT: ICD-10-PCS | Mod: S$GLB,,, | Performed by: NURSE PRACTITIONER

## 2021-04-27 PROCEDURE — 88175 CYTOPATH C/V AUTO FLUID REDO: CPT | Performed by: PATHOLOGY

## 2021-04-27 PROCEDURE — 80053 COMPREHEN METABOLIC PANEL: CPT | Performed by: NURSE PRACTITIONER

## 2021-04-27 PROCEDURE — 99999 PR PBB SHADOW E&M-EST. PATIENT-LVL V: CPT | Mod: PBBFAC,,, | Performed by: NURSE PRACTITIONER

## 2021-04-27 PROCEDURE — 80061 LIPID PANEL: CPT | Performed by: NURSE PRACTITIONER

## 2021-04-27 PROCEDURE — 99214 OFFICE O/P EST MOD 30 MIN: CPT | Mod: S$GLB,,, | Performed by: NURSE PRACTITIONER

## 2021-04-27 PROCEDURE — 99999 PR PBB SHADOW E&M-EST. PATIENT-LVL III: ICD-10-PCS | Mod: PBBFAC,,, | Performed by: NURSE PRACTITIONER

## 2021-04-27 PROCEDURE — 88141 PR  CYTOPATH CERV/VAG INTERPRET: ICD-10-PCS | Mod: ,,, | Performed by: PATHOLOGY

## 2021-04-27 PROCEDURE — 82306 VITAMIN D 25 HYDROXY: CPT | Performed by: NURSE PRACTITIONER

## 2021-04-27 PROCEDURE — 99396 PREV VISIT EST AGE 40-64: CPT | Mod: S$GLB,,, | Performed by: NURSE PRACTITIONER

## 2021-04-27 PROCEDURE — 1126F AMNT PAIN NOTED NONE PRSNT: CPT | Mod: S$GLB,,, | Performed by: NURSE PRACTITIONER

## 2021-04-28 ENCOUNTER — HOSPITAL ENCOUNTER (OUTPATIENT)
Dept: RADIOLOGY | Facility: HOSPITAL | Age: 66
Discharge: HOME | End: 2021-04-28
Attending: NURSE PRACTITIONER
Payer: COMMERCIAL

## 2021-04-28 ENCOUNTER — PATIENT MESSAGE (OUTPATIENT)
Dept: RESEARCH | Facility: HOSPITAL | Age: 66
End: 2021-04-28

## 2021-04-28 DIAGNOSIS — Z85.3 HISTORY OF BREAST CANCER: ICD-10-CM

## 2021-04-28 DIAGNOSIS — R14.0 ABDOMINAL DISTENTION: ICD-10-CM

## 2021-04-28 LAB
25(OH)D3+25(OH)D2 SERPL-MCNC: 4 NG/ML (ref 30–96)
IRON SERPL-MCNC: 93 UG/DL (ref 30–160)
IRON SERPL-MCNC: 93 UG/DL (ref 30–160)
SATURATED IRON: 30 % (ref 20–50)
TOTAL IRON BINDING CAPACITY: 315 UG/DL (ref 250–450)
TRANSFERRIN SERPL-MCNC: 213 MG/DL (ref 200–375)
TSH SERPL DL<=0.005 MIU/L-ACNC: 0.51 UIU/ML (ref 0.4–4)

## 2021-04-28 PROCEDURE — 76856 US PELVIS COMP WITH TRANSVAG NON-OB (XPD): ICD-10-PCS | Mod: 26,,, | Performed by: RADIOLOGY

## 2021-04-28 PROCEDURE — 76830 TRANSVAGINAL US NON-OB: CPT | Mod: 26,,, | Performed by: RADIOLOGY

## 2021-04-28 PROCEDURE — 76830 US PELVIS COMP WITH TRANSVAG NON-OB (XPD): ICD-10-PCS | Mod: 26,,, | Performed by: RADIOLOGY

## 2021-04-28 PROCEDURE — 76856 US EXAM PELVIC COMPLETE: CPT | Mod: 26,,, | Performed by: RADIOLOGY

## 2021-04-28 PROCEDURE — 76856 US EXAM PELVIC COMPLETE: CPT | Mod: TC

## 2021-05-04 ENCOUNTER — PATIENT MESSAGE (OUTPATIENT)
Dept: OBSTETRICS AND GYNECOLOGY | Facility: CLINIC | Age: 66
End: 2021-05-04

## 2021-05-04 LAB
FINAL PATHOLOGIC DIAGNOSIS: ABNORMAL
HPV HR 12 DNA SPEC QL NAA+PROBE: NEGATIVE
HPV16 AG SPEC QL: NEGATIVE
HPV18 DNA SPEC QL NAA+PROBE: NEGATIVE
Lab: ABNORMAL

## 2021-06-16 ENCOUNTER — TELEPHONE (OUTPATIENT)
Dept: RADIOLOGY | Facility: HOSPITAL | Age: 66
End: 2021-06-16

## 2021-06-17 ENCOUNTER — HOSPITAL ENCOUNTER (OUTPATIENT)
Dept: RADIOLOGY | Facility: HOSPITAL | Age: 66
Discharge: HOME | End: 2021-06-17
Attending: UROLOGY
Payer: COMMERCIAL

## 2021-06-17 DIAGNOSIS — I10 HYPERTENSION, UNSPECIFIED TYPE: ICD-10-CM

## 2021-06-17 DIAGNOSIS — E66.9 OBESITY, UNSPECIFIED CLASSIFICATION, UNSPECIFIED OBESITY TYPE, UNSPECIFIED WHETHER SERIOUS COMORBIDITY PRESENT: ICD-10-CM

## 2021-06-17 DIAGNOSIS — N28.1 RENAL CYST: ICD-10-CM

## 2021-06-17 PROCEDURE — 76770 US EXAM ABDO BACK WALL COMP: CPT | Mod: TC

## 2021-06-17 PROCEDURE — 76770 US RETROPERITONEAL COMPLETE: ICD-10-PCS | Mod: 26,,, | Performed by: RADIOLOGY

## 2021-06-17 PROCEDURE — 76770 US EXAM ABDO BACK WALL COMP: CPT | Mod: 26,,, | Performed by: RADIOLOGY

## 2021-07-07 ENCOUNTER — HOSPITAL ENCOUNTER (OUTPATIENT)
Dept: RADIOLOGY | Facility: HOSPITAL | Age: 66
Discharge: HOME | End: 2021-07-07
Attending: NURSE PRACTITIONER
Payer: COMMERCIAL

## 2021-07-07 VITALS — WEIGHT: 241.19 LBS | BODY MASS INDEX: 41.18 KG/M2 | HEIGHT: 64 IN

## 2021-07-07 DIAGNOSIS — Z17.0 MALIGNANT NEOPLASM OF UPPER-OUTER QUADRANT OF LEFT BREAST IN FEMALE, ESTROGEN RECEPTOR POSITIVE: ICD-10-CM

## 2021-07-07 DIAGNOSIS — D50.0 IRON DEFICIENCY ANEMIA DUE TO CHRONIC BLOOD LOSS: ICD-10-CM

## 2021-07-07 DIAGNOSIS — Z12.31 ENCOUNTER FOR SCREENING MAMMOGRAM FOR MALIGNANT NEOPLASM OF BREAST: ICD-10-CM

## 2021-07-07 DIAGNOSIS — D56.3 ALPHA THALASSEMIA TRAIT: ICD-10-CM

## 2021-07-07 DIAGNOSIS — C50.412 MALIGNANT NEOPLASM OF UPPER-OUTER QUADRANT OF LEFT BREAST IN FEMALE, ESTROGEN RECEPTOR POSITIVE: ICD-10-CM

## 2021-07-07 DIAGNOSIS — Z71.9 HEALTH EDUCATION/COUNSELING: ICD-10-CM

## 2021-07-07 DIAGNOSIS — Z08 ENCOUNTER FOR FOLLOW-UP SURVEILLANCE OF BREAST CANCER: ICD-10-CM

## 2021-07-07 DIAGNOSIS — Z85.3 ENCOUNTER FOR FOLLOW-UP SURVEILLANCE OF BREAST CANCER: ICD-10-CM

## 2021-07-07 DIAGNOSIS — Z12.31 ENCOUNTER FOR SCREENING MAMMOGRAM FOR BREAST CANCER: ICD-10-CM

## 2021-07-07 DIAGNOSIS — Z71.89 COUNSELING AND COORDINATION OF CARE: ICD-10-CM

## 2021-07-07 DIAGNOSIS — Z71.89 COUNSELING ON HEALTH PROMOTION AND DISEASE PREVENTION: ICD-10-CM

## 2021-07-07 PROCEDURE — 77063 BREAST TOMOSYNTHESIS BI: CPT | Mod: 26,52,, | Performed by: RADIOLOGY

## 2021-07-07 PROCEDURE — 77067 MAMMO DIGITAL SCREENING RIGHT WITH TOMO: ICD-10-PCS | Mod: 26,52,, | Performed by: RADIOLOGY

## 2021-07-07 PROCEDURE — 77063 MAMMO DIGITAL SCREENING RIGHT WITH TOMO: ICD-10-PCS | Mod: 26,52,, | Performed by: RADIOLOGY

## 2021-07-07 PROCEDURE — 77067 SCR MAMMO BI INCL CAD: CPT | Mod: TC

## 2021-07-07 PROCEDURE — 77067 SCR MAMMO BI INCL CAD: CPT | Mod: 26,52,, | Performed by: RADIOLOGY

## 2021-07-28 ENCOUNTER — OFFICE VISIT (OUTPATIENT)
Dept: UROLOGY | Facility: CLINIC | Age: 66
End: 2021-07-28
Payer: COMMERCIAL

## 2021-07-28 VITALS — DIASTOLIC BLOOD PRESSURE: 79 MMHG | SYSTOLIC BLOOD PRESSURE: 121 MMHG | HEART RATE: 66 BPM

## 2021-07-28 DIAGNOSIS — N28.1 RENAL CYST: Primary | ICD-10-CM

## 2021-07-28 DIAGNOSIS — N39.3 SUI (STRESS URINARY INCONTINENCE, FEMALE): ICD-10-CM

## 2021-07-28 PROCEDURE — 3074F PR MOST RECENT SYSTOLIC BLOOD PRESSURE < 130 MM HG: ICD-10-PCS | Mod: CPTII,S$GLB,, | Performed by: UROLOGY

## 2021-07-28 PROCEDURE — 1160F PR REVIEW ALL MEDS BY PRESCRIBER/CLIN PHARMACIST DOCUMENTED: ICD-10-PCS | Mod: CPTII,S$GLB,, | Performed by: UROLOGY

## 2021-07-28 PROCEDURE — 99213 OFFICE O/P EST LOW 20 MIN: CPT | Mod: S$GLB,,, | Performed by: UROLOGY

## 2021-07-28 PROCEDURE — 3078F DIAST BP <80 MM HG: CPT | Mod: CPTII,S$GLB,, | Performed by: UROLOGY

## 2021-07-28 PROCEDURE — 1126F PR PAIN SEVERITY QUANTIFIED, NO PAIN PRESENT: ICD-10-PCS | Mod: CPTII,S$GLB,, | Performed by: UROLOGY

## 2021-07-28 PROCEDURE — 1160F RVW MEDS BY RX/DR IN RCRD: CPT | Mod: CPTII,S$GLB,, | Performed by: UROLOGY

## 2021-07-28 PROCEDURE — 3078F PR MOST RECENT DIASTOLIC BLOOD PRESSURE < 80 MM HG: ICD-10-PCS | Mod: CPTII,S$GLB,, | Performed by: UROLOGY

## 2021-07-28 PROCEDURE — 3074F SYST BP LT 130 MM HG: CPT | Mod: CPTII,S$GLB,, | Performed by: UROLOGY

## 2021-07-28 PROCEDURE — 99999 PR PBB SHADOW E&M-EST. PATIENT-LVL III: ICD-10-PCS | Mod: PBBFAC,,, | Performed by: UROLOGY

## 2021-07-28 PROCEDURE — 1159F MED LIST DOCD IN RCRD: CPT | Mod: CPTII,S$GLB,, | Performed by: UROLOGY

## 2021-07-28 PROCEDURE — 99999 PR PBB SHADOW E&M-EST. PATIENT-LVL III: CPT | Mod: PBBFAC,,, | Performed by: UROLOGY

## 2021-07-28 PROCEDURE — 1159F PR MEDICATION LIST DOCUMENTED IN MEDICAL RECORD: ICD-10-PCS | Mod: CPTII,S$GLB,, | Performed by: UROLOGY

## 2021-07-28 PROCEDURE — 99213 PR OFFICE/OUTPT VISIT, EST, LEVL III, 20-29 MIN: ICD-10-PCS | Mod: S$GLB,,, | Performed by: UROLOGY

## 2021-07-28 PROCEDURE — 1126F AMNT PAIN NOTED NONE PRSNT: CPT | Mod: CPTII,S$GLB,, | Performed by: UROLOGY

## 2021-09-30 ENCOUNTER — OFFICE VISIT (OUTPATIENT)
Dept: OPHTHALMOLOGY | Facility: CLINIC | Age: 66
End: 2021-09-30
Payer: COMMERCIAL

## 2021-09-30 DIAGNOSIS — H52.4 MYOPIA WITH ASTIGMATISM AND PRESBYOPIA, BILATERAL: ICD-10-CM

## 2021-09-30 DIAGNOSIS — H25.13 NUCLEAR SCLEROSIS, BILATERAL: Primary | ICD-10-CM

## 2021-09-30 DIAGNOSIS — H52.13 MYOPIA WITH ASTIGMATISM AND PRESBYOPIA, BILATERAL: ICD-10-CM

## 2021-09-30 DIAGNOSIS — H52.203 MYOPIA WITH ASTIGMATISM AND PRESBYOPIA, BILATERAL: ICD-10-CM

## 2021-09-30 PROCEDURE — 92004 COMPRE OPH EXAM NEW PT 1/>: CPT | Mod: S$GLB,,, | Performed by: OPTOMETRIST

## 2021-09-30 PROCEDURE — 1159F MED LIST DOCD IN RCRD: CPT | Mod: CPTII,S$GLB,, | Performed by: OPTOMETRIST

## 2021-09-30 PROCEDURE — 1160F PR REVIEW ALL MEDS BY PRESCRIBER/CLIN PHARMACIST DOCUMENTED: ICD-10-PCS | Mod: CPTII,S$GLB,, | Performed by: OPTOMETRIST

## 2021-09-30 PROCEDURE — 99999 PR PBB SHADOW E&M-EST. PATIENT-LVL II: ICD-10-PCS | Mod: PBBFAC,,, | Performed by: OPTOMETRIST

## 2021-09-30 PROCEDURE — 4010F PR ACE/ARB THEARPY RXD/TAKEN: ICD-10-PCS | Mod: CPTII,S$GLB,, | Performed by: OPTOMETRIST

## 2021-09-30 PROCEDURE — 92015 PR REFRACTION: ICD-10-PCS | Mod: S$GLB,,, | Performed by: OPTOMETRIST

## 2021-09-30 PROCEDURE — 1159F PR MEDICATION LIST DOCUMENTED IN MEDICAL RECORD: ICD-10-PCS | Mod: CPTII,S$GLB,, | Performed by: OPTOMETRIST

## 2021-09-30 PROCEDURE — 92004 PR EYE EXAM, NEW PATIENT,COMPREHESV: ICD-10-PCS | Mod: S$GLB,,, | Performed by: OPTOMETRIST

## 2021-09-30 PROCEDURE — 99999 PR PBB SHADOW E&M-EST. PATIENT-LVL II: CPT | Mod: PBBFAC,,, | Performed by: OPTOMETRIST

## 2021-09-30 PROCEDURE — 92015 DETERMINE REFRACTIVE STATE: CPT | Mod: S$GLB,,, | Performed by: OPTOMETRIST

## 2021-09-30 PROCEDURE — 1160F RVW MEDS BY RX/DR IN RCRD: CPT | Mod: CPTII,S$GLB,, | Performed by: OPTOMETRIST

## 2021-09-30 PROCEDURE — 4010F ACE/ARB THERAPY RXD/TAKEN: CPT | Mod: CPTII,S$GLB,, | Performed by: OPTOMETRIST

## 2021-10-26 DIAGNOSIS — I50.22 CHRONIC SYSTOLIC HEART FAILURE: ICD-10-CM

## 2021-10-26 DIAGNOSIS — I42.7 CARDIOMYOPATHY DUE TO CHEMOTHERAPY: ICD-10-CM

## 2021-10-26 DIAGNOSIS — T45.1X5A CARDIOMYOPATHY DUE TO CHEMOTHERAPY: ICD-10-CM

## 2021-10-26 RX ORDER — SACUBITRIL AND VALSARTAN 97; 103 MG/1; MG/1
1 TABLET, FILM COATED ORAL 2 TIMES DAILY
Qty: 60 TABLET | Refills: 6 | Status: SHIPPED | OUTPATIENT
Start: 2021-10-26 | End: 2022-06-02 | Stop reason: SDUPTHER

## 2021-11-09 DIAGNOSIS — I42.7 CARDIOMYOPATHY DUE TO CHEMOTHERAPY: ICD-10-CM

## 2021-11-09 DIAGNOSIS — T45.1X5A CARDIOMYOPATHY DUE TO CHEMOTHERAPY: ICD-10-CM

## 2021-11-09 RX ORDER — CARVEDILOL PHOSPHATE 80 MG/1
CAPSULE, EXTENDED RELEASE ORAL
Qty: 90 CAPSULE | Refills: 3 | Status: SHIPPED | OUTPATIENT
Start: 2021-11-09 | End: 2021-11-15

## 2021-11-15 DIAGNOSIS — I50.22 NYHA CLASS 2 AND ACC/AHA STAGE C CHRONIC SYSTOLIC CONGESTIVE HEART FAILURE: Primary | ICD-10-CM

## 2021-11-15 DIAGNOSIS — I10 ESSENTIAL HYPERTENSION: ICD-10-CM

## 2021-11-15 RX ORDER — METOPROLOL SUCCINATE 100 MG/1
100 TABLET, EXTENDED RELEASE ORAL DAILY
Qty: 30 TABLET | Refills: 11 | Status: SHIPPED | OUTPATIENT
Start: 2021-11-15 | End: 2022-06-02

## 2021-11-23 ENCOUNTER — OFFICE VISIT (OUTPATIENT)
Dept: HEMATOLOGY/ONCOLOGY | Facility: CLINIC | Age: 66
End: 2021-11-23
Payer: COMMERCIAL

## 2021-11-23 ENCOUNTER — IMMUNIZATION (OUTPATIENT)
Dept: PHARMACY | Facility: CLINIC | Age: 66
End: 2021-11-23
Payer: COMMERCIAL

## 2021-11-23 VITALS
HEIGHT: 65 IN | HEART RATE: 74 BPM | BODY MASS INDEX: 39.63 KG/M2 | SYSTOLIC BLOOD PRESSURE: 106 MMHG | OXYGEN SATURATION: 99 % | RESPIRATION RATE: 14 BRPM | DIASTOLIC BLOOD PRESSURE: 65 MMHG | TEMPERATURE: 97 F | WEIGHT: 237.88 LBS

## 2021-11-23 DIAGNOSIS — C50.412 MALIGNANT NEOPLASM OF UPPER-OUTER QUADRANT OF LEFT BREAST IN FEMALE, ESTROGEN RECEPTOR POSITIVE: ICD-10-CM

## 2021-11-23 DIAGNOSIS — Z17.0 MALIGNANT NEOPLASM OF UPPER-OUTER QUADRANT OF LEFT BREAST IN FEMALE, ESTROGEN RECEPTOR POSITIVE: ICD-10-CM

## 2021-11-23 DIAGNOSIS — D50.0 IRON DEFICIENCY ANEMIA DUE TO CHRONIC BLOOD LOSS: ICD-10-CM

## 2021-11-23 DIAGNOSIS — M54.9 MID BACK PAIN ON RIGHT SIDE: ICD-10-CM

## 2021-11-23 DIAGNOSIS — D56.3 ALPHA THALASSEMIA TRAIT: Primary | ICD-10-CM

## 2021-11-23 DIAGNOSIS — Z23 NEED FOR VACCINATION: Primary | ICD-10-CM

## 2021-11-23 PROCEDURE — 99214 OFFICE O/P EST MOD 30 MIN: CPT | Mod: S$GLB,,, | Performed by: NURSE PRACTITIONER

## 2021-11-23 PROCEDURE — 4010F PR ACE/ARB THEARPY RXD/TAKEN: ICD-10-PCS | Mod: CPTII,S$GLB,, | Performed by: NURSE PRACTITIONER

## 2021-11-23 PROCEDURE — 99214 PR OFFICE/OUTPT VISIT, EST, LEVL IV, 30-39 MIN: ICD-10-PCS | Mod: S$GLB,,, | Performed by: NURSE PRACTITIONER

## 2021-11-23 PROCEDURE — 4010F ACE/ARB THERAPY RXD/TAKEN: CPT | Mod: CPTII,S$GLB,, | Performed by: NURSE PRACTITIONER

## 2021-11-23 PROCEDURE — 99999 PR PBB SHADOW E&M-EST. PATIENT-LVL IV: CPT | Mod: PBBFAC,,, | Performed by: NURSE PRACTITIONER

## 2021-11-23 PROCEDURE — 99999 PR PBB SHADOW E&M-EST. PATIENT-LVL IV: ICD-10-PCS | Mod: PBBFAC,,, | Performed by: NURSE PRACTITIONER

## 2021-11-23 RX ORDER — TIZANIDINE 2 MG/1
2 TABLET ORAL NIGHTLY
Qty: 30 TABLET | Refills: 0 | Status: SHIPPED | OUTPATIENT
Start: 2021-11-23 | End: 2021-12-23

## 2021-11-23 RX ORDER — CARVEDILOL PHOSPHATE 80 MG/1
CAPSULE, EXTENDED RELEASE ORAL
COMMUNITY
Start: 2021-11-18 | End: 2022-06-02 | Stop reason: SDUPTHER

## 2021-11-30 ENCOUNTER — TELEPHONE (OUTPATIENT)
Dept: PHYSICAL MEDICINE AND REHAB | Facility: CLINIC | Age: 66
End: 2021-11-30
Payer: COMMERCIAL

## 2021-12-10 ENCOUNTER — OFFICE VISIT (OUTPATIENT)
Dept: PHYSICAL MEDICINE AND REHAB | Facility: CLINIC | Age: 66
End: 2021-12-10
Payer: COMMERCIAL

## 2021-12-10 VITALS
HEIGHT: 65 IN | DIASTOLIC BLOOD PRESSURE: 72 MMHG | SYSTOLIC BLOOD PRESSURE: 111 MMHG | BODY MASS INDEX: 39.49 KG/M2 | HEART RATE: 74 BPM | WEIGHT: 237 LBS | RESPIRATION RATE: 14 BRPM

## 2021-12-10 DIAGNOSIS — M47.816 LUMBAR FACET ARTHROPATHY: ICD-10-CM

## 2021-12-10 DIAGNOSIS — M62.830 SPASM OF MUSCLE OF LOWER BACK: Primary | ICD-10-CM

## 2021-12-10 PROCEDURE — 4010F PR ACE/ARB THEARPY RXD/TAKEN: ICD-10-PCS | Mod: CPTII,S$GLB,, | Performed by: PHYSICAL MEDICINE & REHABILITATION

## 2021-12-10 PROCEDURE — 99999 PR PBB SHADOW E&M-EST. PATIENT-LVL III: ICD-10-PCS | Mod: PBBFAC,,, | Performed by: PHYSICAL MEDICINE & REHABILITATION

## 2021-12-10 PROCEDURE — 99999 PR PBB SHADOW E&M-EST. PATIENT-LVL III: CPT | Mod: PBBFAC,,, | Performed by: PHYSICAL MEDICINE & REHABILITATION

## 2021-12-10 PROCEDURE — 99214 OFFICE O/P EST MOD 30 MIN: CPT | Mod: S$GLB,,, | Performed by: PHYSICAL MEDICINE & REHABILITATION

## 2021-12-10 PROCEDURE — 4010F ACE/ARB THERAPY RXD/TAKEN: CPT | Mod: CPTII,S$GLB,, | Performed by: PHYSICAL MEDICINE & REHABILITATION

## 2021-12-10 PROCEDURE — 99214 PR OFFICE/OUTPT VISIT, EST, LEVL IV, 30-39 MIN: ICD-10-PCS | Mod: S$GLB,,, | Performed by: PHYSICAL MEDICINE & REHABILITATION

## 2022-05-03 NOTE — PROGRESS NOTES
amSubjective:       Patient ID: Roxanne Altamirano is a 66 y.o. female.    Chief Complaint: back pain  HPI:  Patient presents for breast cancer surveillance. Last visit with me in clinic was 11/23/2021 for back pain evaluation.    Last mammo 7/7/2021- right breast wnl    Last dexa 7/2021- osteopenia- taking vit d and calcium    History of dyspepsia and bloating- change in bm- pt was referred to GI 4/2020  CT showed minimal thickening and inflammatory changes seen within the ascending and transverse colon.  Mild colitis not excluded. Mild fatty infiltration seen within the wall of the right and transverse colon which can be seen with chronic inflammatory processes.  Mild constipation within the ascending and transverse colon.      Was noted to have mild iron def anemia.      Pt was scheduled for upper and lower endoscopy by GI- postponed due to the pandemic and was rescheduled for 4/28/20  Last colonoscopy- and egd- 4/28/20  - Normal duodenal bulb and second portion of the duodenum.                         - Chronic gastritis with hemorrhage. Biopsied.                         - 4 cm hiatal hernia.                         - Z-line irregular, 35 cm from the incisors.                         Biopsied.                         - LA Grade A esophagitis.                         - Normal upper third of esophagus and middle third                         of esophagus.     The examined portion of the ileum was normal.                         - A single colonic angiodysplastic lesion. Treated                         with a monopolar probe.                         - Moderate diverticulosis in the sigmoid colon and                         in the descending colon. There was no evidence of                         diverticular bleeding.                         - Hemorrhoids found on perianal exam.                         - The examination was otherwise normal.                         - No specimens collected.          Denies any UTI  symptoms- no hematuria or dysuria. Has f/u with urology June 2022    Pt has a history of a Stage IIA, 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 She subsequently underwent left mastectomy and sentinel node biopsy followed by axillary dissection  March 23, 2007. Histopathologic evaluation demonstrated again multiple foci of intracystic papillary carcinoma, as well as DCIS noncomedo type. One of three sentinel lymph nodes demonstrated micrometastatic disease. Twelve additional axillary lymph nodes were obtained, all of which were negative for disease. Estrogen receptors were 11%. Progesterone receptors 4%. HER2/bob was not over-expressed. She subsequently underwent additional studies including a MUGA scan demonstrating an injection  fraction of 39%, thus she was treated with six cycles of Cytoxan and Taxotere which were well tolerated.     9/28/07 hormone studies suggested the pt was menopausal so she was started on Arimidex for post adjuvant therapy. Therapy completed 9/2012.     History of chemo induced cardiomyopathy. CHF- followed by Cardiology-      Pt has had f/u with Urology for her hematuria- has left upper pole renal cyst that is stable.       Denies any visible hematuria, no vaginal discharge and no blood in her bowel movements. Pt relates dysuria has resolved.  She denies any suprapubic discomfort or lower abdominal pain.    Last mammo- 7/7/2021- right- mammo - wnl    Left axilla ultrasound 4/16/19- 2 small nonenlarged lymph nodes    Last gyn- 4/27/2021- Mariposa Liam FNP    Last covid vaccine- 3/1 and 3/22/2021 right arm    Pt denies any pain or changes.     Review of Systems   Constitutional: Negative.    HENT: Negative.    Eyes: Negative.    Respiratory: Negative.    Cardiovascular: Negative.    Gastrointestinal: Negative.    Endocrine: Negative.    Genitourinary: Negative.    Musculoskeletal: Positive for back pain.    Skin: Negative.    Allergic/Immunologic: Negative.    Neurological: Negative.    Hematological: Negative.  Negative for adenopathy.   Psychiatric/Behavioral: Negative.        Breast: Pt denies any breast pain, nipple discharge or palpable abnormality. No chest wall mass or changes.     Objective:      Physical Exam  Constitutional:       Appearance: She is well-developed. She is obese.   HENT:      Head: Normocephalic and atraumatic.      Right Ear: External ear normal.      Left Ear: External ear normal.      Mouth/Throat:      Pharynx: No oropharyngeal exudate.   Eyes:      General: No scleral icterus.        Right eye: No discharge.         Left eye: No discharge.      Conjunctiva/sclera: Conjunctivae normal.      Pupils: Pupils are equal, round, and reactive to light.   Neck:      Thyroid: No thyromegaly.   Cardiovascular:      Rate and Rhythm: Normal rate and regular rhythm.      Pulses: Normal pulses.      Heart sounds: Normal heart sounds.   Pulmonary:      Effort: Pulmonary effort is normal.      Breath sounds: Normal breath sounds.   Chest:   Breasts:      Right: No inverted nipple, mass, nipple discharge, skin change, tenderness, axillary adenopathy or supraclavicular adenopathy.      Left: No inverted nipple, mass, nipple discharge, skin change, tenderness, axillary adenopathy or supraclavicular adenopathy.            Comments: No visible mass or left chest wall changes  Abdominal:      General: Bowel sounds are normal. There is no distension.      Palpations: Abdomen is soft. Abdomen is not rigid. There is no mass.      Tenderness: There is no abdominal tenderness. There is no guarding or rebound. Negative signs include Rockwell's sign and McBurney's sign.      Hernia: No hernia is present.   Musculoskeletal:         General: Normal range of motion.      Right shoulder: No crepitus. Normal strength.      Cervical back: Normal range of motion and neck supple.   Lymphadenopathy:      Head:      Right side  of head: No submental, submandibular, tonsillar, preauricular, posterior auricular or occipital adenopathy.      Left side of head: No submental, submandibular, tonsillar, preauricular, posterior auricular or occipital adenopathy.      Cervical: No cervical adenopathy.      Right cervical: No superficial or posterior cervical adenopathy.     Left cervical: No superficial or posterior cervical adenopathy.      Upper Body:      Right upper body: No supraclavicular or axillary adenopathy.      Left upper body: No supraclavicular or axillary adenopathy.   Skin:     General: Skin is warm and dry.      Coloration: Skin is not pale.      Findings: No erythema or rash.   Neurological:      General: No focal deficit present.      Mental Status: She is alert and oriented to person, place, and time.      Deep Tendon Reflexes: Reflexes are normal and symmetric.   Psychiatric:         Mood and Affect: Mood normal.         Behavior: Behavior normal.         Thought Content: Thought content normal.         Judgment: Judgment normal.         Assessment:       1. Malignant neoplasm of upper-outer quadrant of left breast in female, estrogen receptor positive    2. Cardiomyopathy due to chemotherapy    3. Alpha thalassemia trait    4. Iron deficiency anemia due to chronic blood loss    5. Encounter for follow-up surveillance of breast cancer    6. Counseling and coordination of care    7. Counseling on health promotion and disease prevention    8. Health education/counseling    9. Encounter for breast cancer screening using non-mammogram modality    10. Thyroid disorder screening    11. Lipid screening        Plan:      1. Breast cancer as previously described. Arimidex therapy completed X 5 years. Negative clinical exam  2. Heart failure stable and followed by cardiology.  3. Obesity. Encouraged pt to continue with exercise and dietary changes. Walking in neighborhood 3 times a week for 30 min- walks on treadmill at the gym  4.  hematuria workup benign- in past- left upper pole renal cyst- Has f/u with urology routinely  5. Change in bowel movement character- Upper and lower endoscopy revealed esophogitis, hiatal hernia, colon angiodysplastic lesion and diverticulosis- symptoms improved with pepcid- 4/28/2020- rec 10 year f/u colon  6. Mildly elevated bilirubin 2/2017 and stable since  8. History of alpha thalassemia trait -   9. Mammo due 7/2022 - right breast  10. Cbc, cmp, iron, tibc, ferritin, tsh and lipids today- pt fasting  11. RTC one year July 2023 - cbc, cmp and right mammo and Nov 2022 for exam only

## 2022-05-17 ENCOUNTER — IMMUNIZATION (OUTPATIENT)
Dept: PHARMACY | Facility: CLINIC | Age: 67
End: 2022-05-17
Payer: COMMERCIAL

## 2022-05-17 ENCOUNTER — OFFICE VISIT (OUTPATIENT)
Dept: HEMATOLOGY/ONCOLOGY | Facility: CLINIC | Age: 67
End: 2022-05-17
Payer: COMMERCIAL

## 2022-05-17 ENCOUNTER — LAB VISIT (OUTPATIENT)
Dept: LAB | Facility: HOSPITAL | Age: 67
End: 2022-05-17
Attending: INTERNAL MEDICINE
Payer: COMMERCIAL

## 2022-05-17 VITALS
HEIGHT: 64 IN | HEART RATE: 79 BPM | BODY MASS INDEX: 40.46 KG/M2 | WEIGHT: 237 LBS | RESPIRATION RATE: 16 BRPM | DIASTOLIC BLOOD PRESSURE: 76 MMHG | SYSTOLIC BLOOD PRESSURE: 114 MMHG | OXYGEN SATURATION: 98 % | TEMPERATURE: 96 F

## 2022-05-17 DIAGNOSIS — Z71.9 HEALTH EDUCATION/COUNSELING: ICD-10-CM

## 2022-05-17 DIAGNOSIS — Z71.89 COUNSELING ON HEALTH PROMOTION AND DISEASE PREVENTION: ICD-10-CM

## 2022-05-17 DIAGNOSIS — Z13.220 LIPID SCREENING: ICD-10-CM

## 2022-05-17 DIAGNOSIS — Z71.89 COUNSELING AND COORDINATION OF CARE: ICD-10-CM

## 2022-05-17 DIAGNOSIS — Z17.0 MALIGNANT NEOPLASM OF UPPER-OUTER QUADRANT OF LEFT BREAST IN FEMALE, ESTROGEN RECEPTOR POSITIVE: Primary | ICD-10-CM

## 2022-05-17 DIAGNOSIS — T45.1X5A CARDIOMYOPATHY DUE TO CHEMOTHERAPY: ICD-10-CM

## 2022-05-17 DIAGNOSIS — Z13.29 THYROID DISORDER SCREENING: ICD-10-CM

## 2022-05-17 DIAGNOSIS — Z08 ENCOUNTER FOR FOLLOW-UP SURVEILLANCE OF BREAST CANCER: ICD-10-CM

## 2022-05-17 DIAGNOSIS — D56.3 ALPHA THALASSEMIA TRAIT: ICD-10-CM

## 2022-05-17 DIAGNOSIS — C50.412 MALIGNANT NEOPLASM OF UPPER-OUTER QUADRANT OF LEFT BREAST IN FEMALE, ESTROGEN RECEPTOR POSITIVE: Primary | ICD-10-CM

## 2022-05-17 DIAGNOSIS — I42.7 CARDIOMYOPATHY DUE TO CHEMOTHERAPY: ICD-10-CM

## 2022-05-17 DIAGNOSIS — Z85.3 ENCOUNTER FOR FOLLOW-UP SURVEILLANCE OF BREAST CANCER: ICD-10-CM

## 2022-05-17 DIAGNOSIS — Z23 NEED FOR VACCINATION: Primary | ICD-10-CM

## 2022-05-17 DIAGNOSIS — Z12.39 ENCOUNTER FOR BREAST CANCER SCREENING USING NON-MAMMOGRAM MODALITY: ICD-10-CM

## 2022-05-17 DIAGNOSIS — D50.0 IRON DEFICIENCY ANEMIA DUE TO CHRONIC BLOOD LOSS: ICD-10-CM

## 2022-05-17 LAB
ALBUMIN SERPL BCP-MCNC: 3.7 G/DL (ref 3.5–5.2)
ALP SERPL-CCNC: 75 U/L (ref 55–135)
ALT SERPL W/O P-5'-P-CCNC: 17 U/L (ref 10–44)
ANION GAP SERPL CALC-SCNC: 8 MMOL/L (ref 8–16)
AST SERPL-CCNC: 16 U/L (ref 10–40)
BASOPHILS # BLD AUTO: 0.02 K/UL (ref 0–0.2)
BASOPHILS NFR BLD: 0.5 % (ref 0–1.9)
BILIRUB SERPL-MCNC: 1.2 MG/DL (ref 0.1–1)
BUN SERPL-MCNC: 11 MG/DL (ref 8–23)
CALCIUM SERPL-MCNC: 8.8 MG/DL (ref 8.7–10.5)
CHLORIDE SERPL-SCNC: 107 MMOL/L (ref 95–110)
CHOLEST SERPL-MCNC: 234 MG/DL (ref 120–199)
CHOLEST/HDLC SERPL: 3 {RATIO} (ref 2–5)
CO2 SERPL-SCNC: 26 MMOL/L (ref 23–29)
CREAT SERPL-MCNC: 0.8 MG/DL (ref 0.5–1.4)
DIFFERENTIAL METHOD: ABNORMAL
EOSINOPHIL # BLD AUTO: 0.2 K/UL (ref 0–0.5)
EOSINOPHIL NFR BLD: 3.6 % (ref 0–8)
ERYTHROCYTE [DISTWIDTH] IN BLOOD BY AUTOMATED COUNT: 14.9 % (ref 11.5–14.5)
EST. GFR  (AFRICAN AMERICAN): >60 ML/MIN/1.73 M^2
EST. GFR  (NON AFRICAN AMERICAN): >60 ML/MIN/1.73 M^2
FERRITIN SERPL-MCNC: 179 NG/ML (ref 20–300)
GLUCOSE SERPL-MCNC: 108 MG/DL (ref 70–110)
HCT VFR BLD AUTO: 39.1 % (ref 37–48.5)
HDLC SERPL-MCNC: 79 MG/DL (ref 40–75)
HDLC SERPL: 33.8 % (ref 20–50)
HGB BLD-MCNC: 11.9 G/DL (ref 12–16)
IMM GRANULOCYTES # BLD AUTO: 0.01 K/UL (ref 0–0.04)
IMM GRANULOCYTES NFR BLD AUTO: 0.2 % (ref 0–0.5)
IRON SERPL-MCNC: 92 UG/DL (ref 30–160)
LDLC SERPL CALC-MCNC: 139 MG/DL (ref 63–159)
LYMPHOCYTES # BLD AUTO: 1.1 K/UL (ref 1–4.8)
LYMPHOCYTES NFR BLD: 25.7 % (ref 18–48)
MCH RBC QN AUTO: 24.4 PG (ref 27–31)
MCHC RBC AUTO-ENTMCNC: 30.4 G/DL (ref 32–36)
MCV RBC AUTO: 80 FL (ref 82–98)
MONOCYTES # BLD AUTO: 0.3 K/UL (ref 0.3–1)
MONOCYTES NFR BLD: 7.7 % (ref 4–15)
NEUTROPHILS # BLD AUTO: 2.6 K/UL (ref 1.8–7.7)
NEUTROPHILS NFR BLD: 62.3 % (ref 38–73)
NONHDLC SERPL-MCNC: 155 MG/DL
NRBC BLD-RTO: 0 /100 WBC
PLATELET # BLD AUTO: 258 K/UL (ref 150–450)
PMV BLD AUTO: 9.5 FL (ref 9.2–12.9)
POTASSIUM SERPL-SCNC: 4 MMOL/L (ref 3.5–5.1)
PROT SERPL-MCNC: 7.2 G/DL (ref 6–8.4)
RBC # BLD AUTO: 4.88 M/UL (ref 4–5.4)
SATURATED IRON: 27 % (ref 20–50)
SODIUM SERPL-SCNC: 141 MMOL/L (ref 136–145)
TOTAL IRON BINDING CAPACITY: 337 UG/DL (ref 250–450)
TRANSFERRIN SERPL-MCNC: 228 MG/DL (ref 200–375)
TRIGL SERPL-MCNC: 80 MG/DL (ref 30–150)
TSH SERPL DL<=0.005 MIU/L-ACNC: 0.74 UIU/ML (ref 0.4–4)
WBC # BLD AUTO: 4.13 K/UL (ref 3.9–12.7)

## 2022-05-17 PROCEDURE — 84443 ASSAY THYROID STIM HORMONE: CPT | Performed by: NURSE PRACTITIONER

## 2022-05-17 PROCEDURE — 1159F MED LIST DOCD IN RCRD: CPT | Mod: CPTII,S$GLB,, | Performed by: NURSE PRACTITIONER

## 2022-05-17 PROCEDURE — 80053 COMPREHEN METABOLIC PANEL: CPT | Performed by: NURSE PRACTITIONER

## 2022-05-17 PROCEDURE — 3074F PR MOST RECENT SYSTOLIC BLOOD PRESSURE < 130 MM HG: ICD-10-PCS | Mod: CPTII,S$GLB,, | Performed by: NURSE PRACTITIONER

## 2022-05-17 PROCEDURE — 82728 ASSAY OF FERRITIN: CPT | Performed by: NURSE PRACTITIONER

## 2022-05-17 PROCEDURE — 99999 PR PBB SHADOW E&M-EST. PATIENT-LVL IV: CPT | Mod: PBBFAC,,, | Performed by: NURSE PRACTITIONER

## 2022-05-17 PROCEDURE — 3078F PR MOST RECENT DIASTOLIC BLOOD PRESSURE < 80 MM HG: ICD-10-PCS | Mod: CPTII,S$GLB,, | Performed by: NURSE PRACTITIONER

## 2022-05-17 PROCEDURE — 3074F SYST BP LT 130 MM HG: CPT | Mod: CPTII,S$GLB,, | Performed by: NURSE PRACTITIONER

## 2022-05-17 PROCEDURE — 4010F PR ACE/ARB THEARPY RXD/TAKEN: ICD-10-PCS | Mod: CPTII,S$GLB,, | Performed by: NURSE PRACTITIONER

## 2022-05-17 PROCEDURE — 3008F PR BODY MASS INDEX (BMI) DOCUMENTED: ICD-10-PCS | Mod: CPTII,S$GLB,, | Performed by: NURSE PRACTITIONER

## 2022-05-17 PROCEDURE — 99999 PR PBB SHADOW E&M-EST. PATIENT-LVL IV: ICD-10-PCS | Mod: PBBFAC,,, | Performed by: NURSE PRACTITIONER

## 2022-05-17 PROCEDURE — 99214 PR OFFICE/OUTPT VISIT, EST, LEVL IV, 30-39 MIN: ICD-10-PCS | Mod: S$GLB,,, | Performed by: NURSE PRACTITIONER

## 2022-05-17 PROCEDURE — 99214 OFFICE O/P EST MOD 30 MIN: CPT | Mod: S$GLB,,, | Performed by: NURSE PRACTITIONER

## 2022-05-17 PROCEDURE — 4010F ACE/ARB THERAPY RXD/TAKEN: CPT | Mod: CPTII,S$GLB,, | Performed by: NURSE PRACTITIONER

## 2022-05-17 PROCEDURE — 3008F BODY MASS INDEX DOCD: CPT | Mod: CPTII,S$GLB,, | Performed by: NURSE PRACTITIONER

## 2022-05-17 PROCEDURE — 1159F PR MEDICATION LIST DOCUMENTED IN MEDICAL RECORD: ICD-10-PCS | Mod: CPTII,S$GLB,, | Performed by: NURSE PRACTITIONER

## 2022-05-17 PROCEDURE — 85025 COMPLETE CBC W/AUTO DIFF WBC: CPT | Performed by: NURSE PRACTITIONER

## 2022-05-17 PROCEDURE — 80061 LIPID PANEL: CPT | Performed by: NURSE PRACTITIONER

## 2022-05-17 PROCEDURE — 3078F DIAST BP <80 MM HG: CPT | Mod: CPTII,S$GLB,, | Performed by: NURSE PRACTITIONER

## 2022-05-17 PROCEDURE — 84466 ASSAY OF TRANSFERRIN: CPT | Performed by: NURSE PRACTITIONER

## 2022-06-01 DIAGNOSIS — T45.1X5A CARDIOMYOPATHY DUE TO CHEMOTHERAPY: ICD-10-CM

## 2022-06-01 DIAGNOSIS — I50.22 CHRONIC SYSTOLIC HEART FAILURE: ICD-10-CM

## 2022-06-01 DIAGNOSIS — I42.7 CARDIOMYOPATHY DUE TO CHEMOTHERAPY: ICD-10-CM

## 2022-06-01 DIAGNOSIS — I50.22 NYHA CLASS 2 AND ACC/AHA STAGE C CHRONIC SYSTOLIC CONGESTIVE HEART FAILURE: ICD-10-CM

## 2022-06-01 DIAGNOSIS — I10 ESSENTIAL HYPERTENSION: Primary | ICD-10-CM

## 2022-06-01 RX ORDER — SACUBITRIL AND VALSARTAN 97; 103 MG/1; MG/1
1 TABLET, FILM COATED ORAL 2 TIMES DAILY
Qty: 60 TABLET | Refills: 6 | OUTPATIENT
Start: 2022-06-01

## 2022-06-02 ENCOUNTER — HOSPITAL ENCOUNTER (OUTPATIENT)
Dept: CARDIOLOGY | Facility: HOSPITAL | Age: 67
Discharge: HOME | End: 2022-06-02
Attending: STUDENT IN AN ORGANIZED HEALTH CARE EDUCATION/TRAINING PROGRAM
Payer: COMMERCIAL

## 2022-06-02 ENCOUNTER — OFFICE VISIT (OUTPATIENT)
Dept: CARDIOLOGY | Facility: CLINIC | Age: 67
End: 2022-06-02
Payer: COMMERCIAL

## 2022-06-02 VITALS
SYSTOLIC BLOOD PRESSURE: 100 MMHG | DIASTOLIC BLOOD PRESSURE: 70 MMHG | OXYGEN SATURATION: 98 % | WEIGHT: 233 LBS | BODY MASS INDEX: 39.78 KG/M2 | HEART RATE: 70 BPM | HEIGHT: 64 IN

## 2022-06-02 DIAGNOSIS — I50.22 NYHA CLASS 2 AND ACC/AHA STAGE C CHRONIC SYSTOLIC CONGESTIVE HEART FAILURE: Primary | ICD-10-CM

## 2022-06-02 DIAGNOSIS — I50.22 NYHA CLASS 2 AND ACC/AHA STAGE C CHRONIC SYSTOLIC CONGESTIVE HEART FAILURE: ICD-10-CM

## 2022-06-02 DIAGNOSIS — I10 ESSENTIAL HYPERTENSION: ICD-10-CM

## 2022-06-02 DIAGNOSIS — I42.7 CARDIOMYOPATHY DUE TO CHEMOTHERAPY: ICD-10-CM

## 2022-06-02 DIAGNOSIS — T45.1X5A CARDIOMYOPATHY DUE TO CHEMOTHERAPY: ICD-10-CM

## 2022-06-02 DIAGNOSIS — E66.01 MORBID OBESITY WITH BMI OF 40.0-44.9, ADULT: ICD-10-CM

## 2022-06-02 DIAGNOSIS — I50.22 CHRONIC SYSTOLIC HEART FAILURE: ICD-10-CM

## 2022-06-02 PROCEDURE — 99214 PR OFFICE/OUTPT VISIT, EST, LEVL IV, 30-39 MIN: ICD-10-PCS | Mod: S$GLB,,, | Performed by: STUDENT IN AN ORGANIZED HEALTH CARE EDUCATION/TRAINING PROGRAM

## 2022-06-02 PROCEDURE — 3074F PR MOST RECENT SYSTOLIC BLOOD PRESSURE < 130 MM HG: ICD-10-PCS | Mod: CPTII,S$GLB,, | Performed by: STUDENT IN AN ORGANIZED HEALTH CARE EDUCATION/TRAINING PROGRAM

## 2022-06-02 PROCEDURE — 3078F PR MOST RECENT DIASTOLIC BLOOD PRESSURE < 80 MM HG: ICD-10-PCS | Mod: CPTII,S$GLB,, | Performed by: STUDENT IN AN ORGANIZED HEALTH CARE EDUCATION/TRAINING PROGRAM

## 2022-06-02 PROCEDURE — 99214 OFFICE O/P EST MOD 30 MIN: CPT | Mod: S$GLB,,, | Performed by: STUDENT IN AN ORGANIZED HEALTH CARE EDUCATION/TRAINING PROGRAM

## 2022-06-02 PROCEDURE — 99999 PR PBB SHADOW E&M-EST. PATIENT-LVL III: CPT | Mod: PBBFAC,,, | Performed by: STUDENT IN AN ORGANIZED HEALTH CARE EDUCATION/TRAINING PROGRAM

## 2022-06-02 PROCEDURE — 1159F MED LIST DOCD IN RCRD: CPT | Mod: CPTII,S$GLB,, | Performed by: STUDENT IN AN ORGANIZED HEALTH CARE EDUCATION/TRAINING PROGRAM

## 2022-06-02 PROCEDURE — 93005 ELECTROCARDIOGRAM TRACING: CPT

## 2022-06-02 PROCEDURE — 3074F SYST BP LT 130 MM HG: CPT | Mod: CPTII,S$GLB,, | Performed by: STUDENT IN AN ORGANIZED HEALTH CARE EDUCATION/TRAINING PROGRAM

## 2022-06-02 PROCEDURE — 93010 EKG 12-LEAD: ICD-10-PCS | Mod: ,,, | Performed by: INTERNAL MEDICINE

## 2022-06-02 PROCEDURE — 3008F BODY MASS INDEX DOCD: CPT | Mod: CPTII,S$GLB,, | Performed by: STUDENT IN AN ORGANIZED HEALTH CARE EDUCATION/TRAINING PROGRAM

## 2022-06-02 PROCEDURE — 4010F ACE/ARB THERAPY RXD/TAKEN: CPT | Mod: CPTII,S$GLB,, | Performed by: STUDENT IN AN ORGANIZED HEALTH CARE EDUCATION/TRAINING PROGRAM

## 2022-06-02 PROCEDURE — 4010F PR ACE/ARB THEARPY RXD/TAKEN: ICD-10-PCS | Mod: CPTII,S$GLB,, | Performed by: STUDENT IN AN ORGANIZED HEALTH CARE EDUCATION/TRAINING PROGRAM

## 2022-06-02 PROCEDURE — 93010 ELECTROCARDIOGRAM REPORT: CPT | Mod: ,,, | Performed by: INTERNAL MEDICINE

## 2022-06-02 PROCEDURE — 3078F DIAST BP <80 MM HG: CPT | Mod: CPTII,S$GLB,, | Performed by: STUDENT IN AN ORGANIZED HEALTH CARE EDUCATION/TRAINING PROGRAM

## 2022-06-02 PROCEDURE — 1159F PR MEDICATION LIST DOCUMENTED IN MEDICAL RECORD: ICD-10-PCS | Mod: CPTII,S$GLB,, | Performed by: STUDENT IN AN ORGANIZED HEALTH CARE EDUCATION/TRAINING PROGRAM

## 2022-06-02 PROCEDURE — 99999 PR PBB SHADOW E&M-EST. PATIENT-LVL III: ICD-10-PCS | Mod: PBBFAC,,, | Performed by: STUDENT IN AN ORGANIZED HEALTH CARE EDUCATION/TRAINING PROGRAM

## 2022-06-02 PROCEDURE — 3008F PR BODY MASS INDEX (BMI) DOCUMENTED: ICD-10-PCS | Mod: CPTII,S$GLB,, | Performed by: STUDENT IN AN ORGANIZED HEALTH CARE EDUCATION/TRAINING PROGRAM

## 2022-06-02 RX ORDER — SACUBITRIL AND VALSARTAN 97; 103 MG/1; MG/1
1 TABLET, FILM COATED ORAL 2 TIMES DAILY
Qty: 180 TABLET | Refills: 3 | Status: SHIPPED | OUTPATIENT
Start: 2022-06-02 | End: 2023-06-02 | Stop reason: SDUPTHER

## 2022-06-02 RX ORDER — CARVEDILOL PHOSPHATE 80 MG/1
80 CAPSULE, EXTENDED RELEASE ORAL DAILY
Qty: 90 CAPSULE | Refills: 3 | Status: SHIPPED | OUTPATIENT
Start: 2022-06-02 | End: 2023-03-09

## 2022-06-02 NOTE — PROGRESS NOTES
Section of Cardiology                  Cardiac Clinic Note    Chief Complaint/Reason for consultation: follow up       HPI:   Roxanne Altamirano is a 66 y.o. female with h/o breast cancer s/p left mastectomy/chemo, CHF, obesity, HTN who comes in for follow up.    6/2/22  Was seeing Dr. NOLEN in cardiology clinic.  Still follows up with Oncology  Exercises regularly, goes to the gym about 3-4 times a week  Has knee pain   Denies tobacco abuse. ETOH rarely.  Denies CVA, DM    Denies SOB, chest pain, PND, orthopnea, PND.      EKG 6/2/22 NSR, cannot r/o anterior infarct, qtc 453 ms    ECHO  2020  · The left ventricle is normal in size with low normal systolic function. The estimated ejection fraction is 50%.  · Mild left atrial enlargement.  · Indeterminate diastolic function.  · Normal right ventricular systolic function.  · Mild right atrial enlargement.  · Normal central venous pressure (3 mmHg).  · The estimated PA systolic pressure is 29 mmHg.        STRESS TEST    [facility]      ROS: All 10 systems reviewed. Please refer to the HPI for pertinent positives. All other systems negative.     Past Medical History  Past Medical History:   Diagnosis Date    Arthritis of right knee     Breast cancer     She has a history of a Stage IIA, 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 She subsequently underwent left mastectomy and sentinel node biopsy followed by axillary dissection March 23, 2007. Histopathologic evaluation demonstrated again multiple foci of intracystic papillary carcinoma, as well as DCIS noncomed    Cardiomyopathy     CHEMO INDUCED- RESOLVED    CHF (congestive heart failure)     systolic    Hypertension     Uterine fibroid        Surgical History  Past Surgical History:   Procedure Laterality Date    BREAST BIOPSY      BREAST CYST EXCISION Right     BREAST SURGERY      CHOLECYSTECTOMY      COLONOSCOPY N/A  9/10/2018    Procedure: COLONOSCOPY;  Surgeon: Indra Ramos MD;  Location: [facility] ENDO;  Service: Endoscopy;  Laterality: N/A;    COLONOSCOPY N/A 4/28/2020    Procedure: COLONOSCOPY;  Surgeon: Dulce Maria Siegel MD;  Location: [facility] ENDO;  Service: Endoscopy;  Laterality: N/A;    ESOPHAGOGASTRODUODENOSCOPY N/A 4/28/2020    Procedure: EGD (ESOPHAGOGASTRODUODENOSCOPY);  Surgeon: Dulce Maria Siegel MD;  Location: [facility] ENDO;  Service: Endoscopy;  Laterality: N/A;    MASTECTOMY      left    MEDIPORT INSERTION, SINGLE            Allergies:   Review of patient's allergies indicates:  No Known Allergies    Social History:  Social History     Socioeconomic History    Marital status:    Tobacco Use    Smoking status: Never Smoker    Smokeless tobacco: Never Used   Substance and Sexual Activity    Alcohol use: Yes     Alcohol/week: 0.0 standard drinks     Comment: Ocassionally    Drug use: No    Sexual activity: Yes     Partners: Male     Birth control/protection: None, Post-menopausal       Family History:  family history includes Breast cancer in her maternal aunt; Cancer (age of onset: 55) in her maternal aunt; Diabetes in her father and mother; Stroke in her father and paternal aunt.    Home Medications:  Current Outpatient Medications on File Prior to Visit   Medication Sig Dispense Refill    carvedilol (COREG CR) 80 MG 24 hr capsule Take by mouth.      flu vacc [id number] 6mos up,PF, (FLUARIX QUAD [id number], PF,) 60 mcg (15 mcg x 4)/0.5 mL Syrg Inject 0.5 mLs into the muscle. 0.5 mL 0    levocetirizine (XYZAL) 5 MG tablet Take 1 tablet (5 mg total) by mouth every evening. 30 tablet 11    metoprolol succinate (TOPROL-XL) 100 MG 24 hr tablet Take 1 tablet (100 mg total) by mouth once daily. 30 tablet 11    pantoprazole (PROTONIX) 20 MG tablet Take 1 tablet (20 mg total) by mouth once daily. 90 tablet 3    sacubitriL-valsartan (ENTRESTO)  mg per tablet Take 1 tablet by mouth 2 (two) times daily.  60 tablet 6     No current facility-administered medications on file prior to visit.       Physical exam:  There were no vitals taken for this visit.        General: Pt is a 66 y.o. year old female who is AAOx3, in NAD, is pleasant, well nourished, looks stated age  HEENT: PERRL, EOMI, Oral mucosa pink & moist  CVS  No abnormal cardiac pulsations noted on inspection. JVP not raised. The apical impulse is normal on palpation, and is located in the left 5th intercostal space in the mid - clavicular line. No palpable thrills or abnormal pulsations noted. RR, S1 - S2 heard, no murmurs, rubs or gallops appreciated.   PUL : CTA B/L. No wheezes/crackles heard   ABD : BS +, soft. No tenderness elicited   LE : No C/C/E. Distal Pulses palpable B/L         LABS:    Chemistry:   Lab Results   Component Value Date     05/17/2022    K 4.0 05/17/2022     05/17/2022    CO2 26 05/17/2022    BUN 11 05/17/2022    CREATININE 0.8 05/17/2022    CALCIUM 8.8 05/17/2022     Cardiac Markers: No results found for: CKTOTAL, CKMB, CKMBINDEX, TROPONINI  Cardiac Markers (Last 3): No results found for: CKTOTAL, CKMB, CKMBINDEX, TROPONINI  CBC:   Lab Results   Component Value Date    WBC 4.13 05/17/2022    HGB 11.9 (L) 05/17/2022    HCT 39.1 05/17/2022    MCV 80 (L) 05/17/2022     05/17/2022     Lipids:   Lab Results   Component Value Date    CHOL 234 (H) 05/17/2022    TRIG 80 05/17/2022    HDL 79 (H) 05/17/2022     Coagulation: No results found for: PT, INR, APTT        Assessment      1. NYHA class 2 and LINDA/AHA stage C chronic systolic congestive heart failure    2. Essential hypertension    3. Morbid obesity with BMI of 40.0-44.9, adult         Plan:      Congestive heart failure  NYHA class 2, stage C  Compensated  Continue Entresto and carvedilol    HTN  Borderline low, no symptoms   as of 5/22, 10 year ASCD risk 4.6%  Continue diet and exercise    Obesity, BMI 35-39.9  Exercise as tolerated, at least 30 minutes  daily  Low-salt, low-fat diet  Weight loss encouraged      This note was prepared using voice recognition system and is likely to have sound alike errors that may have been overlooked even after proofreading.     I have reviewed all pertinent chart information.  Plans and recommendations have been formulated under my direct supervision. All questions answered and patient voiced understanding.   If symptoms persist go to the ED.    RTC in 1 year        Jelly Arguelles MD  Cardiology

## 2022-06-30 ENCOUNTER — OFFICE VISIT (OUTPATIENT)
Dept: URGENT CARE | Facility: CLINIC | Age: 67
End: 2022-06-30
Payer: COMMERCIAL

## 2022-06-30 ENCOUNTER — HOSPITAL ENCOUNTER (OUTPATIENT)
Dept: RADIOLOGY | Facility: CLINIC | Age: 67
Discharge: HOME | End: 2022-06-30
Attending: PHYSICIAN ASSISTANT
Payer: COMMERCIAL

## 2022-06-30 VITALS
RESPIRATION RATE: 18 BRPM | TEMPERATURE: 98 F | WEIGHT: 235 LBS | HEART RATE: 84 BPM | SYSTOLIC BLOOD PRESSURE: 117 MMHG | HEIGHT: 64 IN | DIASTOLIC BLOOD PRESSURE: 64 MMHG | BODY MASS INDEX: 40.12 KG/M2 | OXYGEN SATURATION: 99 %

## 2022-06-30 DIAGNOSIS — R05.3 CHRONIC COUGH: Primary | ICD-10-CM

## 2022-06-30 DIAGNOSIS — R05.3 CHRONIC COUGH: ICD-10-CM

## 2022-06-30 PROCEDURE — 99203 OFFICE O/P NEW LOW 30 MIN: CPT | Mod: S$GLB,,, | Performed by: PHYSICIAN ASSISTANT

## 2022-06-30 PROCEDURE — 3074F SYST BP LT 130 MM HG: CPT | Mod: CPTII,S$GLB,, | Performed by: PHYSICIAN ASSISTANT

## 2022-06-30 PROCEDURE — 4010F ACE/ARB THERAPY RXD/TAKEN: CPT | Mod: CPTII,S$GLB,, | Performed by: PHYSICIAN ASSISTANT

## 2022-06-30 PROCEDURE — 3078F PR MOST RECENT DIASTOLIC BLOOD PRESSURE < 80 MM HG: ICD-10-PCS | Mod: CPTII,S$GLB,, | Performed by: PHYSICIAN ASSISTANT

## 2022-06-30 PROCEDURE — 71046 XR CHEST PA AND LATERAL: ICD-10-PCS | Mod: S$GLB,,, | Performed by: RADIOLOGY

## 2022-06-30 PROCEDURE — 1160F PR REVIEW ALL MEDS BY PRESCRIBER/CLIN PHARMACIST DOCUMENTED: ICD-10-PCS | Mod: CPTII,S$GLB,, | Performed by: PHYSICIAN ASSISTANT

## 2022-06-30 PROCEDURE — 1159F MED LIST DOCD IN RCRD: CPT | Mod: CPTII,S$GLB,, | Performed by: PHYSICIAN ASSISTANT

## 2022-06-30 PROCEDURE — 3008F PR BODY MASS INDEX (BMI) DOCUMENTED: ICD-10-PCS | Mod: CPTII,S$GLB,, | Performed by: PHYSICIAN ASSISTANT

## 2022-06-30 PROCEDURE — 3074F PR MOST RECENT SYSTOLIC BLOOD PRESSURE < 130 MM HG: ICD-10-PCS | Mod: CPTII,S$GLB,, | Performed by: PHYSICIAN ASSISTANT

## 2022-06-30 PROCEDURE — 99203 PR OFFICE/OUTPT VISIT, NEW, LEVL III, 30-44 MIN: ICD-10-PCS | Mod: S$GLB,,, | Performed by: PHYSICIAN ASSISTANT

## 2022-06-30 PROCEDURE — 3078F DIAST BP <80 MM HG: CPT | Mod: CPTII,S$GLB,, | Performed by: PHYSICIAN ASSISTANT

## 2022-06-30 PROCEDURE — 4010F PR ACE/ARB THEARPY RXD/TAKEN: ICD-10-PCS | Mod: CPTII,S$GLB,, | Performed by: PHYSICIAN ASSISTANT

## 2022-06-30 PROCEDURE — 71046 X-RAY EXAM CHEST 2 VIEWS: CPT | Mod: S$GLB,,, | Performed by: RADIOLOGY

## 2022-06-30 PROCEDURE — 1160F RVW MEDS BY RX/DR IN RCRD: CPT | Mod: CPTII,S$GLB,, | Performed by: PHYSICIAN ASSISTANT

## 2022-06-30 PROCEDURE — 1159F PR MEDICATION LIST DOCUMENTED IN MEDICAL RECORD: ICD-10-PCS | Mod: CPTII,S$GLB,, | Performed by: PHYSICIAN ASSISTANT

## 2022-06-30 PROCEDURE — 3008F BODY MASS INDEX DOCD: CPT | Mod: CPTII,S$GLB,, | Performed by: PHYSICIAN ASSISTANT

## 2022-06-30 RX ORDER — FLUTICASONE PROPIONATE 50 MCG
1 SPRAY, SUSPENSION (ML) NASAL DAILY
Qty: 11.1 ML | Refills: 0 | Status: SHIPPED | OUTPATIENT
Start: 2022-06-30

## 2022-06-30 RX ORDER — BENZONATATE 100 MG/1
200 CAPSULE ORAL 3 TIMES DAILY PRN
Qty: 60 CAPSULE | Refills: 0 | Status: SHIPPED | OUTPATIENT
Start: 2022-06-30 | End: 2023-04-24

## 2022-06-30 NOTE — PROGRESS NOTES
"Subjective:       Patient ID: Roxanne Altamirano is a 66 y.o. female.    Vitals:  height is 5' 4" (1.626 m) and weight is 106.6 kg (235 lb 0.2 oz). Her tympanic temperature is 98 °F (36.7 °C). Her blood pressure is 117/64 and her pulse is 84. Her respiration is 18 and oxygen saturation is 99%.     Chief Complaint: Cough    Patient is a 66 year old female who presents with a 3-4 month history of cough. Patient states when she has cough on off both day and night though it does not keep her up at night. She states in the morning she has the most cough and coughs up sputum that is clear. She states she has an associated post nasal drip and nasal congestion chronically as well. She states she does not have any wheezing, shortness of breaht, or chest pain. She states in the mornings she often coughs up some mucus and this morning she was coughing and gagging and after the third round she noticed blood tinged sputum. She denies copious bloody sputum. She states she has never had similar symptoms before.     Cough  This is a new problem. The current episode started today. The problem has been unchanged. The cough is productive of blood-tinged sputum. Associated symptoms include hemoptysis and postnasal drip. Pertinent negatives include no chest pain, chills, ear congestion, ear pain, fever, headaches, heartburn, myalgias, nasal congestion, rash, rhinorrhea, sore throat, shortness of breath, sweats, weight loss or wheezing. She has tried nothing for the symptoms.       Constitution: Negative for chills and fever.   HENT: Positive for congestion and postnasal drip. Negative for ear pain, sinus pain, sinus pressure, sore throat and trouble swallowing.    Neck: Negative for painful lymph nodes.   Cardiovascular: Negative for chest pain.   Respiratory: Positive for cough and bloody sputum. Negative for sputum production, shortness of breath and wheezing.    Gastrointestinal: Negative for abdominal pain, nausea, vomiting, diarrhea and " heartburn.   Musculoskeletal: Negative for muscle ache.   Skin: Negative for rash.   Neurological: Negative for headaches.   Hematologic/Lymphatic: Negative for swollen lymph nodes.       Objective:      Physical Exam   Constitutional: She is oriented to person, place, and time. She appears well-developed. She is cooperative.  Non-toxic appearance. She does not appear ill. No distress.   HENT:   Head: Normocephalic and atraumatic.   Ears:   Right Ear: Hearing, tympanic membrane, external ear and ear canal normal.   Left Ear: Hearing, tympanic membrane, external ear and ear canal normal.   Nose: Nose normal. No mucosal edema, rhinorrhea, nasal deformity or congestion. No epistaxis. Right sinus exhibits no maxillary sinus tenderness and no frontal sinus tenderness. Left sinus exhibits no maxillary sinus tenderness and no frontal sinus tenderness.   Mouth/Throat: Uvula is midline, oropharynx is clear and moist and mucous membranes are normal. No trismus in the jaw. Normal dentition. No uvula swelling. No oropharyngeal exudate, posterior oropharyngeal edema or posterior oropharyngeal erythema.   Eyes: Conjunctivae and lids are normal. No scleral icterus.   Neck: Trachea normal and phonation normal. Neck supple. No edema present. No erythema present. No neck rigidity present.   Cardiovascular: Normal rate, regular rhythm, S1 normal, S2 normal, normal heart sounds and normal pulses.   No murmur heard.  Pulmonary/Chest: Effort normal and breath sounds normal. No accessory muscle usage. No tachypnea. No respiratory distress. She has no decreased breath sounds. She has no wheezes. She has no rhonchi. She has no rales.   Abdominal: Normal appearance.   Musculoskeletal: Normal range of motion.         General: No deformity. Normal range of motion.      Right lower leg: No edema.      Left lower leg: No edema.   Lymphadenopathy:     She has no cervical adenopathy.   Neurological: She is alert and oriented to person, place, and  time. She exhibits normal muscle tone. Coordination normal.   Skin: Skin is warm, dry, intact, not diaphoretic and not pale.   Psychiatric: Her speech is normal and behavior is normal. Judgment and thought content normal.   Nursing note and vitals reviewed.    X-Ray Chest PA And Lateral    Result Date: 6/30/2022  EXAMINATION: XR CHEST PA AND LATERAL CLINICAL HISTORY: cough chornic, hemoptysis; Chronic cough TECHNIQUE: PA and lateral views of the chest were performed. COMPARISON: 05/18/2007 FINDINGS: Cardiac silhouette and bronchovascular markings are likely accentuated by low lung volumes.  Cannot exclude mild cardiomegaly and mild pulmonary vascular congestion.  No focal parenchymal consolidation or definite pleural effusion visualized.  Postoperative changes are again noted involving the left chest wall and axilla as well as the right upper abdominal quadrant.  No acute osseous findings demonstrated.     As above Electronically signed by: Calvin Martinez MD Date:    06/30/2022 Time:    14:02        Assessment:       1. Chronic cough          Plan:         Chronic cough  -     X-Ray Chest PA And Lateral; Future; Expected date: 06/30/2022    Patient has known CHF but denies shortness of breath, chest pain, orthopnea, or peripheral edema. Lungs are clear on exam. No peripheral pitting edema is noted. CXR as above.  Discussed likely morning coughing is due to post nasal drip from allergies. Hemoptysis likely secondary to forceful coughing. Discussed otc anti-histamines, tessalon Perles, and flonase. Discussed strict ER precautions for worsening or changing symptoms. Discussed follow up with PCP in 1 week for continued care.     Patient verbalized understanding and agrees with plan.     Gail Pham PA-C    Patient Instructions   PLEASE READ YOUR DISCHARGE INSTRUCTIONS ENTIRELY AS IT CONTAINS IMPORTANT INFORMATION.      Please drink plenty of fluids.    Please get plenty of rest.    Please return here or go to the  Emergency Department for any concerns or worsening of condition.    Please take an over the counter antihistamine medication (allegra/Claritin/Zyrtec) of your choice as directed.    Try an over the counter decongestant like Mucinex D or Sudafed. You buy this behind the pharmacy counter    If you do have Hypertension or palpitations, it is safe to take Coricidin HBP for relief of sinus symptoms.    If not allergic, please take over the counter Tylenol (Acetaminophen) and/or Motrin (Ibuprofen) as directed for control of pain and/or fever.  Please follow up with your primary care doctor or specialist as needed.    Sore throat recommendations: Warm fluids, warm salt water gargles, throat lozenges, tea, honey, soup, rest, hydration.    Use over the counter flonase: one spray each nostril twice daily OR two sprays each nostril once daily.     Sinus rinses DO NOT USE TAP WATER, if you must, water must be a rolling boil for 1 minute, let it cool, then use.  May use distilled water, or over the counter nasal saline rinses.  Vics vapor rub in shower to help open nasal passages.  May use nasal gel to keep passages moisturized.  May use Nasal saline sprays during the day for added relief of congestion.   For those who go to the gym, please do not use the sauna or steam room now to clear sinuses.    If you  smoke, please stop smoking.      Please return or see your primary care doctor if you develop new or worsening symptoms.     Please arrange follow up with your primary medical clinic as soon as possible. You must understand that you've received an Urgent Care treatment only and that you may be released before all of your medical problems are known or treated. You, the patient, will arrange for follow up as instructed. If your symptoms worsen or fail to improve you should go to the Emergency Room.

## 2022-07-03 ENCOUNTER — TELEPHONE (OUTPATIENT)
Dept: URGENT CARE | Facility: CLINIC | Age: 67
End: 2022-07-03
Payer: COMMERCIAL

## 2022-07-12 ENCOUNTER — HOSPITAL ENCOUNTER (OUTPATIENT)
Dept: RADIOLOGY | Facility: HOSPITAL | Age: 67
Discharge: HOME | End: 2022-07-12
Attending: NURSE PRACTITIONER
Payer: COMMERCIAL

## 2022-07-12 DIAGNOSIS — C50.412 MALIGNANT NEOPLASM OF UPPER-OUTER QUADRANT OF LEFT BREAST IN FEMALE, ESTROGEN RECEPTOR POSITIVE: ICD-10-CM

## 2022-07-12 DIAGNOSIS — Z17.0 MALIGNANT NEOPLASM OF UPPER-OUTER QUADRANT OF LEFT BREAST IN FEMALE, ESTROGEN RECEPTOR POSITIVE: ICD-10-CM

## 2022-07-12 PROCEDURE — 77063 BREAST TOMOSYNTHESIS BI: CPT | Mod: TC

## 2022-07-12 PROCEDURE — 77067 SCR MAMMO BI INCL CAD: CPT | Mod: 26,52,, | Performed by: RADIOLOGY

## 2022-07-12 PROCEDURE — 77063 MAMMO DIGITAL SCREENING RIGHT WITH TOMO: ICD-10-PCS | Mod: 26,52,, | Performed by: RADIOLOGY

## 2022-07-12 PROCEDURE — 77067 MAMMO DIGITAL SCREENING RIGHT WITH TOMO: ICD-10-PCS | Mod: 26,52,, | Performed by: RADIOLOGY

## 2022-07-12 PROCEDURE — 77063 BREAST TOMOSYNTHESIS BI: CPT | Mod: 26,52,, | Performed by: RADIOLOGY

## 2022-07-20 ENCOUNTER — OFFICE VISIT (OUTPATIENT)
Dept: DERMATOLOGY | Facility: CLINIC | Age: 67
End: 2022-07-20
Payer: COMMERCIAL

## 2022-07-20 DIAGNOSIS — L82.1 SEBORRHEIC KERATOSIS: Primary | ICD-10-CM

## 2022-07-20 PROCEDURE — 99999 PR PBB SHADOW E&M-EST. PATIENT-LVL II: CPT | Mod: PBBFAC,,, | Performed by: DERMATOLOGY

## 2022-07-20 PROCEDURE — 1160F RVW MEDS BY RX/DR IN RCRD: CPT | Mod: CPTII,S$GLB,, | Performed by: DERMATOLOGY

## 2022-07-20 PROCEDURE — 1159F PR MEDICATION LIST DOCUMENTED IN MEDICAL RECORD: ICD-10-PCS | Mod: CPTII,S$GLB,, | Performed by: DERMATOLOGY

## 2022-07-20 PROCEDURE — 99203 PR OFFICE/OUTPT VISIT, NEW, LEVL III, 30-44 MIN: ICD-10-PCS | Mod: S$GLB,,, | Performed by: DERMATOLOGY

## 2022-07-20 PROCEDURE — 4010F PR ACE/ARB THEARPY RXD/TAKEN: ICD-10-PCS | Mod: CPTII,S$GLB,, | Performed by: DERMATOLOGY

## 2022-07-20 PROCEDURE — 1159F MED LIST DOCD IN RCRD: CPT | Mod: CPTII,S$GLB,, | Performed by: DERMATOLOGY

## 2022-07-20 PROCEDURE — 1160F PR REVIEW ALL MEDS BY PRESCRIBER/CLIN PHARMACIST DOCUMENTED: ICD-10-PCS | Mod: CPTII,S$GLB,, | Performed by: DERMATOLOGY

## 2022-07-20 PROCEDURE — 4010F ACE/ARB THERAPY RXD/TAKEN: CPT | Mod: CPTII,S$GLB,, | Performed by: DERMATOLOGY

## 2022-07-20 PROCEDURE — 99999 PR PBB SHADOW E&M-EST. PATIENT-LVL II: ICD-10-PCS | Mod: PBBFAC,,, | Performed by: DERMATOLOGY

## 2022-07-20 PROCEDURE — 99203 OFFICE O/P NEW LOW 30 MIN: CPT | Mod: S$GLB,,, | Performed by: DERMATOLOGY

## 2022-07-20 RX ORDER — FLUOCINONIDE 0.5 MG/G
OINTMENT TOPICAL
Qty: 30 G | Refills: 0 | Status: SHIPPED | OUTPATIENT
Start: 2022-07-20 | End: 2023-04-24

## 2022-07-20 NOTE — PROGRESS NOTES
Subjective:       Patient ID:  Roxanne Altamirano is a 66 y.o. female who presents for   Chief Complaint   Patient presents with    Mole     Hx of DPN, last seen on 11/23/15.  She c/o several lesions of the face x several years.  + growth, asymptomatic.       Review of Systems   Constitutional: Negative for fever and chills.   Gastrointestinal: Negative for nausea and vomiting.   Skin: Positive for activity-related sunscreen use. Negative for daily sunscreen use and recent sunburn.   Hematologic/Lymphatic: Does not bruise/bleed easily.        Objective:    Physical Exam   Constitutional: She appears well-developed and well-nourished. No distress.   Neurological: She is alert and oriented to person, place, and time. She is not disoriented.   Psychiatric: She has a normal mood and affect.   Skin:   Areas Examined (abnormalities noted in diagram):   Head / Face Inspection Performed  Neck Inspection Performed  RUE Inspected  LUE Inspection Performed              Diagram Legend      Pigmented verrucoid papule/plaque c/w seborrheic keratosis         Assessment / Plan:        Seborrheic keratosis  -     fluocinonide (LIDEX) 0.05 % ointment; AAA bid x 2 weeks.  Use with sunscreen if outdoors.  Dispense: 30 g; Refill: 0  Procedure note for destruction via hyfrecation and curettage:    Risks, benefits and alternatives discussed including, pain, infection, allergic reaction to anesthesia, scarring, dyschromia, recurrence and infection.  Written and verbal consent obtained. 2 lesions cleaned with alcohol and anesthetized with 1.5 cc of 1% lidocaine with epinephrine. Areas then lightly hyfrecated and curetted to remove gross lesion. No complications. Areas dressed with vaseline ointment.  Will start fluocinonide ointment bid x 2 weeks.  Pt instructed to avoid sunlight and to sunscreen daily. Wound care discussed and AVS given.           Follow up if symptoms worsen or fail to improve.

## 2022-07-20 NOTE — PROGRESS NOTES
History of Present Illness: The patient presents with chief complaint of moles.  Location: face  Duration: years  Signs/Symptoms: n/a    Prior treatments: removal (years)

## 2022-07-25 ENCOUNTER — TELEPHONE (OUTPATIENT)
Dept: UROLOGY | Facility: CLINIC | Age: 67
End: 2022-07-25
Payer: COMMERCIAL

## 2022-07-25 DIAGNOSIS — N28.1 RENAL CYST: Primary | ICD-10-CM

## 2022-07-25 NOTE — TELEPHONE ENCOUNTER
Returned call and rescheduled US and f/u appt. Pt confirmed new appt dates, times and locations. Pt was thankful, CHARLA KIDD.     ----- Message from Juli Garrison sent at 7/25/2022 12:10 PM CDT -----  Contact: Roxanne  Patient is calling back to rescheduled both the ultrasound and office visit that was cancelled due to provider. Patient is requesting a call back at [phone] today to reschedule both.  Thanks,  RP

## 2022-08-04 NOTE — PROGRESS NOTES
amSubjective:       Patient ID: Roxanne Altamirano is a 66 y.o. female.    Chief Complaint: back pain  HPI:  Patient presents for breast cancer surveillance. Last visit with me in clinic was 5/17/2022.    Last mammo 7/12/2022- right breast wnl    Last dexa 7/2021- osteopenia- taking vit d and calcium    Interval history:  Pt had cough and sinus congestion end of June 2022- had one episode of hemoptysis - went to urgent care- CXR revealed evidence of mild cardiomegaly and pulmonary vascular congestion for which pt has known CHF    Cough resolved and no other episode of hemoptysis    Had a flair in her back pain a couple of weeks ago on the left lateral side- took zanaflex and went away- has not reoccured    Labs in May 2022 revealed stable cbc, mild elevation in bilirubin and mild elevation in cholesterol. TSH and ferritin normal    History of dyspepsia and bloating- change in bm- pt was referred to GI 4/2020  CT showed minimal thickening and inflammatory changes seen within the ascending and transverse colon.  Mild colitis not excluded. Mild fatty infiltration seen within the wall of the right and transverse colon which can be seen with chronic inflammatory processes.  Mild constipation within the ascending and transverse colon.      History of mild iron def anemia. Known thalassemia      Pt was scheduled for upper and lower endoscopy by GI- postponed due to the pandemic and was rescheduled for 4/28/20  Last colonoscopy- and egd- 4/28/20  - Normal duodenal bulb and second portion of the duodenum.                         - Chronic gastritis with hemorrhage. Biopsied.                         - 4 cm hiatal hernia.                         - Z-line irregular, 35 cm from the incisors.                         Biopsied.                         - LA Grade A esophagitis.                         - Normal upper third of esophagus and middle third                         of esophagus.     The examined portion of the ileum was normal.                          - A single colonic angiodysplastic lesion. Treated                         with a monopolar probe.                         - Moderate diverticulosis in the sigmoid colon and                         in the descending colon. There was no evidence of                         diverticular bleeding.                         - Hemorrhoids found on perianal exam.                         - The examination was otherwise normal.                         - No specimens collected.          Denies any UTI symptoms- no hematuria or dysuria. History of hematuria- left upper pole renal cyst. 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 She subsequently underwent left mastectomy and sentinel node biopsy followed by axillary dissection  March 23, 2007. Histopathologic evaluation demonstrated again multiple foci of intracystic papillary carcinoma, as well as DCIS noncomedo type. One of three sentinel lymph nodes demonstrated micrometastatic disease. Twelve additional axillary lymph nodes were obtained, all of which were negative for disease. Estrogen receptors were 11%. Progesterone receptors 4%. HER2/bob was not over-expressed. She subsequently underwent additional studies including a MUGA scan demonstrating an injection  fraction of 39%, thus she was treated with six cycles of Cytoxan and Taxotere which were well tolerated.     9/28/07 hormone studies suggested the pt was menopausal so she was started on Arimidex for post adjuvant therapy. Therapy completed 9/2012.     History of chemo induced cardiomyopathy. CHF- followed by Cardiology-      Last mammo- 7/12/2022- right- mammo - wnl    Left axilla ultrasound 4/16/19- 2 small nonenlarged lymph nodes    Last gyn- 4/27/2021- Mariposa Liam FNP    Up to date with covid vaccines-     Pt denies any pain or changes.      Review of Systems   Constitutional: Negative.    HENT: Negative.    Eyes: Negative.    Respiratory: Negative.    Cardiovascular: Negative.    Gastrointestinal: Negative.    Endocrine: Negative.    Genitourinary: Negative.    Musculoskeletal: Positive for back pain.   Skin: Negative.    Allergic/Immunologic: Negative.    Neurological: Negative.    Hematological: Negative.  Negative for adenopathy.   Psychiatric/Behavioral: Negative.        Breast: Pt denies any breast pain, nipple discharge or palpable abnormality. No chest wall mass or changes.     Objective:      Physical Exam  Constitutional:       Appearance: She is well-developed. She is obese.   HENT:      Head: Normocephalic and atraumatic.      Right Ear: External ear normal.      Left Ear: External ear normal.      Mouth/Throat:      Pharynx: No oropharyngeal exudate.   Eyes:      General: No scleral icterus.        Right eye: No discharge.         Left eye: No discharge.      Conjunctiva/sclera: Conjunctivae normal.      Pupils: Pupils are equal, round, and reactive to light.   Neck:      Thyroid: No thyromegaly.   Cardiovascular:      Rate and Rhythm: Normal rate and regular rhythm.      Pulses: Normal pulses.      Heart sounds: Normal heart sounds.   Pulmonary:      Effort: Pulmonary effort is normal.      Breath sounds: Normal breath sounds.   Chest:   Breasts:      Right: No inverted nipple, mass, nipple discharge, skin change, tenderness, axillary adenopathy or supraclavicular adenopathy.      Left: No inverted nipple, mass, nipple discharge, skin change, tenderness, axillary adenopathy or supraclavicular adenopathy.            Comments: No visible mass or left chest wall changes  Abdominal:      General: Bowel sounds are normal. There is no distension.      Palpations: Abdomen is soft. Abdomen is not rigid. There is no mass.      Tenderness: There is no abdominal tenderness. There is no guarding or rebound. Negative signs include Rockwell's sign and  McBurney's sign.      Hernia: No hernia is present.   Musculoskeletal:         General: Normal range of motion.      Right shoulder: No crepitus. Normal strength.      Cervical back: Normal range of motion and neck supple.   Lymphadenopathy:      Head:      Right side of head: No submental, submandibular, tonsillar, preauricular, posterior auricular or occipital adenopathy.      Left side of head: No submental, submandibular, tonsillar, preauricular, posterior auricular or occipital adenopathy.      Cervical: No cervical adenopathy.      Right cervical: No superficial or posterior cervical adenopathy.     Left cervical: No superficial or posterior cervical adenopathy.      Upper Body:      Right upper body: No supraclavicular or axillary adenopathy.      Left upper body: No supraclavicular or axillary adenopathy.   Skin:     General: Skin is warm and dry.      Coloration: Skin is not pale.      Findings: No erythema or rash.   Neurological:      General: No focal deficit present.      Mental Status: She is alert and oriented to person, place, and time.      Deep Tendon Reflexes: Reflexes are normal and symmetric.   Psychiatric:         Mood and Affect: Mood normal.         Behavior: Behavior normal.         Thought Content: Thought content normal.         Judgment: Judgment normal.         Assessment:       1. Malignant neoplasm of upper-outer quadrant of left breast in female, estrogen receptor positive    2. Alpha thalassemia trait    3. Iron deficiency anemia due to chronic blood loss    4. Encounter for follow-up surveillance of breast cancer    5. Counseling and coordination of care    6. Counseling on health promotion and disease prevention    7. Health education/counseling    8. Encounter for breast cancer screening using non-mammogram modality        Plan:      1. Breast cancer as previously described. Arimidex therapy completed X 5 years. Negative clinical exam  2. Heart failure stable and followed by  cardiology.  3. Obesity. Encouraged pt to continue with exercise and dietary changes. Walking in neighborhood 3 times a week for 30 min- walks on treadmill at the gym  4. hematuria workup benign- in past- left upper pole renal cyst- Has f/u with urology routinely  5. Change in bowel movement character- Upper and lower endoscopy revealed esophogitis, hiatal hernia, colon angiodysplastic lesion and diverticulosis- symptoms improved with pepcid- 4/28/2020- rec 10 year f/u colon  6. Mildly elevated bilirubin 2/2017 and again in May 2022- will repeat cmp today  8. History of alpha thalassemia trait - continue to follow labs  9. Mammo  7/12/2022 - right breast- wnl- due again 7/2023  10. Cbc, cmp, today  11. RTC one year July 2023 - cbc, cmp and right mammo and Feb 2023 for exam only

## 2022-08-18 ENCOUNTER — LAB VISIT (OUTPATIENT)
Dept: LAB | Facility: HOSPITAL | Age: 67
End: 2022-08-18
Attending: INTERNAL MEDICINE
Payer: MEDICARE

## 2022-08-18 ENCOUNTER — OFFICE VISIT (OUTPATIENT)
Dept: HEMATOLOGY/ONCOLOGY | Facility: CLINIC | Age: 67
End: 2022-08-18
Payer: MEDICARE

## 2022-08-18 VITALS
RESPIRATION RATE: 16 BRPM | WEIGHT: 235.88 LBS | HEART RATE: 74 BPM | SYSTOLIC BLOOD PRESSURE: 115 MMHG | BODY MASS INDEX: 40.27 KG/M2 | TEMPERATURE: 97 F | HEIGHT: 64 IN | DIASTOLIC BLOOD PRESSURE: 84 MMHG | OXYGEN SATURATION: 98 %

## 2022-08-18 DIAGNOSIS — Z08 ENCOUNTER FOR FOLLOW-UP SURVEILLANCE OF BREAST CANCER: ICD-10-CM

## 2022-08-18 DIAGNOSIS — C50.412 MALIGNANT NEOPLASM OF UPPER-OUTER QUADRANT OF LEFT BREAST IN FEMALE, ESTROGEN RECEPTOR POSITIVE: Primary | ICD-10-CM

## 2022-08-18 DIAGNOSIS — D50.0 IRON DEFICIENCY ANEMIA DUE TO CHRONIC BLOOD LOSS: ICD-10-CM

## 2022-08-18 DIAGNOSIS — D56.3 ALPHA THALASSEMIA TRAIT: ICD-10-CM

## 2022-08-18 DIAGNOSIS — Z12.39 ENCOUNTER FOR BREAST CANCER SCREENING USING NON-MAMMOGRAM MODALITY: ICD-10-CM

## 2022-08-18 DIAGNOSIS — Z17.0 MALIGNANT NEOPLASM OF UPPER-OUTER QUADRANT OF LEFT BREAST IN FEMALE, ESTROGEN RECEPTOR POSITIVE: ICD-10-CM

## 2022-08-18 DIAGNOSIS — Z85.3 ENCOUNTER FOR FOLLOW-UP SURVEILLANCE OF BREAST CANCER: ICD-10-CM

## 2022-08-18 DIAGNOSIS — Z71.9 HEALTH EDUCATION/COUNSELING: ICD-10-CM

## 2022-08-18 DIAGNOSIS — Z71.89 COUNSELING AND COORDINATION OF CARE: ICD-10-CM

## 2022-08-18 DIAGNOSIS — Z17.0 MALIGNANT NEOPLASM OF UPPER-OUTER QUADRANT OF LEFT BREAST IN FEMALE, ESTROGEN RECEPTOR POSITIVE: Primary | ICD-10-CM

## 2022-08-18 DIAGNOSIS — Z71.89 COUNSELING ON HEALTH PROMOTION AND DISEASE PREVENTION: ICD-10-CM

## 2022-08-18 DIAGNOSIS — C50.412 MALIGNANT NEOPLASM OF UPPER-OUTER QUADRANT OF LEFT BREAST IN FEMALE, ESTROGEN RECEPTOR POSITIVE: ICD-10-CM

## 2022-08-18 LAB
ALBUMIN SERPL BCP-MCNC: 3.6 G/DL (ref 3.5–5.2)
ALP SERPL-CCNC: 83 U/L (ref 55–135)
ALT SERPL W/O P-5'-P-CCNC: 19 U/L (ref 10–44)
ANION GAP SERPL CALC-SCNC: 9 MMOL/L (ref 8–16)
AST SERPL-CCNC: 15 U/L (ref 10–40)
BASOPHILS # BLD AUTO: 0.03 K/UL (ref 0–0.2)
BASOPHILS NFR BLD: 0.7 % (ref 0–1.9)
BILIRUB SERPL-MCNC: 0.9 MG/DL (ref 0.1–1)
BUN SERPL-MCNC: 12 MG/DL (ref 8–23)
CALCIUM SERPL-MCNC: 9.2 MG/DL (ref 8.7–10.5)
CHLORIDE SERPL-SCNC: 105 MMOL/L (ref 95–110)
CO2 SERPL-SCNC: 26 MMOL/L (ref 23–29)
CREAT SERPL-MCNC: 0.8 MG/DL (ref 0.5–1.4)
DIFFERENTIAL METHOD: ABNORMAL
EOSINOPHIL # BLD AUTO: 0.3 K/UL (ref 0–0.5)
EOSINOPHIL NFR BLD: 6.2 % (ref 0–8)
ERYTHROCYTE [DISTWIDTH] IN BLOOD BY AUTOMATED COUNT: 14.6 % (ref 11.5–14.5)
EST. GFR  (NO RACE VARIABLE): >60 ML/MIN/1.73 M^2
GLUCOSE SERPL-MCNC: 107 MG/DL (ref 70–110)
HCT VFR BLD AUTO: 37.4 % (ref 37–48.5)
HGB BLD-MCNC: 11.5 G/DL (ref 12–16)
IMM GRANULOCYTES # BLD AUTO: 0 K/UL (ref 0–0.04)
IMM GRANULOCYTES NFR BLD AUTO: 0 % (ref 0–0.5)
LYMPHOCYTES # BLD AUTO: 1.2 K/UL (ref 1–4.8)
LYMPHOCYTES NFR BLD: 29.4 % (ref 18–48)
MCH RBC QN AUTO: 24.1 PG (ref 27–31)
MCHC RBC AUTO-ENTMCNC: 30.7 G/DL (ref 32–36)
MCV RBC AUTO: 78 FL (ref 82–98)
MONOCYTES # BLD AUTO: 0.3 K/UL (ref 0.3–1)
MONOCYTES NFR BLD: 7.3 % (ref 4–15)
NEUTROPHILS # BLD AUTO: 2.4 K/UL (ref 1.8–7.7)
NEUTROPHILS NFR BLD: 56.4 % (ref 38–73)
NRBC BLD-RTO: 0 /100 WBC
PLATELET # BLD AUTO: 259 K/UL (ref 150–450)
PMV BLD AUTO: 9.8 FL (ref 9.2–12.9)
POTASSIUM SERPL-SCNC: 4.3 MMOL/L (ref 3.5–5.1)
PROT SERPL-MCNC: 7.3 G/DL (ref 6–8.4)
RBC # BLD AUTO: 4.77 M/UL (ref 4–5.4)
SODIUM SERPL-SCNC: 140 MMOL/L (ref 136–145)
WBC # BLD AUTO: 4.22 K/UL (ref 3.9–12.7)

## 2022-08-18 PROCEDURE — 85025 COMPLETE CBC W/AUTO DIFF WBC: CPT | Performed by: NURSE PRACTITIONER

## 2022-08-18 PROCEDURE — 99999 PR PBB SHADOW E&M-EST. PATIENT-LVL III: CPT | Mod: PBBFAC,,, | Performed by: NURSE PRACTITIONER

## 2022-08-18 PROCEDURE — 99214 OFFICE O/P EST MOD 30 MIN: CPT | Mod: S$PBB,,, | Performed by: NURSE PRACTITIONER

## 2022-08-18 PROCEDURE — 99213 OFFICE O/P EST LOW 20 MIN: CPT | Mod: PBBFAC | Performed by: NURSE PRACTITIONER

## 2022-08-18 PROCEDURE — 99999 PR PBB SHADOW E&M-EST. PATIENT-LVL III: ICD-10-PCS | Mod: PBBFAC,,, | Performed by: NURSE PRACTITIONER

## 2022-08-18 PROCEDURE — 99214 PR OFFICE/OUTPT VISIT, EST, LEVL IV, 30-39 MIN: ICD-10-PCS | Mod: S$PBB,,, | Performed by: NURSE PRACTITIONER

## 2022-08-18 PROCEDURE — 80053 COMPREHEN METABOLIC PANEL: CPT | Performed by: NURSE PRACTITIONER

## 2022-08-18 PROCEDURE — 36415 COLL VENOUS BLD VENIPUNCTURE: CPT | Performed by: NURSE PRACTITIONER

## 2022-08-24 ENCOUNTER — HOSPITAL ENCOUNTER (OUTPATIENT)
Dept: RADIOLOGY | Facility: HOSPITAL | Age: 67
Discharge: HOME | End: 2022-08-24
Attending: UROLOGY
Payer: MEDICARE

## 2022-08-24 DIAGNOSIS — N28.1 RENAL CYST: ICD-10-CM

## 2022-08-24 PROCEDURE — 76770 US EXAM ABDO BACK WALL COMP: CPT | Mod: TC

## 2022-08-24 PROCEDURE — 76770 US RETROPERITONEAL COMPLETE: ICD-10-PCS | Mod: 26,,, | Performed by: RADIOLOGY

## 2022-08-24 PROCEDURE — 76770 US EXAM ABDO BACK WALL COMP: CPT | Mod: 26,,, | Performed by: RADIOLOGY

## 2022-08-29 ENCOUNTER — OFFICE VISIT (OUTPATIENT)
Dept: UROLOGY | Facility: CLINIC | Age: 67
End: 2022-08-29
Payer: MEDICARE

## 2022-08-29 VITALS
BODY MASS INDEX: 40.88 KG/M2 | WEIGHT: 239.44 LBS | DIASTOLIC BLOOD PRESSURE: 70 MMHG | SYSTOLIC BLOOD PRESSURE: 118 MMHG | HEIGHT: 64 IN

## 2022-08-29 DIAGNOSIS — N39.3 SUI (STRESS URINARY INCONTINENCE, FEMALE): ICD-10-CM

## 2022-08-29 DIAGNOSIS — N28.1 RENAL CYST: Primary | ICD-10-CM

## 2022-08-29 PROCEDURE — 99213 OFFICE O/P EST LOW 20 MIN: CPT | Mod: PBBFAC | Performed by: UROLOGY

## 2022-08-29 PROCEDURE — 99213 OFFICE O/P EST LOW 20 MIN: CPT | Mod: S$PBB,,, | Performed by: UROLOGY

## 2022-08-29 PROCEDURE — 99999 PR PBB SHADOW E&M-EST. PATIENT-LVL III: ICD-10-PCS | Mod: PBBFAC,,, | Performed by: UROLOGY

## 2022-08-29 PROCEDURE — 99999 PR PBB SHADOW E&M-EST. PATIENT-LVL III: CPT | Mod: PBBFAC,,, | Performed by: UROLOGY

## 2022-08-29 PROCEDURE — 99213 PR OFFICE/OUTPT VISIT, EST, LEVL III, 20-29 MIN: ICD-10-PCS | Mod: S$PBB,,, | Performed by: UROLOGY

## 2022-08-29 NOTE — PROGRESS NOTES
Chief Complaint   Patient presents with    Other     1 yr f/u rev u/s         History of Present Illness:   Roxanne Altamirano is a 67 y.o. female here for follow up.     8/29/22-renal US done 8/24/22 showing a mildly complex L upper pole renal cyst, now measuring 5.0cm. 2.8cm L lower pole simple cyst. I have personally reviewed these images. Had some pain in her left side a few weeks ago when she would lay down. No longer having any pain. No gross hematuria. Mild CLAYTON, but not bothersome. Doing kegels, which do seem to help. No interval change in H&P.     7/28/21-Renal US stable. Personally reviewed, showing 4.5cm L upper pole mildly complex cyst. No gross hematuria. No dysuria. Only has CLAYTON if she laughs. No abd or flank pain. No recent UTI.   Per Ruth:  6/18/20:  Indep assessment of imaging agree with report:  Left renal cyst about the same no sig changes mildly complex.  Reviewed history in detail. Doing well overall.   4/23/19: No hematuria in the interval and none today.  Stable 5cm left renal cyst on reassuring US.  Prefers annual followup with US.  Reviewed history in detail.  4/2/18: Returns for followup.  No hematuria in the interval.  MRI shows known renal cysts unchanged from 2015 to 2017.  10/19/15: CT Urogram normal except for benign left upper pole renal cyst. Cysto normal.  10/12/15: 59 yo woman referred by RAYSHAWN Campuzano for gross hematuria presumed to be UTI and not resolving with continued microhematuria after UTI treated.  No abd/pelvic pain and no exac/rel factors; except some crampy discomfort sometime.  No prior hematuria.  No urolithiasis.  No urinary bother.  No  history.  Normal sexual function.  UA confirms hematuria with -UCx on two occasions.  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    Review of Systems   Constitutional:  Negative for chills and fever.   HENT:  Negative for nosebleeds.    Respiratory:  Negative for shortness of breath.    Cardiovascular:  Negative for chest pain.    Gastrointestinal:  Negative for blood in stool.   Genitourinary:  Negative for dysuria.   Neurological:  Negative for numbness.   Hematological:  Does not bruise/bleed easily.   All other systems reviewed and are negative.    Current Outpatient Medications   Medication Sig Dispense Refill    carvedilol (COREG CR) 80 MG 24 hr capsule Take 1 capsule (80 mg total) by mouth once daily. 90 capsule 3    flu vacc [id number] 6mos up,PF, (FLUARIX QUAD [id number], PF,) 60 mcg (15 mcg x 4)/0.5 mL Syrg Inject 0.5 mLs into the muscle. 0.5 mL 0    fluocinonide (LIDEX) 0.05 % ointment AAA bid x 2 weeks.  Use with sunscreen if outdoors. 30 g 0    pantoprazole (PROTONIX) 20 MG tablet Take 1 tablet (20 mg total) by mouth once daily. 90 tablet 3    sacubitriL-valsartan (ENTRESTO)  mg per tablet Take 1 tablet by mouth 2 (two) times daily. 180 tablet 3    benzonatate (TESSALON PERLES) 100 MG capsule Take 2 capsules (200 mg total) by mouth 3 (three) times daily as needed for Cough. (Patient not taking: Reported on 8/29/2022) 60 capsule 0    fluticasone propionate (FLONASE) 50 mcg/actuation nasal spray 1 spray (50 mcg total) by Each Nostril route once daily. (Patient not taking: Reported on 8/29/2022) 11.1 mL 0    levocetirizine (XYZAL) 5 MG tablet Take 1 tablet (5 mg total) by mouth every evening. (Patient not taking: Reported on 8/29/2022) 30 tablet 11     No current facility-administered medications for this visit.       Review of patient's allergies indicates:  No Known Allergies    Past medical, family, and social history reviewed as documented in chart with pertinent positive medical, family, and social history detailed in HPI.    Physical Exam  Vitals:    08/29/22 1022   BP: 118/70       General: Well-developed, Well Nourished in No acute distress  HEENT: Normocephalic, Atraumatic, Extraocular Movements intact  Neck: Supple, trachea midline, no cervical or supraclavicular lymphadenopathy  Respirations: Even and  unlabored  Back: Midline spine without tenderness, no CVA tenderness  Abdomen: Soft, Non-tender, non-distended, no hepatosplenomegaly, no rebound or guarding, no palpable masses  Extremities: Moves all equally, atraumatic, no clubbing, cyanosis or edema  Skin: warm and dry  Psych: normal affect  Neuro: Alert and oriented x 3, Cranial nerves II-XII grossly intact      Urinalysis  Negative for blood, LE, nit      Assesment:   1. Renal cyst  US Retroperitoneal Complete (Kidney and      2. CLAYTON (stress urinary incontinence, female)                Plan:  Renal cyst  -     US Retroperitoneal Complete (Kidney and; Future; Expected date: 08/29/2022    CLAYTON (stress urinary incontinence, female)    kegel exercises    Follow up in about 1 year (around 8/29/2023) for renal US before visit.

## 2022-10-25 ENCOUNTER — PATIENT MESSAGE (OUTPATIENT)
Dept: SURGERY | Facility: CLINIC | Age: 67
End: 2022-10-25
Payer: MEDICARE

## 2022-11-01 DIAGNOSIS — C50.412 MALIGNANT NEOPLASM OF UPPER-OUTER QUADRANT OF LEFT BREAST IN FEMALE, ESTROGEN RECEPTOR POSITIVE: Primary | ICD-10-CM

## 2022-11-01 DIAGNOSIS — Z17.0 MALIGNANT NEOPLASM OF UPPER-OUTER QUADRANT OF LEFT BREAST IN FEMALE, ESTROGEN RECEPTOR POSITIVE: Primary | ICD-10-CM

## 2022-11-01 DIAGNOSIS — Z12.31 ENCOUNTER FOR SCREENING MAMMOGRAM FOR MALIGNANT NEOPLASM OF BREAST: ICD-10-CM

## 2022-12-09 ENCOUNTER — TELEPHONE (OUTPATIENT)
Dept: SURGERY | Facility: CLINIC | Age: 67
End: 2022-12-09
Payer: MEDICARE

## 2022-12-09 NOTE — TELEPHONE ENCOUNTER
Returned Stephanie's call back from [facility]. They needs specific procedure code and clinical notes to complete pt info. Forwarded message to Nolvia and explained to Stephanie that she will be reaching out to her nxt week when Nolvia returns

## 2022-12-09 NOTE — TELEPHONE ENCOUNTER
----- Message from Tomas De Souza sent at 12/9/2022  3:05 PM CST -----  Stephanie with the [employer] [facility] would like to consult with a nurse in regards to a prescription request and also the patient medical records she stated it can be faxed over to [phone]. Thanks r/s

## 2023-02-22 NOTE — PROGRESS NOTES
amSubjective:       Patient ID: Roxanne Altamirano is a 67 y.o. female.    Chief Complaint: back pain  HPI:  Patient presents for breast cancer surveillance. Last visit with me in clinic was 5/17/2022.    Last mammo 7/12/2022- right breast wnl    Last dexa 7/2021- osteopenia- taking vit d and calcium    Interval history:  Pt had cough and sinus congestion end of June 2022- had one episode of hemoptysis - went to urgent care- CXR revealed evidence of mild cardiomegaly and pulmonary vascular congestion for which pt has known CHF    Cough resolved and no other episode of hemoptysis    Labs in August 2022 revealed stable cbc, cmp    History of dyspepsia and bloating- change in bm- pt was referred to GI 4/2020  CT showed minimal thickening and inflammatory changes seen within the ascending and transverse colon.  Mild colitis not excluded. Mild fatty infiltration seen within the wall of the right and transverse colon which can be seen with chronic inflammatory processes.  Mild constipation within the ascending and transverse colon.      History of mild iron def anemia. Known thalassemia      Pt was scheduled for upper and lower endoscopy by GI- postponed due to the pandemic and was rescheduled for 4/28/20  Last colonoscopy- and egd- 4/28/20  - Normal duodenal bulb and second portion of the duodenum.                         - Chronic gastritis with hemorrhage. Biopsied.                         - 4 cm hiatal hernia.                         - Z-line irregular, 35 cm from the incisors.                         Biopsied.                         - LA Grade A esophagitis.                         - Normal upper third of esophagus and middle third                         of esophagus.     The examined portion of the ileum was normal.                         - A single colonic angiodysplastic lesion. Treated                         with a monopolar probe.                         - Moderate diverticulosis in the sigmoid colon and                          in the descending colon. There was no evidence of                         diverticular bleeding.                         - Hemorrhoids found on perianal exam.                         - The examination was otherwise normal.                         - No specimens collected.          Denies any UTI symptoms- no hematuria or dysuria. History of hematuria- left upper pole renal cyst. 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 She subsequently underwent left mastectomy and sentinel node biopsy followed by axillary dissection  March 23, 2007. Histopathologic evaluation demonstrated again multiple foci of intracystic papillary carcinoma, as well as DCIS noncomedo type. One of three sentinel lymph nodes demonstrated micrometastatic disease. Twelve additional axillary lymph nodes were obtained, all of which were negative for disease. Estrogen receptors were 11%. Progesterone receptors 4%. HER2/bob was not over-expressed. She subsequently underwent additional studies including a MUGA scan demonstrating an injection  fraction of 39%, thus she was treated with six cycles of Cytoxan and Taxotere which were well tolerated.     9/28/07 hormone studies suggested the pt was menopausal so she was started on Arimidex for post adjuvant therapy. Therapy completed 9/2012.     History of chemo induced cardiomyopathy. CHF- followed by Cardiology-      Last mammo- 7/12/2022- right- mammo - wnl    Left axilla ultrasound 4/16/19- 2 small nonenlarged lymph nodes    Last gyn- 5/4/2021- Mariposa Liam FNP    Up to date with covid vaccines-     Pt denies any pain or changes.     Review of Systems   Constitutional: Negative.    HENT: Negative.     Eyes: Negative.    Respiratory: Negative.     Cardiovascular: Negative.    Gastrointestinal: Negative.         No reflux   Endocrine: Negative.     Genitourinary: Negative.    Musculoskeletal:  Positive for back pain.   Skin: Negative.    Allergic/Immunologic: Negative.    Neurological: Negative.    Hematological: Negative.  Negative for adenopathy.   Psychiatric/Behavioral: Negative.       Breast: Pt denies any breast pain, nipple discharge or palpable abnormality. No chest wall mass or changes.     Objective:      Physical Exam  Constitutional:       Appearance: She is well-developed. She is obese.   HENT:      Head: Normocephalic and atraumatic.      Right Ear: External ear normal.      Left Ear: External ear normal.      Mouth/Throat:      Pharynx: No oropharyngeal exudate.   Eyes:      General: No scleral icterus.        Right eye: No discharge.         Left eye: No discharge.      Conjunctiva/sclera: Conjunctivae normal.      Pupils: Pupils are equal, round, and reactive to light.   Neck:      Thyroid: No thyromegaly.   Cardiovascular:      Rate and Rhythm: Normal rate and regular rhythm.      Pulses: Normal pulses.      Heart sounds: Normal heart sounds.   Pulmonary:      Effort: Pulmonary effort is normal.      Breath sounds: Normal breath sounds.   Chest:   Breasts:     Right: No inverted nipple, mass, nipple discharge, skin change or tenderness.      Left: No inverted nipple, mass, nipple discharge, skin change or tenderness.          Comments: No visible mass or left chest wall changes  Abdominal:      General: Bowel sounds are normal. There is no distension.      Palpations: Abdomen is soft. Abdomen is not rigid. There is no mass.      Tenderness: There is no abdominal tenderness. There is no guarding or rebound. Negative signs include Rockwell's sign and McBurney's sign.      Hernia: No hernia is present.   Musculoskeletal:         General: Normal range of motion.      Right shoulder: No crepitus. Normal strength.      Cervical back: Normal range of motion and neck supple.   Lymphadenopathy:      Head:      Right side of head: No submental,  submandibular, tonsillar, preauricular, posterior auricular or occipital adenopathy.      Left side of head: No submental, submandibular, tonsillar, preauricular, posterior auricular or occipital adenopathy.      Cervical: No cervical adenopathy.      Right cervical: No superficial or posterior cervical adenopathy.     Left cervical: No superficial or posterior cervical adenopathy.      Upper Body:      Right upper body: No supraclavicular or axillary adenopathy.      Left upper body: No supraclavicular or axillary adenopathy.   Skin:     General: Skin is warm and dry.      Coloration: Skin is not pale.      Findings: No erythema or rash.   Neurological:      General: No focal deficit present.      Mental Status: She is alert and oriented to person, place, and time.      Deep Tendon Reflexes: Reflexes are normal and symmetric.   Psychiatric:         Mood and Affect: Mood normal.         Behavior: Behavior normal.         Thought Content: Thought content normal.         Judgment: Judgment normal.       Assessment:       1. Malignant neoplasm of upper-outer quadrant of left breast in female, estrogen receptor positive    2. Alpha thalassemia trait    3. Encounter for follow-up surveillance of breast cancer    4. Encounter for breast cancer screening using non-mammogram modality    5. Counseling on health promotion and disease prevention    6. Counseling and coordination of care        Plan:      1. Breast cancer as previously described. Arimidex therapy completed X 5 years. Negative clinical exam  2. Heart failure stable and followed by cardiology.  3. Obesity. Encouraged pt to continue with exercise and dietary changes. Walking in neighborhood 3 times a week for 30 min- walks on treadmill at the gym  4. hematuria workup benign- in past- left upper pole renal cyst- Has f/u with urology routinely  5. Change in bowel movement character- Upper and lower endoscopy revealed esophogitis, hiatal hernia, colon angiodysplastic  lesion and diverticulosis- symptoms improved with pepcid- 4/28/2020- rec 10 year f/u colon  6. Mildly elevated bilirubin 2/2017 and again in May 2022- returned to normal on repeat labs  8. History of alpha thalassemia trait - continue to follow labs with pcp  9. Mammo  7/12/2022 - right breast- wnl- due again 7/2023  10.  RTC one year March 2024 - exam

## 2023-03-08 ENCOUNTER — OFFICE VISIT (OUTPATIENT)
Dept: SURGERY | Facility: CLINIC | Age: 68
End: 2023-03-08
Payer: MEDICARE

## 2023-03-08 ENCOUNTER — HOSPITAL ENCOUNTER (OUTPATIENT)
Dept: RADIOLOGY | Facility: HOSPITAL | Age: 68
Discharge: HOME | End: 2023-03-08
Attending: UROLOGY
Payer: MEDICARE

## 2023-03-08 VITALS
HEIGHT: 64 IN | DIASTOLIC BLOOD PRESSURE: 88 MMHG | WEIGHT: 228.19 LBS | HEART RATE: 80 BPM | SYSTOLIC BLOOD PRESSURE: 132 MMHG | BODY MASS INDEX: 38.96 KG/M2 | RESPIRATION RATE: 16 BRPM

## 2023-03-08 DIAGNOSIS — Z71.89 COUNSELING ON HEALTH PROMOTION AND DISEASE PREVENTION: ICD-10-CM

## 2023-03-08 DIAGNOSIS — Z08 ENCOUNTER FOR FOLLOW-UP SURVEILLANCE OF BREAST CANCER: ICD-10-CM

## 2023-03-08 DIAGNOSIS — Z12.31 ENCOUNTER FOR SCREENING MAMMOGRAM FOR MALIGNANT NEOPLASM OF BREAST: ICD-10-CM

## 2023-03-08 DIAGNOSIS — D56.3 ALPHA THALASSEMIA TRAIT: ICD-10-CM

## 2023-03-08 DIAGNOSIS — Z17.0 MALIGNANT NEOPLASM OF UPPER-OUTER QUADRANT OF LEFT BREAST IN FEMALE, ESTROGEN RECEPTOR POSITIVE: Primary | ICD-10-CM

## 2023-03-08 DIAGNOSIS — Z12.39 ENCOUNTER FOR BREAST CANCER SCREENING USING NON-MAMMOGRAM MODALITY: ICD-10-CM

## 2023-03-08 DIAGNOSIS — Z71.89 COUNSELING AND COORDINATION OF CARE: ICD-10-CM

## 2023-03-08 DIAGNOSIS — C50.412 MALIGNANT NEOPLASM OF UPPER-OUTER QUADRANT OF LEFT BREAST IN FEMALE, ESTROGEN RECEPTOR POSITIVE: Primary | ICD-10-CM

## 2023-03-08 DIAGNOSIS — M85.89 OSTEOPENIA OF MULTIPLE SITES: ICD-10-CM

## 2023-03-08 DIAGNOSIS — Z85.3 ENCOUNTER FOR FOLLOW-UP SURVEILLANCE OF BREAST CANCER: ICD-10-CM

## 2023-03-08 DIAGNOSIS — N28.1 RENAL CYST: ICD-10-CM

## 2023-03-08 PROCEDURE — 76770 US EXAM ABDO BACK WALL COMP: CPT | Mod: 26,,, | Performed by: STUDENT IN AN ORGANIZED HEALTH CARE EDUCATION/TRAINING PROGRAM

## 2023-03-08 PROCEDURE — 99999 PR PBB SHADOW E&M-EST. PATIENT-LVL III: ICD-10-PCS | Mod: PBBFAC,,, | Performed by: NURSE PRACTITIONER

## 2023-03-08 PROCEDURE — 99213 OFFICE O/P EST LOW 20 MIN: CPT | Mod: PBBFAC,25 | Performed by: NURSE PRACTITIONER

## 2023-03-08 PROCEDURE — 76770 US RETROPERITONEAL COMPLETE: ICD-10-PCS | Mod: 26,,, | Performed by: STUDENT IN AN ORGANIZED HEALTH CARE EDUCATION/TRAINING PROGRAM

## 2023-03-08 PROCEDURE — 76770 US EXAM ABDO BACK WALL COMP: CPT | Mod: TC

## 2023-03-08 PROCEDURE — 99999 PR PBB SHADOW E&M-EST. PATIENT-LVL III: CPT | Mod: PBBFAC,,, | Performed by: NURSE PRACTITIONER

## 2023-03-08 PROCEDURE — 99214 OFFICE O/P EST MOD 30 MIN: CPT | Mod: S$PBB,,, | Performed by: NURSE PRACTITIONER

## 2023-03-08 PROCEDURE — 99214 PR OFFICE/OUTPT VISIT, EST, LEVL IV, 30-39 MIN: ICD-10-PCS | Mod: S$PBB,,, | Performed by: NURSE PRACTITIONER

## 2023-03-09 ENCOUNTER — TELEPHONE (OUTPATIENT)
Dept: CARDIOLOGY | Facility: CLINIC | Age: 68
End: 2023-03-09
Payer: MEDICARE

## 2023-03-09 RX ORDER — METOPROLOL SUCCINATE 50 MG/1
50 TABLET, EXTENDED RELEASE ORAL DAILY
Qty: 30 TABLET | Refills: 11 | Status: SHIPPED | OUTPATIENT
Start: 2023-03-09 | End: 2023-08-29 | Stop reason: SDUPTHER

## 2023-03-09 NOTE — TELEPHONE ENCOUNTER
Pt contacted [facility] for pt to call back.          ----- Message from Jelly Arguelles MD sent at 3/9/2023 12:52 PM CST -----  Can let her know that I stopped the carvedilol and switched to metoprolol. Medication sent in to pharmacy.

## 2023-04-21 DIAGNOSIS — I50.22 NYHA CLASS 2 AND ACC/AHA STAGE C CHRONIC SYSTOLIC CONGESTIVE HEART FAILURE: Chronic | ICD-10-CM

## 2023-04-21 DIAGNOSIS — I42.7 CARDIOMYOPATHY DUE TO CHEMOTHERAPY: ICD-10-CM

## 2023-04-21 DIAGNOSIS — I10 ESSENTIAL HYPERTENSION: Primary | Chronic | ICD-10-CM

## 2023-04-21 DIAGNOSIS — T45.1X5A CARDIOMYOPATHY DUE TO CHEMOTHERAPY: ICD-10-CM

## 2023-04-24 ENCOUNTER — HOSPITAL ENCOUNTER (OUTPATIENT)
Dept: CARDIOLOGY | Facility: HOSPITAL | Age: 68
Discharge: HOME | End: 2023-04-24
Attending: STUDENT IN AN ORGANIZED HEALTH CARE EDUCATION/TRAINING PROGRAM
Payer: MEDICARE

## 2023-04-24 ENCOUNTER — OFFICE VISIT (OUTPATIENT)
Dept: CARDIOLOGY | Facility: CLINIC | Age: 68
End: 2023-04-24
Payer: MEDICARE

## 2023-04-24 VITALS
HEART RATE: 82 BPM | SYSTOLIC BLOOD PRESSURE: 120 MMHG | DIASTOLIC BLOOD PRESSURE: 76 MMHG | OXYGEN SATURATION: 98 % | WEIGHT: 231.5 LBS | BODY MASS INDEX: 39.73 KG/M2

## 2023-04-24 DIAGNOSIS — T45.1X5A CARDIOMYOPATHY DUE TO CHEMOTHERAPY: ICD-10-CM

## 2023-04-24 DIAGNOSIS — I50.22 NYHA CLASS 2 AND ACC/AHA STAGE C CHRONIC SYSTOLIC CONGESTIVE HEART FAILURE: Chronic | ICD-10-CM

## 2023-04-24 DIAGNOSIS — I42.7 CARDIOMYOPATHY DUE TO CHEMOTHERAPY: ICD-10-CM

## 2023-04-24 DIAGNOSIS — I50.22 NYHA CLASS 2 AND ACC/AHA STAGE C CHRONIC SYSTOLIC CONGESTIVE HEART FAILURE: ICD-10-CM

## 2023-04-24 DIAGNOSIS — E66.01 SEVERE OBESITY (BMI 35.0-35.9 WITH COMORBIDITY): ICD-10-CM

## 2023-04-24 DIAGNOSIS — R07.89 CHEST TIGHTNESS: Primary | ICD-10-CM

## 2023-04-24 DIAGNOSIS — I10 ESSENTIAL HYPERTENSION: Chronic | ICD-10-CM

## 2023-04-24 DIAGNOSIS — I10 ESSENTIAL HYPERTENSION: ICD-10-CM

## 2023-04-24 PROCEDURE — 99999 PR PBB SHADOW E&M-EST. PATIENT-LVL III: ICD-10-PCS | Mod: PBBFAC,,, | Performed by: STUDENT IN AN ORGANIZED HEALTH CARE EDUCATION/TRAINING PROGRAM

## 2023-04-24 PROCEDURE — 93010 ELECTROCARDIOGRAM REPORT: CPT | Mod: ,,, | Performed by: INTERNAL MEDICINE

## 2023-04-24 PROCEDURE — 99213 OFFICE O/P EST LOW 20 MIN: CPT | Mod: PBBFAC | Performed by: STUDENT IN AN ORGANIZED HEALTH CARE EDUCATION/TRAINING PROGRAM

## 2023-04-24 PROCEDURE — 99214 PR OFFICE/OUTPT VISIT, EST, LEVL IV, 30-39 MIN: ICD-10-PCS | Mod: S$PBB,,, | Performed by: STUDENT IN AN ORGANIZED HEALTH CARE EDUCATION/TRAINING PROGRAM

## 2023-04-24 PROCEDURE — 93010 EKG 12-LEAD: ICD-10-PCS | Mod: ,,, | Performed by: INTERNAL MEDICINE

## 2023-04-24 PROCEDURE — 93005 ELECTROCARDIOGRAM TRACING: CPT

## 2023-04-24 PROCEDURE — 99999 PR PBB SHADOW E&M-EST. PATIENT-LVL III: CPT | Mod: PBBFAC,,, | Performed by: STUDENT IN AN ORGANIZED HEALTH CARE EDUCATION/TRAINING PROGRAM

## 2023-04-24 PROCEDURE — 99214 OFFICE O/P EST MOD 30 MIN: CPT | Mod: S$PBB,,, | Performed by: STUDENT IN AN ORGANIZED HEALTH CARE EDUCATION/TRAINING PROGRAM

## 2023-04-24 NOTE — PROGRESS NOTES
Section of Cardiology                  Cardiac Clinic Note    Chief Complaint/Reason for consultation: follow up       HPI:   Roxanne Altamirano is a 67 y.o. female with h/o breast cancer s/p left mastectomy/chemo, CHF, obesity, HTN who comes in for follow up.    6/2/22  Was seeing Dr. NOLEN in cardiology clinic.  Still follows up with Oncology  Exercises regularly, goes to the gym about 3-4 times a week  Has knee pain   Denies tobacco abuse. ETOH rarely.  Denies CVA, DM    Denies SOB, chest pain, PND, orthopnea, PND.      4/24/23  Chest tightness/pressure that started about 1 week ago, comes on with movement  Has improved  Intermittent throughout the day  Nothing makes it worse  Some palpitations   No diaphoresis or nausea   Did workout recently, on the treadmill, for 30-1 hr, no issues   No weights   Lost 8lbs since 8/22    Family hx: dad- CHF    Denies SOB, PND, orthopnea, PND.      EKG 4/24/23 NSR  EKG 6/2/22 NSR, cannot r/o anterior infarct, qtc 453 ms    ECHO  2020  The left ventricle is normal in size with low normal systolic function. The estimated ejection fraction is 50%.  Mild left atrial enlargement.  Indeterminate diastolic function.  Normal right ventricular systolic function.  Mild right atrial enlargement.  Normal central venous pressure (3 mmHg).  The estimated PA systolic pressure is 29 mmHg.        STRESS TEST    [facility]      ROS: All 10 systems reviewed. Please refer to the HPI for pertinent positives. All other systems negative.     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 She subsequently underwent left mastectomy and sentinel node biopsy followed by axillary dissection March 23, 2007. Histopathologic evaluation demonstrated again multiple foci of intracystic  papillary carcinoma, as well as DCIS noncomed    Cardiomyopathy     CHEMO INDUCED- RESOLVED    CHF (congestive heart failure)     systolic    Hypertension     Uterine fibroid        Surgical History  Past Surgical History:   Procedure Laterality Date    BREAST BIOPSY      BREAST CYST EXCISION Right     BREAST SURGERY      CHOLECYSTECTOMY      COLONOSCOPY N/A 9/10/2018    Procedure: COLONOSCOPY;  Surgeon: Indra Ramos MD;  Location: [facility] ENDO;  Service: Endoscopy;  Laterality: N/A;    COLONOSCOPY N/A 4/28/2020    Procedure: COLONOSCOPY;  Surgeon: Dulce Maria Siegel MD;  Location: [facility] ENDO;  Service: Endoscopy;  Laterality: N/A;    ESOPHAGOGASTRODUODENOSCOPY N/A 4/28/2020    Procedure: EGD (ESOPHAGOGASTRODUODENOSCOPY);  Surgeon: Dulce Maria Siegel MD;  Location: [facility] ENDO;  Service: Endoscopy;  Laterality: N/A;    MASTECTOMY      left    MEDIPORT INSERTION, SINGLE            Allergies:   Review of patient's allergies indicates:  No Known Allergies    Social History:  Social History     Socioeconomic History    Marital status:    Tobacco Use    Smoking status: Never    Smokeless tobacco: Never   Substance and Sexual Activity    Alcohol use: Yes     Alcohol/week: 0.0 standard drinks     Comment: Ocassionally    Drug use: No    Sexual activity: Yes     Partners: Male     Birth control/protection: None, Post-menopausal       Family History:  family history includes Breast cancer in her maternal aunt; Cancer (age of onset: 55) in her maternal aunt; Diabetes in her father and mother; Stroke in her father and paternal aunt.    Home Medications:  Current Outpatient Medications on File Prior to Visit   Medication Sig Dispense Refill    fluticasone propionate (FLONASE) 50 mcg/actuation nasal spray 1 spray (50 mcg total) by Each Nostril route once daily. 11.1 mL 0    metoprolol succinate (TOPROL-XL) 50 MG 24 hr tablet Take 1 tablet (50 mg total) by mouth once daily. 30 tablet 11    sacubitriL-valsartan (ENTRESTO)   mg per tablet Take 1 tablet by mouth 2 (two) times daily. 180 tablet 3    benzonatate (TESSALON PERLES) 100 MG capsule Take 2 capsules (200 mg total) by mouth 3 (three) times daily as needed for Cough. (Patient not taking: Reported on 8/29/2022) 60 capsule 0    flu vacc [id number] 6mos up,PF, (FLUARIX QUAD [id number], PF,) 60 mcg (15 mcg x 4)/0.5 mL Syrg Inject 0.5 mLs into the muscle. (Patient not taking: Reported on 4/24/2023) 0.5 mL 0    fluocinonide (LIDEX) 0.05 % ointment AAA bid x 2 weeks.  Use with sunscreen if outdoors. (Patient not taking: Reported on 4/24/2023) 30 g 0    levocetirizine (XYZAL) 5 MG tablet Take 1 tablet (5 mg total) by mouth every evening. (Patient not taking: Reported on 8/29/2022) 30 tablet 11    pantoprazole (PROTONIX) 20 MG tablet Take 1 tablet (20 mg total) by mouth once daily. 90 tablet 3     No current facility-administered medications on file prior to visit.       Physical exam:  /76 (BP Location: Right arm, Patient Position: Sitting)   Pulse 82   Wt 105 kg (231 lb 7.7 oz)   SpO2 98%   BMI 39.73 kg/m²         General: Pt is a 67 y.o. year old female who is AAOx3, in NAD, is pleasant, well nourished, looks stated age  HEENT: PERRL, EOMI, Oral mucosa pink & moist  CVS  No abnormal cardiac pulsations noted on inspection. JVP not raised. The apical impulse is normal on palpation, and is located in the left 5th intercostal space in the mid - clavicular line. No palpable thrills or abnormal pulsations noted. RR, S1 - S2 heard, no murmurs, rubs or gallops appreciated.   PUL : CTA B/L. No wheezes/crackles heard   ABD : BS +, soft. No tenderness elicited   LE : No C/C/E. Distal Pulses palpable B/L         LABS:    Chemistry:   Lab Results   Component Value Date     08/18/2022    K 4.3 08/18/2022     08/18/2022    CO2 26 08/18/2022    BUN 12 08/18/2022    CREATININE 0.8 08/18/2022    CALCIUM 9.2 08/18/2022     Cardiac Markers: No results found for: CKTOTAL, CKMB,  CKMBINDEX, TROPONINI  Cardiac Markers (Last 3): No results found for: CKTOTAL, CKMB, CKMBINDEX, TROPONINI  CBC:   Lab Results   Component Value Date    WBC 4.22 08/18/2022    HGB 11.5 (L) 08/18/2022    HCT 37.4 08/18/2022    MCV 78 (L) 08/18/2022     08/18/2022     Lipids:   Lab Results   Component Value Date    CHOL 234 (H) 05/17/2022    TRIG 80 05/17/2022    HDL 79 (H) 05/17/2022     Coagulation: No results found for: PT, INR, APTT        Assessment      1. Chest tightness    2. Essential hypertension    3. NYHA class 2 and LINDA/AHA stage C chronic systolic congestive heart failure    4. Cardiomyopathy due to chemotherapy    5. Severe obesity (BMI 35.0-35.9 with comorbidity)           Plan:    Chest tightness   Obtain ETT   Obtain echo    Congestive heart failure  NYHA class 2, stage C  Compensated  Continue Entresto and carvedilol    HTN  Borderline low, no symptoms   as of 5/22, 10 year ASCD risk 4.6%  Continue diet and exercise    Obesity, BMI 35-39.9  Exercise as tolerated, at least 30 minutes daily  Low-salt, low-fat diet  Weight loss encouraged      This note was prepared using voice recognition system and is likely to have sound alike errors that may have been overlooked even after proofreading.     I have reviewed all pertinent chart information.  Plans and recommendations have been formulated under my direct supervision. All questions answered and patient voiced understanding.   If symptoms persist go to the ED.    RTC in 6 weeks         Jelly Arguelles MD  Cardiology

## 2023-05-16 ENCOUNTER — HOSPITAL ENCOUNTER (OUTPATIENT)
Dept: CARDIOLOGY | Facility: HOSPITAL | Age: 68
Discharge: HOME | End: 2023-05-16
Attending: STUDENT IN AN ORGANIZED HEALTH CARE EDUCATION/TRAINING PROGRAM
Payer: MEDICARE

## 2023-05-16 DIAGNOSIS — I42.7 CARDIOMYOPATHY DUE TO CHEMOTHERAPY: ICD-10-CM

## 2023-05-16 DIAGNOSIS — I50.22 NYHA CLASS 2 AND ACC/AHA STAGE C CHRONIC SYSTOLIC CONGESTIVE HEART FAILURE: ICD-10-CM

## 2023-05-16 DIAGNOSIS — T45.1X5A CARDIOMYOPATHY DUE TO CHEMOTHERAPY: ICD-10-CM

## 2023-05-16 DIAGNOSIS — I10 ESSENTIAL HYPERTENSION: ICD-10-CM

## 2023-05-16 DIAGNOSIS — R07.89 CHEST TIGHTNESS: ICD-10-CM

## 2023-05-16 DIAGNOSIS — E66.01 SEVERE OBESITY (BMI 35.0-35.9 WITH COMORBIDITY): ICD-10-CM

## 2023-05-16 LAB
CV STRESS BASE HR: 92 BPM
DIASTOLIC BLOOD PRESSURE: 67 MMHG
OHS CV CPX 85 PERCENT MAX PREDICTED HEART RATE MALE: 126
OHS CV CPX MAX PREDICTED HEART RATE: 149
OHS CV CPX PATIENT IS FEMALE: 1
OHS CV CPX PATIENT IS MALE: 0
OHS CV CPX PEAK DIASTOLIC BLOOD PRESSURE: 66 MMHG
OHS CV CPX PEAK HEAR RATE: 144 BPM
OHS CV CPX PEAK RATE PRESSURE PRODUCT: NORMAL
OHS CV CPX PEAK SYSTOLIC BLOOD PRESSURE: 170 MMHG
OHS CV CPX PERCENT MAX PREDICTED HEART RATE ACHIEVED: 97
OHS CV CPX RATE PRESSURE PRODUCT PRESENTING: 9936
STRESS ECHO POST EXERCISE DUR MIN: 6 MINUTES
STRESS ST DEPRESSION: 0.3 MM
SYSTOLIC BLOOD PRESSURE: 108 MMHG

## 2023-05-16 PROCEDURE — 93306 ECHO (CUPID ONLY): ICD-10-PCS | Mod: 26,,, | Performed by: INTERNAL MEDICINE

## 2023-05-16 PROCEDURE — 93016 EXERCISE STRESS - EKG (CUPID ONLY): ICD-10-PCS | Mod: ,,, | Performed by: INTERNAL MEDICINE

## 2023-05-16 PROCEDURE — 93016 CV STRESS TEST SUPVJ ONLY: CPT | Mod: ,,, | Performed by: INTERNAL MEDICINE

## 2023-05-16 PROCEDURE — 93018 EXERCISE STRESS - EKG (CUPID ONLY): ICD-10-PCS | Mod: ,,, | Performed by: INTERNAL MEDICINE

## 2023-05-16 PROCEDURE — 93306 TTE W/DOPPLER COMPLETE: CPT | Mod: 26,,, | Performed by: INTERNAL MEDICINE

## 2023-05-16 PROCEDURE — 93018 CV STRESS TEST I&R ONLY: CPT | Mod: ,,, | Performed by: INTERNAL MEDICINE

## 2023-05-16 PROCEDURE — 93017 CV STRESS TEST TRACING ONLY: CPT

## 2023-05-16 PROCEDURE — 93306 TTE W/DOPPLER COMPLETE: CPT

## 2023-05-17 VITALS
HEIGHT: 64 IN | BODY MASS INDEX: 39.44 KG/M2 | WEIGHT: 231 LBS | SYSTOLIC BLOOD PRESSURE: 120 MMHG | DIASTOLIC BLOOD PRESSURE: 76 MMHG

## 2023-05-17 LAB
AORTIC ROOT ANNULUS: 3.27 CM
ASCENDING AORTA: 2.83 CM
AV INDEX (PROSTH): 1.04
AV MEAN GRADIENT: 3 MMHG
AV PEAK GRADIENT: 5 MMHG
AV VALVE AREA: 3.33 CM2
AV VELOCITY RATIO: 0.93
BSA FOR ECHO PROCEDURE: 2.18 M2
CV ECHO LV RWT: 0.46 CM
DOP CALC AO PEAK VEL: 1.07 M/S
DOP CALC AO VTI: 20.7 CM
DOP CALC LVOT AREA: 3.2 CM2
DOP CALC LVOT DIAMETER: 2.02 CM
DOP CALC LVOT PEAK VEL: 1 M/S
DOP CALC LVOT STROKE VOLUME: 68.87 CM3
DOP CALC RVOT PEAK VEL: 0.6 M/S
DOP CALC RVOT VTI: 11 CM
DOP CALCLVOT PEAK VEL VTI: 21.5 CM
E WAVE DECELERATION TIME: 129.64 MSEC
E/A RATIO: 0.76
E/E' RATIO: 8 M/S
ECHO LV POSTERIOR WALL: 1.04 CM (ref 0.6–1.1)
EJECTION FRACTION: 55 %
FRACTIONAL SHORTENING: 30 % (ref 28–44)
INTERVENTRICULAR SEPTUM: 1.14 CM (ref 0.6–1.1)
IVC DIAMETER: 1.33 CM
IVRT: 97.05 MSEC
LA MAJOR: 4.59 CM
LA MINOR: 4.81 CM
LA WIDTH: 3.9 CM
LEFT ATRIUM SIZE: 3.09 CM
LEFT ATRIUM VOLUME INDEX MOD: 29.9 ML/M2
LEFT ATRIUM VOLUME INDEX: 23.1 ML/M2
LEFT ATRIUM VOLUME MOD: 62.29 CM3
LEFT ATRIUM VOLUME: 48.12 CM3
LEFT INTERNAL DIMENSION IN SYSTOLE: 3.17 CM (ref 2.1–4)
LEFT VENTRICLE DIASTOLIC VOLUME INDEX: 45.74 ML/M2
LEFT VENTRICLE DIASTOLIC VOLUME: 95.13 ML
LEFT VENTRICLE MASS INDEX: 85 G/M2
LEFT VENTRICLE SYSTOLIC VOLUME INDEX: 19.3 ML/M2
LEFT VENTRICLE SYSTOLIC VOLUME: 40.1 ML
LEFT VENTRICULAR INTERNAL DIMENSION IN DIASTOLE: 4.56 CM (ref 3.5–6)
LEFT VENTRICULAR MASS: 176.45 G
LV LATERAL E/E' RATIO: 8 M/S
LV SEPTAL E/E' RATIO: 8 M/S
LVOT MG: 1.97 MMHG
LVOT MV: 0.65 CM/S
MV PEAK A VEL: 0.74 M/S
MV PEAK E VEL: 0.56 M/S
MV STENOSIS PRESSURE HALF TIME: 37.6 MS
MV VALVE AREA P 1/2 METHOD: 5.85 CM2
PISA TR MAX VEL: 2.26 M/S
PULM VEIN S/D RATIO: 1.7
PV MEAN GRADIENT: 0.65 MMHG
PV PEAK D VEL: 0.33 M/S
PV PEAK S VEL: 0.56 M/S
PV PEAK VELOCITY: 0.66 CM/S
RA MAJOR: 4.2 CM
RA PRESSURE: 8 MMHG
RA WIDTH: 3.17 CM
RIGHT VENTRICULAR END-DIASTOLIC DIMENSION: 3.26 CM
SINUS: 3.02 CM
STJ: 2.7 CM
TDI LATERAL: 0.07 M/S
TDI SEPTAL: 0.07 M/S
TDI: 0.07 M/S
TR MAX PG: 20 MMHG
TRICUSPID ANNULAR PLANE SYSTOLIC EXCURSION: 1.92 CM
TV REST PULMONARY ARTERY PRESSURE: 28 MMHG

## 2023-06-01 ENCOUNTER — OFFICE VISIT (OUTPATIENT)
Dept: CARDIOLOGY | Facility: CLINIC | Age: 68
End: 2023-06-01
Payer: MEDICARE

## 2023-06-01 ENCOUNTER — LAB VISIT (OUTPATIENT)
Dept: LAB | Facility: HOSPITAL | Age: 68
End: 2023-06-01
Attending: STUDENT IN AN ORGANIZED HEALTH CARE EDUCATION/TRAINING PROGRAM
Payer: MEDICARE

## 2023-06-01 VITALS
HEIGHT: 64 IN | BODY MASS INDEX: 39.45 KG/M2 | HEART RATE: 77 BPM | WEIGHT: 231.06 LBS | DIASTOLIC BLOOD PRESSURE: 74 MMHG | SYSTOLIC BLOOD PRESSURE: 112 MMHG

## 2023-06-01 DIAGNOSIS — I50.22 NYHA CLASS 2 AND ACC/AHA STAGE C CHRONIC SYSTOLIC CONGESTIVE HEART FAILURE: Primary | Chronic | ICD-10-CM

## 2023-06-01 DIAGNOSIS — R07.89 CHEST TIGHTNESS: ICD-10-CM

## 2023-06-01 DIAGNOSIS — E66.01 SEVERE OBESITY (BMI 35.0-35.9 WITH COMORBIDITY): ICD-10-CM

## 2023-06-01 DIAGNOSIS — I42.7 CARDIOMYOPATHY DUE TO CHEMOTHERAPY: ICD-10-CM

## 2023-06-01 DIAGNOSIS — T45.1X5A CARDIOMYOPATHY DUE TO CHEMOTHERAPY: ICD-10-CM

## 2023-06-01 DIAGNOSIS — I10 ESSENTIAL HYPERTENSION: Chronic | ICD-10-CM

## 2023-06-01 LAB
CHOLEST SERPL-MCNC: 249 MG/DL (ref 120–199)
CHOLEST/HDLC SERPL: 3 {RATIO} (ref 2–5)
HDLC SERPL-MCNC: 82 MG/DL (ref 40–75)
HDLC SERPL: 32.9 % (ref 20–50)
LDLC SERPL CALC-MCNC: 150.8 MG/DL (ref 63–159)
NONHDLC SERPL-MCNC: 167 MG/DL
TRIGL SERPL-MCNC: 81 MG/DL (ref 30–150)

## 2023-06-01 PROCEDURE — 36415 COLL VENOUS BLD VENIPUNCTURE: CPT | Performed by: STUDENT IN AN ORGANIZED HEALTH CARE EDUCATION/TRAINING PROGRAM

## 2023-06-01 PROCEDURE — 99214 PR OFFICE/OUTPT VISIT, EST, LEVL IV, 30-39 MIN: ICD-10-PCS | Mod: S$PBB,,, | Performed by: STUDENT IN AN ORGANIZED HEALTH CARE EDUCATION/TRAINING PROGRAM

## 2023-06-01 PROCEDURE — 99213 OFFICE O/P EST LOW 20 MIN: CPT | Mod: PBBFAC | Performed by: STUDENT IN AN ORGANIZED HEALTH CARE EDUCATION/TRAINING PROGRAM

## 2023-06-01 PROCEDURE — 99214 OFFICE O/P EST MOD 30 MIN: CPT | Mod: S$PBB,,, | Performed by: STUDENT IN AN ORGANIZED HEALTH CARE EDUCATION/TRAINING PROGRAM

## 2023-06-01 PROCEDURE — 80061 LIPID PANEL: CPT | Performed by: STUDENT IN AN ORGANIZED HEALTH CARE EDUCATION/TRAINING PROGRAM

## 2023-06-01 PROCEDURE — 99999 PR PBB SHADOW E&M-EST. PATIENT-LVL III: ICD-10-PCS | Mod: PBBFAC,,, | Performed by: STUDENT IN AN ORGANIZED HEALTH CARE EDUCATION/TRAINING PROGRAM

## 2023-06-01 PROCEDURE — 99999 PR PBB SHADOW E&M-EST. PATIENT-LVL III: CPT | Mod: PBBFAC,,, | Performed by: STUDENT IN AN ORGANIZED HEALTH CARE EDUCATION/TRAINING PROGRAM

## 2023-06-01 NOTE — PROGRESS NOTES
Section of Cardiology                  Cardiac Clinic Note    Chief Complaint/Reason for consultation: follow up       HPI:   Roxanne Altamirano is a 67 y.o. female with h/o breast cancer s/p left mastectomy/chemo, CHF, obesity, HTN who comes in for follow up.    6/2/22  Was seeing Dr. NOLEN in cardiology clinic.  Still follows up with Oncology  Exercises regularly, goes to the gym about 3-4 times a week  Has knee pain   Denies tobacco abuse. ETOH rarely.  Denies CVA, DM    Denies SOB, chest pain, PND, orthopnea, PND.      4/24/23  Chest tightness/pressure that started about 1 week ago, comes on with movement  Has improved  Intermittent throughout the day  Nothing makes it worse  Some palpitations   No diaphoresis or nausea   Did workout recently, on the treadmill, for 30-1 hr, no issues   No weights   Lost 8lbs since 8/22    Family hx: dad- CHF    Denies SOB, PND, orthopnea, PND.      6/1/23  ETT 5/23 normal   Echo 5/23 EF 55%  Has not had chest tightness  No SOB   Goes to the gym 3-4 times a week and the uses the treadmill   Overtime weight has went down 8 lbs since 8/22  Has low appetite, trying to eat twice a day  Eats sweets intermittently       EKG 4/24/23 NSR  EKG 6/2/22 NSR, cannot r/o anterior infarct, qtc 453 ms    Echo 5/23  The left ventricle is normal in size with concentric remodeling and normal systolic function.  The estimated ejection fraction is 55%.  Normal left ventricular diastolic function.  Normal right ventricular size with normal right ventricular systolic function.  Mild tricuspid regurgitation.  Intermediate central venous pressure (8 mmHg).  The estimated PA systolic pressure is 28 mmHg.       ECHO  2020  The left ventricle is normal in size with low normal systolic function. The estimated ejection fraction is 50%.  Mild left atrial enlargement.  Indeterminate diastolic function.  Normal right ventricular systolic function.  Mild right atrial enlargement.  Normal central venous  pressure (3 mmHg).  The estimated PA systolic pressure is 29 mmHg.        STRESS TEST    [facility]      ROS: All 10 systems reviewed. Please refer to the HPI for pertinent positives. All other systems negative.     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 She subsequently underwent left mastectomy and sentinel node biopsy followed by axillary dissection March 23, 2007. Histopathologic evaluation demonstrated again multiple foci of intracystic papillary carcinoma, as well as DCIS noncomed    Cardiomyopathy     CHEMO INDUCED- RESOLVED    CHF (congestive heart failure)     systolic    Hypertension     Uterine fibroid        Surgical History  Past Surgical History:   Procedure Laterality Date    BREAST BIOPSY      BREAST CYST EXCISION Right     BREAST SURGERY      CHOLECYSTECTOMY      COLONOSCOPY N/A 9/10/2018    Procedure: COLONOSCOPY;  Surgeon: Indra Ramos MD;  Location: [facility];  Service: Endoscopy;  Laterality: N/A;    COLONOSCOPY N/A 4/28/2020    Procedure: COLONOSCOPY;  Surgeon: Dulce Maria Siegel MD;  Location: [facility];  Service: Endoscopy;  Laterality: N/A;    ESOPHAGOGASTRODUODENOSCOPY N/A 4/28/2020    Procedure: EGD (ESOPHAGOGASTRODUODENOSCOPY);  Surgeon: Dulce Maria Siegel MD;  Location: [facility];  Service: Endoscopy;  Laterality: N/A;    MASTECTOMY      left    MEDIPORT INSERTION, SINGLE            Allergies:   Review of patient's allergies indicates:  No Known Allergies    Social History:  Social History     Socioeconomic History    Marital status:    Tobacco Use    Smoking status: Never    Smokeless tobacco: Never   Substance and Sexual Activity    Alcohol use: Yes     Alcohol/week: 0.0 standard drinks     Comment: Ocassionally    Drug use: No    Sexual activity: Yes  "    Partners: Male     Birth control/protection: None, Post-menopausal       Family History:  family history includes Breast cancer in her maternal aunt; Cancer (age of onset: 55) in her maternal aunt; Diabetes in her father and mother; Stroke in her father and paternal aunt.    Home Medications:  Current Outpatient Medications on File Prior to Visit   Medication Sig Dispense Refill    metoprolol succinate (TOPROL-XL) 50 MG 24 hr tablet Take 1 tablet (50 mg total) by mouth once daily. 30 tablet 11    sacubitriL-valsartan (ENTRESTO)  mg per tablet Take 1 tablet by mouth 2 (two) times daily. 180 tablet 3    fluticasone propionate (FLONASE) 50 mcg/actuation nasal spray 1 spray (50 mcg total) by Each Nostril route once daily. (Patient not taking: Reported on 6/1/2023) 11.1 mL 0     No current facility-administered medications on file prior to visit.       Physical exam:  /74 (BP Location: Right arm, Patient Position: Sitting, BP Method: Large (Automatic))   Pulse 77   Ht 5' 4" (1.626 m)   Wt 104.8 kg (231 lb 0.7 oz)   BMI 39.66 kg/m²         General: Pt is a 67 y.o. year old female who is AAOx3, in NAD, is pleasant, well nourished, looks stated age  HEENT: PERRL, EOMI, Oral mucosa pink & moist  CVS  No abnormal cardiac pulsations noted on inspection. JVP not raised. The apical impulse is normal on palpation, and is located in the left 5th intercostal space in the mid - clavicular line. No palpable thrills or abnormal pulsations noted. RR, S1 - S2 heard, no murmurs, rubs or gallops appreciated.   PUL : CTA B/L. No wheezes/crackles heard   ABD : BS +, soft. No tenderness elicited   LE : No C/C/E. Distal Pulses palpable B/L         LABS:    Chemistry:   Lab Results   Component Value Date     08/18/2022    K 4.3 08/18/2022     08/18/2022    CO2 26 08/18/2022    BUN 12 08/18/2022    CREATININE 0.8 08/18/2022    CALCIUM 9.2 08/18/2022     Cardiac Markers: No results found for: CKTOTAL, CKMB, " CKMBINDEX, TROPONINI  Cardiac Markers (Last 3): No results found for: CKTOTAL, CKMB, CKMBINDEX, TROPONINI  CBC:   Lab Results   Component Value Date    WBC 4.22 08/18/2022    HGB 11.5 (L) 08/18/2022    HCT 37.4 08/18/2022    MCV 78 (L) 08/18/2022     08/18/2022     Lipids:   Lab Results   Component Value Date    CHOL 234 (H) 05/17/2022    TRIG 80 05/17/2022    HDL 79 (H) 05/17/2022     Coagulation: No results found for: PT, INR, APTT        Assessment      1. NYHA class 2 and LINDA/AHA stage C chronic systolic congestive heart failure    2. Essential hypertension    3. Cardiomyopathy due to chemotherapy    4. Severe obesity (BMI 35.0-35.9 with comorbidity)    5. Chest tightness             Plan:    Chest tightness - resolved   ETT 5/23 normal   Echo 5/23 EF 55%    Congestive heart failure  NYHA class 1, stage B  Compensated  Continue Entresto and carvedilol    HTN  Borderline low, no symptoms   as of 5/22, 10 year ASCD risk 4.6%  Continue diet and exercise  Repeat lipid panel    Obesity, BMI 35-39.9  Exercise as tolerated, at least 30 minutes daily  Low-salt, low-fat diet  Weight loss encouraged      This note was prepared using voice recognition system and is likely to have sound alike errors that may have been overlooked even after proofreading.     I have reviewed all pertinent chart information.  Plans and recommendations have been formulated under my direct supervision. All questions answered and patient voiced understanding.   If symptoms persist go to the ED.    RTC in 6 months        Jelly Arguelles MD  Cardiology

## 2023-06-02 ENCOUNTER — PATIENT MESSAGE (OUTPATIENT)
Dept: CARDIOLOGY | Facility: CLINIC | Age: 68
End: 2023-06-02
Payer: MEDICARE

## 2023-06-02 DIAGNOSIS — I42.7 CARDIOMYOPATHY DUE TO CHEMOTHERAPY: ICD-10-CM

## 2023-06-02 DIAGNOSIS — T45.1X5A CARDIOMYOPATHY DUE TO CHEMOTHERAPY: ICD-10-CM

## 2023-06-02 DIAGNOSIS — I50.22 CHRONIC SYSTOLIC HEART FAILURE: ICD-10-CM

## 2023-06-02 RX ORDER — SACUBITRIL AND VALSARTAN 97; 103 MG/1; MG/1
1 TABLET, FILM COATED ORAL 2 TIMES DAILY
Qty: 180 TABLET | Refills: 1 | Status: ON HOLD | OUTPATIENT
Start: 2023-06-02 | End: 2023-12-12 | Stop reason: SDUPTHER

## 2023-06-07 ENCOUNTER — HOSPITAL ENCOUNTER (OUTPATIENT)
Dept: RADIOLOGY | Facility: HOSPITAL | Age: 68
Discharge: HOME | End: 2023-06-07
Attending: PODIATRIST
Payer: MEDICARE

## 2023-06-07 ENCOUNTER — OFFICE VISIT (OUTPATIENT)
Dept: PODIATRY | Facility: CLINIC | Age: 68
End: 2023-06-07
Payer: MEDICARE

## 2023-06-07 VITALS — WEIGHT: 231.06 LBS | BODY MASS INDEX: 39.45 KG/M2 | HEIGHT: 64 IN

## 2023-06-07 DIAGNOSIS — M77.51 TENDINITIS OF RIGHT FOOT: Primary | ICD-10-CM

## 2023-06-07 DIAGNOSIS — M77.51 TENDINITIS OF RIGHT FOOT: ICD-10-CM

## 2023-06-07 PROCEDURE — 99213 OFFICE O/P EST LOW 20 MIN: CPT | Mod: PBBFAC | Performed by: PODIATRIST

## 2023-06-07 PROCEDURE — 99203 OFFICE O/P NEW LOW 30 MIN: CPT | Mod: S$PBB,,, | Performed by: PODIATRIST

## 2023-06-07 PROCEDURE — 99999 PR PBB SHADOW E&M-EST. PATIENT-LVL III: ICD-10-PCS | Mod: PBBFAC,,, | Performed by: PODIATRIST

## 2023-06-07 PROCEDURE — 99203 PR OFFICE/OUTPT VISIT, NEW, LEVL III, 30-44 MIN: ICD-10-PCS | Mod: S$PBB,,, | Performed by: PODIATRIST

## 2023-06-07 PROCEDURE — 73630 XR FOOT COMPLETE 3 VIEW RIGHT: ICD-10-PCS | Mod: 26,RT,, | Performed by: RADIOLOGY

## 2023-06-07 PROCEDURE — 73630 X-RAY EXAM OF FOOT: CPT | Mod: 26,RT,, | Performed by: RADIOLOGY

## 2023-06-07 PROCEDURE — 99999 PR PBB SHADOW E&M-EST. PATIENT-LVL III: CPT | Mod: PBBFAC,,, | Performed by: PODIATRIST

## 2023-06-07 PROCEDURE — 73630 X-RAY EXAM OF FOOT: CPT | Mod: TC,RT

## 2023-06-07 NOTE — PROGRESS NOTES
Subjective:     Patient ID: Roxanne Altamirano is a 67 y.o. female.    Chief Complaint: Foot Pain (Pt c/o right foot pain 3/10, non-diabetic pt wears sandals, PCP Dr. Mireles last seen Dr Campuzano 3-8-23)    Roxanne is a 67 y.o. female who presents to the podiatry clinic  with complaint of  right foot pain. Onset of the symptoms was several days ago. Precipitating event: none known. Current symptoms include: ability to bear weight, but with some pain. Aggravating factors: standing and walking. Symptoms have gradually improved. Patient has had prior foot problems. Evaluation to date: none. Treatment to date: OTC analgesics which are somewhat effective. Patients rates pain 3/10 on pain scale. Patient points to right medial midfoot. Patient states she exercises 3-4 times a week. Patient has no other pedal complaints at this time.     Patient Active Problem List   Diagnosis    Breast cancer    Arthritis of right knee    Periostitis, without mention of osteomyelitis, ankle and foot    Enthesopathy of ankle and tarsus, unspecified    Syringoma of eyelid - Both Eyes    Refractive error - Both Eyes    Cardiomyopathy due to chemotherapy    Essential hypertension    Rotator cuff impingement syndrome of right shoulder    Alpha thalassemia trait    NYHA class 2 and LINDA/AHA stage C chronic systolic congestive heart failure    Special screening for malignant neoplasms, colon    Diverticulosis of large intestine without hemorrhage    Morbid obesity    Abnormal CT scan, colon    Anemia    Altered bowel habits    Atypical chest pain       Medication List with Changes/Refills   Current Medications    FLUTICASONE PROPIONATE (FLONASE) 50 MCG/ACTUATION NASAL SPRAY    1 spray (50 mcg total) by Each Nostril route once daily.    METOPROLOL SUCCINATE (TOPROL-XL) 50 MG 24 HR TABLET    Take 1 tablet (50 mg total) by mouth once daily.    SACUBITRIL-VALSARTAN (ENTRESTO)  MG PER TABLET    Take 1 tablet by mouth 2 (two) times daily.       Review  "of patient's allergies indicates:  No Known Allergies    Past Surgical History:   Procedure Laterality Date    BREAST BIOPSY      BREAST CYST EXCISION Right     BREAST SURGERY      CHOLECYSTECTOMY      COLONOSCOPY N/A 9/10/2018    Procedure: COLONOSCOPY;  Surgeon: Indra Ramos MD;  Location: [facility] ENDO;  Service: Endoscopy;  Laterality: N/A;    COLONOSCOPY N/A 4/28/2020    Procedure: COLONOSCOPY;  Surgeon: Dulce Maria Siegel MD;  Location: [facility] ENDO;  Service: Endoscopy;  Laterality: N/A;    ESOPHAGOGASTRODUODENOSCOPY N/A 4/28/2020    Procedure: EGD (ESOPHAGOGASTRODUODENOSCOPY);  Surgeon: Dulce Maria Siegel MD;  Location: [facility] ENDO;  Service: Endoscopy;  Laterality: N/A;    MASTECTOMY      left    MEDIPORT INSERTION, SINGLE         Family History   Problem Relation Age of Onset    Diabetes Mother     Diabetes Father     Stroke Father     Cancer Maternal Aunt 55        breast cancer    Breast cancer Maternal Aunt     Stroke Paternal Aunt     Melanoma Neg Hx     Psoriasis Neg Hx     Lupus Neg Hx     Eczema Neg Hx        Social History     Socioeconomic History    Marital status:    Tobacco Use    Smoking status: Never    Smokeless tobacco: Never   Substance and Sexual Activity    Alcohol use: Yes     Alcohol/week: 0.0 standard drinks     Comment: Ocassionally    Drug use: No    Sexual activity: Yes     Partners: Male     Birth control/protection: None, Post-menopausal       Vitals:    06/07/23 1423   Weight: 104.8 kg (231 lb 0.7 oz)   Height: 5' 4" (1.626 m)   PainSc:   3       Hemoglobin A1C   Date Value Ref Range Status   10/26/2006 5.4 4.5 - 6.2 % Final       Review of Systems   Constitutional:  Negative for chills and fever.   Respiratory:  Negative for shortness of breath.    Cardiovascular:  Negative for chest pain, palpitations, orthopnea, claudication and leg swelling.   Gastrointestinal:  Negative for diarrhea, nausea and vomiting.   Musculoskeletal:  Negative for joint pain.   Skin:  Negative for " rash.   Neurological:  Negative for dizziness, tingling, sensory change, focal weakness and weakness.   Psychiatric/Behavioral: Negative.             Objective:      PHYSICAL EXAM: Apperance: Alert and orient in no distress,well developed, and with good attention to grooming and body habits  Patient presents ambulating in birkenstock sandals.   Lower Extremity Physical Exam:  VASCULAR: Dorsalis pedis pulses 2/4 right and Posterior Tibial pulses 2/4 right. Capillary fill time <4 seconds right. No edema observed right   DERMATOLOGICAL: No skin rashes, subcutaneous nodules, lesions, or ulcers observed right.   NEUROLOGICAL: Light touch, sharp-dull, proprioception all present and equal bilaterally.   MUSCULOSKELETAL:Muscle strength is 5/5 for foot inverters, everters, plantarflexors, and dorsiflexors. Muscle tone is normal.  Ankle joint ROM diminished  with no pain or crepitus right ankle joints. Ankle joints right demonstrate no evidence of subluxation, dislocation or laxity.  STJ right demonstrates diminished ROM with no pain or crepitus. STJ right demonstrates no evidence of subluxation, dislocation or laxity.  MTJ ROM right is diminished, pain free and without crepitus.  MTJ right demonstrates no evidence of subluxation, dislocation or laxity. Tenderness to palpation right medial foot at arch. Medial aspect of right foot shows tenderness to palpation. No pain on along posterior tibial tendon. The general morphology of the foot is decreased arch height off weight bearing right. + Hubscher maneuver right.        Assessment:       ICD-10-CM ICD-9-CM   1. Tendinitis of right foot  M77.51 727.06       Plan:   Tendinitis of right foot  -     X-Ray Foot Complete Right; Future; Expected date: 06/07/2023    I counseled the patient on her conditions, regarding findings of my examination, my impressions, and usual treatment plan.   Ordered right foot x-rays to be completed today.   I explained to the patient that etiology and  treatment options for arch pain including ice message, new shoegear, orthotic inserts, NSAIDs, rest, strappings and tapings, stretching/strengthening including number and amounts of time each application.    Patient shown proper execution of stretching /strengthening exercise and instructed on correct number and amounts of time each stretch.    The patient and I reviewed the types of shoes she should be wearing, my recommendation includes generally the best time of the day for a shoe fitting is the afternoon, shoes with a wide toe box, very good cushion, and tennis shoes with removable inner soles.  Patient instructed on adequate icing techniques. Patient should ice the affected area at least once per day x 10 minutes for 10 days . I advised the  patient that extra icing would also be beneficial to ensure adequate anti inflammatory effect.   Patient to return if symptoms persist or worsens.            Kathryn Melvin DPM  [facility]

## 2023-07-12 NOTE — PROGRESS NOTES
amSubjective:       Patient ID: Roxanne Altamirano is a 67 y.o. female.    Chief Complaint: Breast cancer surveillance    HPI:  Patient presents for breast cancer surveillance. Last visit with me in clinic was 3/8/2023.    Last mammo 7/14/2023- right breast- wnl    Last dexa 7/14/2023- osteopenia stable - taking vit d and calcium    Interval history:      Pt had cough and sinus congestion end of June 2022- had one episode of hemoptysis - went to urgent care- CXR revealed evidence of mild cardiomegaly and pulmonary vascular congestion for which pt has known CHF    Cough resolved and no other episode of hemoptysis      History of dyspepsia and bloating- change in bm- pt was referred to GI 4/2020  CT showed minimal thickening and inflammatory changes seen within the ascending and transverse colon.  Mild colitis not excluded. Mild fatty infiltration seen within the wall of the right and transverse colon which can be seen with chronic inflammatory processes.  Mild constipation within the ascending and transverse colon.      History of mild iron def anemia. Known thalassemia      Pt was scheduled for upper and lower endoscopy by GI- postponed due to the pandemic and was rescheduled for 4/28/20  Last colonoscopy- and egd- 4/28/20  - Normal duodenal bulb and second portion of the duodenum.                         - Chronic gastritis with hemorrhage. Biopsied.                         - 4 cm hiatal hernia.                         - Z-line irregular, 35 cm from the incisors.                         Biopsied.                         - LA Grade A esophagitis.                         - Normal upper third of esophagus and middle third                         of esophagus.     The examined portion of the ileum was normal.                         - A single colonic angiodysplastic lesion. Treated                         with a monopolar probe.                         - Moderate diverticulosis in the sigmoid colon and                          in the descending colon. There was no evidence of                         diverticular bleeding.                         - Hemorrhoids found on perianal exam.                         - The examination was otherwise normal.                         - No specimens collected.          Denies any UTI symptoms- no hematuria or dysuria. History of hematuria- left upper pole renal cyst. 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 She subsequently underwent left mastectomy and sentinel node biopsy followed by axillary dissection  March 23, 2007. Histopathologic evaluation demonstrated again multiple foci of intracystic papillary carcinoma, as well as DCIS noncomedo type. One of three sentinel lymph nodes demonstrated micrometastatic disease. Twelve additional axillary lymph nodes were obtained, all of which were negative for disease. Estrogen receptors were 11%. Progesterone receptors 4%. HER2/bob was not over-expressed. She subsequently underwent additional studies including a MUGA scan demonstrating an injection  fraction of 39%, thus she was treated with six cycles of Cytoxan and Taxotere which were well tolerated.     9/28/07 hormone studies suggested the pt was menopausal so she was started on Arimidex for post adjuvant therapy. Therapy completed 9/2012.     History of chemo induced cardiomyopathy. CHF- followed by Cardiology-      Last mammo- 7/14/2023- right- mammo - wnl    History of Left axilla ultrasound 4/16/19- 2 small nonenlarged lymph nodes    Last gyn- 5/4/2021- Mariposa Wheeler FNP    Pt denies any pain or changes.     Interval History:     Persistent symptoms/side effects of treatment: symmetry issues with mastectomy and balance- interested in plastic surgery options- referral placed    Functional changes: ROM, neuropathy, weakness or fatigue-   none    Psychosocial challenges- no depression    Lymphedema- stable    Diet/Nutrition- exercising    Sexual Dysfunction or challenges - none    Review of Systems   Constitutional: Negative.    HENT: Negative.     Eyes: Negative.    Respiratory: Negative.     Cardiovascular: Negative.    Gastrointestinal: Negative.         No reflux   Endocrine: Negative.    Genitourinary: Negative.    Musculoskeletal:  Positive for back pain.   Skin: Negative.    Allergic/Immunologic: Negative.    Neurological: Negative.    Hematological: Negative.  Negative for adenopathy.   Psychiatric/Behavioral: Negative.       Breast: Pt denies any breast pain, nipple discharge or palpable abnormality. No chest wall mass or changes.     Objective:      Physical Exam  Constitutional:       Appearance: She is well-developed. She is obese.   HENT:      Head: Normocephalic and atraumatic.      Right Ear: External ear normal.      Left Ear: External ear normal.      Mouth/Throat:      Pharynx: No oropharyngeal exudate.   Eyes:      General: No scleral icterus.        Right eye: No discharge.         Left eye: No discharge.      Conjunctiva/sclera: Conjunctivae normal.      Pupils: Pupils are equal, round, and reactive to light.   Neck:      Thyroid: No thyromegaly.   Cardiovascular:      Rate and Rhythm: Normal rate and regular rhythm.      Pulses: Normal pulses.      Heart sounds: Normal heart sounds.   Pulmonary:      Effort: Pulmonary effort is normal.      Breath sounds: Normal breath sounds.   Chest:   Breasts:     Right: No inverted nipple, mass, nipple discharge, skin change or tenderness.      Left: Absent. No mass, nipple discharge, skin change or tenderness.          Comments: No visible mass or left chest wall changes  Abdominal:      General: Bowel sounds are normal. There is no distension.      Palpations: Abdomen is soft. Abdomen is not rigid. There is no mass.      Tenderness: There is no abdominal tenderness. There is no guarding or  rebound. Negative signs include Rockwell's sign and McBurney's sign.      Hernia: No hernia is present.   Musculoskeletal:         General: Normal range of motion.      Right shoulder: No crepitus. Normal strength.      Cervical back: Normal range of motion and neck supple.   Lymphadenopathy:      Head:      Right side of head: No submental, submandibular, tonsillar, preauricular, posterior auricular or occipital adenopathy.      Left side of head: No submental, submandibular, tonsillar, preauricular, posterior auricular or occipital adenopathy.      Cervical: No cervical adenopathy.      Right cervical: No superficial or posterior cervical adenopathy.     Left cervical: No superficial or posterior cervical adenopathy.      Upper Body:      Right upper body: No supraclavicular or axillary adenopathy.      Left upper body: No supraclavicular or axillary adenopathy.   Skin:     General: Skin is warm and dry.      Coloration: Skin is not pale.      Findings: No erythema or rash.   Neurological:      General: No focal deficit present.      Mental Status: She is alert and oriented to person, place, and time.      Deep Tendon Reflexes: Reflexes are normal and symmetric.   Psychiatric:         Mood and Affect: Mood normal.         Behavior: Behavior normal.         Thought Content: Thought content normal.         Judgment: Judgment normal.       Assessment:       1. Malignant neoplasm of upper-outer quadrant of left breast in female, estrogen receptor positive    2. Alpha thalassemia trait    3. Encounter for follow-up surveillance of breast cancer    4. Encounter for breast cancer screening using non-mammogram modality    5. Counseling on health promotion and disease prevention    6. Counseling and coordination of care          Plan:      1. Breast cancer as previously described. Arimidex therapy completed X 5 years. Negative clinical exam  2. Heart failure stable and followed by cardiology.  3. Obesity. Encouraged pt to  continue with exercise and dietary changes.   4. hematuria workup benign- in past- left upper pole renal cyst- Has f/u with urology routinely  5. Change in bowel movement character- Upper and lower endoscopy revealed esophogitis, hiatal hernia, colon angiodysplastic lesion and diverticulosis- symptoms improved with pepcid- 4/28/2020- rec 10 year f/u colon  6. Mildly elevated bilirubin 2/2017 and again in May 2022- returned to normal on repeat labs  8. History of alpha thalassemia trait - continue to follow labs with pcp  9. Mammo  7/14/2023-  right breast- wnl- due again 7/2024  10. Dexa scan 7/14/2023- osteopenia stable  11.  RTC one year July 2024 - mammo and exam     12. Placed referral for plastic surgery consult- pt interested in options due to having exercise difficulty with prosthesis and heaviness of right breast. Pt will call to schedule

## 2023-07-14 ENCOUNTER — HOSPITAL ENCOUNTER (OUTPATIENT)
Dept: RADIOLOGY | Facility: HOSPITAL | Age: 68
Discharge: HOME | End: 2023-07-14
Attending: NURSE PRACTITIONER
Payer: MEDICARE

## 2023-07-14 VITALS — HEIGHT: 64 IN | WEIGHT: 231.06 LBS | BODY MASS INDEX: 39.45 KG/M2

## 2023-07-14 DIAGNOSIS — Z17.0 MALIGNANT NEOPLASM OF UPPER-OUTER QUADRANT OF LEFT BREAST IN FEMALE, ESTROGEN RECEPTOR POSITIVE: ICD-10-CM

## 2023-07-14 DIAGNOSIS — Z08 ENCOUNTER FOR FOLLOW-UP SURVEILLANCE OF BREAST CANCER: ICD-10-CM

## 2023-07-14 DIAGNOSIS — Z12.31 ENCOUNTER FOR SCREENING MAMMOGRAM FOR MALIGNANT NEOPLASM OF BREAST: ICD-10-CM

## 2023-07-14 DIAGNOSIS — C50.412 MALIGNANT NEOPLASM OF UPPER-OUTER QUADRANT OF LEFT BREAST IN FEMALE, ESTROGEN RECEPTOR POSITIVE: ICD-10-CM

## 2023-07-14 DIAGNOSIS — Z85.3 ENCOUNTER FOR FOLLOW-UP SURVEILLANCE OF BREAST CANCER: ICD-10-CM

## 2023-07-14 PROCEDURE — 77067 MAMMO DIGITAL SCREENING RIGHT WITH TOMO: ICD-10-PCS | Mod: 26,52,, | Performed by: RADIOLOGY

## 2023-07-14 PROCEDURE — 77063 BREAST TOMOSYNTHESIS BI: CPT | Mod: 26,52,, | Performed by: RADIOLOGY

## 2023-07-14 PROCEDURE — 77067 SCR MAMMO BI INCL CAD: CPT | Mod: TC,52

## 2023-07-14 PROCEDURE — 77067 SCR MAMMO BI INCL CAD: CPT | Mod: 26,52,, | Performed by: RADIOLOGY

## 2023-07-14 PROCEDURE — 77063 MAMMO DIGITAL SCREENING RIGHT WITH TOMO: ICD-10-PCS | Mod: 26,52,, | Performed by: RADIOLOGY

## 2023-07-26 ENCOUNTER — OFFICE VISIT (OUTPATIENT)
Dept: SURGERY | Facility: CLINIC | Age: 68
End: 2023-07-26
Payer: MEDICARE

## 2023-07-26 VITALS
SYSTOLIC BLOOD PRESSURE: 125 MMHG | DIASTOLIC BLOOD PRESSURE: 78 MMHG | HEIGHT: 64 IN | BODY MASS INDEX: 39.75 KG/M2 | RESPIRATION RATE: 16 BRPM | HEART RATE: 90 BPM | WEIGHT: 232.81 LBS

## 2023-07-26 DIAGNOSIS — Z90.12 H/O LEFT MASTECTOMY: ICD-10-CM

## 2023-07-26 DIAGNOSIS — D56.3 ALPHA THALASSEMIA TRAIT: ICD-10-CM

## 2023-07-26 DIAGNOSIS — Z12.39 ENCOUNTER FOR BREAST CANCER SCREENING USING NON-MAMMOGRAM MODALITY: ICD-10-CM

## 2023-07-26 DIAGNOSIS — C50.412 MALIGNANT NEOPLASM OF UPPER-OUTER QUADRANT OF LEFT BREAST IN FEMALE, ESTROGEN RECEPTOR POSITIVE: Primary | ICD-10-CM

## 2023-07-26 DIAGNOSIS — Z12.31 ENCOUNTER FOR SCREENING MAMMOGRAM FOR MALIGNANT NEOPLASM OF BREAST: ICD-10-CM

## 2023-07-26 DIAGNOSIS — Z85.3 ENCOUNTER FOR FOLLOW-UP SURVEILLANCE OF BREAST CANCER: ICD-10-CM

## 2023-07-26 DIAGNOSIS — Z08 ENCOUNTER FOR FOLLOW-UP SURVEILLANCE OF BREAST CANCER: ICD-10-CM

## 2023-07-26 DIAGNOSIS — Z71.89 COUNSELING ON HEALTH PROMOTION AND DISEASE PREVENTION: ICD-10-CM

## 2023-07-26 DIAGNOSIS — Z71.89 COUNSELING AND COORDINATION OF CARE: ICD-10-CM

## 2023-07-26 DIAGNOSIS — Z17.0 MALIGNANT NEOPLASM OF UPPER-OUTER QUADRANT OF LEFT BREAST IN FEMALE, ESTROGEN RECEPTOR POSITIVE: Primary | ICD-10-CM

## 2023-07-26 PROCEDURE — 99999 PR PBB SHADOW E&M-EST. PATIENT-LVL III: CPT | Mod: PBBFAC,,, | Performed by: NURSE PRACTITIONER

## 2023-07-26 PROCEDURE — 99214 PR OFFICE/OUTPT VISIT, EST, LEVL IV, 30-39 MIN: ICD-10-PCS | Mod: S$PBB,,, | Performed by: NURSE PRACTITIONER

## 2023-07-26 PROCEDURE — 99214 OFFICE O/P EST MOD 30 MIN: CPT | Mod: S$PBB,,, | Performed by: NURSE PRACTITIONER

## 2023-07-26 PROCEDURE — 99213 OFFICE O/P EST LOW 20 MIN: CPT | Mod: PBBFAC | Performed by: NURSE PRACTITIONER

## 2023-07-26 PROCEDURE — 99999 PR PBB SHADOW E&M-EST. PATIENT-LVL III: ICD-10-PCS | Mod: PBBFAC,,, | Performed by: NURSE PRACTITIONER

## 2023-08-04 ENCOUNTER — PATIENT MESSAGE (OUTPATIENT)
Dept: SURGERY | Facility: CLINIC | Age: 68
End: 2023-08-04
Payer: MEDICARE

## 2023-08-07 DIAGNOSIS — Z01.818 PREOP EXAMINATION: Primary | ICD-10-CM

## 2023-08-09 ENCOUNTER — LAB VISIT (OUTPATIENT)
Dept: LAB | Facility: HOSPITAL | Age: 68
End: 2023-08-09
Attending: NURSE PRACTITIONER
Payer: MEDICARE

## 2023-08-09 DIAGNOSIS — Z01.818 PREOP EXAMINATION: ICD-10-CM

## 2023-08-09 LAB
ALBUMIN SERPL BCP-MCNC: 3.8 G/DL (ref 3.5–5.2)
ALP SERPL-CCNC: 80 U/L (ref 55–135)
ALT SERPL W/O P-5'-P-CCNC: 26 U/L (ref 10–44)
ANION GAP SERPL CALC-SCNC: 7 MMOL/L (ref 8–16)
AST SERPL-CCNC: 24 U/L (ref 10–40)
BASOPHILS # BLD AUTO: 0.03 K/UL (ref 0–0.2)
BASOPHILS NFR BLD: 0.8 % (ref 0–1.9)
BILIRUB SERPL-MCNC: 0.9 MG/DL (ref 0.1–1)
BUN SERPL-MCNC: 7 MG/DL (ref 8–23)
CALCIUM SERPL-MCNC: 9.1 MG/DL (ref 8.7–10.5)
CHLORIDE SERPL-SCNC: 106 MMOL/L (ref 95–110)
CO2 SERPL-SCNC: 28 MMOL/L (ref 23–29)
CREAT SERPL-MCNC: 0.8 MG/DL (ref 0.5–1.4)
DIFFERENTIAL METHOD: ABNORMAL
EOSINOPHIL # BLD AUTO: 0.1 K/UL (ref 0–0.5)
EOSINOPHIL NFR BLD: 3.5 % (ref 0–8)
ERYTHROCYTE [DISTWIDTH] IN BLOOD BY AUTOMATED COUNT: 14.8 % (ref 11.5–14.5)
EST. GFR  (NO RACE VARIABLE): >60 ML/MIN/1.73 M^2
GLUCOSE SERPL-MCNC: 110 MG/DL (ref 70–110)
HCT VFR BLD AUTO: 38.8 % (ref 37–48.5)
HGB BLD-MCNC: 12 G/DL (ref 12–16)
IMM GRANULOCYTES # BLD AUTO: 0.01 K/UL (ref 0–0.04)
IMM GRANULOCYTES NFR BLD AUTO: 0.3 % (ref 0–0.5)
LYMPHOCYTES # BLD AUTO: 1 K/UL (ref 1–4.8)
LYMPHOCYTES NFR BLD: 26.1 % (ref 18–48)
MCH RBC QN AUTO: 24.5 PG (ref 27–31)
MCHC RBC AUTO-ENTMCNC: 30.9 G/DL (ref 32–36)
MCV RBC AUTO: 79 FL (ref 82–98)
MONOCYTES # BLD AUTO: 0.3 K/UL (ref 0.3–1)
MONOCYTES NFR BLD: 7 % (ref 4–15)
NEUTROPHILS # BLD AUTO: 2.5 K/UL (ref 1.8–7.7)
NEUTROPHILS NFR BLD: 62.3 % (ref 38–73)
NRBC BLD-RTO: 0 /100 WBC
PLATELET # BLD AUTO: 283 K/UL (ref 150–450)
PMV BLD AUTO: 9.4 FL (ref 9.2–12.9)
POTASSIUM SERPL-SCNC: 4.2 MMOL/L (ref 3.5–5.1)
PROT SERPL-MCNC: 7.3 G/DL (ref 6–8.4)
RBC # BLD AUTO: 4.89 M/UL (ref 4–5.4)
SODIUM SERPL-SCNC: 141 MMOL/L (ref 136–145)
WBC # BLD AUTO: 3.99 K/UL (ref 3.9–12.7)

## 2023-08-09 PROCEDURE — 85025 COMPLETE CBC W/AUTO DIFF WBC: CPT | Performed by: NURSE PRACTITIONER

## 2023-08-09 PROCEDURE — 80053 COMPREHEN METABOLIC PANEL: CPT | Performed by: NURSE PRACTITIONER

## 2023-08-14 ENCOUNTER — TELEPHONE (OUTPATIENT)
Dept: SURGERY | Facility: CLINIC | Age: 68
End: 2023-08-14
Payer: MEDICARE

## 2023-08-14 NOTE — TELEPHONE ENCOUNTER
----- Message from Loulou Anderson sent at 8/14/2023 10:35 AM CDT -----  Contact: Roxanne Oliveira is calling to speak to the nurse regarding all of her recent x ray and labs sent over to the facility for her surgery. She would also like a call back at [phone]    Thanks  LJ

## 2023-08-14 NOTE — TELEPHONE ENCOUNTER
Returned pt's call bk..ph call then to Dr. James's office to confirm fax was received. Spoked to Layne.

## 2023-08-16 ENCOUNTER — TELEPHONE (OUTPATIENT)
Dept: SURGERY | Facility: CLINIC | Age: 68
End: 2023-08-16
Payer: MEDICARE

## 2023-08-16 NOTE — TELEPHONE ENCOUNTER
----- Message from Anant Nelson sent at 8/16/2023 11:19 AM CDT -----  Contact: Janet/Dr. James Office  Janet is needing a call back in regards to needing the patients last medical clearance, CBC and CMB lab results faxed to [phone] or [phone] Please give her a call back at [phone]

## 2023-08-16 NOTE — TELEPHONE ENCOUNTER
Returned pt's call bk. Received vcml. Left detailed msg informing that requested articles; recent labs are on the way via fax, as per her request. Call back number left.

## 2023-08-18 ENCOUNTER — OFFICE VISIT (OUTPATIENT)
Dept: PRIMARY CARE CLINIC | Facility: CLINIC | Age: 68
End: 2023-08-18
Payer: MEDICARE

## 2023-08-18 ENCOUNTER — TELEPHONE (OUTPATIENT)
Dept: PRIMARY CARE CLINIC | Facility: CLINIC | Age: 68
End: 2023-08-18

## 2023-08-18 VITALS
DIASTOLIC BLOOD PRESSURE: 77 MMHG | OXYGEN SATURATION: 95 % | WEIGHT: 231.5 LBS | BODY MASS INDEX: 39.52 KG/M2 | SYSTOLIC BLOOD PRESSURE: 128 MMHG | TEMPERATURE: 98 F | HEIGHT: 64 IN | HEART RATE: 80 BPM

## 2023-08-18 DIAGNOSIS — Z01.818 PRE-OP EXAM: Primary | ICD-10-CM

## 2023-08-18 DIAGNOSIS — Z85.3 HISTORY OF BREAST CANCER: ICD-10-CM

## 2023-08-18 DIAGNOSIS — E66.01 MORBID OBESITY: ICD-10-CM

## 2023-08-18 DIAGNOSIS — I10 ESSENTIAL HYPERTENSION: Chronic | ICD-10-CM

## 2023-08-18 PROCEDURE — 99215 PR OFFICE/OUTPT VISIT, EST, LEVL V, 40-54 MIN: ICD-10-PCS | Mod: S$PBB,,, | Performed by: INTERNAL MEDICINE

## 2023-08-18 PROCEDURE — 99215 OFFICE O/P EST HI 40 MIN: CPT | Mod: S$PBB,,, | Performed by: INTERNAL MEDICINE

## 2023-08-18 PROCEDURE — 99213 OFFICE O/P EST LOW 20 MIN: CPT | Mod: PBBFAC,PN | Performed by: INTERNAL MEDICINE

## 2023-08-18 PROCEDURE — 99999 PR PBB SHADOW E&M-EST. PATIENT-LVL III: ICD-10-PCS | Mod: PBBFAC,,, | Performed by: INTERNAL MEDICINE

## 2023-08-18 PROCEDURE — 99999 PR PBB SHADOW E&M-EST. PATIENT-LVL III: CPT | Mod: PBBFAC,,, | Performed by: INTERNAL MEDICINE

## 2023-08-18 NOTE — PROGRESS NOTES
"Subjective     Patient ID: Roxanne Altamirano is a 68 y.o. female.    Chief Complaint: Establish Care and Pre-op Exam      HPI here at the request of Dr. James for breast augmentation.  Patient had a left mastectomy years ago.  She is feeling well today.  Recent BMP and CBC reviewed and essentially within normal range.  She also had a stress echo performed in April 2023 that she passed.  No chest pain or shortness of breath.  Does not smoke.  No history of trouble with surgery in the past.    Exercise Stress - EKG    Height:  5' 4"   Weight:  231 lb 7.7 oz   Blood Pressure:  Not recorded    Date of Study:  5/16/23   Ordering Provider:  Jelly Arguelles MD   Clinical Indications:  None Listed       Interpreting Physicians  Performing Staff   Alee Garza MD Tech:  Stephany Wilkinson          Reason for Exam  Priority: Routine  Dx: Essential hypertension [I10 (ICD-10-CM)]; NYHA class 2 and LINDA/AHA stage C chronic systolic congestive heart failure [I50.22 (ICD-10-CM)]; Cardiomyopathy due to chemotherapy [I42.7, T45.1X5A (ICD-10-CM)]; Severe obesity (BMI 35.0-35.9 with comorbidity) [E66.01, Z68.35 (ICD-10-CM)]; Chest tightness [R07.89 (ICD-10-CM)]         Conclusion         The ECG portion of the study is negative for ischemia.    The patient reported no chest pain during the stress test.    The blood pressure response to stress was normal.    There were no arrhythmias during stress.    The exercise capacity was normal.    The patient exercised for 6 minutes  seconds on a Elmer protocol, achieving a peak heart rate of 144 bpm, which is 97 % of the age predicted maximum heart rate. The patient experienced no angina during the test. Their exercise capacity was normal.     Asymptomatic pre, during, and post stress. Returned to baseline during recovery.           Past Medical History:   Diagnosis Date    Arthritis of right knee     Breast cancer     She has a history of a Stage IIA, TXN1M0 intracystic papillary carcinoma " "with multifocal DCIS and one of three positive sentinel lymph nodes diagnosed in Feb 2007. She subsequently underwent left mastectomy and sentinel node biopsy followed by axillary dissection March 23, 2007. Histopathologic evaluation demonstrated again multiple foci of intracystic papillary carcinoma, as well as DCIS noncomed    Cardiomyopathy     CHEMO INDUCED- RESOLVED    CHF (congestive heart failure)     systolic    Hypertension     Uterine fibroid      Review of patient's allergies indicates:  No Known Allergies  Past Surgical History:   Procedure Laterality Date    BREAST BIOPSY      BREAST CYST EXCISION Right     BREAST SURGERY      CHOLECYSTECTOMY      COLONOSCOPY N/A 9/10/2018    Procedure: COLONOSCOPY;  Surgeon: Indra Ramos MD;  Location: [facility] ENDO;  Service: Endoscopy;  Laterality: N/A;    COLONOSCOPY N/A 4/28/2020    Procedure: COLONOSCOPY;  Surgeon: Dulce Maria Siegel MD;  Location: [facility];  Service: Endoscopy;  Laterality: N/A;    ESOPHAGOGASTRODUODENOSCOPY N/A 4/28/2020    Procedure: EGD (ESOPHAGOGASTRODUODENOSCOPY);  Surgeon: Dulce Maria Siegel MD;  Location: [facility];  Service: Endoscopy;  Laterality: N/A;    MASTECTOMY      left    MEDIPORT INSERTION, SINGLE       Family History   Problem Relation Age of Onset    Diabetes Mother     Diabetes Father     Stroke Father     Cancer Maternal Aunt 55        breast cancer    Breast cancer Maternal Aunt     Stroke Paternal Aunt     Melanoma Neg Hx     Psoriasis Neg Hx     Lupus Neg Hx     Eczema Neg Hx      Social History     Socioeconomic History    Marital status:    Tobacco Use    Smoking status: Never    Smokeless tobacco: Never   Substance and Sexual Activity    Alcohol use: Yes     Alcohol/week: 0.0 standard drinks of alcohol     Comment: Ocassionally    Drug use: No    Sexual activity: Yes     Partners: Male     Birth control/protection: None, Post-menopausal         /77   Pulse 80   Temp 97.6 °F (36.4 °C)   Ht 5' 4" (1.626 m)  "  Wt 105 kg (231 lb 7.7 oz)   SpO2 95%   BMI 39.73 kg/m²   Outpatient Medications as of 8/18/2023   Medication Sig Dispense Refill    cephALEXin (KEFLEX) 500 MG capsule Take 1 capsule (500 mg total) by mouth every 6 (six) hours for antibiotic 56 capsule 0    fluticasone propionate (FLONASE) 50 mcg/actuation nasal spray 1 spray (50 mcg total) by Each Nostril route once daily. 11.1 mL 0    HYDROcodone-acetaminophen (NORCO) 7.5-325 mg per tablet Take 1 tablet by mouth every 6 (six) hours as needed for pain 28 tablet 0    metoprolol succinate (TOPROL-XL) 50 MG 24 hr tablet Take 1 tablet (50 mg total) by mouth once daily. 30 tablet 11    ondansetron (ZOFRAN-ODT) 8 MG TbDL Take 1 tablet (8 mg total) by mouth every 6 (six) hours as needed for nausea 10 tablet 0    sacubitriL-valsartan (ENTRESTO)  mg per tablet Take 1 tablet by mouth 2 (two) times daily. 180 tablet 1     No current facility-administered medications on file as of 8/18/2023.     Recent Results (from the past 672 hour(s))   CBC Auto Differential    Collection Time: 08/09/23 10:42 AM   Result Value Ref Range    WBC 3.99 3.90 - 12.70 K/uL    RBC 4.89 4.00 - 5.40 M/uL    Hemoglobin 12.0 12.0 - 16.0 g/dL    Hematocrit 38.8 37.0 - 48.5 %    MCV 79 (L) 82 - 98 fL    MCH 24.5 (L) 27.0 - 31.0 pg    MCHC 30.9 (L) 32.0 - 36.0 g/dL    RDW 14.8 (H) 11.5 - 14.5 %    Platelets 283 150 - 450 K/uL    MPV 9.4 9.2 - 12.9 fL    Immature Granulocytes 0.3 0.0 - 0.5 %    Gran # (ANC) 2.5 1.8 - 7.7 K/uL    Immature Grans (Abs) 0.01 0.00 - 0.04 K/uL    Lymph # 1.0 1.0 - 4.8 K/uL    Mono # 0.3 0.3 - 1.0 K/uL    Eos # 0.1 0.0 - 0.5 K/uL    Baso # 0.03 0.00 - 0.20 K/uL    nRBC 0 0 /100 WBC    Gran % 62.3 38.0 - 73.0 %    Lymph % 26.1 18.0 - 48.0 %    Mono % 7.0 4.0 - 15.0 %    Eosinophil % 3.5 0.0 - 8.0 %    Basophil % 0.8 0.0 - 1.9 %    Differential Method Automated    Comprehensive Metabolic Panel    Collection Time: 08/09/23 10:42 AM   Result Value Ref Range    Sodium 141 136  - 145 mmol/L    Potassium 4.2 3.5 - 5.1 mmol/L    Chloride 106 95 - 110 mmol/L    CO2 28 23 - 29 mmol/L    Glucose 110 70 - 110 mg/dL    BUN 7 (L) 8 - 23 mg/dL    Creatinine 0.8 0.5 - 1.4 mg/dL    Calcium 9.1 8.7 - 10.5 mg/dL    Total Protein 7.3 6.0 - 8.4 g/dL    Albumin 3.8 3.5 - 5.2 g/dL    Total Bilirubin 0.9 0.1 - 1.0 mg/dL    Alkaline Phosphatase 80 55 - 135 U/L    AST 24 10 - 40 U/L    ALT 26 10 - 44 U/L    eGFR >60 >60 mL/min/1.73 m^2    Anion Gap 7 (L) 8 - 16 mmol/L   ]  Review of Systems   All other systems reviewed and are negative.         Objective     Physical Exam  Constitutional:       General: She is not in acute distress.     Appearance: Normal appearance. She is obese. She is not ill-appearing, toxic-appearing or diaphoretic.   HENT:      Head: Normocephalic and atraumatic.      Mouth/Throat:      Mouth: Mucous membranes are moist.   Eyes:      Conjunctiva/sclera: Conjunctivae normal.   Cardiovascular:      Rate and Rhythm: Normal rate and regular rhythm.      Heart sounds: No murmur heard.     No friction rub. No gallop.   Pulmonary:      Effort: Pulmonary effort is normal. No respiratory distress.      Breath sounds: Normal breath sounds. No wheezing or rales.   Musculoskeletal:      Cervical back: Neck supple.      Right lower leg: No edema.      Left lower leg: No edema.   Skin:     General: Skin is dry.   Neurological:      General: No focal deficit present.      Mental Status: She is alert and oriented to person, place, and time.   Psychiatric:         Mood and Affect: Mood normal.         Behavior: Behavior normal.            Assessment and Plan     1. Pre-op exam    2. Essential hypertension    3. Morbid obesity    4. History of breast cancer         Pt appears low risk for surgery.  Thank you Dr. James for the consultation.    Follow up in about 6 months (around 2/18/2024).    Immunization History   Administered Date(s) Administered    COVID-19, MRNA, LN-S, PF (Pfizer) (Gray Cap)  05/17/2022    COVID-19, MRNA, LN-S, PF (Pfizer) (Purple Cap) 03/01/2021, 03/23/2021, 11/23/2021    Influenza - Quadrivalent - PF *Preferred* (6 months and older) 10/21/2020       I spent a total of 40 minutes on the day of the visit.This includes face to face time and non-face to face time preparing to see the patient (eg, review of tests), obtaining and/or reviewing separately obtained history, documenting clinical information in the electronic or other health record, independently interpreting results and communicating results to the patient/family/caregiver, or care coordinator.

## 2023-08-18 NOTE — TELEPHONE ENCOUNTER
----- Message from Eun Booker MD sent at 8/18/2023 11:30 AM CDT -----  Regarding: Pre op ready to be sent  Pre op note done.  Please fax along with pre op paper (in pre op folder) to Dr. James's office.  Patient requested a courtesy call noting it was done.  Thank you.

## 2023-08-21 ENCOUNTER — TELEPHONE (OUTPATIENT)
Dept: PRIMARY CARE CLINIC | Facility: CLINIC | Age: 68
End: 2023-08-21
Payer: MEDICARE

## 2023-08-21 NOTE — TELEPHONE ENCOUNTER
----- Message from Monica Nieto sent at 8/21/2023  8:29 AM CDT -----  Type: General Call Back     Name of Caller:Layne from outside providers office  Symptoms:medical clearance   Would the patient rather a call back or a response via [facility]? Call back   Best Call Back Number:[phone]  Additional Information: Providers office   called in regards to getting medical clearance for a patients procedure for the patients breast cancer. Patients providers office called an requests the patients PCP sends a medical clearance for 8/24/23. Please call back with further assistance and more information

## 2023-08-30 RX ORDER — METOPROLOL SUCCINATE 50 MG/1
50 TABLET, EXTENDED RELEASE ORAL DAILY
Qty: 30 TABLET | Refills: 6 | Status: SHIPPED | OUTPATIENT
Start: 2023-08-30 | End: 2024-08-29

## 2023-09-14 NOTE — PROGRESS NOTES
"Breast Surgical Oncology  [place]    Date of Service: 09/19/2023    SUBJECTIVE:   Chief complaint: right breast cancer    HISTORY OF PRESENT ILLNESS:   Josette Altamirano is a 68 y.o. female who is kindly referred by Dr. Rashawn James for right breast cancer.    She has a history of hormone receptor positive, [id number] negative Stage IIA breast cancer. She was initially diagnosed via core needle biopsy of an abnormal left axillary lymph node. There was no identifiable primary breast tumor. She subsequently underwent left total mastectomy and sentinel node biopsy followed by axillary dissection March 23, 2007. Surgical pathology confirmed multiple foci of intracystic papillary carcinoma with multifocal DCIS and one of three positive sentinel lymph nodes. Details regarding in breast findings are not currently available. Twelve additional axillary lymph nodes were obtained on lymph node dissection, all of which were negative for disease. Her tumor profile was: ER 11%. UT 4%. HER2/bob was not over-expressed. She had six cycles of adjuvant Cytoxan and Taxotere due to her depressed EF. She completed 5 years of Arimidex.    She has been followed by Kathleen Campuzano NP in survivorship without event. Her total mastectomy resulted in a significant asymmetry, since she has size F breasts. She was referred to Dr. James for consideration of delayed reconstruction and contralateral symmetry procedure. She had a BIRADS 1 negative screening mammogram of the right breast in July 2023. She proceeded to the OR on 8/24/23 for a left latissimus dorsi flap and right sided reduction for symmetry. Incidental cancer was identified within the breast tissue resected from the right sided reduction. Pathology showed multiple foci of hormone receptor positive, lrc2nbb negative invasive lobular carcinoma measuring 0.9 mm, 3 mm, and 4.4 mm. "The three foci of tumor are most likely contiguous and represent a single focus of ILC on the regros examination " "of the specimen."     She is referred for consultation and to discuss next steps.     Her breast cancer risk factor profile is as follows: Menarche at 14, Menopause at 52.  She is . Age at first live birth was 22. Family history of cancer is as follows: maternal aunt with breast cancer at age 50,  at age 55.    FAMILY HISTORY:     Family History   Problem Relation Age of Onset    Diabetes Mother     Diabetes Father     Stroke Father     Cancer Maternal Aunt 55        breast cancer    Breast cancer Maternal Aunt     Stroke Paternal Aunt     Melanoma Neg Hx     Psoriasis Neg Hx     Lupus Neg Hx     Eczema Neg Hx         PAST MEDICAL HISTORY:     Past Medical History:   Diagnosis Date    Arthritis of right knee     Breast cancer     She has a history of a Stage IIA, TXN1M0 intracystic papillary carcinoma with multifocal DCIS and one of three positive sentinel lymph nodes diagnosed in 2007. She subsequently underwent left mastectomy and sentinel node biopsy followed by axillary dissection 2007. Histopathologic evaluation demonstrated again multiple foci of intracystic papillary carcinoma, as well as DCIS noncomed    Cardiomyopathy     CHEMO INDUCED- RESOLVED    CHF (congestive heart failure)     systolic    Hypertension     Uterine fibroid        SURGICAL HISTORY:     Past Surgical History:   Procedure Laterality Date    BREAST BIOPSY      BREAST CYST EXCISION Right     BREAST SURGERY      CHOLECYSTECTOMY      COLONOSCOPY N/A 9/10/2018    Procedure: COLONOSCOPY;  Surgeon: Indra Ramos MD;  Location: [facility];  Service: Endoscopy;  Laterality: N/A;    COLONOSCOPY N/A 2020    Procedure: COLONOSCOPY;  Surgeon: Dulce Maria Siegel MD;  Location: [facility];  Service: Endoscopy;  Laterality: N/A;    ESOPHAGOGASTRODUODENOSCOPY N/A 2020    Procedure: EGD (ESOPHAGOGASTRODUODENOSCOPY);  Surgeon: Dulce Maria Siegel MD;  Location: [facility];  Service: Endoscopy;  Laterality: N/A;    MASTECTOMY "      left    MEDIPORT INSERTION, SINGLE         SOCIAL HISTORY:     Social History     Tobacco Use    Smoking status: Never    Smokeless tobacco: Never   Substance Use Topics    Alcohol use: Yes     Alcohol/week: 0.0 standard drinks of alcohol     Comment: Ocassionally    Drug use: No        MEDICATIONS/ALLERGIES:     Current Outpatient Medications:     cephALEXin (KEFLEX) 500 MG capsule, Take 1 capsule (500 mg total) by mouth every 6 (six) hours for antibiotic, Disp: 56 capsule, Rfl: 0    fluticasone propionate (FLONASE) 50 mcg/actuation nasal spray, 1 spray (50 mcg total) by Each Nostril route once daily., Disp: 11.1 mL, Rfl: 0    HYDROcodone-acetaminophen (NORCO) 7.5-325 mg per tablet, Take 1 tablet by mouth every 6 (six) hours as needed for pain, Disp: 28 tablet, Rfl: 0    metoprolol succinate (TOPROL-XL) 50 MG 24 hr tablet, Take 1 tablet (50 mg total) by mouth once daily., Disp: 30 tablet, Rfl: 6    ondansetron (ZOFRAN-ODT) 8 MG TbDL, Take 1 tablet (8 mg total) by mouth every 6 (six) hours as needed for nausea, Disp: 10 tablet, Rfl: 0    sacubitriL-valsartan (ENTRESTO)  mg per tablet, Take 1 tablet by mouth 2 (two) times daily., Disp: 180 tablet, Rfl: 1  Review of patient's allergies indicates:  No Known Allergies    REVIEW OF SYSTEMS:   I have reviewed 12 systems, including 2 points per system. Pertinent reported positives are: depressed mood, anxiety, joint stiffness    PHYSICAL EXAM:   General: The patient appears well and is in no acute distress.     Chaperon present for examination.   BREAST EXAM  No Asymmetry  Right: wise pattern reduction incisions healing, no signs of infection or tissue loss  - Mass: No  - Skin change: No  - Nipple Discharge: No  - Nipple retraction: No  - Axillary LAD: No  Left: latissimus dorsi flap viable, incisions well healed  - Mass: No  - Skin change: No  - Axillary LAD: No    IMAGING:   Images were personally reviewed.     Results for orders placed during the hospital  encounter of 07/14/23    Mammo Digital Screening Right with [place]    Narrative  Result:  Mammo Digital Screening Right with [place]    History:  Patient is 67 y.o. and is seen for a screening mammogram.      Films Compared:  Compared to: 07/12/2022 Mammo Digital Screening Right with [place] and 07/07/2021 Mammo Digital Screening Right with [place]    Findings:  This procedure was performed using tomosynthesis.  Computer-aided detection was utilized in the interpretation of this examination.    The right breast has scattered areas of fibroglandular density. There is no evidence of suspicious masses, microcalcifications or architectural distortion.    Impression  No mammographic evidence of malignancy.    BI-RADS Category 1: Negative    Recommendation:  Routine screening mammogram in 1 year is recommended.      PATHOLOGY:   [facility] pathology 8/24/23:    Invasive lobular carcinoma  ER by IHC: 95%  NC by IHC: 75%  [id number] by IHC: 1+  Ki-67: 10%    ASSESSMENT:     1. Malignant neoplasm of overlapping sites of right breast in female, estrogen receptor positive    2. Positive depression screening          PLAN:     Josette Altamirano is a 68 y.o. female who is new diagnosis of Stage IA (T1b N0 Mx) RIGHT Breast Invasive Lobular Carcinoma, Grade 2, ER 95%, NC 75%, Ntu6qim 1+ with a ki67 of 10%. I have reviewed her imaging and pathology reports with her and I have provided her with copies. Today, we reviewed reviewed the guidelines of the National Comprehensive Cancer Network for her diagnosis.    I am ordering a breast MRI to exclude other lesions in either breast as well as a right axillary ultrasound to evaluate her lymph nodes.  I am referring her for genetic counseling based on bilateral breast cancer diagnosis and family history.      We have discussed the surgical choices of lumpectomy with radiation and mastectomy with or without radiation. Because of the large volume of her reduction with Dr. James in comparison to the 8 mm  of invasive disease, she likely has negative margins due to the small volume of tumor. She had size F breasts that were reduced to a size C with a 32 x 18 x 5.5. cm aggregate of breast tissue as the surgical specimen.      I have explained that the survival is the same, regardless of the surgery chosen.  We have discussed that the local recurrence rate following mastectomy is 2-5%.  I have explained that the local recurrence rate was slightly higher following breast conservation in the large trials that compared mastectomy and breast conservation. However, with current medical therapies, local recurrence has been reduced to as low as 6%. I did review with her the general schedule and side effects of radiation. She will be referred to radiation oncology. We briefly discussed reconstruction options, and the [facility] breast cancer treatment brochure was provided.    We reviewed the need for sentinel lymph node biopsy to further stage the axilla at a minimum.       Because her cancer is hormone receptor positive, she will be recommended for endocrine therapy.  The decision for or against adjuvant chemotherapy will be made following surgery. She understands that she will be referred to a medical oncologist to discuss these points further.    I have provided her with general information regarding the supportive services available here including oncology social work, patient navigation, nutritional services, preoperative and postoperative physical therapy programs, and genetic counseling.      I spent a total of 60 minutes on this visit. This includes face to face time and non-face to face time preparing to see the patient (eg, review of tests), obtaining and/or reviewing separately obtained history, documenting clinical information in the electronic or other health record, independently interpreting results and communicating results to the patient/family/caregiver, or care coordinator.        Shannon Galvan M.D.    I  have used clinical judgement based on duration and functional status to consider definite necessity for treatment. Case management contact

## 2023-09-19 ENCOUNTER — OFFICE VISIT (OUTPATIENT)
Dept: SURGERY | Facility: CLINIC | Age: 68
End: 2023-09-19
Payer: MEDICARE

## 2023-09-19 ENCOUNTER — PATIENT OUTREACH (OUTPATIENT)
Dept: ADMINISTRATIVE | Facility: OTHER | Age: 68
End: 2023-09-19
Payer: MEDICARE

## 2023-09-19 DIAGNOSIS — C50.811 MALIGNANT NEOPLASM OF OVERLAPPING SITES OF RIGHT BREAST IN FEMALE, ESTROGEN RECEPTOR POSITIVE: Primary | ICD-10-CM

## 2023-09-19 DIAGNOSIS — Z13.31 POSITIVE DEPRESSION SCREENING: ICD-10-CM

## 2023-09-19 DIAGNOSIS — Z17.0 MALIGNANT NEOPLASM OF OVERLAPPING SITES OF RIGHT BREAST IN FEMALE, ESTROGEN RECEPTOR POSITIVE: Primary | ICD-10-CM

## 2023-09-19 PROCEDURE — 99205 PR OFFICE/OUTPT VISIT, NEW, LEVL V, 60-74 MIN: ICD-10-PCS | Mod: S$PBB,,, | Performed by: SURGERY

## 2023-09-19 PROCEDURE — 99205 OFFICE O/P NEW HI 60 MIN: CPT | Mod: S$PBB,,, | Performed by: SURGERY

## 2023-09-19 NOTE — PROGRESS NOTES
CHW - Outreach Attempt    Community Health Worker left a voicemail message for 1st attempt to contact patient regarding:  community resources    Community Health Worker to attempt to contact patient on: 9/19/23

## 2023-09-20 ENCOUNTER — PATIENT OUTREACH (OUTPATIENT)
Dept: ADMINISTRATIVE | Facility: OTHER | Age: 68
End: 2023-09-20
Payer: MEDICARE

## 2023-09-20 NOTE — PROGRESS NOTES
CHW - Case Closure    This Community Health Worker spoke to patient via telephone 9/20/23    Pt/Caregiver reported: Pt completed SDOH with pt and she stated she doesn't need any community resources at this time but appreciates the call    Pt/Caregiver denied any additional needs at this time and agrees with episode closure at this time.  Provided patient with Community Health Worker's contact information and encouraged him/her to contact this Community Health Worker if additional needs arise.

## 2023-09-27 DIAGNOSIS — C50.811 MALIGNANT NEOPLASM OF OVERLAPPING SITES OF RIGHT BREAST IN FEMALE, ESTROGEN RECEPTOR POSITIVE: Primary | ICD-10-CM

## 2023-09-27 DIAGNOSIS — Z17.0 MALIGNANT NEOPLASM OF OVERLAPPING SITES OF RIGHT BREAST IN FEMALE, ESTROGEN RECEPTOR POSITIVE: Primary | ICD-10-CM

## 2023-09-29 DIAGNOSIS — C50.919 BREAST CANCER: Primary | ICD-10-CM

## 2023-09-29 DIAGNOSIS — C50.811 MALIGNANT NEOPLASM OF OVERLAPPING SITES OF RIGHT BREAST IN FEMALE, ESTROGEN RECEPTOR POSITIVE: ICD-10-CM

## 2023-09-29 DIAGNOSIS — Z17.0 MALIGNANT NEOPLASM OF OVERLAPPING SITES OF RIGHT BREAST IN FEMALE, ESTROGEN RECEPTOR POSITIVE: ICD-10-CM

## 2023-10-09 ENCOUNTER — CLINICAL SUPPORT (OUTPATIENT)
Dept: REHABILITATION | Facility: HOSPITAL | Age: 68
End: 2023-10-09
Attending: SURGERY
Payer: MEDICARE

## 2023-10-09 ENCOUNTER — TELEPHONE (OUTPATIENT)
Dept: RADIOLOGY | Facility: HOSPITAL | Age: 68
End: 2023-10-09
Payer: MEDICARE

## 2023-10-09 ENCOUNTER — PATIENT MESSAGE (OUTPATIENT)
Dept: SURGERY | Facility: CLINIC | Age: 68
End: 2023-10-09
Payer: MEDICARE

## 2023-10-09 DIAGNOSIS — C50.811 MALIGNANT NEOPLASM OF OVERLAPPING SITES OF RIGHT BREAST IN FEMALE, ESTROGEN RECEPTOR POSITIVE: ICD-10-CM

## 2023-10-09 DIAGNOSIS — Z17.0 MALIGNANT NEOPLASM OF OVERLAPPING SITES OF RIGHT BREAST IN FEMALE, ESTROGEN RECEPTOR POSITIVE: ICD-10-CM

## 2023-10-09 DIAGNOSIS — Z71.9 ENCOUNTER FOR EDUCATION: ICD-10-CM

## 2023-10-09 DIAGNOSIS — Z91.89 AT RISK FOR SELF CARE DEFICIT: ICD-10-CM

## 2023-10-09 PROCEDURE — 97110 THERAPEUTIC EXERCISES: CPT

## 2023-10-09 PROCEDURE — 97166 OT EVAL MOD COMPLEX 45 MIN: CPT

## 2023-10-09 NOTE — PLAN OF CARE
[facility]   Outpatient Occupational Therapy Breast Cancer Pre-Operative Evaluation     Date: 10/9/2023  Name: Josette Altamirano  Clinic Number: [id number]    Therapy Diagnosis:   Encounter Diagnoses   Name Primary?    Malignant neoplasm of overlapping sites of right breast in female, estrogen receptor positive     At risk for self care deficit     Encounter for education      Physician: Shannon Galvan MD    Physician Orders: Occupational Therapy to Evaluate and Treat  Medical Diagnosis: Malignant neoplasm of overlapping sites of right breast in female, estrogen receptor positive [C50.811, Z17.0]    Evaluation Date: 10/9/2023  Insurance Authorization Period Expiration: 9/27/2023 - 9/26/2024  Plan of Care Certification Period: 10/9/2032 - 04/9/2024  Progress Note Due: N/A     Date of Return to MD: 10/10/2023    Visit # / Visits authorized: 1/1  FOTO: 0/3 NP Pre-Op    Precautions:  Standard    Time In: 8:15  Time Out: 9:00  Total Appointment Time (timed & untimed codes): 45 minutes    Subjective           Date of Onset: 2007  History of Current Condition: Patient reports that she had left breast cancer in 2007 in which she underwent a mastectomy. She chose not to do reconstruction and had a prosthesis. She then decided this year to have delayed reconstruction with a lat flap and reduction of right breast. During reduction a neoplasm was found in the right breast. Patient now is undergoing consult for her right breast. She is hoping to have a right mastectomy if she is a candidate.       Patient presents today to perform baseline measurements pre-surgery to be able to detect lymphedema post surgery, upper extremity muscle testing, postural and range of motion assessment along with education of risk of lymphedema and surgical precautions post surgery. Circumferential measurements will also be taken pre-surgery of bilateral upper extremities for early detection of lymphedema post surgery. Patient will  also be instructed in exercises to perform pre-surgery to insure best outcomes post surgery.       Medical Diagnosis: Malignant neoplasm of overlapping sites of right breast in female, estrogen receptor positive [C50.811, Z17.0]  Surgical Procedure and Date: TBD  Chemotherapy: TBD  Radiation: TBD      Involved Side: Bilateral  Hand Dominance: Right    Previous Therapy: None    Patients Goals: Patient reported goals are to learn necessary education for post-surgery care, exercises and decrease risk for lymphedema for return to prior functional level post surgery.     Falls: None    Pain:  Pain Related Behaviors Observed: Yes   Functional Pain Scale Rating 0-10:   Average: 2/10   Best: 0/10   Worst: 5/10   Location: Left breast and lat flap incision  Description: Aching    Occupation:   Working Presently: Retired      Functional Limitations/Social History:    Current Level of Function: Independent with all ADL, IADL, community mobility and functional activities.    Limitation of Functional Status as follows:   ADLs/IADLs: See Below in Objective Section    Nutrition:  Normal  Exercise Routine Prior to Onset : Active  Home/Living Environment : lives with their spouse       Past Medical History/Physical Systems Review:     Past Medical History:  Josette Altamirano  has a past medical history of Arthritis of right knee, Breast cancer, Cardiomyopathy, CHF (congestive heart failure), Hypertension, and Uterine fibroid.    Past Surgical History:  Josette Altamirano  has a past surgical history that includes Cholecystectomy; Breast biopsy; Mediport insertion, single; Breast surgery; Mastectomy; Breast cyst excision (Right); Colonoscopy (N/A, 9/10/2018); Colonoscopy (N/A, 4/28/2020); and Esophagogastroduodenoscopy (N/A, 4/28/2020).    Current Medications:  Josette has a current medication list which includes the following prescription(s): cephalexin, fluticasone propionate, hydrocodone-acetaminophen, metoprolol succinate, ondansetron, and  "entresto.    Allergies:  Review of patient's allergies indicates:  No Known Allergies                 Objective     Activities of Daily Living:    Activity of Daily Living  10/9/2023   Feeding Independent   Grooming    Hygiene    Toileting    Upper Body Dressing    Lower Body Dressing    Bathing        Instrumental Activities of Daily Living:    Instrumental Activity of Daily Living 10/9/2023   Homecare Independent   Cooking    Laundry    Driving    Yard Work    Use of Telephone    Money Management    Medication Management    Handwriting    Technology Use        Functional Mobility:    Functional Mobility 10/9/2023   Bed mobility Independent   Roll to left    Roll to right    Supine to prone    Scooting to edge of bed    Supine to sit    Sit to supine    Transfers to bed    Transfers to toilet    Sit to Stand    Stand Pivot    Car Transfers        Gait Assessment:   -    Assistive Devices Used: None  -    Assistance: Independent  -    Distance: Community distances     Endurance Deficit: none    Posture/Alignment :  Postural examination/scapula alignment: Within normal limits   Joint integrity: Within functional limits   Skin integrity: Intact  Edema: None noted    Range of Motion:    Joint Evaluation  AROM  10/9/2023 AROM  10/9/2023    Left Right   Shoulder Flexion 0-180 160 160   Shoulder Abduction 0-180 160 160   Shoulder External Rotation 0-90 45 45   Shoulder Internal Rotation 0-90 70 70   Shoulder Extension 0-60 20 20   Shoulder Horizontal Adduction 0-90 Within normal limits  Within normal limits        Strength:    Strength 10/9/2023 10/9/2023    Left Right   Shoulder Flexion 4+/5 4+/5   Shoulder Abduction     Shoulder External Rotation      Shoulder Internal Rotation     Shoulder Extension     Shoulder Horizontal Adduction     Elbow Flexion 5/5 5/5   Elbow Extension         Baseline Measurements of bilateral upper extremities for early detection of Lymphedema:     LANDMARK RIGHT LEFT   E + 8" 41 cm 39 cm   E " "+ 6" 39 cm 39 cm   E + 4" 38 cm 40 cm   E + 2" 36.5 cm 37 cm   Elbow 30 cm 29.5 cm   W+ 8" 29 cm 27.5 cm   W +  6" 26.5 cm 27 cm   W + 4" 22 cm 24 cm   Wrist 16 cm 16 cm   DPC 19.5 cm 19.5 cm   IP Thumb 6.5 cm 6.5 cm     Coordination:   -     Fine Motor Coordination: intact  -     Upper Extremity Coordination: intact  -     Lower Extremity Coordination: intact    Mental status : within normal limits     Treatment and Patient Education     Total Time Separate from Evaluation: 15 minutes    Patient participated in self care/home management for 5 minutes including the following:     -    Role of Occupational Therapy in multidisciplinary team, goals for Occupational Therapy  -    Patient was educated in lymphedema etiology and management plans.    -    Patient was provided with written risk reductions and precautions for managing lymphedema  -    Patient was educated on axillary cording and how to identify  -    Reviewed LEONOR drain care instructions.   -    Scar massage and breast massage for current surgery    Range of Motion/ Lifting Precautions post surgery - until drains have been removed:  -    Do not lift affected arm above 90 degrees of shoulder flexion  -    Do not lift over 5 lbs  -    Do not pull or push heavy objects  -    Do not sleep on your stomach or surgery side     Patient was instructed in and performed therapeutic exercise for 10 minutes for postural correction and alignment, stretching and soft tissue mobility.   Exercises included:     - exaggerated deep breathing and relaxation  - scapular retractions  - fist making/ball squeezes  - elbow flexion/extension  - wall walks flexion/abduction  - pendulums      Patient was able to demonstrate and report understanding and performance    Written Home Exercises Provided: yes.  Exercises were reviewed and Roxanne was able to demonstrate them prior to the end of the session.  Roxanne demonstrated good  understanding of the education provided.     See EMR under " Patient Instructions for exercises provided 10/9/2023.    Assessment     This is a 68 y.o. female referred to outpatient occupational therapy and presents with a medical diagnosis of Malignant neoplasm of overlapping sites of right breast in female, estrogen receptor positive breast cancer and was seen today pre-operatively to assess strength and range of motion of bilateral upper extremities, to take baseline circumferential measurements of bilateral upper extremities to aid in the early detection of lymphedema, educated on axillary cording and aid in its detection and provide patient education on exercises/precautions post breast surgery. Patient does not exhibit any range of motion impairments    Anticipated barriers to Occupational Therapy: Pain     Plan of care discussed with patient: Yes  Patient's spiritual, cultural and educational needs considered and patient is agreeable to the plan of care and goals as stated below:     Medical Necessity is demonstrated by the following  Occupational Profile/History  Co-morbidities and personal factors that may impact the plan of care [] LOW: Brief chart review  [] MODERATE: Expanded chart review   [x] HIGH: Extensive chart review    Moderate / High Support Documentation:   Past Medical History:   Diagnosis Date    Arthritis of right knee     Breast cancer     She has a history of a Stage IIA, 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 She subsequently underwent left mastectomy and sentinel node biopsy followed by axillary dissection March 23, 2007. Histopathologic evaluation demonstrated again multiple foci of intracystic papillary carcinoma, as well as DCIS noncomed    Cardiomyopathy     CHEMO INDUCED- RESOLVED    CHF (congestive heart failure)     systolic    Hypertension     Uterine fibroid         Examination  Performance deficits relating to physical, cognitive or psychosocial  skills that result in activity limitations and/or participation restrictions  [] LOW: addressing 1-3 Performance deficits  [x] MODERATE: 3-5 Performance deficits  [] HIGH: 5+ Performance deficits (please support below)    Moderate / High Support Documentation:    Physical:  Joint Mobility  Muscle Power/Strength  Skin Integrity/Scar Formation  Pain    Cognitive:  No Deficits    Psychosocial:    No Deficits     Treatment Options [] LOW: Multiple options  [x] MODERATE: Several options  [] HIGH: Limited options      Decision Making/ Complexity Score: moderate          Plan     Schedule patient for follow up with Occupational therapy post surgery. Goals for therapy post surgery will be established at that time.     Shweta Hancock, OT ,OTD, OTR/L

## 2023-10-09 NOTE — TELEPHONE ENCOUNTER
----- Message from Mikael Hagen sent at 10/9/2023  1:34 PM CDT -----  Regarding: appt  Contact: @[phone]  [facility] nurse calling to get pt sched at [facility] for Malignant neoplasm of overlapping sites of right breast in female, estrogen receptor positive [C50.811, Z17.0] no aval dates ..... pls call and [phone]

## 2023-10-12 ENCOUNTER — TELEPHONE (OUTPATIENT)
Dept: RADIOLOGY | Facility: HOSPITAL | Age: 68
End: 2023-10-12
Payer: MEDICARE

## 2023-10-12 ENCOUNTER — TELEPHONE (OUTPATIENT)
Dept: SURGERY | Facility: CLINIC | Age: 68
End: 2023-10-12
Payer: MEDICARE

## 2023-10-12 NOTE — TELEPHONE ENCOUNTER
----- Message from Cathy Law sent at 10/12/2023  1:06 PM CDT -----  Contact: LUIS Finch@[phone]--  PT calling to speak with the nurse to reschedule the Mammogram for tomorrow in [place]. She would like a call back today. Please advise.

## 2023-10-12 NOTE — TELEPHONE ENCOUNTER
----- Message from Miriam Lawrence sent at 10/12/2023  1:47 PM CDT -----  Regarding: appt access  Contact: pt @ [phone]  Patient is calling to reschedule appointment on 10/13 for MRI.       Please call to advise further thank you for all you are doing.

## 2023-10-12 NOTE — TELEPHONE ENCOUNTER
Returned phone call to patient. Patient requested to reschedule her mammo in [place]. She mentioned that she could not have it done at this time, because her breast is not healed, and she still has gauze. Gave number to [facility] to assist with appointment rescheduling.

## 2023-10-16 ENCOUNTER — LAB VISIT (OUTPATIENT)
Dept: LAB | Facility: HOSPITAL | Age: 68
End: 2023-10-16
Attending: SURGERY
Payer: MEDICARE

## 2023-10-16 ENCOUNTER — OFFICE VISIT (OUTPATIENT)
Dept: GENETICS | Facility: CLINIC | Age: 68
End: 2023-10-16
Payer: MEDICARE

## 2023-10-16 DIAGNOSIS — C50.811 MALIGNANT NEOPLASM OF OVERLAPPING SITES OF RIGHT BREAST IN FEMALE, ESTROGEN RECEPTOR POSITIVE: Primary | ICD-10-CM

## 2023-10-16 DIAGNOSIS — Z85.3 HISTORY OF LEFT BREAST CANCER: ICD-10-CM

## 2023-10-16 DIAGNOSIS — Z17.0 MALIGNANT NEOPLASM OF OVERLAPPING SITES OF RIGHT BREAST IN FEMALE, ESTROGEN RECEPTOR POSITIVE: Primary | ICD-10-CM

## 2023-10-16 DIAGNOSIS — C50.811 MALIGNANT NEOPLASM OF OVERLAPPING SITES OF RIGHT BREAST IN FEMALE, ESTROGEN RECEPTOR POSITIVE: ICD-10-CM

## 2023-10-16 DIAGNOSIS — Z80.3 FAMILY HISTORY OF BREAST CANCER: ICD-10-CM

## 2023-10-16 DIAGNOSIS — Z17.0 MALIGNANT NEOPLASM OF OVERLAPPING SITES OF RIGHT BREAST IN FEMALE, ESTROGEN RECEPTOR POSITIVE: ICD-10-CM

## 2023-10-16 PROCEDURE — 36415 COLL VENOUS BLD VENIPUNCTURE: CPT | Performed by: SURGERY

## 2023-10-16 PROCEDURE — 96040 PR GENETIC COUNSELING, EACH 30 MIN: ICD-10-PCS | Mod: ,,,

## 2023-10-16 PROCEDURE — 96040 PR GENETIC COUNSELING, EACH 30 MIN: CPT | Mod: ,,,

## 2023-10-16 NOTE — PROGRESS NOTES
"  Cancer Genetics  Hereditary/High Risk Clinic  Department of Hematology and Oncology  [facility]        Date of Service:  10/16/2023  Provider:  Radha Naidu MS, [facility]    Patient Information  Name:  Josette Altamirano  :  1955  MRN:  [id number]        Referring Provider: Shannon Galvan MD    The chief complaint leading to consultation is: as below.  Visit type: in-person.  Face-to-face time with patient: approximately 45 minutes.  Approximately 60 minutes in total were spent on this encounter, which includes face-to-face time and non-face-to-face time preparing to see the patient (e.g., review of tests), obtaining and/or reviewing separately obtained history, documenting clinical information in the electronic or other health record, independently interpreting results (not  reported) and communicating results to the patient/family/caregiver, or care coordination (not separately reported).      SUBJECTIVE:      Chief Complaint: Genetic Counseling     History of Present Illness (HPI):  Josette Altamirano ("Roxanne"), 68 y.o., assigned female sex at birth is established with the [facility] but new to me.  She was referred by  Breast Surgery  for genetic cancer risk assessment given her personal history of breast cancer. She has a history of a Stage IIA, TXN1M0 intracystic papillary carcinoma with multifocal DCIS (dx ~age 52) s/p left mastectomy and sentinel node biopsy followed by axillary dissection. Her total right mastectomy resulted in a significant asymmetry, since she has size F breasts. She underwent left latissimus dorsi flap and right sided reduction for symmetry after a negative screening mammogram of the right breast in 2023. Incidental cancer was identified within the breast tissue resected from the right sided reduction. She diagnosed with invasive lobular carcinoma of the right breast (ER+, TX+, HER2 bob-).    Focused Medical History:   Germline " cancer-genetic testing:  No  Cancer:  Yes  Left breast cancer (02/2007)  Right breast cancer ([facility])  Colonoscopy: Yes  Most recent colonoscopy: 04/28/2020  Colon polyp:  No  Mammogram: Yes  Most recent right mammo: 07/14/2023  Breast MRI:  No  Other benign tumor:  Yes  Right breast cyst (adolescence)  Uterine fibroid  Pancreatitis:  No  Pancreatic cyst:  No  Reproductive organs intact      Focused Surgical History  Left mastectomy (03/23/2007)  Right breast cyst excision (adolescence)    Ancestry:  Ashkenazi Voodoo ancestry:  No  Paternal:     Maternal:     Focused Family History:  Consanguinity in ancestors:  No  Germline cancer-genetic testing in blood relatives:  No  Cancer in family:  Yes; there are no known blood relatives affected with cancer other than those mentioned in the pedigree below    Family Cancer Pedigree:           Review of Systems:  See HPI.        SUBJECTIVE:   Records Reviewed  Medical History  Problem List  Any pertinent, available Procedures and Pathology reports in both [facility] and [facility]    COUNSELING   Causes of cancer  Germline cancer genetic testing is the testing of genes associated with cancer, known as cancer susceptibility genes.  Just as these genes are inherited from parents, mutations in these genes can be inherited, as well.  A mutation in a cancer susceptibility gene adversely affects the gene's ability to prevent cancer; therefore, carriers of cancer susceptibility gene mutations may be at increased risk for certain cancers.     Only 5-10% cancers are caused by a cancer susceptibility gene mutation, meaning the cancer is genetic/hereditary; rather, most cancers are sporadic.  Causes of sporadic cancers may include environmental risk factors, lifestyle risk factors, and non-modifiable risk factors.  It is important to note that members of a family often share not only their genetics but also risk factors including  environmental and lifestyle risk factors, so cancers can be familial.    Mutations in BRCA1 and BRCA2 are associated with an increased risk for breast and ovarian cancer, in addition to male breast cancer, pancreatic, melanoma, and prostate cancer. Mutations in other genes may increase the risk of breast and prostate cancer, in addition to other cancers.     Potential results of genetic testing, and their implications  Potential results of genetic testing include positive, negative, and variant of unknown significance (VUS).    A positive result indicates the presence of at least one clinically significant mutation, and the patient's associated cancer risks vary depending upon the cancer susceptibility gene(s) in which there is/are a mutation(s).  With a positive result, in some cases, depending upon the specific result and the patient's clinical history, modified risk management may be recommended, including measures for risk reduction and/or surveillance; however, modified management is not always an option.    A negative result indicates that no clinically significant mutations were identified in the gene(s) tested.    A VUS indicates that there is not presently enough data for the laboratory to make a determination as to whether the variant is clinically significant.  VUSs are not typically acted upon clinically.       Modified management may also be recommended, even with a result of no or unknown significance, based upon risk assessment that incorporates the family history.  Sometimes, depending upon the genetic testing result and the cancer diagnosis, additional/modified treatments may be an option, though this is not guaranteed.     Genetic mutation inheritance  If  Roxanne tests positive for a mutation, her first-degree relatives would each have a 50% chance of having the same mutation, and other, more distantly related blood-relatives would also be at risk of having the same mutation.       Genetic  discrimination  The Genetic Information Nondiscrimination Act (KARMA) is U[place] federal legislation that provides some protections against use of an individual's genetic information by their health insurer and by their employer.  Title I of KARMA prohibits most health insurers from utilizing an individual's genetic information to make decisions regarding insurance eligibility or premium charges.  Title II of KARMA prohibits covered entities, including employers, from requesting the genetic information of employees and applicants.  KARMA does not protect individuals from genetic discrimination toward health insurance obtained through a job with the  or through the Federal Employees Health Benefits Plan; from genetic discrimination by employers with fewer than 15 employees or if employed by the ; or from genetic discrimination by any other type of policies/entities, including but not limited to life insurance, disability insurance, long-term care insurance,  benefits, and  Health Services benefits.     Genetic testing logistics  An outside laboratory would perform the testing after a blood sample is collected at [facility] or a saliva sample is collected by the patient at home.  With genetic testing, there is a potential for the patient to incur out-of-pocket costs.  Results can take 2-3 weeks.  Post-test genetic counseling can be conducted once the genetic testing results are available.     Assessment/Plan  Based on the information provided by  Roxanne, she meets current criteria for genetic testing of hereditary breast and ovarian cancer syndrome according to current clinical guidelines. Roxanne's clinical history, including personal history and/or family history, is suggestive of a hereditary cancer syndrome in the family given the personal history of breast cancer diagnosis at age 52, as well as her second primary breast cancer and family history of breast cancer.     Offered germline  cancer-genetic testing at this time versus deferring testing at this time or declining testing altogether.  Various test panel options were discussed. Josette decided to proceed with genetic testing through the 2-gene BRCA1/BRCA2 Core Panel and the 84-gene Multi-Cancer Panel (AIP, ALK, APC, SHAYLA, AXIN2, BAP1, BARD1, BLM, BMPR1A, BRCA1, BRCA2, BRIP1, CASR, CDC73, CDH1, CDK4, CDKN1B, CDKN1C, CDKN2A, CEBPA, CHEK2, CTNNA1, DICER1, DIS3L2, EGFR, EPCAM, FH, FLCN, GATA2, GPC3, GREM1, HOXB13, HRAS, KIT, MAX, MEN1, MET, MITF, MLH1, MSH2, MSH3, MSH6, MUTYH, NBN, NF1, NF2, NTHL1, PALB2, PDGFRA, PHOX2B, PMS2, POLD1, POLE, POT1, DSTUM3A, PTCH1, PTEN, RAD50, RAD51C, RAD51D, RB1, RECQL4, RET, RUNX1, SDHA, SDHAF2, SDHB, SDHC, SDHD, SMAD4, SMARCA4, SMARCB1, SMARCE1, STK11, SUFU, TERC, TERT, HSRF884, TP53, TSC1, TSC2, VHL, WRN, WT1).  Roxanne has provided her informed consent to proceed. A blood sample was collected today.     Questions were encouraged and answered to the patient's satisfaction, and she verbalized understanding of information and agreement with the plan.         Signed,    Radha Naidu MS, [facility]  Certified Genetic Counselor, Hereditary and High-Risk Clinic  Hematology/Oncology, [facility]    10/16/2023

## 2023-10-23 DIAGNOSIS — C50.811 MALIGNANT NEOPLASM OF OVERLAPPING SITES OF RIGHT BREAST IN FEMALE, ESTROGEN RECEPTOR POSITIVE: Primary | ICD-10-CM

## 2023-10-23 DIAGNOSIS — Z17.0 MALIGNANT NEOPLASM OF OVERLAPPING SITES OF RIGHT BREAST IN FEMALE, ESTROGEN RECEPTOR POSITIVE: Primary | ICD-10-CM

## 2023-10-25 ENCOUNTER — TELEPHONE (OUTPATIENT)
Dept: HEMATOLOGY/ONCOLOGY | Facility: CLINIC | Age: 68
End: 2023-10-25
Payer: MEDICARE

## 2023-10-25 NOTE — NURSING
1133am: Outgoing call to pt regarding [facility] referral per Dr. Galvan for breast cancer. Spoke with pt. Pt aware of dx and ref request. Pt scheduled for new Oncology appt on 11/6 at 220 pm at [facility] with Dr. Sykes. Pt givne location directions. Pt verbalized understanding. Pt plan to report to appts as scheduled.  Oncology Navigation   Intake  Cancer Type: Breast  Internal / External Referral: Internal (Dr. Shannon Galvan)  Date of Referral: 10/24/23  Initial Nurse Navigator Contact: 10/25/23  Referral to Initial Contact Timeline (days): 1  Date Worked: 10/25/23  First Appointment Available: 11/06/23  Appointment Date: 11/06/23  Schedule to Appointment Timeline (days): 12  First Available Date vs. Scheduled Date (days): 0  Multiple appointments: No  Reason if booked > 7 days after scheduling: Specific provider / access     Treatment  Current Status: Staging work-up    Surgical Oncologist: Dr. Shannon Galvan  Plastic Surgeon: Dr. Rashawn James  Surgery Schedule Date: 08/24/23    Medical Oncologist: Dr. Lawrence Sykes  Consult Date: 11/06/23       Procedures: Screening Mammogram                 Acuity      Follow Up  No follow-ups on file.

## 2023-10-26 ENCOUNTER — HOSPITAL ENCOUNTER (OUTPATIENT)
Dept: RADIOLOGY | Facility: HOSPITAL | Age: 68
Discharge: HOME | End: 2023-10-26
Attending: SURGERY
Payer: MEDICARE

## 2023-10-26 ENCOUNTER — TELEPHONE (OUTPATIENT)
Dept: RADIOLOGY | Facility: HOSPITAL | Age: 68
End: 2023-10-26
Payer: MEDICARE

## 2023-10-26 DIAGNOSIS — C50.811 MALIGNANT NEOPLASM OF OVERLAPPING SITES OF RIGHT BREAST IN FEMALE, ESTROGEN RECEPTOR POSITIVE: Primary | ICD-10-CM

## 2023-10-26 DIAGNOSIS — C50.811 MALIGNANT NEOPLASM OF OVERLAPPING SITES OF RIGHT BREAST IN FEMALE, ESTROGEN RECEPTOR POSITIVE: ICD-10-CM

## 2023-10-26 DIAGNOSIS — Z17.0 MALIGNANT NEOPLASM OF OVERLAPPING SITES OF RIGHT BREAST IN FEMALE, ESTROGEN RECEPTOR POSITIVE: ICD-10-CM

## 2023-10-26 DIAGNOSIS — Z17.0 MALIGNANT NEOPLASM OF OVERLAPPING SITES OF RIGHT BREAST IN FEMALE, ESTROGEN RECEPTOR POSITIVE: Primary | ICD-10-CM

## 2023-10-26 PROCEDURE — 76882 US AXILLA ONLY (BREAST IMAGING) RIGHT: ICD-10-PCS | Mod: 26,RT,, | Performed by: STUDENT IN AN ORGANIZED HEALTH CARE EDUCATION/TRAINING PROGRAM

## 2023-10-26 PROCEDURE — 76882 US LMTD JT/FCL EVL NVASC XTR: CPT | Mod: 26,RT,, | Performed by: STUDENT IN AN ORGANIZED HEALTH CARE EDUCATION/TRAINING PROGRAM

## 2023-10-26 PROCEDURE — 76882 US LMTD JT/FCL EVL NVASC XTR: CPT | Mod: TC,RT

## 2023-10-26 NOTE — TELEPHONE ENCOUNTER
Ultrasound guided biopsy scheduled for 11/1/23 at 9:30am, arrival time 9am. Biopsy instructions given with understandings verbalized. Patient has my contact information.

## 2023-10-27 DIAGNOSIS — Z17.0 MALIGNANT NEOPLASM OF OVERLAPPING SITES OF RIGHT BREAST IN FEMALE, ESTROGEN RECEPTOR POSITIVE: Primary | ICD-10-CM

## 2023-10-27 DIAGNOSIS — C50.811 MALIGNANT NEOPLASM OF OVERLAPPING SITES OF RIGHT FEMALE BREAST: ICD-10-CM

## 2023-10-27 DIAGNOSIS — C50.811 MALIGNANT NEOPLASM OF OVERLAPPING SITES OF RIGHT BREAST IN FEMALE, ESTROGEN RECEPTOR POSITIVE: Primary | ICD-10-CM

## 2023-10-27 RX ORDER — SODIUM CHLORIDE 9 MG/ML
INJECTION, SOLUTION INTRAVENOUS CONTINUOUS
Status: CANCELLED | OUTPATIENT
Start: 2023-10-27

## 2023-10-30 DIAGNOSIS — Z01.818 PRE-OP EXAM: Primary | ICD-10-CM

## 2023-11-01 ENCOUNTER — HOSPITAL ENCOUNTER (OUTPATIENT)
Dept: RADIOLOGY | Facility: HOSPITAL | Age: 68
Discharge: HOME | End: 2023-11-01
Attending: SURGERY
Payer: MEDICARE

## 2023-11-01 DIAGNOSIS — Z17.0 MALIGNANT NEOPLASM OF OVERLAPPING SITES OF RIGHT BREAST IN FEMALE, ESTROGEN RECEPTOR POSITIVE: ICD-10-CM

## 2023-11-01 DIAGNOSIS — C50.811 MALIGNANT NEOPLASM OF OVERLAPPING SITES OF RIGHT BREAST IN FEMALE, ESTROGEN RECEPTOR POSITIVE: ICD-10-CM

## 2023-11-01 PROCEDURE — 76882 US LMTD JT/FCL EVL NVASC XTR: CPT | Mod: 26,RT,, | Performed by: RADIOLOGY

## 2023-11-01 PROCEDURE — 76882 US AXILLA ONLY (BREAST IMAGING) RIGHT: ICD-10-PCS | Mod: 26,RT,, | Performed by: RADIOLOGY

## 2023-11-01 PROCEDURE — 76882 US LMTD JT/FCL EVL NVASC XTR: CPT | Mod: TC,RT

## 2023-11-06 ENCOUNTER — OFFICE VISIT (OUTPATIENT)
Dept: HEMATOLOGY/ONCOLOGY | Facility: CLINIC | Age: 68
End: 2023-11-06
Payer: MEDICARE

## 2023-11-06 VITALS
WEIGHT: 230.38 LBS | BODY MASS INDEX: 39.33 KG/M2 | HEIGHT: 64 IN | HEART RATE: 76 BPM | SYSTOLIC BLOOD PRESSURE: 118 MMHG | TEMPERATURE: 97 F | OXYGEN SATURATION: 99 % | DIASTOLIC BLOOD PRESSURE: 71 MMHG

## 2023-11-06 DIAGNOSIS — Z85.3 HISTORY OF LEFT BREAST CANCER: ICD-10-CM

## 2023-11-06 DIAGNOSIS — I10 ESSENTIAL HYPERTENSION: Chronic | ICD-10-CM

## 2023-11-06 DIAGNOSIS — C50.811 MALIGNANT NEOPLASM OF OVERLAPPING SITES OF RIGHT BREAST IN FEMALE, ESTROGEN RECEPTOR POSITIVE: Primary | ICD-10-CM

## 2023-11-06 DIAGNOSIS — Z17.0 MALIGNANT NEOPLASM OF OVERLAPPING SITES OF RIGHT BREAST IN FEMALE, ESTROGEN RECEPTOR POSITIVE: Primary | ICD-10-CM

## 2023-11-06 DIAGNOSIS — I50.22 NYHA CLASS 2 AND ACC/AHA STAGE C CHRONIC SYSTOLIC CONGESTIVE HEART FAILURE: Chronic | ICD-10-CM

## 2023-11-06 DIAGNOSIS — Z90.12 H/O LEFT MASTECTOMY: ICD-10-CM

## 2023-11-06 DIAGNOSIS — E66.01 MORBID OBESITY: ICD-10-CM

## 2023-11-06 PROCEDURE — 99999 PR PBB SHADOW E&M-EST. PATIENT-LVL III: CPT | Mod: PBBFAC,,, | Performed by: INTERNAL MEDICINE

## 2023-11-06 PROCEDURE — 99215 PR OFFICE/OUTPT VISIT, EST, LEVL V, 40-54 MIN: ICD-10-PCS | Mod: S$PBB,,, | Performed by: INTERNAL MEDICINE

## 2023-11-06 PROCEDURE — 99999 PR PBB SHADOW E&M-EST. PATIENT-LVL III: ICD-10-PCS | Mod: PBBFAC,,, | Performed by: INTERNAL MEDICINE

## 2023-11-06 PROCEDURE — 99213 OFFICE O/P EST LOW 20 MIN: CPT | Mod: PBBFAC | Performed by: INTERNAL MEDICINE

## 2023-11-06 PROCEDURE — 99215 OFFICE O/P EST HI 40 MIN: CPT | Mod: S$PBB,,, | Performed by: INTERNAL MEDICINE

## 2023-11-06 NOTE — PROGRESS NOTES
Subjective:       Patient ID: Josette Altamirano is a 68 y.o. female.    Chief Complaint: No chief complaint on file.    HPI:  68-year-old female history of left-sided mastectomy.  Treated with Cytoxan and Taxotere 2007 followed by aromatase inhibitor for 5 years.  Patient now has breast cancer in opposite breast.  Patient presents for further evaluation for adjuvant therapy treatment recommendations ECOG status 1    Past Medical History:   Diagnosis Date    Arthritis of right knee     Breast cancer     She has a history of a Stage IIA, 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 She subsequently underwent left mastectomy and sentinel node biopsy followed by axillary dissection March 23, 2007. Histopathologic evaluation demonstrated again multiple foci of intracystic papillary carcinoma, as well as DCIS noncomed    Cardiomyopathy     CHEMO INDUCED- RESOLVED    CHF (congestive heart failure)     systolic    Hypertension     Uterine fibroid      Family History   Problem Relation Age of Onset    Diabetes Mother     Diabetes Father     Stroke Father     Cancer Maternal Aunt 55        breast cancer    Breast cancer Maternal Aunt     Stroke Paternal Aunt     Melanoma Neg Hx     Psoriasis Neg Hx     Lupus Neg Hx     Eczema Neg Hx      Social History     Socioeconomic History    Marital status:    Tobacco Use    Smoking status: Never    Smokeless tobacco: Never   Substance and Sexual Activity    Alcohol use: Yes     Alcohol/week: 0.0 standard drinks of alcohol     Comment: Ocassionally    Drug use: No    Sexual activity: Yes     Partners: Male     Birth control/protection: None, Post-menopausal     Social Determinants of Health     Financial Resource Strain: Low Risk  (9/19/2023)    Overall Financial Resource Strain (CARDIA)     Difficulty of Paying Living Expenses: Not hard at all   Food Insecurity: No Food Insecurity (9/19/2023)     Hunger Vital Sign     Worried About Running Out of Food in the Last Year: Never true     Ran Out of Food in the Last Year: Never true   Transportation Needs: No Transportation Needs (9/19/2023)    PRAPARE - Transportation     Lack of Transportation (Medical): No     Lack of Transportation (Non-Medical): No   Physical Activity: Sufficiently Active (9/19/2023)    Exercise Vital Sign     Days of Exercise per Week: 4 days     Minutes of Exercise per Session: 60 min   Social Connections: Moderately Integrated (9/19/2023)    Social Connection and Isolation Panel [NHANES]     Frequency of Communication with Friends and Family: More than three times a week     Frequency of Social Gatherings with Friends and Family: More than three times a week     Attends [employer] Services: 1 to 4 times per year     Active Member of Clubs or Organizations: No     Attends Club or Organization Meetings: Never     Marital Status:    Recent Concern: Social Connections - Moderately Isolated (9/19/2023)    Social Connection and Isolation Panel [NHANES]     Frequency of Communication with Friends and Family: More than three times a week     Frequency of Social Gatherings with Friends and Family: More than three times a week     Attends Mor[employer] Services: 1 to 4 times per year     Active Member of Clubs or Organizations: No     Attends Club or Organization Meetings: Never     Marital Status:    Housing Stability: Low Risk  (9/19/2023)    Housing Stability Vital Sign     Unable to Pay for Housing in the Last Year: No     Number of Places Lived in the Last Year: 1     Unstable Housing in the Last Year: No     Past Surgical History:   Procedure Laterality Date    BREAST BIOPSY      BREAST CYST EXCISION Right     BREAST SURGERY      CHOLECYSTECTOMY      COLONOSCOPY N/A 9/10/2018    Procedure: COLONOSCOPY;  Surgeon: Indra Ramos MD;  Location: [facility];  Service: Endoscopy;  Laterality: N/A;    COLONOSCOPY N/A 4/28/2020    Procedure:  "COLONOSCOPY;  Surgeon: Dulce Maria Siegel MD;  Location: [facility];  Service: Endoscopy;  Laterality: N/A;    ESOPHAGOGASTRODUODENOSCOPY N/A 4/28/2020    Procedure: EGD (ESOPHAGOGASTRODUODENOSCOPY);  Surgeon: Dulce Maria Siegel MD;  Location: [facility] ENDO;  Service: Endoscopy;  Laterality: N/A;    MASTECTOMY      left    MEDIPORT INSERTION, SINGLE         Labs:  Lab Results   Component Value Date    WBC 3.99 08/09/2023    HGB 12.0 08/09/2023    HCT 38.8 08/09/2023    MCV 79 (L) 08/09/2023     08/09/2023     BMP  Lab Results   Component Value Date     08/09/2023    K 4.2 08/09/2023     08/09/2023    CO2 28 08/09/2023    BUN 7 (L) 08/09/2023    CREATININE 0.8 08/09/2023    CALCIUM 9.1 08/09/2023    ANIONGAP 7 (L) 08/09/2023    ESTGFRAFRICA >60 05/17/2022    EGFRNONAA >60 05/17/2022     Lab Results   Component Value Date    ALT 26 08/09/2023    AST 24 08/09/2023    ALKPHOS 80 08/09/2023    BILITOT 0.9 08/09/2023       Lab Results   Component Value Date    IRON 92 05/17/2022    TIBC 337 05/17/2022    FERRITIN 179 05/17/2022     No results found for: "NQPGNMUT87"  No results found for: "FOLATE"  Lab Results   Component Value Date    TSH 0.741 05/17/2022         Review of Systems   Constitutional:  Negative for activity change, appetite change, chills, diaphoresis, fatigue, fever and unexpected weight change.   HENT:  Negative for congestion, dental problem, drooling, ear discharge, ear pain, facial swelling, hearing loss, mouth sores, nosebleeds, postnasal drip, rhinorrhea, sinus pressure, sneezing, sore throat, tinnitus, trouble swallowing and voice change.    Eyes:  Negative for photophobia, pain, discharge, redness, itching and visual disturbance.   Respiratory:  Negative for cough, choking, chest tightness, shortness of breath, wheezing and stridor.    Cardiovascular:  Negative for chest pain, palpitations and leg swelling.   Gastrointestinal:  Negative for abdominal distention, abdominal pain, anal " bleeding, blood in stool, constipation, diarrhea, nausea, rectal pain and vomiting.   Endocrine: Negative for cold intolerance, heat intolerance, polydipsia, polyphagia and polyuria.   Genitourinary:  Negative for decreased urine volume, difficulty urinating, dyspareunia, dysuria, enuresis, flank pain, frequency, genital sores, hematuria, menstrual problem, pelvic pain, urgency, vaginal bleeding, vaginal discharge and vaginal pain.   Musculoskeletal:  Negative for arthralgias, back pain, gait problem, joint swelling, myalgias, neck pain and neck stiffness.   Skin:  Negative for color change, pallor and rash.   Allergic/Immunologic: Negative for environmental allergies, food allergies and immunocompromised state.   Neurological:  Negative for dizziness, tremors, seizures, syncope, facial asymmetry, speech difficulty, weakness, light-headedness, numbness and headaches.   Hematological:  Negative for adenopathy. Does not bruise/bleed easily.   Psychiatric/Behavioral:  Positive for dysphoric mood. Negative for agitation, behavioral problems, confusion, decreased concentration, hallucinations, self-injury, sleep disturbance and suicidal ideas. The patient is nervous/anxious. The patient is not hyperactive.        Objective:      Physical Exam  Vitals reviewed.   Constitutional:       General: She is not in acute distress.     Appearance: She is well-developed. She is obese. She is not diaphoretic.   HENT:      Head: Normocephalic and atraumatic.      Right Ear: External ear normal.      Left Ear: External ear normal.      Nose: Nose normal.      Right Sinus: No maxillary sinus tenderness or frontal sinus tenderness.      Left Sinus: No maxillary sinus tenderness or frontal sinus tenderness.      Mouth/Throat:      Pharynx: No oropharyngeal exudate.   Eyes:      General: Lids are normal. No scleral icterus.        Right eye: No discharge.         Left eye: No discharge.      Conjunctiva/sclera: Conjunctivae normal.       Right eye: Right conjunctiva is not injected. No hemorrhage.     Left eye: Left conjunctiva is not injected. No hemorrhage.     Pupils: Pupils are equal, round, and reactive to light.   Neck:      Thyroid: No thyromegaly.      Vascular: No JVD.      Trachea: No tracheal deviation.   Cardiovascular:      Rate and Rhythm: Normal rate.   Pulmonary:      Effort: Pulmonary effort is normal. No respiratory distress.      Breath sounds: No stridor.   Chest:      Chest wall: No tenderness.   Abdominal:      General: Bowel sounds are normal. There is no distension.      Palpations: Abdomen is soft. There is no hepatomegaly, splenomegaly or mass.      Tenderness: There is no abdominal tenderness. There is no rebound.   Musculoskeletal:         General: No tenderness. Normal range of motion.      Cervical back: Normal range of motion and neck supple.   Lymphadenopathy:      Cervical: No cervical adenopathy.      Upper Body:      Right upper body: No supraclavicular adenopathy.      Left upper body: No supraclavicular adenopathy.   Skin:     General: Skin is dry.      Findings: No erythema or rash.   Neurological:      Mental Status: She is alert and oriented to person, place, and time.      Cranial Nerves: No cranial nerve deficit.      Coordination: Coordination normal.   Psychiatric:         Behavior: Behavior normal.         Thought Content: Thought content normal.         Judgment: Judgment normal.             Assessment:      1. Malignant neoplasm of overlapping sites of right breast in female, estrogen receptor positive    2. History of left breast cancer    3. H/O left mastectomy    4. Morbid obesity    5. Essential hypertension    6. NYHA class 2 and LINDA/AHA stage C chronic systolic congestive heart failure           Med Onc Chart Routing      Follow up with physician . Nurse navigation to coordinate   Follow up with [facility]    Infusion scheduling note    Injection scheduling note    Labs    Imaging    Pharmacy appointment     Other referrals                   Plan:      previous history of left breast cancer with mastectomy.  At this time would like to have Oncotype testing completed involving her right breast.  Most likely will need postoperative radiation therapy.  Would like to have results of Oncotype testing completed so that we can make that determination.  With recommendations agree with germ line testing awaiting results.  For review information from up-to-date followed her for her consideration  high likelihood that this represents low Oncotype recurrence score with low Ki-67; no referral to Radiation Oncology needed since patient is planned to have mastectomy        Lawrence Sykes Jr, MD FACP

## 2023-11-07 DIAGNOSIS — C50.811 MALIGNANT NEOPLASM OF OVERLAPPING SITES OF RIGHT BREAST IN FEMALE, ESTROGEN RECEPTOR POSITIVE: Primary | ICD-10-CM

## 2023-11-07 DIAGNOSIS — Z17.0 MALIGNANT NEOPLASM OF OVERLAPPING SITES OF RIGHT BREAST IN FEMALE, ESTROGEN RECEPTOR POSITIVE: Primary | ICD-10-CM

## 2023-11-13 ENCOUNTER — OFFICE VISIT (OUTPATIENT)
Dept: SURGERY | Facility: CLINIC | Age: 68
End: 2023-11-13
Payer: MEDICARE

## 2023-11-13 ENCOUNTER — HOSPITAL ENCOUNTER (OUTPATIENT)
Dept: RADIOLOGY | Facility: HOSPITAL | Age: 68
Discharge: HOME | End: 2023-11-13
Attending: SURGERY
Payer: MEDICARE

## 2023-11-13 ENCOUNTER — HOSPITAL ENCOUNTER (OUTPATIENT)
Dept: CARDIOLOGY | Facility: HOSPITAL | Age: 68
Discharge: HOME | End: 2023-11-13
Attending: SURGERY
Payer: MEDICARE

## 2023-11-13 DIAGNOSIS — Z17.0 MALIGNANT NEOPLASM OF OVERLAPPING SITES OF RIGHT BREAST IN FEMALE, ESTROGEN RECEPTOR POSITIVE: Primary | ICD-10-CM

## 2023-11-13 DIAGNOSIS — C50.811 MALIGNANT NEOPLASM OF OVERLAPPING SITES OF RIGHT BREAST IN FEMALE, ESTROGEN RECEPTOR POSITIVE: Primary | ICD-10-CM

## 2023-11-13 DIAGNOSIS — Z17.0 MALIGNANT NEOPLASM OF OVERLAPPING SITES OF RIGHT BREAST IN FEMALE, ESTROGEN RECEPTOR POSITIVE: ICD-10-CM

## 2023-11-13 DIAGNOSIS — C50.811 MALIGNANT NEOPLASM OF OVERLAPPING SITES OF RIGHT BREAST IN FEMALE, ESTROGEN RECEPTOR POSITIVE: ICD-10-CM

## 2023-11-13 PROCEDURE — 71045 X-RAY EXAM CHEST 1 VIEW: CPT | Mod: 26,,, | Performed by: RADIOLOGY

## 2023-11-13 PROCEDURE — 71045 X-RAY EXAM CHEST 1 VIEW: CPT | Mod: TC

## 2023-11-13 PROCEDURE — 93010 ELECTROCARDIOGRAM REPORT: CPT | Mod: ,,, | Performed by: INTERNAL MEDICINE

## 2023-11-13 PROCEDURE — 93005 ELECTROCARDIOGRAM TRACING: CPT

## 2023-11-13 PROCEDURE — 71045 XR CHEST 1 VIEW: ICD-10-PCS | Mod: 26,,, | Performed by: RADIOLOGY

## 2023-11-13 PROCEDURE — 93010 EKG 12-LEAD: ICD-10-PCS | Mod: ,,, | Performed by: INTERNAL MEDICINE

## 2023-11-13 PROCEDURE — 99212 PR OFFICE/OUTPT VISIT, EST, LEVL II, 10-19 MIN: ICD-10-PCS | Mod: S$PBB,,, | Performed by: SURGERY

## 2023-11-13 PROCEDURE — 99212 OFFICE O/P EST SF 10 MIN: CPT | Mod: S$PBB,,, | Performed by: SURGERY

## 2023-11-13 NOTE — PROGRESS NOTES
"Breast Surgical Oncology  [facility]    Date of Service: 11/13/2023    SUBJECTIVE:   Chief complaint: right breast cancer    HISTORY OF PRESENT ILLNESS:   Josette Altamirano is a 68 y.o. female who is kindly referred by Dr. Rashanw James for right breast cancer.    9/19/23  She has a history of hormone receptor positive, [id number] negative Stage IIA breast cancer. She was initially diagnosed via core needle biopsy of an abnormal left axillary lymph node. There was no identifiable primary breast tumor. She subsequently underwent left total mastectomy and sentinel node biopsy followed by axillary dissection March 23, 2007. Surgical pathology confirmed multiple foci of intracystic papillary carcinoma with multifocal DCIS and one of three positive sentinel lymph nodes. Details regarding in breast findings are not currently available. Twelve additional axillary lymph nodes were obtained on lymph node dissection, all of which were negative for disease. Her tumor profile was: ER 11%. RI 4%. HER2/bob was not over-expressed. She had six cycles of adjuvant Cytoxan and Taxotere due to her depressed EF. She completed 5 years of Arimidex.    She has been followed by Kathleen Campuzano NP in survivorship without event. Her total mastectomy resulted in a significant asymmetry, since she has size F breasts. She was referred to Dr. James for consideration of delayed reconstruction and contralateral symmetry procedure. She had a BIRADS 1 negative screening mammogram of the right breast in July 2023. She proceeded to the OR on 8/24/23 for a left latissimus dorsi flap and right sided reduction for symmetry. Incidental cancer was identified within the breast tissue resected from the right sided reduction. Pathology showed multiple foci of hormone receptor positive, [id number] negative invasive lobular carcinoma measuring 0.9 mm, 3 mm, and 4.4 mm. "The three foci of tumor are most likely contiguous and represent a single focus of ILC on the regros " "examination of the specimen."     She is referred for consultation and to discuss next steps.     23  Following the last visit, she was planned for a contrast mammography to evaluate her breast. She cancelled this study due to delayed healing of her prior reduction. She voiced to Dr. James during a follow up visit shortly after that she would rather proceed with completion mastectomy than breast conservation. Sonographic evaluation of her right axilla showed suspicious lymph nodes and recommended biopsy - a second larger lymph node measures 41 x 10 x 19 mm.  There is borderline cortical thickening at 3.1 mm . Upon her return to radiology for biopsy, repeat sonographic evaluation showed less suspicion with the tubular structure in the right axillary tail favored to represent a dilated duct and biopsy was deferred. Genetic testing showed a VUS in EGFR and POLE genes. She is here for surgical planning of her right completion mastectomy.     Her breast cancer risk factor profile is as follows: Menarche at 14, Menopause at 52.  She is . Age at first live birth was 22. Family history of cancer is as follows: maternal aunt with breast cancer at age 50,  at age 55.    FAMILY HISTORY:     Family History   Problem Relation Age of Onset    Diabetes Mother     Diabetes Father     Stroke Father     Cancer Maternal Aunt 55        breast cancer    Breast cancer Maternal Aunt     Stroke Paternal Aunt     Melanoma Neg Hx     Psoriasis Neg Hx     Lupus Neg Hx     Eczema Neg Hx         PAST MEDICAL HISTORY:     Past Medical History:   Diagnosis Date    Arthritis of right knee     Breast cancer     She has a history of a Stage IIA, TXN1M0 intracystic papillary carcinoma with multifocal DCIS and one of three positive sentinel lymph nodes diagnosed in 2007. She subsequently underwent left mastectomy and sentinel node biopsy followed by axillary dissection 2007. Histopathologic evaluation demonstrated " again multiple foci of intracystic papillary carcinoma, as well as DCIS noncomed    Cardiomyopathy     CHEMO INDUCED- RESOLVED    CHF (congestive heart failure)     systolic    Hypertension     Uterine fibroid        SURGICAL HISTORY:     Past Surgical History:   Procedure Laterality Date    BREAST BIOPSY      BREAST CYST EXCISION Right     BREAST SURGERY      CHOLECYSTECTOMY      COLONOSCOPY N/A 9/10/2018    Procedure: COLONOSCOPY;  Surgeon: Indra Ramos MD;  Location: [facility] ENDO;  Service: Endoscopy;  Laterality: N/A;    COLONOSCOPY N/A 4/28/2020    Procedure: COLONOSCOPY;  Surgeon: Dulce Maria Siegel MD;  Location: [facility] ENDO;  Service: Endoscopy;  Laterality: N/A;    ESOPHAGOGASTRODUODENOSCOPY N/A 4/28/2020    Procedure: EGD (ESOPHAGOGASTRODUODENOSCOPY);  Surgeon: Dulce Maria Siegel MD;  Location: [facility];  Service: Endoscopy;  Laterality: N/A;    MASTECTOMY      left    MEDIPORT INSERTION, SINGLE         SOCIAL HISTORY:     Social History     Tobacco Use    Smoking status: Never    Smokeless tobacco: Never   Substance Use Topics    Alcohol use: Yes     Alcohol/week: 0.0 standard drinks of alcohol     Comment: Ocassionally    Drug use: No        MEDICATIONS/ALLERGIES:     Current Outpatient Medications:     cephALEXin (KEFLEX) 500 MG capsule, Take 1 capsule (500 mg total) by mouth every 6 (six) hours for antibiotic (Patient not taking: Reported on 11/6/2023), Disp: 56 capsule, Rfl: 0    fluticasone propionate (FLONASE) 50 mcg/actuation nasal spray, 1 spray (50 mcg total) by Each Nostril route once daily. (Patient not taking: Reported on 11/6/2023), Disp: 11.1 mL, Rfl: 0    HYDROcodone-acetaminophen (NORCO) 7.5-325 mg per tablet, Take 1 tablet by mouth every 6 (six) hours as needed for pain (Patient not taking: Reported on 11/6/2023), Disp: 28 tablet, Rfl: 0    metoprolol succinate (TOPROL-XL) 50 MG 24 hr tablet, Take 1 tablet (50 mg total) by mouth once daily., Disp: 30 tablet, Rfl: 6    ondansetron  (ZOFRAN-ODT) 8 MG TbDL, Take 1 tablet (8 mg total) by mouth every 6 (six) hours as needed for nausea (Patient not taking: Reported on 11/6/2023), Disp: 10 tablet, Rfl: 0    sacubitriL-valsartan (ENTRESTO)  mg per tablet, Take 1 tablet by mouth 2 (two) times daily., Disp: 180 tablet, Rfl: 1  Review of patient's allergies indicates:  No Known Allergies    REVIEW OF SYSTEMS:   I have reviewed 12 systems, including 2 points per system. Pertinent reported positives are: depressed mood, anxiety, joint stiffness    PHYSICAL EXAM:   General: The patient appears well and is in no acute distress.     Breast exam deferred today    IMAGING:   Images were personally reviewed.     Results for orders placed during the hospital encounter of 07/14/23    Mammo Digital Screening Right with [place]    Narrative  Result:  Mammo Digital Screening Right with [place]    History:  Patient is 67 y.o. and is seen for a screening mammogram.      Films Compared:  Compared to: 07/12/2022 Mammo Digital Screening Right with [place] and 07/07/2021 Mammo Digital Screening Right with [place]    Findings:  This procedure was performed using tomosynthesis.  Computer-aided detection was utilized in the interpretation of this examination.    The right breast has scattered areas of fibroglandular density. There is no evidence of suspicious masses, microcalcifications or architectural distortion.    Impression  No mammographic evidence of malignancy.    BI-RADS Category 1: Negative    Recommendation:  Routine screening mammogram in 1 year is recommended.      PATHOLOGY:   [facility] pathology 8/24/23:    Invasive lobular carcinoma  ER by IHC: 95%  TX by IHC: 75%  [id number] by IHC: 1+  Ki-67: 10%    ASSESSMENT:     1. Malignant neoplasm of overlapping sites of right breast in female, estrogen receptor positive          PLAN:     Josette Altamirano is a 68 y.o. female who is new diagnosis of Stage IA (T1b N0 Mx) RIGHT Breast Invasive Lobular Carcinoma, Grade 2, ER  95%, CT 75%, [id number] 1+ with a ki67 of 10%.     No additional imaging will be planned due to her change to a mastectomy.     She is planned for right skin sparing mastectomy with sentinel lymph node biopsy. The risks of surgery were discussed with the patient, including pain, bleeding, infections, scarring, cosmetic deformity, numbness, necrosis / loss of skin and the nipple areolar complex, lymphedema, injury to adjacent structures such as nerves that affect movement of the arm, need for additional surgery for margins or lymph nodes, need for additional treatments and recurrence.  She understands that if sentinel lymph node biopsy shows metastasis, a completion axillary dissection may be done.  She has provided informed consent.      She is planned for reconstruction by Dr. James.     I spent a total of 15 minutes on this visit. This includes face to face time and non-face to face time preparing to see the patient (eg, review of tests), obtaining and/or reviewing separately obtained history, documenting clinical information in the electronic or other health record, independently interpreting results and communicating results to the patient/family/caregiver, or care coordinator.        Shannon Galvan M.D.

## 2023-11-13 NOTE — H&P (VIEW-ONLY)
"Breast Surgical Oncology  [place]    Date of Service: 11/13/2023    SUBJECTIVE:   Chief complaint: right breast cancer    HISTORY OF PRESENT ILLNESS:   Josette Altamirano is a 68 y.o. female who is kindly referred by Dr. Rashawn James for right breast cancer.    9/19/23  She has a history of hormone receptor positive, okr5mum negative Stage IIA breast cancer. She was initially diagnosed via core needle biopsy of an abnormal left axillary lymph node. There was no identifiable primary breast tumor. She subsequently underwent left total mastectomy and sentinel node biopsy followed by axillary dissection March 23, 2007. Surgical pathology confirmed multiple foci of intracystic papillary carcinoma with multifocal DCIS and one of three positive sentinel lymph nodes. Details regarding in breast findings are not currently available. Twelve additional axillary lymph nodes were obtained on lymph node dissection, all of which were negative for disease. Her tumor profile was: ER 11%. CT 4%. HER2/bob was not over-expressed. She had six cycles of adjuvant Cytoxan and Taxotere due to her depressed EF. She completed 5 years of Arimidex.    She has been followed by Kathleen Campuzano NP in survivorship without event. Her total mastectomy resulted in a significant asymmetry, since she has size F breasts. She was referred to Dr. James for consideration of delayed reconstruction and contralateral symmetry procedure. She had a BIRADS 1 negative screening mammogram of the right breast in July 2023. She proceeded to the OR on 8/24/23 for a left latissimus dorsi flap and right sided reduction for symmetry. Incidental cancer was identified within the breast tissue resected from the right sided reduction. Pathology showed multiple foci of hormone receptor positive, htw8geq negative invasive lobular carcinoma measuring 0.9 mm, 3 mm, and 4.4 mm. "The three foci of tumor are most likely contiguous and represent a single focus of ILC on the regros " "examination of the specimen."     She is referred for consultation and to discuss next steps.     23  Following the last visit, she was planned for a contrast mammography to evaluate her breast. She cancelled this study due to delayed healing of her prior reduction. She voiced to Dr. James during a follow up visit shortly after that she would rather proceed with completion mastectomy than breast conservation. Sonographic evaluation of her right axilla showed suspicious lymph nodes and recommended biopsy - a second larger lymph node measures 41 x 10 x 19 mm.  There is borderline cortical thickening at 3.1 mm . Upon her return to radiology for biopsy, repeat sonographic evaluation showed less suspicion with the tubular structure in the right axillary tail favored to represent a dilated duct and biopsy was deferred. Genetic testing showed a VUS in EGFR and POLE genes. She is here for surgical planning of her right completion mastectomy.     Her breast cancer risk factor profile is as follows: Menarche at 14, Menopause at 52.  She is . Age at first live birth was 22. Family history of cancer is as follows: maternal aunt with breast cancer at age 50,  at age 55.    FAMILY HISTORY:     Family History   Problem Relation Age of Onset    Diabetes Mother     Diabetes Father     Stroke Father     Cancer Maternal Aunt 55        breast cancer    Breast cancer Maternal Aunt     Stroke Paternal Aunt     Melanoma Neg Hx     Psoriasis Neg Hx     Lupus Neg Hx     Eczema Neg Hx         PAST MEDICAL HISTORY:     Past Medical History:   Diagnosis Date    Arthritis of right knee     Breast cancer     She has a history of a Stage IIA, TXN1M0 intracystic papillary carcinoma with multifocal DCIS and one of three positive sentinel lymph nodes diagnosed in 2007. She subsequently underwent left mastectomy and sentinel node biopsy followed by axillary dissection 2007. Histopathologic evaluation demonstrated " again multiple foci of intracystic papillary carcinoma, as well as DCIS noncomed    Cardiomyopathy     CHEMO INDUCED- RESOLVED    CHF (congestive heart failure)     systolic    Hypertension     Uterine fibroid        SURGICAL HISTORY:     Past Surgical History:   Procedure Laterality Date    BREAST BIOPSY      BREAST CYST EXCISION Right     BREAST SURGERY      CHOLECYSTECTOMY      COLONOSCOPY N/A 9/10/2018    Procedure: COLONOSCOPY;  Surgeon: Indra Ramos MD;  Location: [facility] ENDO;  Service: Endoscopy;  Laterality: N/A;    COLONOSCOPY N/A 4/28/2020    Procedure: COLONOSCOPY;  Surgeon: Dulce Maria Siegel MD;  Location: [facility] ENDO;  Service: Endoscopy;  Laterality: N/A;    ESOPHAGOGASTRODUODENOSCOPY N/A 4/28/2020    Procedure: EGD (ESOPHAGOGASTRODUODENOSCOPY);  Surgeon: Dulce Maria Siegel MD;  Location: [facility];  Service: Endoscopy;  Laterality: N/A;    MASTECTOMY      left    MEDIPORT INSERTION, SINGLE         SOCIAL HISTORY:     Social History     Tobacco Use    Smoking status: Never    Smokeless tobacco: Never   Substance Use Topics    Alcohol use: Yes     Alcohol/week: 0.0 standard drinks of alcohol     Comment: Ocassionally    Drug use: No        MEDICATIONS/ALLERGIES:     Current Outpatient Medications:     cephALEXin (KEFLEX) 500 MG capsule, Take 1 capsule (500 mg total) by mouth every 6 (six) hours for antibiotic (Patient not taking: Reported on 11/6/2023), Disp: 56 capsule, Rfl: 0    fluticasone propionate (FLONASE) 50 mcg/actuation nasal spray, 1 spray (50 mcg total) by Each Nostril route once daily. (Patient not taking: Reported on 11/6/2023), Disp: 11.1 mL, Rfl: 0    HYDROcodone-acetaminophen (NORCO) 7.5-325 mg per tablet, Take 1 tablet by mouth every 6 (six) hours as needed for pain (Patient not taking: Reported on 11/6/2023), Disp: 28 tablet, Rfl: 0    metoprolol succinate (TOPROL-XL) 50 MG 24 hr tablet, Take 1 tablet (50 mg total) by mouth once daily., Disp: 30 tablet, Rfl: 6    ondansetron  (ZOFRAN-ODT) 8 MG TbDL, Take 1 tablet (8 mg total) by mouth every 6 (six) hours as needed for nausea (Patient not taking: Reported on 11/6/2023), Disp: 10 tablet, Rfl: 0    sacubitriL-valsartan (ENTRESTO)  mg per tablet, Take 1 tablet by mouth 2 (two) times daily., Disp: 180 tablet, Rfl: 1  Review of patient's allergies indicates:  No Known Allergies    REVIEW OF SYSTEMS:   I have reviewed 12 systems, including 2 points per system. Pertinent reported positives are: depressed mood, anxiety, joint stiffness    PHYSICAL EXAM:   General: The patient appears well and is in no acute distress.     Breast exam deferred today    IMAGING:   Images were personally reviewed.     Results for orders placed during the hospital encounter of 07/14/23    Mammo Digital Screening Right with [place]    Narrative  Result:  Mammo Digital Screening Right with [place]    History:  Patient is 67 y.o. and is seen for a screening mammogram.      Films Compared:  Compared to: 07/12/2022 Mammo Digital Screening Right with [place] and 07/07/2021 Mammo Digital Screening Right with [place]    Findings:  This procedure was performed using tomosynthesis.  Computer-aided detection was utilized in the interpretation of this examination.    The right breast has scattered areas of fibroglandular density. There is no evidence of suspicious masses, microcalcifications or architectural distortion.    Impression  No mammographic evidence of malignancy.    BI-RADS Category 1: Negative    Recommendation:  Routine screening mammogram in 1 year is recommended.      PATHOLOGY:   [facility] pathology 8/24/23:    Invasive lobular carcinoma  ER by IHC: 95%  LA by IHC: 75%  [id number] by IHC: 1+  Ki-67: 10%    ASSESSMENT:     1. Malignant neoplasm of overlapping sites of right breast in female, estrogen receptor positive          PLAN:     Josette Altamirano is a 68 y.o. female who is new diagnosis of Stage IA (T1b N0 Mx) RIGHT Breast Invasive Lobular Carcinoma, Grade 2, ER  95%, IL 75%, Ioi6afm 1+ with a ki67 of 10%.     No additional imaging will be planned due to her change to a mastectomy.     She is planned for right skin sparing mastectomy with sentinel lymph node biopsy. The risks of surgery were discussed with the patient, including pain, bleeding, infections, scarring, cosmetic deformity, numbness, necrosis / loss of skin and the nipple areolar complex, lymphedema, injury to adjacent structures such as nerves that affect movement of the arm, need for additional surgery for margins or lymph nodes, need for additional treatments and recurrence.  She understands that if sentinel lymph node biopsy shows metastasis, a completion axillary dissection may be done.  She has provided informed consent.      She is planned for reconstruction by Dr. James.     I spent a total of 15 minutes on this visit. This includes face to face time and non-face to face time preparing to see the patient (eg, review of tests), obtaining and/or reviewing separately obtained history, documenting clinical information in the electronic or other health record, independently interpreting results and communicating results to the patient/family/caregiver, or care coordinator.        Shannon Galvan M.D.

## 2023-11-17 ENCOUNTER — PATIENT MESSAGE (OUTPATIENT)
Dept: GENETICS | Facility: CLINIC | Age: 68
End: 2023-11-17
Payer: MEDICARE

## 2023-11-20 DIAGNOSIS — M25.561 PAIN IN BOTH KNEES, UNSPECIFIED CHRONICITY: Primary | ICD-10-CM

## 2023-11-20 DIAGNOSIS — M25.562 PAIN IN BOTH KNEES, UNSPECIFIED CHRONICITY: Primary | ICD-10-CM

## 2023-11-21 ENCOUNTER — OFFICE VISIT (OUTPATIENT)
Dept: ORTHOPEDICS | Facility: CLINIC | Age: 68
End: 2023-11-21
Payer: MEDICARE

## 2023-11-21 ENCOUNTER — HOSPITAL ENCOUNTER (OUTPATIENT)
Dept: RADIOLOGY | Facility: HOSPITAL | Age: 68
Discharge: HOME | End: 2023-11-21
Attending: STUDENT IN AN ORGANIZED HEALTH CARE EDUCATION/TRAINING PROGRAM
Payer: MEDICARE

## 2023-11-21 DIAGNOSIS — M17.0 PRIMARY OSTEOARTHRITIS OF KNEES, BILATERAL: Primary | ICD-10-CM

## 2023-11-21 DIAGNOSIS — E66.01 CLASS 2 SEVERE OBESITY DUE TO EXCESS CALORIES WITH SERIOUS COMORBIDITY AND BODY MASS INDEX (BMI) OF 39.0 TO 39.9 IN ADULT: ICD-10-CM

## 2023-11-21 DIAGNOSIS — M22.2X1 PATELLOFEMORAL ARTHRALGIA OF BOTH KNEES: ICD-10-CM

## 2023-11-21 DIAGNOSIS — M22.2X2 PATELLOFEMORAL ARTHRALGIA OF BOTH KNEES: ICD-10-CM

## 2023-11-21 DIAGNOSIS — M25.561 PAIN IN BOTH KNEES, UNSPECIFIED CHRONICITY: ICD-10-CM

## 2023-11-21 DIAGNOSIS — M25.562 PAIN IN BOTH KNEES, UNSPECIFIED CHRONICITY: ICD-10-CM

## 2023-11-21 PROCEDURE — 99204 PR OFFICE/OUTPT VISIT, NEW, LEVL IV, 45-59 MIN: ICD-10-PCS | Mod: S$PBB,,, | Performed by: STUDENT IN AN ORGANIZED HEALTH CARE EDUCATION/TRAINING PROGRAM

## 2023-11-21 PROCEDURE — 73564 X-RAY EXAM KNEE 4 OR MORE: CPT | Mod: 26,50,, | Performed by: RADIOLOGY

## 2023-11-21 PROCEDURE — 73564 X-RAY EXAM KNEE 4 OR MORE: CPT | Mod: TC,50,PO

## 2023-11-21 PROCEDURE — 99999 PR PBB SHADOW E&M-EST. PATIENT-LVL II: ICD-10-PCS | Mod: PBBFAC,,, | Performed by: STUDENT IN AN ORGANIZED HEALTH CARE EDUCATION/TRAINING PROGRAM

## 2023-11-21 PROCEDURE — 99204 OFFICE O/P NEW MOD 45 MIN: CPT | Mod: S$PBB,,, | Performed by: STUDENT IN AN ORGANIZED HEALTH CARE EDUCATION/TRAINING PROGRAM

## 2023-11-21 PROCEDURE — 99999 PR PBB SHADOW E&M-EST. PATIENT-LVL II: CPT | Mod: PBBFAC,,, | Performed by: STUDENT IN AN ORGANIZED HEALTH CARE EDUCATION/TRAINING PROGRAM

## 2023-11-21 PROCEDURE — 99212 OFFICE O/P EST SF 10 MIN: CPT | Mod: PBBFAC,PO | Performed by: STUDENT IN AN ORGANIZED HEALTH CARE EDUCATION/TRAINING PROGRAM

## 2023-11-21 PROCEDURE — 73564 XR KNEE ORTHO BILAT WITH FLEXION: ICD-10-PCS | Mod: 26,50,, | Performed by: RADIOLOGY

## 2023-11-21 RX ORDER — MELOXICAM 15 MG/1
15 TABLET ORAL DAILY
Qty: 30 TABLET | Refills: 1 | Status: SHIPPED | OUTPATIENT
Start: 2023-11-21 | End: 2024-02-27

## 2023-11-21 NOTE — PATIENT INSTRUCTIONS
Assessment:  Josette Altamirano is a 68 y.o. female   Chief Complaint   Patient presents with    Left Knee - Pain    Right Knee - Pain       Encounter Diagnoses   Name Primary?    Primary osteoarthritis of knees, bilateral Yes    Patellofemoral arthralgia of both knees     Class 2 severe obesity due to excess calories with serious comorbidity and body mass index (BMI) of 39.0 to 39.9 in adult         Plan:  Reviewed your x-rays with you today and discussed pertinent findings.   We have reviewed the natural history of this disorder and discussed treatment and management options moving forward   -maintain healthy weight (every pound is 4 pounds of pressure on the knee)    -daily moderate exercise (walk, bike, swim 30 minutes per day) to keep joints moving    -daily strengthening exercises (through therapy or on own) to keep muscles supporting joint healthy and strong    -glucosamine 1500mg daily (look for USP label on bottle)    -tylenol as needed for pain (follow directions on the bottle)    -anti-inflammatory medication such as alleve may be helpful- take 1-2 tabs twice daily for 7 days. If it helps your pain, continue. If you do not feel any change, you may stop and then take it as needed.    (you may be given a once daily anti-inflammatory such as MOBIC. If given, avoid other anti-inflammatory medications such as advil, ibuprofen, motrin, naprosyn, alleve, etc)    -if swollen and painful, ice, decrease activity, and take anti-inflammatory daily for 5-7 days and if no relief call your doctor for further options    -consider cortisone injection (every 3-4 months at most)- anti- inflammatory steroid medication that can be injected directly into the joint to reduce inflammation    -consider hyaluronic acid injections (eufflexxa, hyalgan, synvisc, supartz) (every 6 months at most)- protein injection that helps decrease pain and irritability in the joint. It is best used to help prolong intervals between cortisone injections  to minimize steroid injections. These are currently approved for knee injections. Discuss with your doctor if other joint involved. Call to seek approval prior to the injections.    -long-term treatment may include a total joint replacement (keep diary of good days and bad days, then evaluate as to when you are ready)     You were prescribed meloxicam today as an anti-inflammatory medicine for the pain you are having.  Please take this medicine once a day with food for 2 weeks, and then as needed for days with significant recurrent pain.  Please do not take any other anti-inflammatories such as naproxen, ibuprofen, Aleve at the same time you are taking this medicine.     General Arthritis info:    -shiny white stuff at end of a chicken bones is cartilage    -arthritis is wearing away of the cartilage that lines the end of your bones    -osteoarthritis is thought to be a wear and tear phenomenon    -symptoms are due to inflammation of joint causing stiffness, aching, and sometimes swelling    -occasionally sharp pain will occur causing a give way sensation    -Risk factors: genetic, weight, female > male, age    Supplements for pain, inflammation, arthritis.    Glucosamine sulfate/chondroitin sulfate has been shown to be safe and effective for knee arthritis (and possibly other joints affected with arthritis). This is an over the counter medication/supplement and usually needs to be taken for 1-2 months before results are seen. If you do not see any results after this time, consider stopping it. The dose is usually found on the bottle, and is 1500mg to start (first 1-2 months) then you can back down to 1000 mg (current recommendations are 1500mg per day, all at once).    Some people can have stomach problems with this and if you do, you may stop taking it or divide up the doses. If you are ALLERGIC TO SHELLFISH, please do not take. Follow the instructions on the bottle.    Other supplements that have been shown to  help with joint and muscle pain include:    (Unless otherwise noted, as these supplements are not FDA controlled, please follow the recommendations on the bottle)    Turmeric 500mg PO BID    Flax seed oil    Avocado soybean unsaponifiables    NABILA-e    MSM    Cherry juice extract (tart)    Rosehip    Anikinara    Diacerein    If there are any concerns about taking these supplements with other medications you may already be taking, you can speak to your pharmacist, physician, health care provider, or research other medical information available to you. Side effects and interactions are possible with any supplement or medication - be safe!       Follow-up: As needed or sooner if there are any problems between now and then.    Thank you for choosing [facility] and Dr. Byron Tay for your orthopedic & sports medicine care. It is our goal to provide you with exceptional care that will help keep you healthy, active, and get you back in the game.    Please do not hesitate to reach out to us via email, phone, or MyChart with any questions, concerns, or feedback.    If you felt that you received exemplary care today, please consider leaving us feedback on [employer] at:  [email or web address]    If you are experiencing pain/discomfort ,or have questions after 5pm and would like to be connected to the [facility] on-call team, please call this number and specify which Sports Medicine provider is treating you: [phone]

## 2023-11-21 NOTE — PROGRESS NOTES
Patient ID: Josette Altamirano  YOB: 1955  MRN: [id number]    Chief Complaint: Pain of the Left Knee and Pain of the Right Knee    Referred By: Self for bilateral knee pain    History of Present Illness: Josette Altamirano is a  68 y.o. female who presents today with bilateral knee pain, left (8/10 NPRS) worse than right (5/10 NPRS).     The patient is active in  walking .    Josette Altamirano states it is Chronic in nature and there was not a specific mechanism. Has previously seen ortho at [facility] 7-8 years ago for corticosteroid injection in left knee. Was diagnosed with OA of left knee and treated as such. Endorses 3 weeks of pain today that began without inciting event or trauma. Says she used to walk on treadmill but ceased this in last year due to a surgery she had. Is aware that she needs to lose weight but cannot exercise due to knee pain.  Josette Altamirano describes the pain as a intermittent ache, throb, sharp at anterior joint lines. Associated symptoms include: Swelling No, Instability No, Pain that affects your sleep No, Mechanical Yes, locking/catching No, Neurological No, limited range of motion Yes.  Aggravating activities include activity, prolong siting, walking. They have tried  OTC tylenol, Aspercreme so far for this. They believe that they are unchanged with this treatment . They denies formal physical therapy for this.     Hemoglobin A1C   Date Value Ref Range Status   10/26/2006 5.4 4.5 - 6.2 % Final       Past Medical History:   Past Medical History:   Diagnosis Date    Arthritis of right knee     Breast cancer     She has a history of a Stage IIA, 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 She subsequently underwent left mastectomy and sentinel node biopsy followed by axillary dissection March 23, 2007. Histopathologic evaluation demonstrated again multiple foci of intracystic papillary carcinoma, as well  as DCIS noncomed    Cardiomyopathy     CHEMO INDUCED- RESOLVED    CHF (congestive heart failure)     systolic    Genetic testing 11/06/2023    [employer] Multi Cancer Panel (84 genes) - NEGATIVE, VUS in EGFR, POLE    Hypertension     Uterine fibroid      Past Surgical History:   Procedure Laterality Date    BREAST BIOPSY      BREAST CYST EXCISION Right     BREAST SURGERY      CHOLECYSTECTOMY      COLONOSCOPY N/A 9/10/2018    Procedure: COLONOSCOPY;  Surgeon: Indra Ramos MD;  Location: [facility] ENDO;  Service: Endoscopy;  Laterality: N/A;    COLONOSCOPY N/A 4/28/2020    Procedure: COLONOSCOPY;  Surgeon: Dulce Maria Siegel MD;  Location: [facility];  Service: Endoscopy;  Laterality: N/A;    ESOPHAGOGASTRODUODENOSCOPY N/A 4/28/2020    Procedure: EGD (ESOPHAGOGASTRODUODENOSCOPY);  Surgeon: Dulce Maria Siegel MD;  Location: [facility];  Service: Endoscopy;  Laterality: N/A;    MASTECTOMY      left    MEDIPORT INSERTION, SINGLE       Family History   Problem Relation Age of Onset    Diabetes Mother     Diabetes Father     Stroke Father     Cancer Maternal Aunt 55        breast cancer    Breast cancer Maternal Aunt     Stroke Paternal Aunt     Melanoma Neg Hx     Psoriasis Neg Hx     Lupus Neg Hx     Eczema Neg Hx      Social History     Socioeconomic History    Marital status:    Tobacco Use    Smoking status: Never    Smokeless tobacco: Never   Substance and Sexual Activity    Alcohol use: Yes     Alcohol/week: 0.0 standard drinks of alcohol     Comment: Ocassionally    Drug use: No    Sexual activity: Yes     Partners: Male     Birth control/protection: None, Post-menopausal     Social Determinants of Health     Financial Resource Strain: Low Risk  (9/19/2023)    Overall Financial Resource Strain (CARDIA)     Difficulty of Paying Living Expenses: Not hard at all   Food Insecurity: No Food Insecurity (9/19/2023)    Hunger Vital Sign     Worried About Running Out of Food in the Last Year: Never true     Ran Out of Food  in the Last Year: Never true   Transportation Needs: No Transportation Needs (9/19/2023)    PRAPARE - Transportation     Lack of Transportation (Medical): No     Lack of Transportation (Non-Medical): No   Physical Activity: Sufficiently Active (9/19/2023)    Exercise Vital Sign     Days of Exercise per Week: 4 days     Minutes of Exercise per Session: 60 min   Social Connections: Moderately Integrated (9/19/2023)    Social Connection and Isolation Panel [NHANES]     Frequency of Communication with Friends and Family: More than three times a week     Frequency of Social Gatherings with Friends and Family: More than three times a week     Attends Adventism Services: 1 to 4 times per year     Active Member of Clubs or Organizations: No     Attends Club or Organization Meetings: Never     Marital Status:    Recent Concern: Social Connections - Moderately Isolated (9/19/2023)    Social Connection and Isolation Panel [NHANES]     Frequency of Communication with Friends and Family: More than three times a week     Frequency of Social Gatherings with Friends and Family: More than three times a week     Attends Adventism Services: 1 to 4 times per year     Active Member of Clubs or Organizations: No     Attends Club or Organization Meetings: Never     Marital Status:    Housing Stability: Low Risk  (9/19/2023)    Housing Stability Vital Sign     Unable to Pay for Housing in the Last Year: No     Number of Places Lived in the Last Year: 1     Unstable Housing in the Last Year: No     Medication List with Changes/Refills   Current Medications    CEPHALEXIN (KEFLEX) 500 MG CAPSULE    Take 1 capsule (500 mg total) by mouth every 6 (six) hours for antibiotic    FLUTICASONE PROPIONATE (FLONASE) 50 MCG/ACTUATION NASAL SPRAY    1 spray (50 mcg total) by Each Nostril route once daily.    HYDROCODONE-ACETAMINOPHEN (NORCO) 7.5-325 MG PER TABLET    Take 1 tablet by mouth every 6 (six) hours as needed for pain     METOPROLOL SUCCINATE (TOPROL-XL) 50 MG 24 HR TABLET    Take 1 tablet (50 mg total) by mouth once daily.    ONDANSETRON (ZOFRAN-ODT) 8 MG TBDL    Take 1 tablet (8 mg total) by mouth every 6 (six) hours as needed for nausea    SACUBITRIL-VALSARTAN (ENTRESTO)  MG PER TABLET    Take 1 tablet by mouth 2 (two) times daily.     Review of patient's allergies indicates:  No Known Allergies    Physical Exam:   There is no height or weight on file to calculate BMI.    GENERAL: Well appearing, in no acute distress.  HEAD: Normocephalic and atraumatic.  ENT: External ears and nose grossly normal.  EYES: EOMI bilaterally  PULMONARY: Respirations are grossly even and non-labored.  NEURO: Awake, alert, and oriented x 3.  SKIN: No obvious rashes appreciated.  PSYCH: Mood & affect are appropriate.    Detailed MSK exam:     R knee: No obvious deformities, no ecchymosis, no erythema. No effusion. Limited knee flexion  1 fist compared to L. Crepitus with flexion, extension. Tender to palpation at medial tibial plateau, medial femoral condyle, patellar tendon, patellofemoral. Patellar Apprehension negative. Limited patellar mobility with crepitus.    Ligamentous exam: Lachman negative. Valgus @ 0 negative. Valgus @ 30 negative. Varus negative. Anterior drawer negative. Posterior Drawer negative. Pain/click Mario positive,     L knee: No obvious deformities, no ecchymosis, no erythema. Trace effusion. Full ROM. Tender to palpation at medial joint line, medial tibial plateau, medial femoral condyle, patelalr tendon, patellofemoral. Patellar Apprehension negative. Good patellar mobility with crepitus.  Ligamentous exam: Lachman negative. Valgus @ 0 negative. Valgus @ 30 negative. Varus negative. Anterior drawer negative. Posterior Drawer negative. Pain/click Mario negative,     Imaging:  X-ray Knee Ortho Bilateral with Flexion  Narrative: EXAM: XR KNEE ORTHO BILAT WITH FLEXION    CLINICAL HISTORY: Bilateral knee joint  pain.    FINDINGS: 8 views bilateral knees.  No comparison     Diffuse osteopenia.  Moderate tricompartment degenerative changes with joint space narrowing and mild sclerosis greatest within the medial compartments, right greater than left.  No fracture, dislocation, or other acute abnormality is identified.  No significant joint effusion is evident.       No erosive change identified.  No loose intra-articular osteochondral bodies or osteochondral defect is evident.  Impression:   As above    Finalized on: 11/21/2023 10:06 AM By:  Anton Márquez MD  [facility]# [id number]      2023-11-21 10:08:53.154    [facility]    Relevant imaging results were reviewed and interpreted by me and per my read as above.  This was discussed with the patient and / or family today.     Assessment:  Josette Altamirano is a 68 y.o. female presents today for bilateral knee pain left greater than right consistent with primary osteoarthritis of the knee.  Recent flare up secondary to stopping her regular walking routine after recent surgery and trying to get back in has had a flare up and on inability to return to her normal function.  We discussed the diagnosis and prognosis of osteoarthritis of the knee as well as normal progression.  We will trial oral meloxicam as well as a steady return back to activity as tolerated if not seeing improvement could consider intra-articular injections.  Discussed weight loss and low-impact activities mainstays treatment of arthritis as well.  Follow-up p.r.n.    Primary osteoarthritis of knees, bilateral    Patellofemoral arthralgia of both knees    Class 2 severe obesity due to excess calories with serious comorbidity and body mass index (BMI) of 39.0 to 39.9 in adult    Other orders  -     meloxicam (MOBIC) 15 MG tablet; Take 1 tablet (15 mg total) by mouth once daily.  Dispense: 30 tablet; Refill: 1         A copy of today's visit note has been sent to the referring provider.       Byron Tay MD    Disclaimer: This note  was prepared using a voice recognition system and is likely to have sound alike errors within the text.

## 2023-11-28 ENCOUNTER — OFFICE VISIT (OUTPATIENT)
Dept: PRIMARY CARE CLINIC | Facility: CLINIC | Age: 68
End: 2023-11-28
Payer: MEDICARE

## 2023-11-28 ENCOUNTER — TELEPHONE (OUTPATIENT)
Dept: PRIMARY CARE CLINIC | Facility: CLINIC | Age: 68
End: 2023-11-28

## 2023-11-28 DIAGNOSIS — Z71.9 COUNSELED BY NURSE: ICD-10-CM

## 2023-11-28 DIAGNOSIS — Z11.1 VISIT FOR TB SKIN TEST: Primary | ICD-10-CM

## 2023-11-28 DIAGNOSIS — Z23 IMMUNIZATION DUE: Primary | ICD-10-CM

## 2023-11-28 PROCEDURE — 99999PBSHW POCT TB SKIN TEST: ICD-10-PCS | Mod: PBBFAC,,,

## 2023-11-28 PROCEDURE — 99211 OFF/OP EST MAY X REQ PHY/QHP: CPT | Mod: S$PBB,,, | Performed by: INTERNAL MEDICINE

## 2023-11-28 PROCEDURE — 86580 TB INTRADERMAL TEST: CPT | Mod: PBBFAC,PN | Performed by: INTERNAL MEDICINE

## 2023-11-28 PROCEDURE — 99211 PR OFFICE/OUTPT VISIT, EST, LEVL I: ICD-10-PCS | Mod: S$PBB,,, | Performed by: INTERNAL MEDICINE

## 2023-11-28 PROCEDURE — 99999PBSHW POCT TB SKIN TEST: Mod: PBBFAC,,,

## 2023-11-28 PROCEDURE — 99999 PR PBB SHADOW E&M-EST. PATIENT-LVL I: CPT | Mod: PBBFAC,,, | Performed by: INTERNAL MEDICINE

## 2023-11-28 PROCEDURE — 86580 TB INTRADERMAL TEST: CPT | Mod: PBBFAC,PN

## 2023-11-28 PROCEDURE — 99211 OFF/OP EST MAY X REQ PHY/QHP: CPT | Mod: PBBFAC,PN | Performed by: INTERNAL MEDICINE

## 2023-11-28 PROCEDURE — 99999 PR PBB SHADOW E&M-EST. PATIENT-LVL I: ICD-10-PCS | Mod: PBBFAC,,, | Performed by: INTERNAL MEDICINE

## 2023-11-28 NOTE — PROGRESS NOTES
Tb test administered at 11:34 am to right forearm   To be read on Thursday instructions given to patient to be back in 48-72 hrs.

## 2023-11-30 ENCOUNTER — CLINICAL SUPPORT (OUTPATIENT)
Dept: PRIMARY CARE CLINIC | Facility: CLINIC | Age: 68
End: 2023-11-30
Payer: MEDICARE

## 2023-11-30 DIAGNOSIS — Z11.1 VISIT FOR TB SKIN TEST: Primary | ICD-10-CM

## 2023-11-30 LAB
TB INDURATION - 48 HR READ: 0 MM
TB INDURATION - 72 HR READ: ABNORMAL
TB SKIN TEST - 48 HR READ: NEGATIVE
TB SKIN TEST - 72 HR READ: ABNORMAL

## 2023-12-04 ENCOUNTER — PATIENT MESSAGE (OUTPATIENT)
Dept: PREADMISSION TESTING | Facility: HOSPITAL | Age: 68
End: 2023-12-04
Payer: MEDICARE

## 2023-12-04 ENCOUNTER — OFFICE VISIT (OUTPATIENT)
Dept: INTERNAL MEDICINE | Facility: CLINIC | Age: 68
End: 2023-12-04
Payer: MEDICARE

## 2023-12-04 VITALS
HEART RATE: 76 BPM | TEMPERATURE: 98 F | DIASTOLIC BLOOD PRESSURE: 67 MMHG | OXYGEN SATURATION: 99 % | SYSTOLIC BLOOD PRESSURE: 128 MMHG | RESPIRATION RATE: 18 BRPM

## 2023-12-04 DIAGNOSIS — Z01.818 PRE-OP EXAM: ICD-10-CM

## 2023-12-04 DIAGNOSIS — C50.811 MALIGNANT NEOPLASM OF OVERLAPPING SITES OF RIGHT BREAST IN FEMALE, ESTROGEN RECEPTOR POSITIVE: ICD-10-CM

## 2023-12-04 DIAGNOSIS — I50.22 NYHA CLASS 2 AND ACC/AHA STAGE C CHRONIC SYSTOLIC CONGESTIVE HEART FAILURE: Chronic | ICD-10-CM

## 2023-12-04 DIAGNOSIS — Z17.0 MALIGNANT NEOPLASM OF OVERLAPPING SITES OF RIGHT BREAST IN FEMALE, ESTROGEN RECEPTOR POSITIVE: ICD-10-CM

## 2023-12-04 DIAGNOSIS — T45.1X5A CARDIOMYOPATHY DUE TO CHEMOTHERAPY: ICD-10-CM

## 2023-12-04 DIAGNOSIS — Z01.818 PREOP EXAMINATION: Primary | ICD-10-CM

## 2023-12-04 DIAGNOSIS — I10 ESSENTIAL HYPERTENSION: Chronic | ICD-10-CM

## 2023-12-04 DIAGNOSIS — I42.7 CARDIOMYOPATHY DUE TO CHEMOTHERAPY: ICD-10-CM

## 2023-12-04 PROCEDURE — 99999 PR PBB SHADOW E&M-EST. PATIENT-LVL IV: CPT | Mod: PBBFAC,,,

## 2023-12-04 PROCEDURE — 99214 OFFICE O/P EST MOD 30 MIN: CPT | Mod: PBBFAC

## 2023-12-04 PROCEDURE — 99499 UNLISTED E&M SERVICE: CPT | Mod: S$PBB,,, | Performed by: ANESTHESIOLOGY

## 2023-12-04 PROCEDURE — 99999 PR PBB SHADOW E&M-EST. PATIENT-LVL IV: ICD-10-PCS | Mod: PBBFAC,,,

## 2023-12-04 PROCEDURE — 99499 NO LOS: ICD-10-PCS | Mod: S$PBB,,, | Performed by: ANESTHESIOLOGY

## 2023-12-04 NOTE — H&P (VIEW-ONLY)
Preoperative History and Physical  [facility]                                                                   Chief Complaint: Preoperative evaluation     History of Present Illness:      Josette Altamirano is a 68 y.o. female with a past medical history of arthritis, breast cancer with chemotherapy induced cardiomyopathy, Systolic congestive heart failure on Entresto (now with normal EF), hypertension, and uterine fibroid who presents to the office today for a preoperative consultation at the request of Dr. Velázquez who plans on performing a Right simple mastectomy with sentinel node identification and lymph node biopsy and Dr. James who plans on performing a tissue expander insertion on on December 12.     Functional Status:      The patient is able to climb a flight of stairs. The patient is able to ambulate without difficulty. The patient's functional status is not affected by the surgical problem. The patient's functional status is not affected by shortness of breath, chest pain, dyspnea on exertion and fatigue.      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 She subsequently underwent left mastectomy and sentinel node biopsy followed by axillary dissection March 23, 2007. Histopathologic evaluation demonstrated again multiple foci of  intracystic papillary carcinoma, as well as DCIS noncomed    Cardiomyopathy     CHEMO INDUCED- RESOLVED    CHF (congestive heart failure)     systolic    Genetic testing 11/06/2023    [employer] Multi Cancer Panel (84 genes) - NEGATIVE, VUS in EGFR, POLE    Hypertension     Uterine fibroid         Past Surgical History:      Past Surgical History:   Procedure Laterality Date    BREAST BIOPSY      BREAST CYST EXCISION Right     BREAST SURGERY      CHOLECYSTECTOMY      COLONOSCOPY N/A 9/10/2018    Procedure: COLONOSCOPY;  Surgeon: Indra Ramos MD;  Location: [facility] ENDO;  Service: Endoscopy;  Laterality: N/A;    COLONOSCOPY N/A 4/28/2020    Procedure: COLONOSCOPY;  Surgeon: Dulce Maria Siegel MD;  Location: [facility];  Service: Endoscopy;  Laterality: N/A;    ESOPHAGOGASTRODUODENOSCOPY N/A 4/28/2020    Procedure: EGD (ESOPHAGOGASTRODUODENOSCOPY);  Surgeon: Dulce Maria Siegel MD;  Location: [facility];  Service: Endoscopy;  Laterality: N/A;    MASTECTOMY      left    MEDIPORT INSERTION, SINGLE          Social History:      Social History     Socioeconomic History    Marital status:    Tobacco Use    Smoking status: Never    Smokeless tobacco: Never   Substance and Sexual Activity    Alcohol use: Yes     Alcohol/week: 0.0 standard drinks of alcohol     Comment: Ocassionally    Drug use: No    Sexual activity: Yes     Partners: Male     Birth control/protection: None, Post-menopausal     Social Determinants of Health     Financial Resource Strain: Low Risk  (9/19/2023)    Overall Financial Resource Strain (CARDIA)     Difficulty of Paying Living Expenses: Not hard at all   Food Insecurity: No Food Insecurity (9/19/2023)    Hunger Vital Sign     Worried About Running Out of Food in the Last Year: Never true     Ran Out of Food in the Last Year: Never true   Transportation Needs: No Transportation Needs (9/19/2023)    PRAPARE - Transportation     Lack of Transportation (Medical): No     Lack of Transportation  (Non-Medical): No   Physical Activity: Sufficiently Active (9/19/2023)    Exercise Vital Sign     Days of Exercise per Week: 4 days     Minutes of Exercise per Session: 60 min   Social Connections: Moderately Integrated (9/19/2023)    Social Connection and Isolation Panel [NHANES]     Frequency of Communication with Friends and Family: More than three times a week     Frequency of Social Gatherings with Friends and Family: More than three times a week     Attends Orthodoxy Services: 1 to 4 times per year     Active Member of Clubs or Organizations: No     Attends Club or Organization Meetings: Never     Marital Status:    Recent Concern: Social Connections - Moderately Isolated (9/19/2023)    Social Connection and Isolation Panel [NHANES]     Frequency of Communication with Friends and Family: More than three times a week     Frequency of Social Gatherings with Friends and Family: More than three times a week     Attends Orthodoxy Services: 1 to 4 times per year     Active Member of Clubs or Organizations: No     Attends Club or Organization Meetings: Never     Marital Status:    Housing Stability: Low Risk  (9/19/2023)    Housing Stability Vital Sign     Unable to Pay for Housing in the Last Year: No     Number of Places Lived in the Last Year: 1     Unstable Housing in the Last Year: No        Family History:      Family History   Problem Relation Age of Onset    Diabetes Mother     Breast cancer Mother     Diabetes Father     Stroke Father     No Known Problems Sister     No Known Problems Brother     Cancer Maternal Aunt 55        breast cancer    Breast cancer Maternal Aunt     Stroke Paternal Aunt     No Known Problems Maternal Grandmother     No Known Problems Maternal Grandfather     No Known Problems Paternal Grandmother     Heart failure Paternal Grandfather     Cancer Maternal Cousin     Melanoma Neg Hx     Psoriasis Neg Hx     Lupus Neg Hx     Eczema Neg Hx        Allergies:      Review of  patient's allergies indicates:  No Known Allergies    Medications:      Current Outpatient Medications   Medication Sig    metoprolol succinate (TOPROL-XL) 50 MG 24 hr tablet Take 1 tablet (50 mg total) by mouth once daily.    sacubitriL-valsartan (ENTRESTO)  mg per tablet Take 1 tablet by mouth 2 (two) times daily.    cephALEXin (KEFLEX) 500 MG capsule Take 1 capsule (500 mg total) by mouth every 6 (six) hours for antibiotic    fluticasone propionate (FLONASE) 50 mcg/actuation nasal spray 1 spray (50 mcg total) by Each Nostril route once daily. (Patient not taking: Reported on 11/6/2023)    HYDROcodone-acetaminophen (NORCO) 7.5-325 mg per tablet Take 1 tablet by mouth every 6 (six) hours as needed for pain (Patient not taking: Reported on 11/6/2023)    meloxicam (MOBIC) 15 MG tablet Take 1 tablet (15 mg total) by mouth once daily.    ondansetron (ZOFRAN-ODT) 8 MG TbDL Take 1 tablet (8 mg total) by mouth every 6 (six) hours as needed for nausea (Patient not taking: Reported on 11/6/2023)     No current facility-administered medications for this visit.       Vitals:      Vitals:    12/04/23 1025   BP: 128/67   Pulse: 76   Resp: 18   Temp: 97.5 °F (36.4 °C)       Review of Systems:        Constitutional: Negative for fever, chills, weight loss, malaise/fatigue and diaphoresis.   HENT: Negative for hearing loss, ear pain, nosebleeds, congestion, sore throat, neck pain, tinnitus and ear discharge.    Eyes: Negative for blurred vision, double vision, photophobia, pain, discharge and redness.   Respiratory: Negative for cough, hemoptysis, sputum production, shortness of breath, wheezing and stridor.    Cardiovascular: Negative for chest pain, palpitations, orthopnea, claudication, leg swelling and PND.   Gastrointestinal: Negative for heartburn, nausea, vomiting, abdominal pain, diarrhea, constipation, blood in stool and melena.   Genitourinary: Negative for dysuria, urgency, frequency, hematuria and flank pain.  "  Musculoskeletal: Negative for myalgias, back pain, joint pain and falls.   Skin: Negative for itching and rash.   Neurological: Negative for dizziness, tingling, tremors, sensory change, speech change, focal weakness, seizures, loss of consciousness, weakness and headaches.   Endo/Heme/Allergies: Negative for environmental allergies and polydipsia. Does not bruise/bleed easily.   Psychiatric/Behavioral: Negative for depression, suicidal ideas, hallucinations, memory loss and substance abuse. The patient is not nervous/anxious and does not have insomnia.    All 14 systems reviewed and negative except as noted above.    Physical Exam:      Constitutional: Appears well-developed, well-nourished and in no acute distress.  Patient is oriented to person, place, and time.   Head: Normocephalic and atraumatic. Mucous membranes moist.  Neck: Neck supple no mass.   Cardiovascular: Normal rate and regular rhythm.  S1 S2 appreciated by ascultation.  Pulmonary/Chest: Effort normal and clear to auscultation bilaterally. No respiratory distress.   Abdomen: Soft. Non-tender and non-distended. Bowel sounds are normal.   Neurological: Patient is alert and oriented to person, place and time. Moves all extremities.  Skin: Warm and dry. No lesions.  Extremities: No clubbing, cyanosis or edema.    Laboratory data:      Reviewed and noted in plan where applicable. Please see chart for full laboratory data.    @LUFPLOGWH60(cpk,cpkmb,troponini,mb)@ @MDMOZGOCE61(poctglucose)@     No results found for: "INR", "PROTIME"    Lab Results   Component Value Date    WBC 3.78 (L) 11/13/2023    HGB 12.0 11/13/2023    HCT 40.0 11/13/2023    MCV 79 (L) 11/13/2023     11/13/2023       @ADTMURABF29(GLU,NA,K,Cl,CO2,BUN,Creatinine,Calcium,MG)@    Predictors of intubation difficulty:       Obesity? yes    Anatomically abnormal facies? no   Prominent incisors? no   Receding mandible? no   Short, thick neck? yes    Neck range of motion: normal   " Dentition:  Permanent bridge to lower front.   Based on the Modified Mallampati, patient is a mallampati score: I (soft palate, uvula, fauces, and tonsillar pillars visible)    Cardiographics:      ECG in October showed: Normal sinus rhythm   Normal ECG   When compared with ECG of 16-MAY-2023 13:57,   No significant change was found   Confirmed by Paulo Reaves MD ([id number]) on 11/13/2023 7:08:04 PM     Echocardiogram in April showed:    The left ventricle is normal in size with concentric remodeling and normal systolic function.  The estimated ejection fraction is 55%.  Normal left ventricular diastolic function.  Normal right ventricular size with normal right ventricular systolic function.  Mild tricuspid regurgitation.  Intermediate central venous pressure (8 mmHg).  The estimated PA systolic pressure is 28 mmHg.       Imaging:      Chest x-ray in October showed: Stable chest x-ray without acute or suspicious findings.     Assessment and Plan:      Preop examination  - Patient presents today at the request of Dr. Galvan who plans on performing a Right simple mastectomy, with SN identification, and lymph node biopsy, and Dr. James who plans on performing tissue expander insertion on 12/12.    Known risk factors for perioperative complications: Congestive heart failure  HTN  Cardiomyopathy due to chemotherapy.  Difficulty with intubation is not anticipated.    Cardiac Risk Estimation: Based on the Revised Cardiac Risk index, patient is a Class 1 risk with a 3.9% risk of a major cardiac event in a low risk procedure.    1.) Preoperative workup as follows: ECG, hemoglobin, hematocrit, electrolytes, creatinine, glucose, liver function studies.  2.) Change in medication regimen before surgery: discontinue ASA 6 days before surgery, discontinue NSAIDs 5 days before surgery.  3.) Prophylaxis for cardiac events with perioperative beta-blockers: Continue Toprol XL.  4.) Invasive hemodynamic monitoring perioperatively: not  indicated.  5.) Deep vein thrombosis prophylaxis postoperatively: intermittent pneumatic compression boots and regimen to be chosen by surgical team.  6.) Surveillance for postoperative MI with ECG immediately postoperatively and on postoperati ve days 1 and 2 AND troponin levels 24 hours postoperatively and on day 4 or hospital discharge (whichever comes first): not indicated.  7.) Current medications which may produce withdrawal symptoms if withheld perioperatively: None.  8.) Other measures:  None.      Malignant neoplasm of overlapping sites of right breast in female, estrogen receptor positive  - Followed outpatient by Dr. Sykes.  - See plan as per above.    Essential hypertension  - BP well controlled.  - Continue Toprol XL.    Cardiomyopathy due to chemotherapy  - ECHO in April showed.    The left ventricle is normal in size with concentric remodeling and normal systolic function.  The estimated ejection fraction is 55%.  Normal left ventricular diastolic function.  Normal right ventricular size with normal right ventricular systolic function.  Mild tricuspid regurgitation.  Intermediate central venous pressure (8 mmHg).  The estimated PA systolic pressure is 28 mmHg.     - Continue Entresto.  - Continue outpatient f/u with Cardiology as instructed.    NYHA class 2 and LINDA/AHA stage C chronic systolic congestive heart failure  - Now with NORMAL EF.  - ECHO in April showed.    The left ventricle is normal in size with concentric remodeling and normal systolic function.  The estimated ejection fraction is 55%.  Normal left ventricular diastolic function.  Normal right ventricular size with normal right ventricular systolic function.  Mild tricuspid regurgitation.  Intermediate central venous pressure (8 mmHg).  The estimated PA systolic pressure is 28 mmHg.     - Continue Entresto.  - Continue outpatient f/u with Cardiology as instructed.        Electronically signed by Raeann Ponce DNP, ACNP on 12/4/2023  at 10:20 AM.

## 2023-12-04 NOTE — DISCHARGE INSTRUCTIONS
To confirm, your doctor has instructed you: Surgery is scheduled for 12/12/23.   Pre admit office will call the afternoon prior to surgery between 1PM and 3PM with arrival time.    Surgery will be at [facility],  The address is [address]. [place] [address].      IMPORTANT INSTRUCTIONS!    Do not eat or drink after 12 midnight, including water. Do not smoke or use chewing tobacco after 12 midnight!  OK to brush teeth, but no gum, candy, or mints!      *Take only these medicines with a small swallow of water-morning of surgery*     Toprol XL, Entresto         ____ Stop taking all vitamins, herbal supplements, Aspirin, & NSAIDS (Ibuprofen, Advil, Aleve) 7 days prior to surgery, as these can thin your blood.    ____ Weight loss medication, such as Adipex and Phentermine, must be stopped 14 days prior to surgery, no exceptions!    *Diabetic Patients: If you take diabetic or weight loss medication, do NOT take morning of surgery unless instructed by Doctor. Metformin to be stopped 24 hrs prior to surgery. DO NOT take short-acting insulin the day of surgery. Only take HALF of your regular dose of long-acting insulin the night before surgery, unless instructed otherwise. Blood sugars will be checked in pre-op.   ~Ozempic/Mounjaro/Wegovy/Trulicity/Semaglutide injections must be stopped 7 days prior to surgery.     Please notify MD office if you develop an active infection, are prescribed antibiotics by someone other than the surgeon doing your surgery, or visit urgent care/ER.      Bathing Instructions:   The night before surgery and the morning prior to coming to the hospital:    - Shower & rinse your body as usual with anti-bacterial Soap (Dial or Lever 2000)   -Hibiclens (if indicated) use AFTER anti-bacterial soap; 1 packet PM/1 packet in AM on surgical site only   -Do not use hibiclens on your head, face, or genitals.    -Do not wash with anti-bacterial soap after you use the hibiclens.    -Do  not shave surgical site 5-7 days prior to surgery.    -Pubic hair 7 days prior to surgery (OBGYN/Urology only).       Additional Instructions:   __ No powder, lotions, creams, or body spray to skin   __ No deodorant if you are having: breast procedure, PORT, or upper shoulder surgery!   __ No nail polish or artificial nails       **SURGERY WILL BE CANCELLED IF ARTIFICIAL/NAIL POLISH IS PRESENT!!!**  __ Please remove all piercings and leave all jewelry at home.    **SURGERY WILL BE CANCELLED IF PIERCINGS ARE PRESENT!!!**    __ Dentures, Hearing Aids and Contact Lens need to be removed prior to the start of surgery.    __ Avoid Alcoholic beverages 3 days prior to surgery, as it can thin the blood, unless told otherwise by pre-admit department.  __ Females: may need to give a urine sample the morning of surgery;   **Please ask  for a specimen cup if you need to use the restroom prior to being called into pre-op.**  __ Males: Stop ED medications (Viagra, Cialis) 24 hrs prior to surgery.  __ Wear clean, loose-fitting clothing. Allow for dressings/bandages/surgical equipment   __ You must have transportation, and they MUST stay the entire time.   __  Bring photo ID and insurance information to Hospitals in [place]      [facility] Visitor/Ride Policy:   Only 1 adult allowed (over the age of 18) to accompany you and MUST STAY through the entire length of admission.     -Must have a ride home from a responsible adult that you know and trust.    -Medical Transport, Uber or Lyft can only be used if patient has a responsible adult to accompany them during ride home.    ~Your ride MUST STAY the entire time until you are discharged~        Post-Op Instructions: You will receive surgery post-op instructions by your Discharge Nurse prior to going home.   Surgical Site Infection:   Prevention of surgical site infections:   -Keep incisions clean and dry.   -Do not soak/submerge incisions in water until completely healed.   -Do not apply  lotions, powders, creams, or deodorants to site.   -Always make sure hands are cleaned with antibacterial soap/ alcohol-based  prior to touching the surgical site.       Signs and symptoms:               -Redness and pain around the area where you had surgery               -Drainage of cloudy fluid from your surgical wound               -Fever over 100.4 or chills     >>>Call Surgeon office/on-call Surgeon if you experience any of these signs & symptoms post-surgery @ [phone].    *If you are running late or have questions the morning of surgery before 7AM, please call the Pre-OP Department @ [phone].    *Please Call [facility] Pre-Admit Department for surgery instruction questions:  [phone]    *Payment questions:  [phone]    *Billing questions:  [phone]

## 2023-12-04 NOTE — ASSESSMENT & PLAN NOTE
- Patient presents today at the request of Dr. Galvan who plans on performing a Right simple mastectomy, with SN identification, and lymph node biopsy, and Dr. James who plans on performing tissue expander insertion on 12/12.    Known risk factors for perioperative complications: Congestive heart failure  HTN  Cardiomyopathy due to chemotherapy.  Difficulty with intubation is not anticipated.    Cardiac Risk Estimation: Based on the Revised Cardiac Risk index, patient is a Class 1 risk with a 3.9% risk of a major cardiac event in a low risk procedure.    1.) Preoperative workup as follows: ECG, hemoglobin, hematocrit, electrolytes, creatinine, glucose, liver function studies.  2.) Change in medication regimen before surgery: discontinue ASA 6 days before surgery, discontinue NSAIDs 5 days before surgery.  3.) Prophylaxis for cardiac events with perioperative beta-blockers: Continue Toprol XL.  4.) Invasive hemodynamic monitoring perioperatively: not indicated.  5.) Deep vein thrombosis prophylaxis postoperatively: intermittent pneumatic compression boots and regimen to be chosen by surgical team.  6.) Surveillance for postoperative MI with ECG immediately postoperatively and on postoperati ve days 1 and 2 AND troponin levels 24 hours postoperatively and on day 4 or hospital discharge (whichever comes first): not indicated.  7.) Current medications which may produce withdrawal symptoms if withheld perioperatively: None.  8.) Other measures:  None.

## 2023-12-04 NOTE — ASSESSMENT & PLAN NOTE
- Now with NORMAL EF.  - ECHO in April showed.    The left ventricle is normal in size with concentric remodeling and normal systolic function.  The estimated ejection fraction is 55%.  Normal left ventricular diastolic function.  Normal right ventricular size with normal right ventricular systolic function.  Mild tricuspid regurgitation.  Intermediate central venous pressure (8 mmHg).  The estimated PA systolic pressure is 28 mmHg.     - Continue Entresto.  - Continue outpatient f/u with Cardiology as instructed.

## 2023-12-11 ENCOUNTER — ANESTHESIA EVENT (OUTPATIENT)
Dept: SURGERY | Facility: HOSPITAL | Age: 68
End: 2023-12-11
Payer: MEDICARE

## 2023-12-11 ENCOUNTER — TELEPHONE (OUTPATIENT)
Dept: PREADMISSION TESTING | Facility: HOSPITAL | Age: 68
End: 2023-12-11
Payer: MEDICARE

## 2023-12-11 NOTE — ANESTHESIA PREPROCEDURE EVALUATION
12/11/2023  Josette Altamirano is a 68 y.o., female.    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 She subsequently underwent left mastectomy and sentinel node biopsy followed by axillary dissection March 23, 2007. Histopathologic evaluation demonstrated again multiple foci of intracystic papillary carcinoma, as well as DCIS noncomed    Cardiomyopathy     CHEMO INDUCED- RESOLVED    CHF (congestive heart failure)     systolic    Genetic testing 11/06/2023    [employer] Multi Cancer Panel (84 genes) - NEGATIVE, VUS in EGFR, POLE    Hypertension     Uterine fibroid      Past Surgical History:   Procedure Laterality Date    BREAST BIOPSY      BREAST CYST EXCISION Right     BREAST SURGERY      CHOLECYSTECTOMY      COLONOSCOPY N/A 9/10/2018    Procedure: COLONOSCOPY;  Surgeon: Indra Ramos MD;  Location: [facility];  Service: Endoscopy;  Laterality: N/A;    COLONOSCOPY N/A 4/28/2020    Procedure: COLONOSCOPY;  Surgeon: Dulce Maria Siegel MD;  Location: [facility];  Service: Endoscopy;  Laterality: N/A;    ESOPHAGOGASTRODUODENOSCOPY N/A 4/28/2020    Procedure: EGD (ESOPHAGOGASTRODUODENOSCOPY);  Surgeon: Dulce Maria Siegel MD;  Location: [facility];  Service: Endoscopy;  Laterality: N/A;    MASTECTOMY      left    MEDIPORT INSERTION, SINGLE         Pre-op Assessment    I have reviewed the Patient Summary Reports.     I have reviewed the Nursing Notes. I have reviewed the NPO Status.   I have reviewed the Medications.     Review of Systems  Anesthesia Hx:   History of prior surgery of interest to airway management or planning:            Denies Personal Hx of Anesthesia complications.                    Social:  Non-Smoker        Hematology/Oncology:                      Current/Recent Cancer.  Breast              EENT/Dental:  chronic allergic rhinitis           Cardiovascular:     Hypertension       CHF              Congestive Heart Failure (CHF)                Hypertension         Musculoskeletal:  Arthritis               Neurological:    Neuromuscular Disease,                                 Neuromuscular Disease       Physical Exam  General: Well nourished    Airway:  Mallampati: II   Mouth Opening: Normal  TM Distance: Normal  Tongue: Normal  Neck ROM: Normal ROM    Dental:  Intact        Anesthesia Plan  Type of Anesthesia, risks & benefits discussed:    Anesthesia Type: Gen ETT  Intra-op Monitoring Plan: Standard ASA Monitors  Post Op Pain Control Plan: multimodal analgesia and IV/PO Opioids PRN  Induction:  IV  Informed Consent: Informed consent signed with the Patient and all parties understand the risks and agree with anesthesia plan.  All questions answered. Patient consented to blood products? No  ASA Score: 3  Day of Surgery Review of History & Physical: H&P Update referred to the surgeon/provider.    Ready For Surgery From Anesthesia Perspective.     .

## 2023-12-12 ENCOUNTER — HOSPITAL ENCOUNTER (OUTPATIENT)
Dept: RADIOLOGY | Facility: HOSPITAL | Age: 68
Discharge: HOME | End: 2023-12-12
Attending: SURGERY | Admitting: SURGERY
Payer: MEDICARE

## 2023-12-12 ENCOUNTER — HOSPITAL ENCOUNTER (OUTPATIENT)
Facility: HOSPITAL | Age: 68
Discharge: HOME | End: 2023-12-13
Attending: SURGERY | Admitting: SURGERY
Payer: MEDICARE

## 2023-12-12 ENCOUNTER — ANESTHESIA (OUTPATIENT)
Dept: SURGERY | Facility: HOSPITAL | Age: 68
End: 2023-12-12
Payer: MEDICARE

## 2023-12-12 DIAGNOSIS — T45.1X5A CARDIOMYOPATHY DUE TO CHEMOTHERAPY: ICD-10-CM

## 2023-12-12 DIAGNOSIS — I50.22 CHRONIC SYSTOLIC HEART FAILURE: ICD-10-CM

## 2023-12-12 DIAGNOSIS — Z17.0 MALIGNANT NEOPLASM OF OVERLAPPING SITES OF RIGHT BREAST IN FEMALE, ESTROGEN RECEPTOR POSITIVE: Primary | ICD-10-CM

## 2023-12-12 DIAGNOSIS — I42.7 CARDIOMYOPATHY DUE TO CHEMOTHERAPY: ICD-10-CM

## 2023-12-12 DIAGNOSIS — C50.811 MALIGNANT NEOPLASM OF OVERLAPPING SITES OF RIGHT BREAST IN FEMALE, ESTROGEN RECEPTOR POSITIVE: Primary | ICD-10-CM

## 2023-12-12 DIAGNOSIS — Z17.0 MALIGNANT NEOPLASM OF OVERLAPPING SITES OF RIGHT BREAST IN FEMALE, ESTROGEN RECEPTOR POSITIVE: ICD-10-CM

## 2023-12-12 DIAGNOSIS — C50.811 MALIGNANT NEOPLASM OF OVERLAPPING SITES OF RIGHT BREAST IN FEMALE, ESTROGEN RECEPTOR POSITIVE: ICD-10-CM

## 2023-12-12 PROCEDURE — 25000003 PHARM REV CODE 250: Performed by: SURGERY

## 2023-12-12 PROCEDURE — 94799 UNLISTED PULMONARY SVC/PX: CPT | Mod: XB

## 2023-12-12 PROCEDURE — 27000221 HC OXYGEN, UP TO 24 HOURS

## 2023-12-12 PROCEDURE — 63600175 PHARM REV CODE 636 W HCPCS: Performed by: NURSE ANESTHETIST, CERTIFIED REGISTERED

## 2023-12-12 PROCEDURE — 88307 TISSUE EXAM BY PATHOLOGIST: CPT | Mod: 59 | Performed by: PATHOLOGY

## 2023-12-12 PROCEDURE — 88305 TISSUE EXAM BY PATHOLOGIST: ICD-10-PCS | Mod: 26,,, | Performed by: PATHOLOGY

## 2023-12-12 PROCEDURE — D9220A PRA ANESTHESIA: Mod: ,,, | Performed by: NURSE ANESTHETIST, CERTIFIED REGISTERED

## 2023-12-12 PROCEDURE — 27201423 OPTIME MED/SURG SUP & DEVICES STERILE SUPPLY: Performed by: SURGERY

## 2023-12-12 PROCEDURE — 37000008 HC ANESTHESIA 1ST 15 MINUTES: Performed by: SURGERY

## 2023-12-12 PROCEDURE — 38525 PR BIOPSY/REM LYMPH NODES, AXILLARY: ICD-10-PCS | Mod: 51,RT,, | Performed by: SURGERY

## 2023-12-12 PROCEDURE — 88307 PR  SURG PATH,LEVEL V: ICD-10-PCS | Mod: 26,,, | Performed by: PATHOLOGY

## 2023-12-12 PROCEDURE — 38792 RA TRACER ID OF SENTINL NODE: CPT | Mod: RT,,, | Performed by: RADIOLOGY

## 2023-12-12 PROCEDURE — 63600175 PHARM REV CODE 636 W HCPCS: Performed by: SURGERY

## 2023-12-12 PROCEDURE — 88307 TISSUE EXAM BY PATHOLOGIST: CPT | Mod: 26,,, | Performed by: PATHOLOGY

## 2023-12-12 PROCEDURE — 88342 IMHCHEM/IMCYTCHM 1ST ANTB: CPT | Mod: 26,,, | Performed by: PATHOLOGY

## 2023-12-12 PROCEDURE — 88341 PR IHC OR ICC EACH ADD'L SINGLE ANTIBODY  STAINPR: ICD-10-PCS | Mod: 26,,, | Performed by: PATHOLOGY

## 2023-12-12 PROCEDURE — 25000003 PHARM REV CODE 250: Performed by: NURSE ANESTHETIST, CERTIFIED REGISTERED

## 2023-12-12 PROCEDURE — 71000039 HC RECOVERY, EACH ADD'L HOUR: Performed by: SURGERY

## 2023-12-12 PROCEDURE — 88305 TISSUE EXAM BY PATHOLOGIST: CPT | Performed by: PATHOLOGY

## 2023-12-12 PROCEDURE — C1729 CATH, DRAINAGE: HCPCS | Performed by: SURGERY

## 2023-12-12 PROCEDURE — A4216 STERILE WATER/SALINE, 10 ML: HCPCS | Performed by: SURGERY

## 2023-12-12 PROCEDURE — 36000706: Performed by: SURGERY

## 2023-12-12 PROCEDURE — 71000033 HC RECOVERY, INTIAL HOUR: Performed by: SURGERY

## 2023-12-12 PROCEDURE — 88305 TISSUE EXAM BY PATHOLOGIST: CPT | Mod: 26,,, | Performed by: PATHOLOGY

## 2023-12-12 PROCEDURE — 88341 IMHCHEM/IMCYTCHM EA ADD ANTB: CPT | Performed by: PATHOLOGY

## 2023-12-12 PROCEDURE — 36000707: Performed by: SURGERY

## 2023-12-12 PROCEDURE — 19303 MAST SIMPLE COMPLETE: CPT | Mod: RT,,, | Performed by: SURGERY

## 2023-12-12 PROCEDURE — C1789 PROSTHESIS, BREAST, IMP: HCPCS | Performed by: SURGERY

## 2023-12-12 PROCEDURE — 19303 PR MASTECTOMY, SIMPLE, COMPLETE: ICD-10-PCS | Mod: RT,,, | Performed by: SURGERY

## 2023-12-12 PROCEDURE — 38900 PR INTRAOPERATIVE SENTINEL LYMPH NODE ID W DYE INJECTION: ICD-10-PCS | Mod: RT,,, | Performed by: SURGERY

## 2023-12-12 PROCEDURE — 38792 RA TRACER ID OF SENTINL NODE: CPT | Mod: TC

## 2023-12-12 PROCEDURE — D9220A PRA ANESTHESIA: ICD-10-PCS | Mod: ,,, | Performed by: NURSE ANESTHETIST, CERTIFIED REGISTERED

## 2023-12-12 PROCEDURE — 38792 NM SENTINEL LYMPH NODE INJECTION ONLY_RAD PERFORMED: ICD-10-PCS | Mod: RT,,, | Performed by: RADIOLOGY

## 2023-12-12 PROCEDURE — 38525 BIOPSY/REMOVAL LYMPH NODES: CPT | Mod: 51,RT,, | Performed by: SURGERY

## 2023-12-12 PROCEDURE — 37000009 HC ANESTHESIA EA ADD 15 MINS: Performed by: SURGERY

## 2023-12-12 PROCEDURE — 88342 IMHCHEM/IMCYTCHM 1ST ANTB: CPT | Mod: 59 | Performed by: PATHOLOGY

## 2023-12-12 PROCEDURE — 99900035 HC TECH TIME PER 15 MIN (STAT)

## 2023-12-12 PROCEDURE — 38900 IO MAP OF SENT LYMPH NODE: CPT | Mod: RT,,, | Performed by: SURGERY

## 2023-12-12 PROCEDURE — 88342 CHG IMMUNOCYTOCHEMISTRY: ICD-10-PCS | Mod: 26,,, | Performed by: PATHOLOGY

## 2023-12-12 PROCEDURE — 63600175 PHARM REV CODE 636 W HCPCS: Performed by: ANESTHESIOLOGY

## 2023-12-12 PROCEDURE — 88341 IMHCHEM/IMCYTCHM EA ADD ANTB: CPT | Mod: 26,,, | Performed by: PATHOLOGY

## 2023-12-12 DEVICE — IMPLANTABLE DEVICE
Type: IMPLANTABLE DEVICE | Site: BREAST | Status: NON-FUNCTIONAL
Removed: 2024-04-01

## 2023-12-12 RX ORDER — ACETAMINOPHEN 325 MG/1
650 TABLET ORAL EVERY 4 HOURS PRN
Status: DISCONTINUED | OUTPATIENT
Start: 2023-12-12 | End: 2023-12-13 | Stop reason: HOSPADM

## 2023-12-12 RX ORDER — OXYCODONE HYDROCHLORIDE 5 MG/1
5 TABLET ORAL EVERY 4 HOURS PRN
Status: DISCONTINUED | OUTPATIENT
Start: 2023-12-12 | End: 2023-12-13 | Stop reason: HOSPADM

## 2023-12-12 RX ORDER — FENTANYL CITRATE 50 UG/ML
25 INJECTION, SOLUTION INTRAMUSCULAR; INTRAVENOUS EVERY 5 MIN PRN
Status: DISCONTINUED | OUTPATIENT
Start: 2023-12-12 | End: 2023-12-13 | Stop reason: HOSPADM

## 2023-12-12 RX ORDER — ACETAMINOPHEN 325 MG/1
650 TABLET ORAL EVERY 6 HOURS PRN
Status: DISCONTINUED | OUTPATIENT
Start: 2023-12-12 | End: 2023-12-13 | Stop reason: HOSPADM

## 2023-12-12 RX ORDER — SACUBITRIL AND VALSARTAN 97; 103 MG/1; MG/1
1 TABLET, FILM COATED ORAL 2 TIMES DAILY
Qty: 180 TABLET | Refills: 1 | Status: SHIPPED | OUTPATIENT
Start: 2023-12-12

## 2023-12-12 RX ORDER — ONDANSETRON 4 MG/1
8 TABLET, ORALLY DISINTEGRATING ORAL EVERY 8 HOURS PRN
Status: DISCONTINUED | OUTPATIENT
Start: 2023-12-12 | End: 2023-12-13 | Stop reason: HOSPADM

## 2023-12-12 RX ORDER — SUCCINYLCHOLINE CHLORIDE 20 MG/ML
INJECTION INTRAMUSCULAR; INTRAVENOUS
Status: DISCONTINUED | OUTPATIENT
Start: 2023-12-12 | End: 2023-12-12

## 2023-12-12 RX ORDER — EPINEPHRINE 1 MG/ML
INJECTION, SOLUTION, CONCENTRATE INTRAVENOUS
Status: DISPENSED
Start: 2023-12-12 | End: 2023-12-13

## 2023-12-12 RX ORDER — FENTANYL CITRATE 50 UG/ML
INJECTION, SOLUTION INTRAMUSCULAR; INTRAVENOUS
Status: DISCONTINUED | OUTPATIENT
Start: 2023-12-12 | End: 2023-12-12

## 2023-12-12 RX ORDER — CEFAZOLIN SODIUM 1 G/3ML
INJECTION, POWDER, FOR SOLUTION INTRAMUSCULAR; INTRAVENOUS
Status: DISCONTINUED | OUTPATIENT
Start: 2023-12-12 | End: 2023-12-12 | Stop reason: HOSPADM

## 2023-12-12 RX ORDER — NEOMYCIN AND POLYMYXIN B SULFATES 40; 200000 MG/ML; [USP'U]/ML
SOLUTION IRRIGATION
Status: DISPENSED
Start: 2023-12-12 | End: 2023-12-13

## 2023-12-12 RX ORDER — DIPHENHYDRAMINE HYDROCHLORIDE 50 MG/ML
25 INJECTION INTRAMUSCULAR; INTRAVENOUS EVERY 4 HOURS PRN
Status: DISCONTINUED | OUTPATIENT
Start: 2023-12-12 | End: 2023-12-13 | Stop reason: HOSPADM

## 2023-12-12 RX ORDER — MEPERIDINE HYDROCHLORIDE 25 MG/ML
12.5 INJECTION INTRAMUSCULAR; INTRAVENOUS; SUBCUTANEOUS ONCE AS NEEDED
Status: DISCONTINUED | OUTPATIENT
Start: 2023-12-12 | End: 2023-12-13 | Stop reason: HOSPADM

## 2023-12-12 RX ORDER — OXYCODONE HYDROCHLORIDE 5 MG/1
10 TABLET ORAL EVERY 4 HOURS PRN
Status: DISCONTINUED | OUTPATIENT
Start: 2023-12-12 | End: 2023-12-13 | Stop reason: HOSPADM

## 2023-12-12 RX ORDER — OXYCODONE AND ACETAMINOPHEN 5; 325 MG/1; MG/1
1 TABLET ORAL
Status: DISCONTINUED | OUTPATIENT
Start: 2023-12-12 | End: 2023-12-13 | Stop reason: HOSPADM

## 2023-12-12 RX ORDER — PROPOFOL 10 MG/ML
VIAL (ML) INTRAVENOUS
Status: DISCONTINUED | OUTPATIENT
Start: 2023-12-12 | End: 2023-12-12

## 2023-12-12 RX ORDER — CEFAZOLIN SODIUM 2 G/50ML
2 SOLUTION INTRAVENOUS
Status: COMPLETED | OUTPATIENT
Start: 2023-12-12 | End: 2023-12-13

## 2023-12-12 RX ORDER — ISOSULFAN BLUE 50 MG/5ML
INJECTION, SOLUTION SUBCUTANEOUS
Status: DISPENSED
Start: 2023-12-12 | End: 2023-12-13

## 2023-12-12 RX ORDER — SODIUM CHLORIDE 9 MG/ML
INJECTION, SOLUTION INTRAMUSCULAR; INTRAVENOUS; SUBCUTANEOUS
Status: DISCONTINUED | OUTPATIENT
Start: 2023-12-12 | End: 2023-12-12 | Stop reason: HOSPADM

## 2023-12-12 RX ORDER — DEXAMETHASONE SODIUM PHOSPHATE 4 MG/ML
INJECTION, SOLUTION INTRA-ARTICULAR; INTRALESIONAL; INTRAMUSCULAR; INTRAVENOUS; SOFT TISSUE
Status: DISCONTINUED | OUTPATIENT
Start: 2023-12-12 | End: 2023-12-12

## 2023-12-12 RX ORDER — CEFAZOLIN SODIUM 1 G/3ML
INJECTION, POWDER, FOR SOLUTION INTRAMUSCULAR; INTRAVENOUS
Status: DISPENSED
Start: 2023-12-12 | End: 2023-12-13

## 2023-12-12 RX ORDER — ONDANSETRON 2 MG/ML
4 INJECTION INTRAMUSCULAR; INTRAVENOUS DAILY PRN
Status: DISCONTINUED | OUTPATIENT
Start: 2023-12-12 | End: 2023-12-13 | Stop reason: HOSPADM

## 2023-12-12 RX ORDER — ISOSULFAN BLUE 50 MG/5ML
INJECTION, SOLUTION SUBCUTANEOUS
Status: DISCONTINUED | OUTPATIENT
Start: 2023-12-12 | End: 2023-12-12 | Stop reason: HOSPADM

## 2023-12-12 RX ORDER — SODIUM CHLORIDE 9 MG/ML
INJECTION, SOLUTION INTRAVENOUS CONTINUOUS
Status: DISCONTINUED | OUTPATIENT
Start: 2023-12-12 | End: 2023-12-13 | Stop reason: HOSPADM

## 2023-12-12 RX ORDER — MIDAZOLAM HYDROCHLORIDE 1 MG/ML
INJECTION INTRAMUSCULAR; INTRAVENOUS
Status: DISCONTINUED | OUTPATIENT
Start: 2023-12-12 | End: 2023-12-12

## 2023-12-12 RX ORDER — DEXMEDETOMIDINE HYDROCHLORIDE 100 UG/ML
INJECTION, SOLUTION INTRAVENOUS
Status: DISCONTINUED | OUTPATIENT
Start: 2023-12-12 | End: 2023-12-12

## 2023-12-12 RX ORDER — EPINEPHRINE 1 MG/ML
INJECTION, SOLUTION, CONCENTRATE INTRAVENOUS
Status: DISCONTINUED | OUTPATIENT
Start: 2023-12-12 | End: 2023-12-12 | Stop reason: HOSPADM

## 2023-12-12 RX ORDER — HYDRALAZINE HYDROCHLORIDE 20 MG/ML
10 INJECTION INTRAMUSCULAR; INTRAVENOUS EVERY 6 HOURS PRN
Status: DISCONTINUED | OUTPATIENT
Start: 2023-12-12 | End: 2023-12-13 | Stop reason: HOSPADM

## 2023-12-12 RX ORDER — ONDANSETRON HYDROCHLORIDE 2 MG/ML
INJECTION, SOLUTION INTRAMUSCULAR; INTRAVENOUS
Status: DISCONTINUED | OUTPATIENT
Start: 2023-12-12 | End: 2023-12-12

## 2023-12-12 RX ORDER — BUPIVACAINE HYDROCHLORIDE 2.5 MG/ML
INJECTION, SOLUTION EPIDURAL; INFILTRATION; INTRACAUDAL
Status: DISCONTINUED | OUTPATIENT
Start: 2023-12-12 | End: 2023-12-12 | Stop reason: HOSPADM

## 2023-12-12 RX ORDER — ROCURONIUM BROMIDE 10 MG/ML
INJECTION, SOLUTION INTRAVENOUS
Status: DISCONTINUED | OUTPATIENT
Start: 2023-12-12 | End: 2023-12-12

## 2023-12-12 RX ORDER — TALC
6 POWDER (GRAM) TOPICAL NIGHTLY PRN
Status: DISCONTINUED | OUTPATIENT
Start: 2023-12-12 | End: 2023-12-13 | Stop reason: HOSPADM

## 2023-12-12 RX ORDER — ACETAMINOPHEN 10 MG/ML
1000 INJECTION, SOLUTION INTRAVENOUS ONCE
Status: COMPLETED | OUTPATIENT
Start: 2023-12-12 | End: 2023-12-12

## 2023-12-12 RX ORDER — NEOMYCIN AND POLYMYXIN B SULFATES 40; 200000 MG/ML; [USP'U]/ML
SOLUTION IRRIGATION
Status: DISCONTINUED | OUTPATIENT
Start: 2023-12-12 | End: 2023-12-12 | Stop reason: HOSPADM

## 2023-12-12 RX ORDER — LIDOCAINE HYDROCHLORIDE 20 MG/ML
INJECTION, SOLUTION EPIDURAL; INFILTRATION; INTRACAUDAL; PERINEURAL
Status: DISCONTINUED | OUTPATIENT
Start: 2023-12-12 | End: 2023-12-12

## 2023-12-12 RX ORDER — CYCLOBENZAPRINE HCL 5 MG
10 TABLET ORAL 3 TIMES DAILY PRN
Status: DISCONTINUED | OUTPATIENT
Start: 2023-12-12 | End: 2023-12-13 | Stop reason: HOSPADM

## 2023-12-12 RX ORDER — BUPIVACAINE HYDROCHLORIDE 2.5 MG/ML
INJECTION, SOLUTION EPIDURAL; INFILTRATION; INTRACAUDAL
Status: DISPENSED
Start: 2023-12-12 | End: 2023-12-13

## 2023-12-12 RX ADMIN — LIDOCAINE HYDROCHLORIDE 60 MG: 20 INJECTION, SOLUTION EPIDURAL; INFILTRATION; INTRACAUDAL; PERINEURAL at 01:12

## 2023-12-12 RX ADMIN — MIDAZOLAM HYDROCHLORIDE 2 MG: 1 INJECTION INTRAMUSCULAR; INTRAVENOUS at 01:12

## 2023-12-12 RX ADMIN — FENTANYL CITRATE 50 MCG: 0.05 INJECTION, SOLUTION INTRAMUSCULAR; INTRAVENOUS at 01:12

## 2023-12-12 RX ADMIN — CEFAZOLIN SODIUM 2 G: 2 SOLUTION INTRAVENOUS at 11:12

## 2023-12-12 RX ADMIN — FENTANYL CITRATE 25 MCG: 0.05 INJECTION, SOLUTION INTRAMUSCULAR; INTRAVENOUS at 04:12

## 2023-12-12 RX ADMIN — DEXTROSE 2 G: 50 INJECTION, SOLUTION INTRAVENOUS at 01:12

## 2023-12-12 RX ADMIN — PROPOFOL 150 MG: 10 INJECTION, EMULSION INTRAVENOUS at 01:12

## 2023-12-12 RX ADMIN — DEXAMETHASONE SODIUM PHOSPHATE 4 MG: 4 INJECTION, SOLUTION INTRA-ARTICULAR; INTRALESIONAL; INTRAMUSCULAR; INTRAVENOUS; SOFT TISSUE at 01:12

## 2023-12-12 RX ADMIN — ROCURONIUM BROMIDE 5 MG: 10 SOLUTION INTRAVENOUS at 01:12

## 2023-12-12 RX ADMIN — DEXMEDETOMIDINE HYDROCHLORIDE 4 MCG: 100 INJECTION, SOLUTION INTRAVENOUS at 02:12

## 2023-12-12 RX ADMIN — ACETAMINOPHEN 1000 MG: 10 INJECTION, SOLUTION INTRAVENOUS at 05:12

## 2023-12-12 RX ADMIN — CEFAZOLIN SODIUM 2 G: 2 SOLUTION INTRAVENOUS at 06:12

## 2023-12-12 RX ADMIN — FENTANYL CITRATE 50 MCG: 0.05 INJECTION, SOLUTION INTRAMUSCULAR; INTRAVENOUS at 02:12

## 2023-12-12 RX ADMIN — SUCCINYLCHOLINE CHLORIDE 140 MG: 20 INJECTION, SOLUTION INTRAMUSCULAR; INTRAVENOUS; PARENTERAL at 01:12

## 2023-12-12 RX ADMIN — SODIUM CHLORIDE: 9 INJECTION, SOLUTION INTRAVENOUS at 05:12

## 2023-12-12 RX ADMIN — ROCURONIUM BROMIDE 10 MG: 10 SOLUTION INTRAVENOUS at 02:12

## 2023-12-12 RX ADMIN — FENTANYL CITRATE 25 MCG: 0.05 INJECTION, SOLUTION INTRAMUSCULAR; INTRAVENOUS at 03:12

## 2023-12-12 RX ADMIN — FENTANYL CITRATE 25 MCG: 0.05 INJECTION, SOLUTION INTRAMUSCULAR; INTRAVENOUS at 01:12

## 2023-12-12 RX ADMIN — ONDANSETRON HYDROCHLORIDE 4 MG: 2 INJECTION, SOLUTION INTRAMUSCULAR; INTRAVENOUS at 03:12

## 2023-12-12 NOTE — ANESTHESIA POSTPROCEDURE EVALUATION
Anesthesia Post Evaluation    Patient: Josette Altamirano    Procedure(s) Performed: Procedure(s) (LRB):  INJECTION, FOR SENTINEL NODE IDENTIFICATION (Right)  MASTECTOMY, SKIN SPARING, SIMPLE (Right)  BIOPSY, LYMPH NODE, SENTINEL (Right)  INSERTION, TISSUE EXPANDER, BREAST (Right)    Final Anesthesia Type: general      Patient location during evaluation: PACU  Patient participation: Yes- Able to Participate  Level of consciousness: awake  Post-procedure vital signs: reviewed and stable  Pain management: adequate  Airway patency: patent    PONV status at discharge: No PONV  Anesthetic complications: no      Cardiovascular status: stable  Respiratory status: unassisted  Hydration status: euvolemic  Follow-up not needed.              Vitals Value Taken Time   /66 12/12/23 1609   Temp 36.5 °C (97.7 °F) 12/12/23 1558   Pulse 71 12/12/23 1611   Resp 12 12/12/23 1611   SpO2 100 % 12/12/23 1611   Vitals shown include unvalidated device data.      No case tracking events are documented in the log.      Pain/Edy Score: No data recorded

## 2023-12-12 NOTE — CARE UPDATE
Received patient from Kanika URBAN, patient is drowsy but arousable. No respiratory distress observed. IV catheter intact and infusing. Safety measures intact and ongoing.  Surgical site is dry and clean.Family members at bedside. Therapeutic environment provided, care continues.

## 2023-12-12 NOTE — OP NOTE
Operative Report    12/12/2023    Preoperative Diagnosis:   Right Breast Cancer,  overlapping  quadrants    Postoperative Diagnosis:  Same    Procedures Performed:  Right Skin-Sparing Mastectomy  Right [place] Lymph Node Biopsy  Injection of Isosulfan Blue Dye    Surgeon: Shannon Galvan MD    Anesthsia: General     Drains: Per plastics    Complications: None apparent    Disposition: The care of the patient was turned over to Dr. James for completion of the reconstructive portion of the case.    Specimens:  Right Breast, Short Suture Superior, Long Suture Lateral  Right  Axillary [place] Lymph Nodes              No. 1 Blue, Not Hot (Count 0)   No. 2 Non sentinel              Background Count 2    Statement of Medical Necessity:  This patient is a 68 year old woman who was recently diagnosed with right breast cancer.  After undergoing a thorough preoperative evaluation and considering all of her options, she has elected for skin sparing  mastectomy.  The risks, benefits and alternatives to surgery have been discussed and she has provided informed consent.     Description of Operative Procedure and Findings:  The patient was identified and transported to the operating room and placed on the operating table.  All pressure points were padded.  General Anesthesia was administered.  After the skin was cleansed with an alcohol prep, blue dye was injected beneath the nipple-areolar complex and the breast was massaged for 5 minutes. The patient's bilateral breast, axilla and upper arm was prepped and draped in the usual sterile fashion.  A time out was performed.    Attention was then turned to the right breast.  An elliptical incision about the nipple areolar complex was made according to the plastic surgeon's markings using the 15 blade scalpel.  The incision was continued through the subcutaneous tissue using the bovie.  Tumescent solution was instilled in the plane between the breast and the subcutaneous tissue.  The  breast was then dissected from the skin and subcutaneous tissue using scissors.  The dissection continued to the clavicle superiorly, the medial border of the sternum medially, the inframammary fold inferiorly and the anterior border of the latissimus laterally. The breast was then removed from the pectoralis muscle, including the pectoralis fascia with the specimen, using the bovie.  The specimen was oriented using a short suture superiorly and a long suture laterally and submitted to pathology for permanent evaluation.  The wound was irrigated and suctioned dry.  Hemostasis was obtained.  A moist lap was placed in the wound to prevent desiccation of the tissues.     Attention was turned to the right axilla.  Through the same incision, the clavipectoral fascia was opened vertically, exposing the classically appearing axillary fat. 1 sentinel lymph node and one non-sentinel lymph node were identified, as described above. The wound was irrigated and suctioned dry. Hemostasis was obtained.      At this time, the care of the patient was turned over to the plastic surgeon to complete the reconstruction portion of the operation.  No complications were noted.  At the end of my portion of the case, the instrument, needle and sponge count was correct.      [place] Node Biopsy for Breast Cancer  Operation performed with curative intent Yes   Tracer(s) used to identify sentinel nodes in the upfront surgery (non-neoadjuvant) setting Dye and Radioactive tracer   Tracer(s) used to identify sentinel nodes in the neoadjuvant setting N/A   All nodes (colored or non-colored) present at the end of a dye-filled lymphatic channel were removed Yes   All significantly radioactive nodes were removed Yes   All palpably suspicious nodes were removed Yes   Biopsy-proven positive nodes marked with clips prior to chemotherapy were identified and removed N/A

## 2023-12-12 NOTE — INTERVAL H&P NOTE
The patient has been examined and the H&P has been reviewed:    I concur with the findings and no changes have occurred since H&P was written.    Surgery risks, benefits and alternative options discussed and understood by patient/family.    Patient seen and marked on 12/11 with no changes.      Active Hospital Problems    Diagnosis  POA    *Malignant neoplasm of overlapping sites of right breast in female, estrogen receptor positive [C50.811, Z17.0]  Not Applicable      Resolved Hospital Problems   No resolved problems to display.

## 2023-12-12 NOTE — INTERVAL H&P NOTE
The patient has been examined and the H&P has been reviewed:    I concur with the findings and no changes have occurred since H&P was written.    Surgery risks, benefits and alternative options discussed and understood by patient/family.          Active Hospital Problems    Diagnosis  POA    *Malignant neoplasm of overlapping sites of right breast in female, estrogen receptor positive [C50.811, Z17.0]  Not Applicable      Resolved Hospital Problems   No resolved problems to display.

## 2023-12-12 NOTE — PLAN OF CARE
Reviewed and completed all PACU orders. I encouraged questions, answered them thoroughly, and evaluated my instructions via teach-back method. I have disconnected patient from monitoring equipment and prepared them for the next phase of care. Patient has met all PACU discharge criteria at this point. Patient and family agree with the plan of care. Report given to NURSE

## 2023-12-12 NOTE — ANESTHESIA PROCEDURE NOTES
Intubation    Date/Time: 12/12/2023 1:27 PM    Performed by: Vanessa Wilson CRNA  Authorized by: Mikael Greenberg MD    Intubation:     Induction:  Intravenous    Intubated:  Postinduction    Mask Ventilation:  Easy mask    Attempts:  1    Attempted By:  CRNA    Method of Intubation:  Video laryngoscopy    Blade:  Raza 3    Laryngeal View Grade: Grade I - full view of cords      Difficult Airway Encountered?: No      Complications:  None    Airway Device:  Oral endotracheal tube    Airway Device Size:  7.0    Style/Cuff Inflation:  Cuffed    Inflation Amount (mL):  5    Tube secured:  22    Secured at:  The lips    Placement Verified By:  Capnometry and Revisualization with laryngoscopy (Bilateral Breath Sounds)    Complicating Factors:  None    Findings Post-Intubation:  BS equal bilateral and atraumatic/condition of teeth unchanged

## 2023-12-12 NOTE — TRANSFER OF CARE
"Anesthesia Transfer of Care Note    Patient: Josette Altamirano    Procedure(s) Performed: Procedure(s) (LRB):  INJECTION, FOR SENTINEL NODE IDENTIFICATION (Right)  MASTECTOMY, SKIN SPARING, SIMPLE (Right)  BIOPSY, LYMPH NODE, SENTINEL (Right)  INSERTION, TISSUE EXPANDER, BREAST (Right)    Patient location: PACU    Anesthesia Type: general    Transport from OR: Transported from OR on room air with adequate spontaneous ventilation    Post pain: adequate analgesia    Post assessment: no apparent anesthetic complications    Post vital signs: stable    Level of consciousness: sedated and responds to stimulation    Nausea/Vomiting: no nausea/vomiting    Complications: none    Transfer of care protocol was followed      Last vitals: Visit Vitals  /75 (BP Location: Right arm, Patient Position: Sitting)   Pulse 76   Temp 36.3 °C (97.4 °F) (Tympanic)   Ht 5' 4" (1.626 m)   Wt 105.3 kg (232 lb 2.3 oz)   SpO2 98%   Breastfeeding No   BMI 39.85 kg/m²     "

## 2023-12-12 NOTE — DISCHARGE INSTRUCTIONS
POSTOPERATIVE INSTRUCTIONS FOLLOWING   MASTECTOMY AND/OR AXILLARY LYMPH NODE DISSECTION    The following are post-operative instructions that will help you to recover from your surgery.  Please read over these instructions carefully and contact us if we can answer any of your questions or concerns.    Dressing/breast binder (surgi-bra)  A surgical bra may be placed around your chest after your surgery.  If you are given the bra, please wear it as close to 24 hours a day as possible until your post-operative clinic appointment.  If the elastic around the bra irritates your skin, you may wear a soft t-shirt underneath the bra.    You may go without wearing the bra long enough to bath, to launder and dry the bra. If you have fluffy filler placed inside the bra, the filler should be removed whenever the bra is taken off. Please reinsert the fluffy filler, or insert the new soft filler, under the bra when you put the bra back on.  If the bra is extremely uncomfortable, you may wear a supportive sports bra instead after 2 days.    You may shower AFTER the drains are removed.  Please sponge bathe until then. Do not take a tub bath and do not soak the surgical site. lease do not remove the surgical glue that covers your incision.  This will peel off in 2-3 weeks naturally.     Activity   You will be able to do much of your own personal care, such as bathing, dressing, preparing simple meals, etc.  A short walk each day will help with your recovery  You may find that you need to take rest breaks between activities, but you should not need to stay in bed for prolonged periods of time during the day  You may resume light household activities such as simple meal preparation, folding laundry, using your computer, and completing paperwork as you feel ready  Please avoid activities that require moderate to heavy lifting (grocery shopping) or pushing/pulling (vacuuming) and repetitive motions (such as washing windows or long hours  on the computer). Do not lift anything heavier than a gallon of milk.  A good rule during this time is to listen to your body, do what is comfortable, and stop and rest when your feel tired.  If it hurts, dont do it.  Following a lymph node dissection, dont avoid using your arm, but dont exercise your arm until after your first post-operative visit.  At your first post-op visit, you will be given arm exercises to regain movement and flexibility.  You may be referred to physical therapy.  You may restart driving when you are no longer on narcotics, your drain has been removed, and you feel safe turning the wheel and stopping quickly.  You will need to be out of work approximately 2-6 weeks depending on your particular surgery and how well you are recovering.  We will evaluate how you are doing at the first post-op appointment.  This is a good time to ask when you may return to work and what activities you may do.    Medication for pain  You will be given a prescription for pain medication. You should not drive or operate machinery while taking these.  Please take narcotics with food.  Narcotics can cause, or worse, constipation.  You will need to increase your fluid intake, eat high fiber foods (such as fruits and bran) and make sure that you are up and walking. You may need to take an over the counter stool softener for constipation.  An icepack may be helpful to decrease discomfort and swelling, particularly to the armpit after a lymph node dissection.  A small pillow positioned in the armpit may also decrease discomfort after a lymph node dissection.  If you are given a prescription for antibiotics, please continue to take them until the drains have been removed.    How to care for your Drain(s)  Wash hands--STRIP or milk the drainage tube as it comes out of your body toward the bulb.   Beginning where the drain comes out of your body, hold drainage tubing with one hand and with the other, stretch and release  tubing an inch at time while moving downward with both hands toward the bulb.  Do this 2-3 times before emptying the bulb.  Remove the stopper from the bulbs port  (drainage port)  Pour the drainage in the measuring cup provided by the nurse  Flatten/squeeze the bulb to create a vacuum and replace the stopper before letting go the of the bulb.  Record the date, time and amount of drainage in ccs (not ounces) each time bulb is emptied. If you have more than one drain, record each separately.  Discard the drainage into the toilet after measuring and then wash hands.  Empty bulbs 3 times/day or as needed if it fills up before 8 hours.  Remember to bring the output record with you to your doctors appointment.    Please report the following:  Temperature greater than 101 degrees  Discharge or bad odor from the wound  Excessive bleeding, such as bloody dressing or extreme bruising  Redness at incision and/or drain sites  Swelling or buildup of fluid around incision  If blue dye was used to locate your sentinel lymph nodes, your urine and stool may be blue-green in color for 1 or 2 days.    Additional information  I will see you approximately 2 weeks following your surgery.  If this follow-up appointment has not been made, please call the office.    If you have any questions or problems, please call my office.    Dr. Shannon Galvan    [phone]    After hours, please contact the [facility]  at [phone].

## 2023-12-12 NOTE — BRIEF OP NOTE
[facility] Periop Services  Brief Operative Note    Surgery Date: 12/12/2023     Surgeon(s) and Role:  Panel 1:     * Shannon Galvan MD - Primary  Panel 2:     * Rashawn James MD - Primary    Assisting Surgeon: None    Pre-op Diagnosis:  Malignant neoplasm of overlapping sites of right breast in female, estrogen receptor positive [C50.811, Z17.0]    Post-op Diagnosis:  Post-Op Diagnosis Codes:     * Malignant neoplasm of overlapping sites of right breast in female, estrogen receptor positive [C50.811, Z17.0]     * Personal history of malignant neoplasm of breast [Z85.3]     * Status post left mastectomy [Z90.12]     * Deformity of reconstructed breast [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    DISPOSITION: Home or Self Care    FINAL DIAGNOSIS:  Malignant neoplasm of overlapping sites of right breast in female, estrogen receptor positive    FOLLOWUP: In clinic    DISCHARGE INSTRUCTIONS:  Follow instructions from my office.

## 2023-12-13 VITALS
BODY MASS INDEX: 39.63 KG/M2 | HEIGHT: 64 IN | HEART RATE: 82 BPM | WEIGHT: 232.13 LBS | SYSTOLIC BLOOD PRESSURE: 130 MMHG | RESPIRATION RATE: 16 BRPM | DIASTOLIC BLOOD PRESSURE: 59 MMHG | OXYGEN SATURATION: 97 % | TEMPERATURE: 98 F

## 2023-12-13 PROCEDURE — 63600175 PHARM REV CODE 636 W HCPCS: Performed by: SURGERY

## 2023-12-13 RX ADMIN — CEFAZOLIN SODIUM 2 G: 2 SOLUTION INTRAVENOUS at 06:12

## 2023-12-13 NOTE — NURSING
Patient met criteria for discharge. Discharge instructions given, and medications received preoperatively. Patient wheeled to car with no complaints.

## 2023-12-13 NOTE — PROGRESS NOTES
[facility] - Med/Surg  General Surgery  Progress Note    Subjective:     Interval History: S/p right mastectomy with TE.  Doing well. Pain controlled. Ernesto po.    Post-Op Info:  Procedure(s) (LRB):  INJECTION, FOR SENTINEL NODE IDENTIFICATION (Right)  MASTECTOMY, SKIN SPARING, SIMPLE (Right)  BIOPSY, LYMPH NODE, SENTINEL (Right)  INSERTION, TISSUE EXPANDER, BREAST (Right)   1 Day Post-Op      Medications:  Continuous Infusions:   sodium chloride 0.9% 30 mL/hr at 12/12/23 1747     Scheduled Meds:   ceFAZolin (ANCEF) IVPB  2 g Intravenous Q6H     PRN Meds:acetaminophen, acetaminophen, cyclobenzaprine, diphenhydrAMINE, fentaNYL, hydrALAZINE, lorazepam, melatonin, meperidine, ondansetron, ondansetron, oxyCODONE, oxyCODONE, oxyCODONE-acetaminophen     Objective:     Vital Signs (Most Recent):  Temp: 98.4 °F (36.9 °C) (12/12/23 1831)  Pulse: 84 (12/12/23 1939)  Resp: 18 (12/12/23 1939)  BP: 136/66 (12/12/23 1939)  SpO2: 95 % (12/12/23 1939) Vital Signs (24h Range):  Temp:  [97.4 °F (36.3 °C)-98.4 °F (36.9 °C)] 98.4 °F (36.9 °C)  Pulse:  [67-85] 84  Resp:  [12-18] 18  SpO2:  [92 %-100 %] 95 %  BP: (129-173)/(65-80) 136/66       Intake/Output Summary (Last 24 hours) at 12/13/2023 0610  Last data filed at 12/12/2023 2353  Gross per 24 hour   Intake 1000 ml   Output 170 ml   Net 830 ml       AO x 3, NAD  Flaps well perfused, no sign of infection or fluid collection, LEONOR with appropriate serosanguinous output    Significant Labs:  none    Significant Diagnostics:  none    Assessment/Plan:     Active Diagnoses:    Diagnosis Date Noted POA    PRINCIPAL PROBLEM:  Malignant neoplasm of overlapping sites of right breast in female, estrogen receptor positive [C50.811, Z17.0] 09/19/2023 Not Applicable      Problems Resolved During this Admission:     S/p right mastectomy with TE recon  Doing great  OK for D/C after seeing Dr. Galvan   Instructions already given to patient, follow up with me next week.    Rashawn James MD  General  Surgery  [facility]/Surg

## 2023-12-18 NOTE — DISCHARGE SUMMARY
[facility] - Med/Surg  Discharge Note  Short Stay    Procedure(s) (LRB):  INJECTION, FOR SENTINEL NODE IDENTIFICATION (Right)  MASTECTOMY, SKIN SPARING, SIMPLE (Right)  BIOPSY, LYMPH NODE, SENTINEL (Right)  INSERTION, TISSUE EXPANDER, BREAST (Right)  APPLICATION, ACELLULAR HUMAN DERMAL ALLOGRAFT (Right)      OUTCOME: Patient tolerated treatment/procedure well without complication and is now ready for discharge.    DISPOSITION: Home or Self Care    FINAL DIAGNOSIS:  Malignant neoplasm of overlapping sites of right breast in female, estrogen receptor positive    FOLLOWUP: In clinic    DISCHARGE INSTRUCTIONS:  No discharge procedures on file.     TIME SPENT ON DISCHARGE: 10 minutes

## 2023-12-20 LAB
FINAL PATHOLOGIC DIAGNOSIS: NORMAL
GROSS: NORMAL
Lab: NORMAL

## 2023-12-22 ENCOUNTER — DOCUMENTATION ONLY (OUTPATIENT)
Dept: HEMATOLOGY/ONCOLOGY | Facility: CLINIC | Age: 68
End: 2023-12-22
Payer: MEDICARE

## 2023-12-22 DIAGNOSIS — Z17.0 MALIGNANT NEOPLASM OF OVERLAPPING SITES OF RIGHT BREAST IN FEMALE, ESTROGEN RECEPTOR POSITIVE: Primary | ICD-10-CM

## 2023-12-22 DIAGNOSIS — C50.811 MALIGNANT NEOPLASM OF OVERLAPPING SITES OF RIGHT BREAST IN FEMALE, ESTROGEN RECEPTOR POSITIVE: Primary | ICD-10-CM

## 2023-12-22 NOTE — PROGRESS NOTES
Oncotype ordered, path 15 order placed. Will follow.   Oncology Navigation   Intake  Cancer Type: Breast  Internal / External Referral: Internal (Dr. Shannon Galvan)  Date of Referral: 10/24/23  Initial Nurse Navigator Contact: 10/25/23  Referral to Initial Contact Timeline (days): 1  Date Worked: 12/22/23  First Appointment Available: 11/06/23  Appointment Date: 11/06/23  Schedule to Appointment Timeline (days): 12  First Available Date vs. Scheduled Date (days): 0  Multiple appointments: No  Reason if booked > 7 days after scheduling: Specific provider / access     Treatment  Current Status: Staging work-up    Surgical Oncologist: Dr. Shannon Galvan  Plastic Surgeon: Dr. Rashawn James  Surgery Schedule Date: 08/24/23    Medical Oncologist: Dr. Lawrence Sykes  Consult Date: 11/06/23       Procedures: Screening Mammogram                 Acuity      Follow Up  No follow-ups on file.

## 2023-12-26 ENCOUNTER — CLINICAL SUPPORT (OUTPATIENT)
Dept: REHABILITATION | Facility: HOSPITAL | Age: 68
End: 2023-12-26
Payer: MEDICARE

## 2023-12-26 ENCOUNTER — OFFICE VISIT (OUTPATIENT)
Dept: SURGERY | Facility: CLINIC | Age: 68
End: 2023-12-26
Payer: MEDICARE

## 2023-12-26 DIAGNOSIS — C50.811 MALIGNANT NEOPLASM OF OVERLAPPING SITES OF RIGHT BREAST IN FEMALE, ESTROGEN RECEPTOR POSITIVE: Primary | ICD-10-CM

## 2023-12-26 DIAGNOSIS — Z91.89 AT RISK FOR SELF CARE DEFICIT: Primary | ICD-10-CM

## 2023-12-26 DIAGNOSIS — Z17.0 MALIGNANT NEOPLASM OF OVERLAPPING SITES OF RIGHT BREAST IN FEMALE, ESTROGEN RECEPTOR POSITIVE: Primary | ICD-10-CM

## 2023-12-26 DIAGNOSIS — Z71.9 ENCOUNTER FOR EDUCATION: ICD-10-CM

## 2023-12-26 PROCEDURE — 99024 POSTOP FOLLOW-UP VISIT: CPT | Mod: POP,,, | Performed by: SURGERY

## 2023-12-26 PROCEDURE — 97140 MANUAL THERAPY 1/> REGIONS: CPT

## 2023-12-26 PROCEDURE — 99024 PR POST-OP FOLLOW-UP VISIT: ICD-10-PCS | Mod: POP,,, | Performed by: SURGERY

## 2023-12-26 PROCEDURE — 97110 THERAPEUTIC EXERCISES: CPT

## 2023-12-26 NOTE — PROGRESS NOTES
[facility]  Occupational Therapy Treatment Note     Date: 12/26/2023  Name: Josette Altamirano  Clinic Number: [id number]    Therapy Diagnosis:   Encounter Diagnoses   Name Primary?    At risk for self care deficit Yes    Encounter for education      Physician: Shannon Galvan MD    Physician Orders: Occupational Therapy to Evaluate and Treat  Medical Diagnosis: Malignant neoplasm of overlapping sites of right breast in female, estrogen receptor positive [C50.811, Z17.0]     Evaluation Date: 10/9/2023  Insurance Authorization Period Expiration: 9/27/2023 - 9/26/2024  Plan of Care Certification Period: 10/9/2023 - 03/26/2024  Progress Note Due: N/A      Date of Return to MD: 10/10/2023     Visit # / Visits authorized: 1/1  FOTO: 0/3 NP Pre-Op     Precautions:  Standard     Time In: 11:50  Time Out: 12:20  Total Appointment Time (timed & untimed codes): 30 minutes    Subjective     Patient reports: She is doing well    she was compliant with home exercise program given last session.   Response to previous treatment: Tolerated well, No adverse effects  Functional change: Improved knowledge of HEP    Pain: 2/10  Location: right breast    Objective     Objective measures updated at progress report unless specified.    Treatment     Supervised Modalities: Modalities such as hot/cold packs, traction, unattended electrical stimulation, paraffin bath, fluidotherapy to reduce pain and increase soft tissue extensibility. Josette received (0) minutes of modalities listed below after being cleared for contraindications.  x = modalities performed     Supervised Modalities 12/26/2023                            Manual Therapy Techniques: Joint mobilizations, Soft tissue Mobilization, and mobilization with movement applied to the area listed below. Josette received (20) minutes of interventions. x = intervention performed today    Manual Intervention 12/26/2023    Extensive Soft Tissue Mobilization, Myofacial Release,  Manual Lymphatic Drainage, IASTM, Cupping.  x Right breast  to increase blood flow/circulation, improve soft tissue pliability, decrease pain and decrease sensitivity    Joint Mobilizations     Mobilization with movement     Scar Management x Right breast  to increase blood flow/circulation, improve soft tissue pliability, decrease sensitivity, and reduce risk of hypertropic scarring and adhesions. Scar management performed 3 ways. Patient educated on self scar management with clean hands, on healed incisions.          Therapeutic Exercises: to develop strength, endurance, ROM, flexibility, posture and core stabilization. Josette received (10) minutes of exercises listed below. x = performed today * = level of progression of activity     Therapeutic Exercise 12/26/2023    Diaphragmatic breathing and relaxation x 5x   Scapular retractions x 3 sets of 10x   Fist making/ball squeezes x 2 minutes   Elbow flexion/extension x 3 sets of 10x   Wall walks   -Flexion  -Abduction x 2 sets of 10x   Pendulums  - front/back  -side to side  -circles both ways x 10x each direction                     Therapeutic Activities: Everyday tasks to address the combined need of improved strength, range of motion, and endurance to achieve increased independence. While simulating these activities, the person is applying the gained skills of strength and improved range of motion in a practical way.  Josette received (0) minutes of activities listed below. x = activities performed today * = level of progression of activity     Therapeutic Activities 12/26/2023                          Neuromuscular Re-education: the use of functional strengthening, stretching, balancing and coordination activities that train the nerves and muscles to react and communicate to restore normal body movement patterns to increase self care independence. Josette received (0) minutes of interventions listed below.  x = interventions performed today * = level of progression  of activity     Neuromuscular Re-education 12/26/2023                          Self Care: Fundamental skills required to independently care for oneself. Activities of daily living such as eating, bathing, dressing, toileting, grooming, sleeping, transfers and mobility. Instrumental activities of daily living such as driving, medication management, pet care, home management/cleaning, financial management, using the phone, meal preparation and shopping. Josette received (0) minutes of interventions listed below.  x = interventions performed * = level of progression of activity     Self Care 12/26/2023                            Home Exercises and Education Provided     Education provided:   - Importance of self breast massage, and self scar massage   - Action plan for infection  - progress towards goals     Written Home Exercises Provided: yes.  Exercises were reviewed and Josette was able to demonstrate them prior to the end of the session. Josette demonstrated good understanding of the home exercise program provided.     See EMR under Patient Instructions for exercises provided 12/26/2023.        Assessment     Patient tolerated gentle breast massage and scar massage well. Patient instructed to start when her final LEONOR drain comes out and incisions are healed.      Josette is progressing towards her goals and there are no updates to goals at this time. Patient prognosis is Good.     Patient will continue to benefit from skilled outpatient occupational therapy to address the deficits listed in the problem list on initial evaluation provide patient/family education and to maximize patient's level of independence in the home and community environment.     Patient's spiritual, cultural and educational needs considered and patient agreeable to plan of care and goals.    Anticipated barriers to occupational therapy: Pain    Goals:     Short Term Goals:  6 weeks  Progress    Pain: Patient will demonstrate improved pain by reports  of less than or equal to 3/10 worst pain on the verbal rating scale in order to progress toward maximal functional ability and improve quality of life. PC   HEP: Patient will demonstrate independence with home exercise program in order to progress toward functional independence. PC   Lymphedema: Patient will demonstrate 100% understanding of lymphedema risk reduction practices to include self monitoring for lymphedema. PC   Massage: Patient will demonstrate 100% understanding of breast and scar massage practices to reduce risk of fibrosis, scar adhesions, range of motion impairments and pain.  PC      Long Term Goals:  12 weeks  Progress   Pain: Patient will demonstrate improved pain by reports of less than or equal to 1/10 worst pain on the verbal rating scale in order to progress toward maximal functional ability and improve quality of life.   PC   ADL: Patient will return to normal ADL's, IADL's, community involvement, recreational activities, and work-related activities with less than or equal to 0/10 pain, full/maximized tissue mobility and maximal function.  PC      Goals Key:  PC= Progressing/Continue;       PM= Partially Met;       GM= Goal Met,      DC= Discontinue    Plan     Continue Plan of Care (POC) and progress per patient tolerance.      Shweta Hancock, OT  ,OTD, OTR/L

## 2023-12-26 NOTE — PROGRESS NOTES
Breast Surgical Oncology  Post-operative Visit      REFERRING PHYSICIAN:  Eun Booker MD    MEDICAL ONCOLOGIST:    Lawrence Sykes M.D.   RADIATION ONCOLOGIST:   N/A    Date of Service: 12/26/2023    DIAGNOSIS:   This is a 68 y.o. female with Stage IA (T1b N0 Mx) RIGHT Breast Invasive Lobular Carcinoma, Grade 1, ER 95%, ME 75%, Gtd6nmd 1+ with a ki67 of 10%    TREATMENT SUMMARY:   The patient is status post left skin sparing mastectomy and sentinel node biopsy on 12/12/23.  Final pathology showed residual LCIS, no disease in the sentinel lymph nodes.     Lab Results   Component Value Date    FPATHDX  12/12/2023     1. RIGHT BREAST, SKIN SPARING MASTECTOMY:  Florid lobular carcinoma in situ (LCIS).   Background breast tissue with multiple intraductal papillomas, usual ductal hyperplasia, cysts and apocrine metaplasia.  One intramammary lymph node negative for malignancy (0/1).   Unremarkable nipple.    Negative for DCIS or residual invasive carcinoma.    Comment:  Extensive LCIS is identified throughout the specimen.  The patient's reported history of remote breast cancer and recent reconstructive breast surgery revealing invasive lobular carcinoma is noted, however invasive carcinoma is not identified   in this completion mastectomy.  Immunostains for P63, CK5/6 and E-cadherin are performed on multiple tissue blocks (1N, 1Q, 1R and 1T).  E-cadherin is negative or significantly reduced in lesional cells of LCIS.  CK5/6 highlights intermixed and   background usual ductal hyperplasia.  P63 staining highlights retained myoepithelial cells at the periphery of LCIS lesions as well as background benign ductal elements.    2. SENTINEL LYMPH NODE, EXCISION:  One lymph node negative for malignancy (0/1) by routine staining and epithelial immunostain panel (AE1/AE3, WSK, cam 5.2.    3. NONSENTINEL LYMPH NODE, EXCISION:   One lymph node negative for malignancy (0/1).           INTERVAL HISTORY:   Josette Altamirano comes in  for a post-op check.  She denies fever, chills, chest pain or shortness of breath.  Her pain is well controlled.      MEDICATIONS:     Current Outpatient Medications   Medication Sig Dispense Refill    cephALEXin (KEFLEX) 500 MG capsule Take 1 capsule (500 mg total) by mouth every 6 (six) hours for antibiotic 56 capsule 0    cephALEXin (KEFLEX) 500 MG capsule take 1 capsule by mouth every 6 hours 56 capsule 0    fluticasone propionate (FLONASE) 50 mcg/actuation nasal spray 1 spray (50 mcg total) by Each Nostril route once daily. (Patient not taking: Reported on 11/6/2023) 11.1 mL 0    HYDROcodone-acetaminophen (NORCO) 7.5-325 mg per tablet Take 1 tablet by mouth every 6 (six) hours as needed for pain (Patient not taking: Reported on 11/6/2023) 28 tablet 0    HYDROcodone-acetaminophen (NORCO) 7.5-325 mg per tablet take 1 tablet by mouth every 6 hours as needed for pain 28 tablet 0    meloxicam (MOBIC) 15 MG tablet Take 1 tablet (15 mg total) by mouth once daily. 30 tablet 1    metoprolol succinate (TOPROL-XL) 50 MG 24 hr tablet Take 1 tablet (50 mg total) by mouth once daily. 30 tablet 6    ondansetron (ZOFRAN-ODT) 8 MG TbDL Take 1 tablet (8 mg total) by mouth every 6 (six) hours as needed for nausea (Patient not taking: Reported on 11/6/2023) 10 tablet 0    ondansetron (ZOFRAN-ODT) 8 MG TbDL dissolve 1 tablet on tongue every 6 hours as needed for nausea 10 tablet 0    sacubitriL-valsartan (ENTRESTO)  mg per tablet Take 1 tablet by mouth 2 (two) times daily. 180 tablet 1     No current facility-administered medications for this visit.       ALLERGIES:   Review of patient's allergies indicates:  No Known Allergies    PHYSICAL EXAMINATION:   General:  This is a well appearing female with appropriate speech, affect and gait.     Breast:  right breast incision clean, dry, and intact    ASSESSMENT:   The patient has had an uneventful postoperative course.    PLAN:   1. Return in 2 months for a follow up office visit  and breast exam  2. The patient is advised in continued exam of the breast chest wall and to report to this office sooner should she note any areas of abnormality or concern.   3.  She has been instructed to meet with med for discussion of adjuvant treatment recommendations    Shannon Galvan M.D.

## 2023-12-26 NOTE — PLAN OF CARE
[facility]   Outpatient Occupational Therapy Breast Cancer Post-Operative Evaluation     Date: 12/26/2023  Name: Josette Altamirano  Clinic Number: [id number]    Therapy Diagnosis:   Encounter Diagnoses   Name Primary?    At risk for self care deficit Yes    Encounter for education      Physician: Shannon Galvan MD    Physician Orders: Occupational Therapy to Evaluate and Treat  Medical Diagnosis: Malignant neoplasm of overlapping sites of right breast in female, estrogen receptor positive [C50.811, Z17.0]     Evaluation Date: 10/9/2023  Insurance Authorization Period Expiration: 9/27/2023 - 9/26/2024  Plan of Care Certification Period: 10/9/2032 - 03/26/2024  Progress Note Due: N/A      Date of Return to MD: 10/10/2023     Visit # / Visits authorized: 1/1  FOTO: 0/3 NP Pre-Op     Precautions:  Standard    Time In: 11:50  Time Out: 12:20  Total Appointment Time (timed & untimed codes): 30 minutes    Subjective           Date of Onset: 2007  History of Current Condition:   Josette is a 68 y.o. female that presents to [facility] s/p a right skin sparing mastectomy. Patient reports that she had left breast cancer in 2007 in which she underwent a mastectomy. She chose not to do reconstruction and had a prosthesis. She then decided this year to have delayed reconstruction with a lat flap and reduction of right breast. During reduction a neoplasm was found in the right breast. Patient underwent a right skin sparring mastectomy with tissue expander placement on 12/12/23. Patient still has one LEONOR drain.       Staging Prior to Surgery: Malignant neoplasm of overlapping sites of right breast in female, estrogen receptor positive [C50.811, Z17.0]   Surgical Procedure and Date: right skin sparring mastectomy with tissue expander placement on 12/12/23  Chemotherapy: N/A  Radiation: N/A    Involved Side: bilateral  Hand Dominance: Right    Patients Goals: Patient reported goals  are to decrease overall pain, improve mobility, decrease risk for lymphedema and to return to prior functional level.     Falls: none    Pain:  Pain Related Behaviors Observed: Yes   Functional Pain Scale Rating 0-10:   Average: 3/10   Best: 2/10   Worst: 7/10   Location: right breast  Description: Aching    Occupation:   Working Presently: Retired    Functional Limitations/Social History:    Prior Level of Function: Independent and pain free with all ADL, IADL, community mobility and functional activities.     Current Level of Function: Min A to Mod Independent with all ADL, IADL, community mobility and functional activities with reports of increased pain and need for increased time and frequent breaks.      Limitation of Functional Status as follows:   ADLs/IADLs: See Below in Objective Section    Nutrition:  Normal  Exercise Routine Prior to Onset : Active  Home/Living Environment : lives with their spouse       Past Medical History/Physical Systems Review:     Past Medical History:  Josette Altamirano  has a past medical history of Arthritis of right knee, Breast cancer, Cardiomyopathy, CHF (congestive heart failure), Genetic testing, Hypertension, and Uterine fibroid.    Past Surgical History:  Jsoette Altamirano  has a past surgical history that includes Cholecystectomy; Breast biopsy; Mediport insertion, single; Breast surgery; Mastectomy; Breast cyst excision (Right); Colonoscopy (N/A, 9/10/2018); Colonoscopy (N/A, 4/28/2020); Esophagogastroduodenoscopy (N/A, 4/28/2020); Injection for sentinel node identification (Right, 12/12/2023); mastectomy, skin sparing, simple (Right, 12/12/2023); Motley lymph node biopsy (Right, 12/12/2023); Insertion of breast tissue expander (Right, 12/12/2023); and Placement of acellular human dermal allograft (Right, 12/12/2023).    Current Medications:  Josette has a current medication list which includes the following prescription(s): cephalexin, cephalexin, fluticasone propionate,  hydrocodone-acetaminophen, hydrocodone-acetaminophen, meloxicam, metoprolol succinate, ondansetron, ondansetron, and entresto.    Allergies:  Review of patient's allergies indicates:  No Known Allergies                 Objective       Activities of Daily Living:    Activity of Daily Living  12/26/2023   Feeding Modified Independent   Grooming    Hygiene    Toileting    Upper Body Dressing    Lower Body Dressing    Bathing        Instrumental Activities of Daily Living:    Instrumental Activity of Daily Living 12/26/2023   Homecare Minimal Assist   Cooking Minimal Assist   Laundry Minimal Assist   Driving Minimal Assist   Yard Work Minimal Assist   Use of Telephone Independent   Money Management Independent   Medication Management Independent   Handwriting Independent   Technology Use Independent           Gait Assessment:   -    Assistive Devices Used: None  -    Assistance: Independent  -    Distance: Community distances     Endurance Deficit: mild    Posture/Alignment :  Postural examination/scapula alignment: within normal limits     Skin Integrity:   Scar Location: across right breast  Appearance: clean, healing  Signs of infection: No      Edema: Mild  Location: right breast    Axillary Web Syndrome/Cording:   Location: N/A      Range of Motion:    Joint Evaluation  AROM  12/26/2023 AROM  12/26/2023    Left Right   Shoulder Flexion 0-180 Within normal limits  90   Shoulder Abduction 0-180  90   Shoulder External Rotation 0-90  Within normal limits     Shoulder Internal Rotation 0-90  Within normal limits    Shoulder Extension 0-60  15   Shoulder Horizontal Adduction 0-90  Within normal limits         Strength:    Strength 12/26/2023 12/26/2023    Left Right   Shoulder Flexion 5/5 4/5   Shoulder Abduction     Shoulder External Rotation      Shoulder Internal Rotation     Shoulder Extension     Shoulder Horizontal Adduction     Elbow Flexion 5/5 5/5   Elbow Extension         Baseline Measurements of bilateral  "upper extremities for early detection of Lymphedema:      LANDMARK RIGHT LEFT   E + 8" 41 cm 39 cm   E + 6" 39 cm 39 cm   E + 4" 38 cm 40 cm   E + 2" 36.5 cm 37 cm   Elbow 30 cm 29.5 cm   W+ 8" 29 cm 27.5 cm   W +  6" 26.5 cm 27 cm   W + 4" 22 cm 24 cm   Wrist 16 cm 16 cm   DPC 19.5 cm 19.5 cm   IP Thumb 6.5 cm 6.5 cm      Coordination:   -     Fine Motor Coordination: intact  -     Upper Extremity Coordination: intact  -     Lower Extremity Coordination: intact     Mental status : within normal limits       Treatment and Patient Education     Total Time Separate from Evaluation: 30 minutes    Patient participated in self care/home management for 5 minutes including the following:     -    Role of Occupational Therapy in multidisciplinary team, goals for Occupational Therapy  -    Patient was educated in lymphedema etiology and management plans.    -    Patient was provided with written risk reductions and precautions for managing lymphedema.   -    Patient was educated on axillary cording and how to identify     See treatment note for today's treatment     Patient was able to demonstrate and report understanding and performance    Written Home Exercises Provided: yes.  Exercises were reviewed and Josette was able to demonstrate them prior to the end of the session.  Josette demonstrated good  understanding of the education provided.     See EMR under Patient Instructions for exercises provided 12/26/2023.      Assessment     This is a 68 y.o. female referred to outpatient occupational therapy and presents with a medical diagnosis of Malignant neoplasm of overlapping sites of right breast in female, estrogen receptor positive     breast cancer and was seen today post-operatively to assess strength and range of motion of bilateral upper extremities, to take baseline circumferential measurements of bilateral upper extremities to aid in the early detection of lymphedema and provide patient education on " exercises/precautions post breast surgery. Patient educated on breast and scar massage how to perform and how often.      Anticipated barriers to Occupational Therapy: Pain     Plan of care discussed with patient: Yes  Patient's spiritual, cultural and educational needs considered and patient is agreeable to the plan of care and goals as stated below:     Medical Necessity is demonstrated by the following  Occupational Profile/History  Co-morbidities and personal factors that may impact the plan of care [] LOW: Brief chart review  [x] MODERATE: Expanded chart review   [] HIGH: Extensive chart review    Moderate / High Support Documentation:   Past Medical History:   Diagnosis Date    Arthritis of right knee     Breast cancer     She has a history of a Stage IIA, 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 She subsequently underwent left mastectomy and sentinel node biopsy followed by axillary dissection March 23, 2007. Histopathologic evaluation demonstrated again multiple foci of intracystic papillary carcinoma, as well as DCIS noncomed    Cardiomyopathy     CHEMO INDUCED- RESOLVED    CHF (congestive heart failure)     systolic    Genetic testing 11/06/2023    [employer] Multi Cancer Panel (84 genes) - NEGATIVE, VUS in EGFR, POLE    Hypertension     Uterine fibroid         Examination  Performance deficits relating to physical, cognitive or psychosocial skills that result in activity limitations and/or participation restrictions  [] LOW: addressing 1-3 Performance deficits  [x] MODERATE: 3-5 Performance deficits  [] HIGH: 5+ Performance deficits (please support below)    Moderate / High Support Documentation:    Physical:  Joint Mobility  Muscle Power/Strength  Muscle Endurance  Skin Integrity/Scar Formation  Edema  Pain    Cognitive:  No Deficits    Psychosocial:    No Deficits     Treatment Options [] LOW: Multiple options  []  MODERATE: Several options  [] HIGH: Limited options      Decision Making/ Complexity Score: moderate        Goals:    Short Term Goals:  6 weeks  Progress    Pain: Patient will demonstrate improved pain by reports of less than or equal to 3/10 worst pain on the verbal rating scale in order to progress toward maximal functional ability and improve quality of life. PC   HEP: Patient will demonstrate independence with home exercise program in order to progress toward functional independence. PC   Lymphedema: Patient will demonstrate 100% understanding of lymphedema risk reduction practices to include self monitoring for lymphedema. PC   Massage: Patient will demonstrate 100% understanding of breast and scar massage practices to reduce risk of fibrosis, scar adhesions, range of motion impairments and pain.  PC     Long Term Goals:  12 weeks  Progress   Pain: Patient will demonstrate improved pain by reports of less than or equal to 1/10 worst pain on the verbal rating scale in order to progress toward maximal functional ability and improve quality of life.   PC   ADL: Patient will return to normal ADL's, IADL's, community involvement, recreational activities, and work-related activities with less than or equal to 0/10 pain, full/maximized tissue mobility and maximal function.  PC     Goals Key:  PC= Progressing/Continue; PM= Partially Met;  GM= Goal Met,      DC= Discontinue    Plan     Certification Period/Plan of Care Expiration: 12/26/2023 to 03/26/2024.    Outpatient Occupational Therapy 1 times weekly for 12 weeks to include the following interventions: Therapeutic Exercise, Functional Activities, Patient Education, Home Exercise Program, ADL Training, Transfer/Mobility Training, Manual Therapy, and Neuromuscular Reeducation/Sensory.      Shweta Hancock, OT ,OTD, OTR/L

## 2023-12-30 ENCOUNTER — HOSPITAL ENCOUNTER (EMERGENCY)
Facility: HOSPITAL | Age: 68
Discharge: HOME | End: 2023-12-30
Attending: EMERGENCY MEDICINE
Payer: MEDICARE

## 2023-12-30 VITALS
HEART RATE: 69 BPM | OXYGEN SATURATION: 100 % | TEMPERATURE: 98 F | BODY MASS INDEX: 39.43 KG/M2 | RESPIRATION RATE: 16 BRPM | SYSTOLIC BLOOD PRESSURE: 123 MMHG | DIASTOLIC BLOOD PRESSURE: 65 MMHG | WEIGHT: 229.75 LBS

## 2023-12-30 DIAGNOSIS — R20.2 NUMBNESS AND TINGLING IN LEFT HAND: Primary | ICD-10-CM

## 2023-12-30 DIAGNOSIS — R20.0 NUMBNESS AND TINGLING IN LEFT HAND: Primary | ICD-10-CM

## 2023-12-30 PROCEDURE — 99284 EMERGENCY DEPT VISIT MOD MDM: CPT | Mod: 25

## 2023-12-30 NOTE — ED PROVIDER NOTES
Encounter Date: 12/30/2023       History     Chief Complaint   Patient presents with    Hand Injury     Pt reports she was washing dishes in cold water and developed numbness to left index and middle finger. Numbness improved, and developed pain to same fingers. Denies neck pain.     68 year old female with complaint of numbness in left index and middle finger with pain that lasted about 1 hour just prior to arrival.  Pt reports no weakness of hand at present.  No other complaints.         Review of patient's allergies indicates:  No Known Allergies  Past Medical History:   Diagnosis Date    Arthritis of right knee     Breast cancer     She has a history of a Stage IIA, 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 She subsequently underwent left mastectomy and sentinel node biopsy followed by axillary dissection March 23, 2007. Histopathologic evaluation demonstrated again multiple foci of intracystic papillary carcinoma, as well as DCIS noncomed    Cardiomyopathy     CHEMO INDUCED- RESOLVED    CHF (congestive heart failure)     systolic    Genetic testing 11/06/2023    [employer] Multi Cancer Panel (84 genes) - NEGATIVE, VUS in EGFR, POLE    Hypertension     Uterine fibroid      Past Surgical History:   Procedure Laterality Date    BREAST BIOPSY      BREAST CYST EXCISION Right     BREAST SURGERY      CHOLECYSTECTOMY      COLONOSCOPY N/A 9/10/2018    Procedure: COLONOSCOPY;  Surgeon: Indra Ramos MD;  [facility];  Service: Endoscopy;  Laterality: N/A;    COLONOSCOPY N/A 4/28/2020    Procedure: COLONOSCOPY;  Surgeon: Dulce Maria Siegel MD;  Location: [facility];  Service: Endoscopy;  Laterality: N/A;    ESOPHAGOGASTRODUODENOSCOPY N/A 4/28/2020    Procedure: EGD (ESOPHAGOGASTRODUODENOSCOPY);  Surgeon: Dulce Maria Siegel MD;  Location: [facility];  Service: Endoscopy;  Laterality: N/A;    INJECTION FOR SENTINEL NODE  IDENTIFICATION Right 12/12/2023    Procedure: INJECTION, FOR SENTINEL NODE IDENTIFICATION;  Surgeon: Shannon Galvan MD;  Location: [facility] OR;  Service: General;  Laterality: Right;    INSERTION OF BREAST TISSUE EXPANDER Right 12/12/2023    Procedure: INSERTION, TISSUE EXPANDER, BREAST;  Surgeon: Rashawn James MD;  Location: [facility] OR;  Service: Plastics;  Laterality: Right;    MASTECTOMY      left    MASTECTOMY, SKIN SPARING, SIMPLE Right 12/12/2023    Procedure: MASTECTOMY, SKIN SPARING, SIMPLE;  Surgeon: Shannon Galvan MD;  Location: [facility] OR;  Service: General;  Laterality: Right;    MEDIPORT INSERTION, SINGLE      PLACEMENT OF ACELLULAR HUMAN DERMAL ALLOGRAFT Right 12/12/2023    Procedure: APPLICATION, ACELLULAR HUMAN DERMAL ALLOGRAFT;  Surgeon: Rashawn James MD;  Location: [facility] OR;  Service: Plastics;  Laterality: Right;    SENTINEL LYMPH NODE BIOPSY Right 12/12/2023    Procedure: BIOPSY, LYMPH NODE, SENTINEL;  Surgeon: Shannon Galvan MD;  Location: [facility] OR;  Service: General;  Laterality: Right;     Family History   Problem Relation Age of Onset    Diabetes Mother     Breast cancer Mother     Diabetes Father     Stroke Father     No Known Problems Sister     No Known Problems Brother     Cancer Maternal Aunt 55        breast cancer    Breast cancer Maternal Aunt     Stroke Paternal Aunt     No Known Problems Maternal Grandmother     No Known Problems Maternal Grandfather     No Known Problems Paternal Grandmother     Heart failure Paternal Grandfather     Cancer Maternal Cousin     Melanoma Neg Hx     Psoriasis Neg Hx     Lupus Neg Hx     Eczema Neg Hx      Social History     Tobacco Use    Smoking status: Never    Smokeless tobacco: Never   Substance Use Topics    Alcohol use: Yes     Alcohol/week: 0.0 standard drinks of alcohol     Comment: Ocassionally    Drug use: No     Review of Systems   Constitutional:  Negative for fever.   HENT:  Negative for sore throat.    Respiratory:   Negative for shortness of breath.    Cardiovascular:  Negative for chest pain.   Gastrointestinal:  Negative for nausea.   Genitourinary:  Negative for dysuria.   Musculoskeletal:  Negative for back pain.   Skin:  Negative for rash.   Neurological:  Positive for numbness. Negative for weakness.   Hematological:  Does not bruise/bleed easily.       Physical Exam     Initial Vitals [12/30/23 1246]   BP Pulse Resp Temp SpO2   123/65 69 16 98.3 °F (36.8 °C) 100 %      MAP       --         Physical Exam    Nursing note and vitals reviewed.  Constitutional: She appears well-developed and well-nourished.   HENT:   Head: Normocephalic and atraumatic.   Eyes: Conjunctivae and EOM are normal. Pupils are equal, round, and reactive to light.   Neck: Neck supple.   Normal range of motion.  Cardiovascular:  Normal rate, regular rhythm, normal heart sounds and intact distal pulses.           Pulmonary/Chest: Breath sounds normal.   Abdominal: Abdomen is soft. There is no abdominal tenderness. There is no rebound and no guarding.   Musculoskeletal:         General: Normal range of motion.      Cervical back: Normal range of motion and neck supple.     Neurological: She is alert and oriented to person, place, and time. She has normal strength and normal reflexes.   Skin: Skin is warm and dry.   Psychiatric: She has a normal mood and affect. Her behavior is normal. Thought content normal.         ED Course   Procedures  Labs Reviewed - No data to display       Imaging Results              CT Head Without Contrast (Final result)  Result time 12/30/23 13:44:54      Final result by Griffin Hurt MD (12/30/23 13:44:54)                   Impression:      No acute intracranial abnormality.    Nonspecific white matter changes likely related to chronic microvascular ischemia.    Right sphenoid sinus disease.      Electronically signed by: Griffin Hurt  Date:    12/30/2023  Time:    13:44               Narrative:     EXAMINATION:  CT HEAD WITHOUT CONTRAST    CLINICAL HISTORY:  numbness;    TECHNIQUE:  Low dose axial images were obtained through the head.  Coronal and sagittal reformations were also performed. Contrast was not administered.    All CT scans at this location are performed using dose optimization techniques including the following: Automated exposure control; adjustment of the mA and/or kv; use of iterative reconstruction technique.    COMPARISON:  None.    FINDINGS:  Cerebral and ventricular volumes are within normal limits.  No evidence of hydrocephalus.    Mild hypoattenuation noted within the cerebral white matter.    No evidence of acute territorial infarct, intracranial hemorrhage, or mass lesion.  No significant midline shift or mass effect.    Midline structures and posterior fossa structures appear unremarkable.  Basal cisterns are clear.  Bilateral carotid siphon calcification noted.    Calvarium is intact without acute or aggressive abnormality.  Visualized orbits and globes are within normal limits.  Mucosal thickening of the right sphenoid sinus.  Remaining visualized paranasal sinuses and mastoid air cells are clear.                                       Imaging Results              CT Head Without Contrast (Final result)  Result time 12/30/23 13:44:54      Final result by Griffin Hurt MD (12/30/23 13:44:54)                   Impression:      No acute intracranial abnormality.    Nonspecific white matter changes likely related to chronic microvascular ischemia.    Right sphenoid sinus disease.      Electronically signed by: Griffin Hurt  Date:    12/30/2023  Time:    13:44               Narrative:    EXAMINATION:  CT HEAD WITHOUT CONTRAST    CLINICAL HISTORY:  numbness;    TECHNIQUE:  Low dose axial images were obtained through the head.  Coronal and sagittal reformations were also performed. Contrast was not administered.    All CT scans at this location are performed using dose  optimization techniques including the following: Automated exposure control; adjustment of the mA and/or kv; use of iterative reconstruction technique.    COMPARISON:  None.    FINDINGS:  Cerebral and ventricular volumes are within normal limits.  No evidence of hydrocephalus.    Mild hypoattenuation noted within the cerebral white matter.    No evidence of acute territorial infarct, intracranial hemorrhage, or mass lesion.  No significant midline shift or mass effect.    Midline structures and posterior fossa structures appear unremarkable.  Basal cisterns are clear.  Bilateral carotid siphon calcification noted.    Calvarium is intact without acute or aggressive abnormality.  Visualized orbits and globes are within normal limits.  Mucosal thickening of the right sphenoid sinus.  Remaining visualized paranasal sinuses and mastoid air cells are clear.                                      Medications - No data to display  Medical Decision Making  1:53 PM  No motor deficits, patient has no weakness of hand, fingers, or face.  Patient had numbness only with pain left index finger.  Will have patient follow-up PCP for further evaluation.    Amount and/or Complexity of Data Reviewed  Radiology: ordered.                                      Clinical Impression:  Final diagnoses:  [R20.0, R20.2] Numbness and tingling in left hand (Primary)          ED Disposition Condition    Discharge Stable          ED Prescriptions    None       Follow-up Information       Follow up With Specialties Details Why Contact Info    Eun Booker MD Internal Medicine Schedule an appointment as soon as possible for a visit  As needed [id number] [facility] [address] [phone]               Barrett Schuler NP  12/30/23 [id number]

## 2024-01-08 ENCOUNTER — OFFICE VISIT (OUTPATIENT)
Dept: CARDIOLOGY | Facility: CLINIC | Age: 69
End: 2024-01-08
Payer: MEDICARE

## 2024-01-08 VITALS
DIASTOLIC BLOOD PRESSURE: 68 MMHG | SYSTOLIC BLOOD PRESSURE: 118 MMHG | HEIGHT: 64 IN | WEIGHT: 231.5 LBS | HEART RATE: 69 BPM | BODY MASS INDEX: 39.52 KG/M2 | OXYGEN SATURATION: 100 %

## 2024-01-08 DIAGNOSIS — I50.22 CHRONIC SYSTOLIC HEART FAILURE: ICD-10-CM

## 2024-01-08 DIAGNOSIS — Z17.0 MALIGNANT NEOPLASM OF OVERLAPPING SITES OF RIGHT BREAST IN FEMALE, ESTROGEN RECEPTOR POSITIVE: ICD-10-CM

## 2024-01-08 DIAGNOSIS — E66.01 MORBID OBESITY WITH BMI OF 40.0-44.9, ADULT: ICD-10-CM

## 2024-01-08 DIAGNOSIS — I10 ESSENTIAL HYPERTENSION: ICD-10-CM

## 2024-01-08 DIAGNOSIS — R07.89 CHEST TIGHTNESS: ICD-10-CM

## 2024-01-08 DIAGNOSIS — I50.22 NYHA CLASS 2 AND ACC/AHA STAGE C CHRONIC SYSTOLIC CONGESTIVE HEART FAILURE: Primary | ICD-10-CM

## 2024-01-08 DIAGNOSIS — C50.811 MALIGNANT NEOPLASM OF OVERLAPPING SITES OF RIGHT BREAST IN FEMALE, ESTROGEN RECEPTOR POSITIVE: ICD-10-CM

## 2024-01-08 DIAGNOSIS — E66.01 SEVERE OBESITY (BMI 35.0-35.9 WITH COMORBIDITY): ICD-10-CM

## 2024-01-08 PROCEDURE — 99214 OFFICE O/P EST MOD 30 MIN: CPT | Mod: S$PBB,,, | Performed by: STUDENT IN AN ORGANIZED HEALTH CARE EDUCATION/TRAINING PROGRAM

## 2024-01-08 PROCEDURE — 99999 PR PBB SHADOW E&M-EST. PATIENT-LVL III: CPT | Mod: PBBFAC,,, | Performed by: STUDENT IN AN ORGANIZED HEALTH CARE EDUCATION/TRAINING PROGRAM

## 2024-01-08 PROCEDURE — 99213 OFFICE O/P EST LOW 20 MIN: CPT | Mod: PBBFAC | Performed by: STUDENT IN AN ORGANIZED HEALTH CARE EDUCATION/TRAINING PROGRAM

## 2024-01-08 NOTE — PROGRESS NOTES
Section of Cardiology                  Cardiac Clinic Note    Chief Complaint/Reason for consultation: follow up       HPI:   Josette Altamirano is a 68 y.o. female with h/o breast cancer s/p left mastectomy/chemo, CHF, obesity, HTN who comes in for follow up.    6/2/22  Was seeing Dr. NOLEN in cardiology clinic.  Still follows up with Oncology  Exercises regularly, goes to the gym about 3-4 times a week  Has knee pain   Denies tobacco abuse. ETOH rarely.  Denies CVA, DM    Denies SOB, chest pain, PND, orthopnea, PND.      4/24/23  Chest tightness/pressure that started about 1 week ago, comes on with movement  Has improved  Intermittent throughout the day  Nothing makes it worse  Some palpitations   No diaphoresis or nausea   Did workout recently, on the treadmill, for 30-1 hr, no issues   No weights   Lost 8lbs since 8/22    Family hx: dad- CHF    Denies SOB, PND, orthopnea, PND.      6/1/23  ETT 5/23 normal   Echo 5/23 EF 55%  Has not had chest tightness  No SOB   Goes to the gym 3-4 times a week and the uses the treadmill   Overtime weight has went down 8 lbs since 8/22  Has low appetite, trying to eat twice a day  Eats sweets intermittently         1/8/24  Last month, received reconstruction surgery and had to have another mastectomy during the surgery  Was told to hold off on exercise as of now   Weight has gone up   Blood pressure stable, no low blood pressures   Tolerating medications   Carvedilol switch to Toprol-XL due to expense    Denies chest pain, shortness of breath, PND, orthopnea, lower extremity swelling    EKG 1/8/24 NSR   EKG 4/24/23 NSR  EKG 6/2/22 NSR, cannot r/o anterior infarct, qtc 453 ms    Echo 5/23  The left ventricle is normal in size with concentric remodeling and normal systolic function.  The estimated ejection fraction is 55%.  Normal left ventricular diastolic function.  Normal right ventricular size with normal right ventricular systolic function.  Mild tricuspid  regurgitation.  Intermediate central venous pressure (8 mmHg).  The estimated PA systolic pressure is 28 mmHg.       ECHO  2020  The left ventricle is normal in size with low normal systolic function. The estimated ejection fraction is 50%.  Mild left atrial enlargement.  Indeterminate diastolic function.  Normal right ventricular systolic function.  Mild right atrial enlargement.  Normal central venous pressure (3 mmHg).  The estimated PA systolic pressure is 29 mmHg.        STRESS TEST    [facility]      ROS: All 10 systems reviewed. Please refer to the HPI for pertinent positives. All other systems negative.     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 She subsequently underwent left mastectomy and sentinel node biopsy followed by axillary dissection March 23, 2007. Histopathologic evaluation demonstrated again multiple foci of intracystic papillary carcinoma, as well as DCIS noncomed    Cardiomyopathy     CHEMO INDUCED- RESOLVED    CHF (congestive heart failure)     systolic    Genetic testing 11/06/2023    [employer] Multi Cancer Panel (84 genes) - NEGATIVE, VUS in EGFR, POLE    Hypertension     Uterine fibroid        Surgical History  Past Surgical History:   Procedure Laterality Date    BREAST BIOPSY      BREAST CYST EXCISION Right     BREAST SURGERY      CHOLECYSTECTOMY      COLONOSCOPY N/A 9/10/2018    Procedure: COLONOSCOPY;  Surgeon: Indra Ramos MD;  Location: [facility] ENDO;  Service: Endoscopy;  Laterality: N/A;    COLONOSCOPY N/A 4/28/2020    Procedure: COLONOSCOPY;  Surgeon: Dulce Maria Siegel MD;  Location: [facility];  Service: Endoscopy;  Laterality: N/A;    ESOPHAGOGASTRODUODENOSCOPY N/A 4/28/2020    Procedure: EGD (ESOPHAGOGASTRODUODENOSCOPY);  Surgeon: Dulce Maria Siegel MD;   Location: [facility] ENDO;  Service: Endoscopy;  Laterality: N/A;    INJECTION FOR SENTINEL NODE IDENTIFICATION Right 12/12/2023    Procedure: INJECTION, FOR SENTINEL NODE IDENTIFICATION;  Surgeon: Shannon Galvan MD;  Location: [facility] OR;  Service: General;  Laterality: Right;    INSERTION OF BREAST TISSUE EXPANDER Right 12/12/2023    Procedure: INSERTION, TISSUE EXPANDER, BREAST;  Surgeon: Rashawn James MD;  Location: [facility] OR;  Service: Plastics;  Laterality: Right;    MASTECTOMY      left    MASTECTOMY, SKIN SPARING, SIMPLE Right 12/12/2023    Procedure: MASTECTOMY, SKIN SPARING, SIMPLE;  Surgeon: Shannon Galvan MD;  Location: [facility] OR;  Service: General;  Laterality: Right;    MEDIPORT INSERTION, SINGLE      PLACEMENT OF ACELLULAR HUMAN DERMAL ALLOGRAFT Right 12/12/2023    Procedure: APPLICATION, ACELLULAR HUMAN DERMAL ALLOGRAFT;  Surgeon: Rashawn James MD;  Location: [facility] OR;  Service: Plastics;  Laterality: Right;    SENTINEL LYMPH NODE BIOPSY Right 12/12/2023    Procedure: BIOPSY, LYMPH NODE, SENTINEL;  Surgeon: Shannon Galvan MD;  Location: [facility] OR;  Service: General;  Laterality: Right;          Allergies:   Review of patient's allergies indicates:  No Known Allergies    Social History:  Social History     Socioeconomic History    Marital status:    Tobacco Use    Smoking status: Never    Smokeless tobacco: Never   Substance and Sexual Activity    Alcohol use: Yes     Alcohol/week: 0.0 standard drinks of alcohol     Comment: Ocassionally    Drug use: No    Sexual activity: Yes     Partners: Male     Birth control/protection: None, Post-menopausal     Social Determinants of Health     Financial Resource Strain: Low Risk  (9/19/2023)    Overall Financial Resource Strain (CARDIA)     Difficulty of Paying Living Expenses: Not hard at all   Food Insecurity: No Food Insecurity (9/19/2023)    Hunger Vital Sign     Worried About Running Out of Food in the Last Year: Never true     Ran Out of  Food in the Last Year: Never true   Transportation Needs: No Transportation Needs (9/19/2023)    PRAPARE - Transportation     Lack of Transportation (Medical): No     Lack of Transportation (Non-Medical): No   Physical Activity: Sufficiently Active (9/19/2023)    Exercise Vital Sign     Days of Exercise per Week: 4 days     Minutes of Exercise per Session: 60 min   Social Connections: Moderately Integrated (9/19/2023)    Social Connection and Isolation Panel [NHANES]     Frequency of Communication with Friends and Family: More than three times a week     Frequency of Social Gatherings with Friends and Family: More than three times a week     Attends Buddhist Services: 1 to 4 times per year     Active Member of Clubs or Organizations: No     Attends Club or Organization Meetings: Never     Marital Status:    Recent Concern: Social Connections - Moderately Isolated (9/19/2023)    Social Connection and Isolation Panel [NHANES]     Frequency of Communication with Friends and Family: More than three times a week     Frequency of Social Gatherings with Friends and Family: More than three times a week     Attends Buddhist Services: 1 to 4 times per year     Active Member of Clubs or Organizations: No     Attends Club or Organization Meetings: Never     Marital Status:    Housing Stability: Low Risk  (9/19/2023)    Housing Stability Vital Sign     Unable to Pay for Housing in the Last Year: No     Number of Places Lived in the Last Year: 1     Unstable Housing in the Last Year: No       Family History:  family history includes Breast cancer in her maternal aunt and mother; Cancer in her maternal cousin; Cancer (age of onset: 55) in her maternal aunt; Diabetes in her father and mother; Heart failure in her paternal grandfather; No Known Problems in her brother, maternal grandfather, maternal grandmother, paternal grandmother, and sister; Stroke in her father and paternal aunt.    Home Medications:  Current  "Outpatient Medications on File Prior to Visit   Medication Sig Dispense Refill    meloxicam (MOBIC) 15 MG tablet Take 1 tablet (15 mg total) by mouth once daily. 30 tablet 1    metoprolol succinate (TOPROL-XL) 50 MG 24 hr tablet Take 1 tablet (50 mg total) by mouth once daily. 30 tablet 6    ondansetron (ZOFRAN-ODT) 8 MG TbDL Take 1 tablet (8 mg total) by mouth every 6 (six) hours as needed for nausea 10 tablet 0    ondansetron (ZOFRAN-ODT) 8 MG TbDL dissolve 1 tablet on tongue every 6 hours as needed for nausea 10 tablet 0    sacubitriL-valsartan (ENTRESTO)  mg per tablet Take 1 tablet by mouth 2 (two) times daily. 180 tablet 1    cephALEXin (KEFLEX) 500 MG capsule Take 1 capsule (500 mg total) by mouth every 6 (six) hours for antibiotic (Patient not taking: Reported on 1/8/2024) 56 capsule 0    cephALEXin (KEFLEX) 500 MG capsule take 1 capsule by mouth every 6 hours (Patient not taking: Reported on 1/8/2024) 56 capsule 0    fluticasone propionate (FLONASE) 50 mcg/actuation nasal spray 1 spray (50 mcg total) by Each Nostril route once daily. (Patient not taking: Reported on 1/8/2024) 11.1 mL 0    HYDROcodone-acetaminophen (NORCO) 7.5-325 mg per tablet Take 1 tablet by mouth every 6 (six) hours as needed for pain (Patient not taking: Reported on 1/8/2024) 28 tablet 0    HYDROcodone-acetaminophen (NORCO) 7.5-325 mg per tablet take 1 tablet by mouth every 6 hours as needed for pain (Patient not taking: Reported on 1/8/2024) 28 tablet 0     No current facility-administered medications on file prior to visit.       Physical exam:  /68   Pulse 69   Ht 5' 4" (1.626 m)   Wt 105 kg (231 lb 7.7 oz)   SpO2 100%   BMI 39.73 kg/m²         General: Pt is a 68 y.o. year old female who is AAOx3, in NAD, is pleasant, well nourished, looks stated age  HEENT: PERRL, EOMI, Oral mucosa pink & moist  CVS  No abnormal cardiac pulsations noted on inspection. JVP not raised. The apical impulse is normal on palpation, and " "is located in the left 5th intercostal space in the mid - clavicular line. No palpable thrills or abnormal pulsations noted. RR, S1 - S2 heard, no murmurs, rubs or gallops appreciated.   PUL : CTA B/L. No wheezes/crackles heard   ABD : BS +, soft. No tenderness elicited   LE : No C/C/E. Distal Pulses palpable B/L         LABS:    Chemistry:   Lab Results   Component Value Date     11/13/2023    K 3.9 11/13/2023     11/13/2023    CO2 29 11/13/2023    BUN 8 11/13/2023    CREATININE 0.8 11/13/2023    CALCIUM 8.9 11/13/2023     Cardiac Markers: No results found for: "CKTOTAL", "CKMB", "CKMBINDEX", "TROPONINI"  Cardiac Markers (Last 3): No results found for: "CKTOTAL", "CKMB", "CKMBINDEX", "TROPONINI"  CBC:   Lab Results   Component Value Date    WBC 3.78 (L) 11/13/2023    HGB 12.0 11/13/2023    HCT 40.0 11/13/2023    MCV 79 (L) 11/13/2023     11/13/2023     Lipids:   Lab Results   Component Value Date    CHOL 249 (H) 06/01/2023    TRIG 81 06/01/2023    HDL 82 (H) 06/01/2023     Coagulation: No results found for: "PT", "INR", "APTT"        Assessment      1. NYHA class 2 and LINDA/AHA stage C chronic systolic congestive heart failure    2. Essential hypertension    3. Chest tightness    4. Severe obesity (BMI 35.0-35.9 with comorbidity)    5. Chronic systolic heart failure    6. Morbid obesity with BMI of 40.0-44.9, adult    7. Malignant neoplasm of overlapping sites of right breast in female, estrogen receptor positive           Plan:    Chest tightness - resolved   ETT 5/23 normal   Echo 5/23 EF 55%    Congestive heart failure  Compensated  Continue Entresto and Toprol xl     HTN  Stable   ->151 as of 5/22, 10 year ASCD risk 4.6%  Continue diet and exercise    Obesity, BMI 35-39.9  Exercise as tolerated, at least 30 minutes daily  Low-salt, low-fat diet  Weight loss encouraged      This note was prepared using voice recognition system and is likely to have sound alike errors that may have been " overlooked even after proofreading.     I have reviewed all pertinent chart information.  Plans and recommendations have been formulated under my direct supervision. All questions answered and patient voiced understanding.   If symptoms persist go to the ED.    RTC in 6 months        Jelly Arguelles MD  Cardiology

## 2024-01-25 ENCOUNTER — DOCUMENTATION ONLY (OUTPATIENT)
Dept: HEMATOLOGY/ONCOLOGY | Facility: CLINIC | Age: 69
End: 2024-01-25
Payer: MEDICARE

## 2024-01-25 NOTE — NURSING
1430 pm: received Oncotype Report that was unable to report due to insufficient carcinoma present in both initial biopsy specimen and surgical specimen. Report scanned in media and updated in Oncology Hx. Dr. Sykes, Dr. Galvan, and Elissa Marc, notified via in-basket msg.   Oncology Navigation   Intake  Cancer Type: Breast  Internal / External Referral: Internal (Dr. Shannon Galvan)  Date of Referral: 10/24/23  Initial Nurse Navigator Contact: 10/25/23  Referral to Initial Contact Timeline (days): 1  Date Worked: 01/25/24  First Appointment Available: 11/06/23  Appointment Date: 11/06/23  Schedule to Appointment Timeline (days): 12  First Available Date vs. Scheduled Date (days): 0  Multiple appointments: No  Reason if booked > 7 days after scheduling: Specific provider / access     Treatment  Current Status: Staging work-up    Surgical Oncologist: Dr. Shannon Galvan  Plastic Surgeon: Dr. Rashawn James  Surgery Schedule Date: 08/24/23    Medical Oncologist: Dr. Lawrence Sykes  Consult Date: 11/06/23       Procedures: Genomic testing  Genomic Testing Date Sent: 12/21/23                 Acuity      Follow Up  No follow-ups on file.

## 2024-01-29 ENCOUNTER — TELEPHONE (OUTPATIENT)
Dept: HEMATOLOGY/ONCOLOGY | Facility: CLINIC | Age: 69
End: 2024-01-29
Payer: MEDICARE

## 2024-01-29 NOTE — NURSING
1043 am: Outgoing call to pt regarding needing a fup w/Dr. Sykes scheduled. Spoke with pt. Pt informed that Dr. Sykes would like to discuss plan due to Oncotype not being obtained. Pt scheduled with Dr. Sykes on 1/30 at 240 pm at [facility]. Pt given location directions. Pt verbalized understanding. Pt plan to report to appt as scheduled.   Oncology Navigation   Intake  Cancer Type: Breast  Internal / External Referral: Internal (Dr. Shannon Galvan)  Date of Referral: 10/24/23  Initial Nurse Navigator Contact: 10/25/23  Referral to Initial Contact Timeline (days): 1  Date Worked: 01/29/24  First Appointment Available: 11/06/23  Appointment Date: 11/06/23  Schedule to Appointment Timeline (days): 12  First Available Date vs. Scheduled Date (days): 0  Multiple appointments: No  Reason if booked > 7 days after scheduling: Specific provider / access     Treatment  Current Status: Staging work-up    Surgical Oncologist: Dr. Shannon Galvan  Plastic Surgeon: Dr. Rashawn James  Surgery Schedule Date: 08/24/23    Medical Oncologist: Dr. Lawrence Sykes  Consult Date: 11/06/23       Procedures: Genomic testing  Genomic Testing Date Sent: 12/21/23                 Acuity      Follow Up  No follow-ups on file.

## 2024-01-30 ENCOUNTER — OFFICE VISIT (OUTPATIENT)
Dept: HEMATOLOGY/ONCOLOGY | Facility: CLINIC | Age: 69
End: 2024-01-30
Payer: MEDICARE

## 2024-01-30 VITALS
SYSTOLIC BLOOD PRESSURE: 112 MMHG | HEIGHT: 64 IN | DIASTOLIC BLOOD PRESSURE: 70 MMHG | HEART RATE: 80 BPM | OXYGEN SATURATION: 98 % | TEMPERATURE: 98 F | WEIGHT: 231.06 LBS | BODY MASS INDEX: 39.45 KG/M2

## 2024-01-30 DIAGNOSIS — E66.01 MORBID OBESITY: ICD-10-CM

## 2024-01-30 DIAGNOSIS — Z90.12 H/O LEFT MASTECTOMY: ICD-10-CM

## 2024-01-30 DIAGNOSIS — C50.811 MALIGNANT NEOPLASM OF OVERLAPPING SITES OF RIGHT BREAST IN FEMALE, ESTROGEN RECEPTOR POSITIVE: Primary | ICD-10-CM

## 2024-01-30 DIAGNOSIS — Z85.3 HISTORY OF LEFT BREAST CANCER: ICD-10-CM

## 2024-01-30 DIAGNOSIS — Z17.0 MALIGNANT NEOPLASM OF OVERLAPPING SITES OF RIGHT BREAST IN FEMALE, ESTROGEN RECEPTOR POSITIVE: Primary | ICD-10-CM

## 2024-01-30 PROCEDURE — 99214 OFFICE O/P EST MOD 30 MIN: CPT | Mod: PBBFAC | Performed by: INTERNAL MEDICINE

## 2024-01-30 PROCEDURE — 99214 OFFICE O/P EST MOD 30 MIN: CPT | Mod: S$PBB,,, | Performed by: INTERNAL MEDICINE

## 2024-01-30 PROCEDURE — 99999 PR PBB SHADOW E&M-EST. PATIENT-LVL IV: CPT | Mod: PBBFAC,,, | Performed by: INTERNAL MEDICINE

## 2024-01-30 NOTE — PROGRESS NOTES
Subjective:       Patient ID: Josette Altamirano is a 68 y.o. female.    Chief Complaint: Results and Breast Cancer    HPI: A 68-year-old female history of left-sided mastectomy.  Status post stage II invasive breast cancer patient now has right-sided lobular carcinoma in Situ.  Patient has undergone germ line testing.  No evidence of germ line mutation patient returns for therapeutic intervention ECOG status 1    Past Medical History:   Diagnosis Date    Arthritis of right knee     Breast cancer     She has a history of a Stage IIA, 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 She subsequently underwent left mastectomy and sentinel node biopsy followed by axillary dissection March 23, 2007. Histopathologic evaluation demonstrated again multiple foci of intracystic papillary carcinoma, as well as DCIS noncomed    Cardiomyopathy     CHEMO INDUCED- RESOLVED    CHF (congestive heart failure)     systolic    Genetic testing 11/06/2023    [facility] Multi Cancer Panel (84 genes) - NEGATIVE, VUS in EGFR, POLE    Hypertension     Uterine fibroid      Family History   Problem Relation Age of Onset    Diabetes Mother     Breast cancer Mother     Diabetes Father     Stroke Father     No Known Problems Sister     No Known Problems Brother     Cancer Maternal Aunt 55        breast cancer    Breast cancer Maternal Aunt     Stroke Paternal Aunt     No Known Problems Maternal Grandmother     No Known Problems Maternal Grandfather     No Known Problems Paternal Grandmother     Heart failure Paternal Grandfather     Cancer Maternal Cousin     Melanoma Neg Hx     Psoriasis Neg Hx     Lupus Neg Hx     Eczema Neg Hx      Social History     Socioeconomic History    Marital status:    Tobacco Use    Smoking status: Never    Smokeless tobacco: Never   Substance and Sexual Activity    Alcohol use: Yes     Alcohol/week: 0.0 standard drinks of alcohol      Comment: Ocassionally    Drug use: No    Sexual activity: Yes     Partners: Male     Birth control/protection: None, Post-menopausal     Social Determinants of Health     Financial Resource Strain: Low Risk  (9/19/2023)    Overall Financial Resource Strain (CARDIA)     Difficulty of Paying Living Expenses: Not hard at all   Food Insecurity: No Food Insecurity (9/19/2023)    Hunger Vital Sign     Worried About Running Out of Food in the Last Year: Never true     Ran Out of Food in the Last Year: Never true   Transportation Needs: No Transportation Needs (9/19/2023)    PRAPARE - Transportation     Lack of Transportation (Medical): No     Lack of Transportation (Non-Medical): No   Physical Activity: Sufficiently Active (9/19/2023)    Exercise Vital Sign     Days of Exercise per Week: 4 days     Minutes of Exercise per Session: 60 min   Social Connections: Moderately Integrated (9/19/2023)    Social Connection and Isolation Panel [NHANES]     Frequency of Communication with Friends and Family: More than three times a week     Frequency of Social Gatherings with Friends and Family: More than three times a week     Attends [employer] Services: 1 to 4 times per year     Active Member of Clubs or Organizations: No     Attends Club or Organization Meetings: Never     Marital Status:    Recent Concern: Social Connections - Moderately Isolated (9/19/2023)    Social Connection and Isolation Panel [NHANES]     Frequency of Communication with Friends and Family: More than three times a week     Frequency of Social Gatherings with Friends and Family: More than three times a week     Attends [employer]ism Services: 1 to 4 times per year     Active Member of Clubs or Organizations: No     Attends Club or Organization Meetings: Never     Marital Status:    Housing Stability: Low Risk  (9/19/2023)    Housing Stability Vital Sign     Unable to Pay for Housing in the Last Year: No     Number of Places Lived in the Last Year: 1      Unstable Housing in the Last Year: No     Past Surgical History:   Procedure Laterality Date    BREAST BIOPSY      BREAST CYST EXCISION Right     BREAST SURGERY      CHOLECYSTECTOMY      COLONOSCOPY N/A 9/10/2018    Procedure: COLONOSCOPY;  Surgeon: Indra Ramos MD;  Location: [facility];  Service: Endoscopy;  Laterality: N/A;    COLONOSCOPY N/A 4/28/2020    Procedure: COLONOSCOPY;  Surgeon: Dulce Maria Siegel MD;  Location: [facility];  Service: Endoscopy;  Laterality: N/A;    ESOPHAGOGASTRODUODENOSCOPY N/A 4/28/2020    Procedure: EGD (ESOPHAGOGASTRODUODENOSCOPY);  Surgeon: Dulce Maria Siegel MD;  Location: [facility];  Service: Endoscopy;  Laterality: N/A;    INJECTION FOR SENTINEL NODE IDENTIFICATION Right 12/12/2023    Procedure: INJECTION, FOR SENTINEL NODE IDENTIFICATION;  Surgeon: Shannon Galvan MD;  Location: [facility];  Service: General;  Laterality: Right;    INSERTION OF BREAST TISSUE EXPANDER Right 12/12/2023    Procedure: INSERTION, TISSUE EXPANDER, BREAST;  Surgeon: Rashawn James MD;  Location: [facility];  Service: Plastics;  Laterality: Right;    MASTECTOMY      left    MASTECTOMY, SKIN SPARING, SIMPLE Right 12/12/2023    Procedure: MASTECTOMY, SKIN SPARING, SIMPLE;  Surgeon: Shannon Galvan MD;  Location: [facility];  Service: General;  Laterality: Right;    MEDIPORT INSERTION, SINGLE      PLACEMENT OF ACELLULAR HUMAN DERMAL ALLOGRAFT Right 12/12/2023    Procedure: APPLICATION, ACELLULAR HUMAN DERMAL ALLOGRAFT;  Surgeon: Rashawn James MD;  Location: [facility];  Service: Plastics;  Laterality: Right;    SENTINEL LYMPH NODE BIOPSY Right 12/12/2023    Procedure: BIOPSY, LYMPH NODE, SENTINEL;  Surgeon: Shannon Galvan MD;  Location: [facility] OR;  Service: General;  Laterality: Right;       Labs:  Lab Results   Component Value Date    WBC 3.78 (L) 11/13/2023    HGB 12.0 11/13/2023    HCT 40.0 11/13/2023    MCV 79 (L) 11/13/2023     11/13/2023     BMP  Lab Results   Component Value  "Date     11/13/2023    K 3.9 11/13/2023     11/13/2023    CO2 29 11/13/2023    BUN 8 11/13/2023    CREATININE 0.8 11/13/2023    CALCIUM 8.9 11/13/2023    ANIONGAP 8 11/13/2023    ESTGFRAFRICA >60 05/17/2022    EGFRNONAA >60 05/17/2022     Lab Results   Component Value Date    ALT 26 08/09/2023    AST 24 08/09/2023    ALKPHOS 80 08/09/2023    BILITOT 0.9 08/09/2023       Lab Results   Component Value Date    IRON 92 05/17/2022    TIBC 337 05/17/2022    FERRITIN 179 05/17/2022     No results found for: "[id number]"  No results found for: "FOLATE"  Lab Results   Component Value Date    TSH 0.741 05/17/2022         Review of Systems   Constitutional:  Negative for activity change, appetite change, chills, diaphoresis, fatigue, fever and unexpected weight change.   HENT:  Negative for congestion, dental problem, drooling, ear discharge, ear pain, facial swelling, hearing loss, mouth sores, nosebleeds, postnasal drip, rhinorrhea, sinus pressure, sneezing, sore throat, tinnitus, trouble swallowing and voice change.    Eyes:  Negative for photophobia, pain, discharge, redness, itching and visual disturbance.   Respiratory:  Negative for cough, choking, chest tightness, shortness of breath, wheezing and stridor.    Cardiovascular:  Negative for chest pain, palpitations and leg swelling.   Gastrointestinal:  Negative for abdominal distention, abdominal pain, anal bleeding, blood in stool, constipation, diarrhea, nausea, rectal pain and vomiting.   Endocrine: Negative for cold intolerance, heat intolerance, polydipsia, polyphagia and polyuria.   Genitourinary:  Negative for decreased urine volume, difficulty urinating, dyspareunia, dysuria, enuresis, flank pain, frequency, genital sores, hematuria, menstrual problem, pelvic pain, urgency, vaginal bleeding, vaginal discharge and vaginal pain.   Musculoskeletal:  Negative for arthralgias, back pain, gait problem, joint swelling, myalgias, neck pain and neck " stiffness.   Skin:  Negative for color change, pallor and rash.   Allergic/Immunologic: Negative for environmental allergies, food allergies and immunocompromised state.   Neurological:  Negative for dizziness, tremors, seizures, syncope, facial asymmetry, speech difficulty, weakness, light-headedness, numbness and headaches.   Hematological:  Negative for adenopathy. Does not bruise/bleed easily.   Psychiatric/Behavioral:  Negative for agitation, behavioral problems, confusion, decreased concentration, dysphoric mood, hallucinations, self-injury, sleep disturbance and suicidal ideas. The patient is not nervous/anxious and is not hyperactive.        Objective:      Physical Exam  Vitals reviewed.   Constitutional:       General: She is not in acute distress.     Appearance: Normal appearance. She is well-developed. She is not diaphoretic.   HENT:      Head: Normocephalic and atraumatic.      Right Ear: External ear normal.      Left Ear: External ear normal.      Nose: Nose normal.      Right Sinus: No maxillary sinus tenderness or frontal sinus tenderness.      Left Sinus: No maxillary sinus tenderness or frontal sinus tenderness.      Mouth/Throat:      Pharynx: No oropharyngeal exudate.   Eyes:      General: Lids are normal. No scleral icterus.        Right eye: No discharge.         Left eye: No discharge.      Conjunctiva/sclera: Conjunctivae normal.      Right eye: Right conjunctiva is not injected. No hemorrhage.     Left eye: Left conjunctiva is not injected. No hemorrhage.     Pupils: Pupils are equal, round, and reactive to light.   Neck:      Thyroid: No thyromegaly.      Vascular: No JVD.      Trachea: No tracheal deviation.   Cardiovascular:      Rate and Rhythm: Normal rate.   Pulmonary:      Effort: Pulmonary effort is normal. No respiratory distress.      Breath sounds: No stridor.   Chest:      Chest wall: No tenderness.   Abdominal:      General: Bowel sounds are normal. There is no distension.       Palpations: Abdomen is soft. There is no hepatomegaly, splenomegaly or mass.      Tenderness: There is no abdominal tenderness. There is no rebound.   Musculoskeletal:         General: No tenderness. Normal range of motion.      Cervical back: Normal range of motion and neck supple.   Lymphadenopathy:      Cervical: No cervical adenopathy.      Upper Body:      Right upper body: No supraclavicular adenopathy.      Left upper body: No supraclavicular adenopathy.   Skin:     General: Skin is dry.      Findings: No erythema or rash.   Neurological:      Mental Status: She is alert and oriented to person, place, and time.      Cranial Nerves: No cranial nerve deficit.      Coordination: Coordination normal.   Psychiatric:         Behavior: Behavior normal.         Thought Content: Thought content normal.         Judgment: Judgment normal.             Assessment:      1. Malignant neoplasm of overlapping sites of right breast in female, estrogen receptor positive    2. H/O left mastectomy    3. History of left breast cancer    4. Morbid obesity           Med Onc Chart Routing      Follow up with physician No follow up needed. Follow-up through breast surgery in PCP   Follow up with [facility]    Infusion scheduling note    Injection scheduling note    Labs    Imaging    Pharmacy appointment    Other referrals                   Plan:     Results of germ line testing is normal there is no evidence of germ line mutation see [employer] test from October 2023.  In addition would recommend that patient follow-up through breast surgery no need for any adjuvant systemic therapy with history of bilateral mastectomies.  In noninvasive disease.        Lawrence Sykes Jr, MD FACP

## 2024-01-31 DIAGNOSIS — Z01.818 PREOP EXAMINATION: Primary | ICD-10-CM

## 2024-02-05 ENCOUNTER — PATIENT MESSAGE (OUTPATIENT)
Dept: SURGERY | Facility: HOSPITAL | Age: 69
End: 2024-02-05
Payer: MEDICARE

## 2024-02-09 ENCOUNTER — PATIENT MESSAGE (OUTPATIENT)
Dept: SURGERY | Facility: HOSPITAL | Age: 69
End: 2024-02-09
Payer: MEDICARE

## 2024-02-25 NOTE — PROGRESS NOTES
Breast Surgical Oncology  [place]    Date of Service: 02/25/2024    DIAGNOSIS:   68 y.o. female with a history of bilateral breast cancer. Left in 2012 and Right 2023    Date of Diagnosis: 8/24/23  Pathology: ILC, G1, ER 95%, MT 75%, HER2 1+, Ki67 10%  Clinical Stage: IA (cT1b cN0 Mx)  Pathologic Stage: IA (pT1b pN0 Mx)    TREATMENT:   Surgery: right skin sparing mastectomy with sentinel node biopsy on 12/12/23. Shannon Galvan M.D. Breast Surgical Oncology, TE reconstruction by Dr. James.   Systemic Therapy: Endocrine therapy deferred  Medical Oncologist: Lawrence Sykes M.D.     Radiation Treatment Summary: N/A  Radiation Oncologist: not indicated  Genetics: negative  Physical Therapy: prehabilitation     HISTORY OF PRESENT ILLNESS:   Josette Altamirano is here for oncological follow up.  Today, she denies new breast concerns such as pain, masses, skin changes, nipple discharge, nipple retraction or lumps under the arm.  She also denies bone pain, shortness of breath, abdominal pain and neurologic symptoms.     She is planned for right lat flap reconstruction Friday.     IMAGING:   No new    MEDICATIONS/ALLERGIES:     Current Outpatient Medications:     cephALEXin (KEFLEX) 500 MG capsule, Take 1 capsule (500 mg total) by mouth every 6 (six) hours for antibiotic (Patient not taking: Reported on 1/8/2024), Disp: 56 capsule, Rfl: 0    cephALEXin (KEFLEX) 500 MG capsule, take 1 capsule by mouth every 6 hours (Patient not taking: Reported on 1/8/2024), Disp: 56 capsule, Rfl: 0    fluticasone propionate (FLONASE) 50 mcg/actuation nasal spray, 1 spray (50 mcg total) by Each Nostril route once daily. (Patient not taking: Reported on 1/8/2024), Disp: 11.1 mL, Rfl: 0    HYDROcodone-acetaminophen (NORCO) 7.5-325 mg per tablet, Take 1 tablet by mouth every 6 (six) hours as needed for pain (Patient not taking: Reported on 1/8/2024), Disp: 28 tablet, Rfl: 0    HYDROcodone-acetaminophen (NORCO) 7.5-325 mg per tablet, take 1 tablet  by mouth every 6 hours as needed for pain (Patient not taking: Reported on 1/8/2024), Disp: 28 tablet, Rfl: 0    meloxicam (MOBIC) 15 MG tablet, Take 1 tablet (15 mg total) by mouth once daily., Disp: 30 tablet, Rfl: 1    metoprolol succinate (TOPROL-XL) 50 MG 24 hr tablet, Take 1 tablet (50 mg total) by mouth once daily., Disp: 30 tablet, Rfl: 6    ondansetron (ZOFRAN-ODT) 8 MG TbDL, Take 1 tablet (8 mg total) by mouth every 6 (six) hours as needed for nausea, Disp: 10 tablet, Rfl: 0    ondansetron (ZOFRAN-ODT) 8 MG TbDL, dissolve 1 tablet on tongue every 6 hours as needed for nausea, Disp: 10 tablet, Rfl: 0    sacubitriL-valsartan (ENTRESTO)  mg per tablet, Take 1 tablet by mouth 2 (two) times daily., Disp: 180 tablet, Rfl: 1  Review of patient's allergies indicates:  No Known Allergies    PHYSICAL EXAM:   General: The patient appears well and is in no acute distress.     Chaperone is present for examination.   BREAST EXAM  No Asymmetry  S/p bilateral skin sparing mastectomy  Right:  - Mass: No  - Skin change: No  - Axillary LAD: No  Left: s/p lat flap reconstruction  - Mass: No  - Skin change: No  - Axillary LAD: No      ASSESSMENT:   Diagnoses and all orders for this visit:    Malignant neoplasm of overlapping sites of right breast in female, estrogen receptor positive       PLAN:   Josette has a benign exam today without evidence of local or distant relapse.    She will return in June for her 6 month follow up visit, or sooner should she develop breast concerns.      The patient is in agreement with the plan. Questions were encouraged and answered to patient's satisfaction. Josette will call our office with any questions or concerns.     I spent a total of 30 minutes on this visit. This includes face to face time and non-face to face time preparing to see the patient (eg, review of tests), obtaining and/or reviewing separately obtained history, documenting clinical information in the electronic or other  health record, independently interpreting results and communicating results to the patient/family/caregiver, or care coordinator.       Shannon Galvan M.D.

## 2024-02-27 ENCOUNTER — LAB VISIT (OUTPATIENT)
Dept: LAB | Facility: HOSPITAL | Age: 69
End: 2024-02-27
Attending: INTERNAL MEDICINE
Payer: MEDICARE

## 2024-02-27 ENCOUNTER — OFFICE VISIT (OUTPATIENT)
Dept: PRIMARY CARE CLINIC | Facility: CLINIC | Age: 69
End: 2024-02-27
Payer: MEDICARE

## 2024-02-27 ENCOUNTER — OFFICE VISIT (OUTPATIENT)
Dept: SURGERY | Facility: CLINIC | Age: 69
End: 2024-02-27
Payer: MEDICARE

## 2024-02-27 ENCOUNTER — PATIENT MESSAGE (OUTPATIENT)
Dept: UROLOGY | Facility: CLINIC | Age: 69
End: 2024-02-27
Payer: MEDICARE

## 2024-02-27 VITALS
BODY MASS INDEX: 40.11 KG/M2 | TEMPERATURE: 97 F | SYSTOLIC BLOOD PRESSURE: 112 MMHG | OXYGEN SATURATION: 97 % | HEART RATE: 79 BPM | WEIGHT: 233.69 LBS | DIASTOLIC BLOOD PRESSURE: 62 MMHG

## 2024-02-27 DIAGNOSIS — F51.04 PSYCHOPHYSIOLOGIC INSOMNIA: ICD-10-CM

## 2024-02-27 DIAGNOSIS — Z63.0 STRESS DUE TO MARITAL PROBLEMS: ICD-10-CM

## 2024-02-27 DIAGNOSIS — C50.811 MALIGNANT NEOPLASM OF OVERLAPPING SITES OF RIGHT BREAST IN FEMALE, ESTROGEN RECEPTOR POSITIVE: ICD-10-CM

## 2024-02-27 DIAGNOSIS — R30.0 DYSURIA: ICD-10-CM

## 2024-02-27 DIAGNOSIS — Z01.818 PREOP EXAM FOR INTERNAL MEDICINE: ICD-10-CM

## 2024-02-27 DIAGNOSIS — Z01.818 PREOP EXAM FOR INTERNAL MEDICINE: Primary | ICD-10-CM

## 2024-02-27 DIAGNOSIS — Z00.00 ENCOUNTER FOR MEDICARE ANNUAL WELLNESS EXAM: ICD-10-CM

## 2024-02-27 DIAGNOSIS — Z17.0 MALIGNANT NEOPLASM OF OVERLAPPING SITES OF RIGHT BREAST IN FEMALE, ESTROGEN RECEPTOR POSITIVE: Primary | ICD-10-CM

## 2024-02-27 DIAGNOSIS — C50.811 MALIGNANT NEOPLASM OF OVERLAPPING SITES OF RIGHT BREAST IN FEMALE, ESTROGEN RECEPTOR POSITIVE: Primary | ICD-10-CM

## 2024-02-27 DIAGNOSIS — Z17.0 MALIGNANT NEOPLASM OF OVERLAPPING SITES OF RIGHT BREAST IN FEMALE, ESTROGEN RECEPTOR POSITIVE: ICD-10-CM

## 2024-02-27 PROBLEM — Z91.89 AT RISK FOR SELF CARE DEFICIT: Status: RESOLVED | Noted: 2023-10-09 | Resolved: 2024-02-27

## 2024-02-27 LAB
ALBUMIN SERPL BCP-MCNC: 3.8 G/DL (ref 3.5–5.2)
ALP SERPL-CCNC: 84 U/L (ref 55–135)
ALT SERPL W/O P-5'-P-CCNC: 19 U/L (ref 10–44)
ANION GAP SERPL CALC-SCNC: 11 MMOL/L (ref 8–16)
AST SERPL-CCNC: 22 U/L (ref 10–40)
BASOPHILS # BLD AUTO: 0.03 K/UL (ref 0–0.2)
BASOPHILS NFR BLD: 0.7 % (ref 0–1.9)
BILIRUB SERPL-MCNC: 1.1 MG/DL (ref 0.1–1)
BUN SERPL-MCNC: 9 MG/DL (ref 8–23)
CALCIUM SERPL-MCNC: 9 MG/DL (ref 8.7–10.5)
CHLORIDE SERPL-SCNC: 107 MMOL/L (ref 95–110)
CO2 SERPL-SCNC: 24 MMOL/L (ref 23–29)
CREAT SERPL-MCNC: 0.8 MG/DL (ref 0.5–1.4)
DIFFERENTIAL METHOD BLD: ABNORMAL
EOSINOPHIL # BLD AUTO: 0.1 K/UL (ref 0–0.5)
EOSINOPHIL NFR BLD: 2.7 % (ref 0–8)
ERYTHROCYTE [DISTWIDTH] IN BLOOD BY AUTOMATED COUNT: 15.1 % (ref 11.5–14.5)
EST. GFR  (NO RACE VARIABLE): >60 ML/MIN/1.73 M^2
GLUCOSE SERPL-MCNC: 92 MG/DL (ref 70–110)
HCT VFR BLD AUTO: 39 % (ref 37–48.5)
HGB BLD-MCNC: 11.8 G/DL (ref 12–16)
IMM GRANULOCYTES # BLD AUTO: 0.01 K/UL (ref 0–0.04)
IMM GRANULOCYTES NFR BLD AUTO: 0.2 % (ref 0–0.5)
LYMPHOCYTES # BLD AUTO: 1.2 K/UL (ref 1–4.8)
LYMPHOCYTES NFR BLD: 30.7 % (ref 18–48)
MCH RBC QN AUTO: 24 PG (ref 27–31)
MCHC RBC AUTO-ENTMCNC: 30.3 G/DL (ref 32–36)
MCV RBC AUTO: 79 FL (ref 82–98)
MONOCYTES # BLD AUTO: 0.3 K/UL (ref 0.3–1)
MONOCYTES NFR BLD: 8.2 % (ref 4–15)
NEUTROPHILS # BLD AUTO: 2.3 K/UL (ref 1.8–7.7)
NEUTROPHILS NFR BLD: 57.5 % (ref 38–73)
NRBC BLD-RTO: 0 /100 WBC
PLATELET # BLD AUTO: 309 K/UL (ref 150–450)
PMV BLD AUTO: 10.8 FL (ref 9.2–12.9)
POTASSIUM SERPL-SCNC: 4.1 MMOL/L (ref 3.5–5.1)
PROT SERPL-MCNC: 7.3 G/DL (ref 6–8.4)
RBC # BLD AUTO: 4.91 M/UL (ref 4–5.4)
SODIUM SERPL-SCNC: 142 MMOL/L (ref 136–145)
WBC # BLD AUTO: 4.01 K/UL (ref 3.9–12.7)

## 2024-02-27 PROCEDURE — 99024 POSTOP FOLLOW-UP VISIT: CPT | Mod: S$PBB,,, | Performed by: SURGERY

## 2024-02-27 PROCEDURE — 85025 COMPLETE CBC W/AUTO DIFF WBC: CPT | Performed by: INTERNAL MEDICINE

## 2024-02-27 PROCEDURE — 99213 OFFICE O/P EST LOW 20 MIN: CPT | Mod: PBBFAC,PN | Performed by: INTERNAL MEDICINE

## 2024-02-27 PROCEDURE — 36415 COLL VENOUS BLD VENIPUNCTURE: CPT | Mod: PN | Performed by: INTERNAL MEDICINE

## 2024-02-27 PROCEDURE — 99215 OFFICE O/P EST HI 40 MIN: CPT | Mod: S$PBB,,, | Performed by: INTERNAL MEDICINE

## 2024-02-27 PROCEDURE — 99999 PR PBB SHADOW E&M-EST. PATIENT-LVL III: CPT | Mod: PBBFAC,,, | Performed by: INTERNAL MEDICINE

## 2024-02-27 PROCEDURE — 80053 COMPREHEN METABOLIC PANEL: CPT | Performed by: INTERNAL MEDICINE

## 2024-02-27 RX ORDER — TRAZODONE HYDROCHLORIDE 50 MG/1
50 TABLET ORAL NIGHTLY
Qty: 30 TABLET | Refills: 11 | Status: SHIPPED | OUTPATIENT
Start: 2024-02-27

## 2024-02-27 SDOH — SOCIAL DETERMINANTS OF HEALTH (SDOH): PROBLEMS IN RELATIONSHIP WITH SPOUSE OR PARTNER: Z63.0

## 2024-02-27 NOTE — PROGRESS NOTES
Subjective     Patient ID: Josette Altamirano is a 68 y.o. female.    Chief Complaint: Pre-op Exam (Pre-op for Reconstructive Surgery on April, 24/Reports having blood in urine but awaiting a message from Urology to contact her for sooner appointment.)      HPI here for preop evaluation requested by Dr. James for reconstructive breast surgery.  Patient exercises regularly.  She usually walks for 40 minutes at a time about 4-5 days a week.  No chest pain or palpitations or shortness a breath.  She sees Dr. Tanner as her cardiologist.  She is getting preop labs today and these are evaluated.  She has been having some marital stressors and this as well as the surgery has her very tired due to having trouble sleeping.  We discussed short-term medication to try to help as well as possibly pursuing counseling.    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 She subsequently underwent left mastectomy and sentinel node biopsy followed by axillary dissection March 23, 2007. Histopathologic evaluation demonstrated again multiple foci of intracystic papillary carcinoma, as well as DCIS noncomed    Cardiomyopathy     CHEMO INDUCED- RESOLVED    CHF (congestive heart failure)     systolic    Genetic testing 11/06/2023    Inv[employer]ae Multi Cancer Panel (84 genes) - NEGATIVE, VUS in EGFR, POLE    Hypertension     Uterine fibroid      Review of patient's allergies indicates:  No Known Allergies  Past Surgical History:   Procedure Laterality Date    BREAST BIOPSY      BREAST CYST EXCISION Right     BREAST SURGERY      CHOLECYSTECTOMY      COLONOSCOPY N/A 9/10/2018    Procedure: COLONOSCOPY;  Surgeon: Indra Ramos MD;  Location: [facility];  Service: Endoscopy;  Laterality: N/A;    COLONOSCOPY N/A 4/28/2020    Procedure: COLONOSCOPY;   Surgeon: Dulce Maria Siegel MD;  Location: [facility] ENDO;  Service: Endoscopy;  Laterality: N/A;    ESOPHAGOGASTRODUODENOSCOPY N/A 4/28/2020    Procedure: EGD (ESOPHAGOGASTRODUODENOSCOPY);  Surgeon: Dulce Maria Siegel MD;  Location: [facility] ENDO;  Service: Endoscopy;  Laterality: N/A;    INJECTION FOR SENTINEL NODE IDENTIFICATION Right 12/12/2023    Procedure: INJECTION, FOR SENTINEL NODE IDENTIFICATION;  Surgeon: Shannon Galvan MD;  Location: [facility] OR;  Service: General;  Laterality: Right;    INSERTION OF BREAST TISSUE EXPANDER Right 12/12/2023    Procedure: INSERTION, TISSUE EXPANDER, BREAST;  Surgeon: Rashawn James MD;  Location: [facility] OR;  Service: Plastics;  Laterality: Right;    MASTECTOMY      left    MASTECTOMY, SKIN SPARING, SIMPLE Right 12/12/2023    Procedure: MASTECTOMY, SKIN SPARING, SIMPLE;  Surgeon: Shannon Galvan MD;  Location: [facility] OR;  Service: General;  Laterality: Right;    MEDIPORT INSERTION, SINGLE      PLACEMENT OF ACELLULAR HUMAN DERMAL ALLOGRAFT Right 12/12/2023    Procedure: APPLICATION, ACELLULAR HUMAN DERMAL ALLOGRAFT;  Surgeon: Rashawn James MD;  Location: [facility] OR;  Service: Plastics;  Laterality: Right;    SENTINEL LYMPH NODE BIOPSY Right 12/12/2023    Procedure: BIOPSY, LYMPH NODE, SENTINEL;  Surgeon: Shannon Galvan MD;  Location: [facility] OR;  Service: General;  Laterality: Right;     Family History   Problem Relation Age of Onset    Diabetes Mother     Breast cancer Mother     Diabetes Father     Stroke Father     No Known Problems Sister     No Known Problems Brother     Cancer Maternal Aunt 55        breast cancer    Breast cancer Maternal Aunt     Stroke Paternal Aunt     No Known Problems Maternal Grandmother     No Known Problems Maternal Grandfather     No Known Problems Paternal Grandmother     Heart failure Paternal Grandfather     Cancer Maternal Cousin     Melanoma Neg Hx     Psoriasis Neg Hx     Lupus Neg Hx     Eczema Neg Hx      Social History      Socioeconomic History    Marital status:    Tobacco Use    Smoking status: Never    Smokeless tobacco: Never   Substance and Sexual Activity    Alcohol use: Yes     Alcohol/week: 0.0 standard drinks of alcohol     Comment: Ocassionally    Drug use: No    Sexual activity: Yes     Partners: Male     Birth control/protection: None, Post-menopausal     Social Determinants of Health     Financial Resource Strain: Low Risk  (9/19/2023)    Overall Financial Resource Strain (CARDIA)     Difficulty of Paying Living Expenses: Not hard at all   Food Insecurity: No Food Insecurity (9/19/2023)    Hunger Vital Sign     Worried About Running Out of Food in the Last Year: Never true     Ran Out of Food in the Last Year: Never true   Transportation Needs: No Transportation Needs (9/19/2023)    PRAPARE - Transportation     Lack of Transportation (Medical): No     Lack of Transportation (Non-Medical): No   Physical Activity: Sufficiently Active (9/19/2023)    Exercise Vital Sign     Days of Exercise per Week: 4 days     Minutes of Exercise per Session: 60 min   Social Connections: Moderately Integrated (9/19/2023)    Social Connection and Isolation Panel [NHANES]     Frequency of Communication with Friends and Family: More than three times a week     Frequency of Social Gatherings with Friends and Family: More than three times a week     Attends Confucianism Services: 1 to 4 times per year     Active Member of Clubs or Organizations: No     Attends Club or Organization Meetings: Never     Marital Status:    Recent Concern: Social Connections - Moderately Isolated (9/19/2023)    Social Connection and Isolation Panel [NHANES]     Frequency of Communication with Friends and Family: More than three times a week     Frequency of Social Gatherings with Friends and Family: More than three times a week     Attends Confucianism Services: 1 to 4 times per year     Active Member of Clubs or Organizations: No     Attends Club or  Organization Meetings: Never     Marital Status:    Housing Stability: Low Risk  (9/19/2023)    Housing Stability Vital Sign     Unable to Pay for Housing in the Last Year: No     Number of Places Lived in the Last Year: 1     Unstable Housing in the Last Year: No         /62   Pulse 79   Temp 97.3 °F (36.3 °C) (Tympanic)   Wt 106 kg (233 lb 11 oz)   SpO2 97%   BMI 40.11 kg/m²   Outpatient Medications as of 2/27/2024   Medication Sig Dispense Refill    cephALEXin (KEFLEX) 500 MG capsule Take 1 capsule (500 mg total) by mouth every 6 (six) hours for antibiotic 56 capsule 0    fluticasone propionate (FLONASE) 50 mcg/actuation nasal spray 1 spray (50 mcg total) by Each Nostril route once daily. 11.1 mL 0    metoprolol succinate (TOPROL-XL) 50 MG 24 hr tablet Take 1 tablet (50 mg total) by mouth once daily. 30 tablet 6    sacubitriL-valsartan (ENTRESTO)  mg per tablet Take 1 tablet by mouth 2 (two) times daily. 180 tablet 1    traZODone (DESYREL) 50 MG tablet Take 1 tablet (50 mg total) by mouth every evening. 30 tablet 11     No current facility-administered medications on file as of 2/27/2024.       Review of Systems   All other systems reviewed and are negative.         Objective     Physical Exam  Constitutional:       General: She is not in acute distress.     Appearance: Normal appearance. She is not ill-appearing, toxic-appearing or diaphoretic.      Comments: Bmi 40   HENT:      Head: Normocephalic and atraumatic.      Mouth/Throat:      Mouth: Mucous membranes are moist.   Eyes:      Conjunctiva/sclera: Conjunctivae normal.   Cardiovascular:      Rate and Rhythm: Normal rate and regular rhythm.      Heart sounds: No murmur heard.     No friction rub. No gallop.   Pulmonary:      Effort: Pulmonary effort is normal. No respiratory distress.      Breath sounds: Normal breath sounds. No wheezing or rales.   Musculoskeletal:      Cervical back: Neck supple.      Right lower leg: No edema.       Left lower leg: No edema.   Skin:     General: Skin is dry.   Neurological:      General: No focal deficit present.      Mental Status: She is alert and oriented to person, place, and time.   Psychiatric:         Mood and Affect: Mood normal.         Behavior: Behavior normal.            Assessment and Plan     1. Preop exam for internal medicine  -     IN OFFICE EKG 12-LEAD (to Muse)  -     CBC Auto Differential; Future; Expected date: 02/27/2024  -     Comprehensive Metabolic Panel; Future; Expected date: 02/27/2024    2. lab exam  -     POCT urinalysis, dipstick or tablet reag    3. Psychophysiologic insomnia  Comments:  trial of trazodone; pt to send update; having marital stressors  Orders:  -     traZODone (DESYREL) 50 MG tablet; Take 1 tablet (50 mg total) by mouth every evening.  Dispense: 30 tablet; Refill: 11  -     Ambulatory referral/consult to Psychology; Future; Expected date: 03/05/2024    4. Malignant neoplasm of overlapping sites of right breast in female, estrogen receptor positive  Comments:  in remission; seeing heme/onc regularly  Orders:  -     Ambulatory referral/consult to Psychology; Future; Expected date: 03/05/2024    5. BMI 40.0-44.9, adult  Comments:  has been walking 40min at at time most days of the week    6. Stress due to marital problems  -     Ambulatory referral/consult to Psychology; Future; Expected date: 03/05/2024         Pt sees Dr. Arguelles/maco so will refer an EKG needs to her.  She had her last EKG 10/23.  U/a:  seeing Urology for consult upcoming for hematuria.    Pt appears low risk for procedure.    Immunization History   Administered Date(s) Administered    COVID-19, MRNA, LN-S, PF (Pfizer) (Gray Cap) 05/17/2022    COVID-19, MRNA, LN-S, PF (Pfizer) (Purple Cap) 03/01/2021, 03/23/2021, 11/23/2021    Influenza - Quadrivalent - PF *Preferred* (6 months and older) 10/21/2020    PPD Test 11/28/2023    Tdap 11/22/2023

## 2024-02-27 NOTE — Clinical Note
Can fax this with pre op papers to Dr. James's office; may want to wait for lab resulted tomorrow and fax all together.  Pre op paper in folder.

## 2024-02-28 ENCOUNTER — OFFICE VISIT (OUTPATIENT)
Dept: UROLOGY | Facility: CLINIC | Age: 69
End: 2024-02-28
Payer: MEDICARE

## 2024-02-28 VITALS
HEART RATE: 80 BPM | BODY MASS INDEX: 39.05 KG/M2 | RESPIRATION RATE: 16 BRPM | DIASTOLIC BLOOD PRESSURE: 73 MMHG | TEMPERATURE: 98 F | SYSTOLIC BLOOD PRESSURE: 104 MMHG | WEIGHT: 228.75 LBS | HEIGHT: 64 IN

## 2024-02-28 DIAGNOSIS — R31.9 HEMATURIA, UNSPECIFIED TYPE: Primary | ICD-10-CM

## 2024-02-28 PROBLEM — R07.89 ATYPICAL CHEST PAIN: Status: RESOLVED | Noted: 2021-02-03 | Resolved: 2024-02-28

## 2024-02-28 PROBLEM — Z71.9 ENCOUNTER FOR EDUCATION: Status: RESOLVED | Noted: 2023-10-09 | Resolved: 2024-02-28

## 2024-02-28 LAB
AMORPH CRY UR QL COMP ASSIST: ABNORMAL
BACTERIA #/AREA URNS AUTO: ABNORMAL /HPF
BILIRUB UR QL STRIP: NEGATIVE
CLARITY UR REFRACT.AUTO: ABNORMAL
COLOR UR AUTO: ABNORMAL
GLUCOSE UR QL STRIP: NEGATIVE
HGB UR QL STRIP: ABNORMAL
HYALINE CASTS UR QL AUTO: 38 /LPF
KETONES UR QL STRIP: NEGATIVE
LEUKOCYTE ESTERASE UR QL STRIP: ABNORMAL
MICROSCOPIC COMMENT: ABNORMAL
NITRITE UR QL STRIP: NEGATIVE
PH UR STRIP: 6 [PH] (ref 5–8)
POC RESIDUAL URINE VOLUME: 0 ML (ref 0–100)
PROT UR QL STRIP: ABNORMAL
RBC #/AREA URNS AUTO: >100 /HPF (ref 0–4)
SP GR UR STRIP: 1.02 (ref 1–1.03)
SQUAMOUS #/AREA URNS AUTO: 5 /HPF
URN SPEC COLLECT METH UR: ABNORMAL
WBC #/AREA URNS AUTO: 22 /HPF (ref 0–5)

## 2024-02-28 PROCEDURE — 51798 US URINE CAPACITY MEASURE: CPT | Mod: PBBFAC | Performed by: NURSE PRACTITIONER

## 2024-02-28 PROCEDURE — 99214 OFFICE O/P EST MOD 30 MIN: CPT | Mod: S$PBB,,, | Performed by: NURSE PRACTITIONER

## 2024-02-28 PROCEDURE — 99999 PR PBB SHADOW E&M-EST. PATIENT-LVL III: CPT | Mod: PBBFAC,,, | Performed by: NURSE PRACTITIONER

## 2024-02-28 PROCEDURE — 81001 URINALYSIS AUTO W/SCOPE: CPT | Performed by: NURSE PRACTITIONER

## 2024-02-28 PROCEDURE — 99213 OFFICE O/P EST LOW 20 MIN: CPT | Mod: PBBFAC | Performed by: NURSE PRACTITIONER

## 2024-02-28 PROCEDURE — 87086 URINE CULTURE/COLONY COUNT: CPT | Performed by: NURSE PRACTITIONER

## 2024-02-28 PROCEDURE — 99999PBSHW POCT BLADDER SCAN: Mod: PBBFAC,,,

## 2024-02-28 RX ORDER — CEFDINIR 300 MG/1
300 CAPSULE ORAL 2 TIMES DAILY
Qty: 20 CAPSULE | Refills: 0 | Status: SHIPPED | OUTPATIENT
Start: 2024-02-28 | End: 2024-03-09

## 2024-02-28 NOTE — PROGRESS NOTES
Chief Complaint:   Gross hematuria    HPI:   Patient is presenting with complaints of painful, gross hematuria.  Patient states that she has had slight dysuria and gross hematuria for several days.  Urine in clinic indicates blood and trace leukocytes.  PVR is 0 mL.  Patient does have a history of gross hematuria with negative workups, in the past.  Denies fever, pelvic or flank pain.  Regan MARRERO  08/29/2022  Roxanne Altamirano is a 67 y.o. female here for follow up.      8/29/22-renal US done 8/24/22 showing a mildly complex L upper pole renal cyst, now measuring 5.0cm. 2.8cm L lower pole simple cyst. I have personally reviewed these images. Had some pain in her left side a few weeks ago when she would lay down. No longer having any pain. No gross hematuria. Mild CLAYTON, but not bothersome. Doing kegels, which do seem to help. No interval change in H&P.      7/28/21-Renal US stable. Personally reviewed, showing 4.5cm L upper pole mildly complex cyst. No gross hematuria. No dysuria. Only has CLAYTON if she laughs. No abd or flank pain. No recent UTI.   Per Miranda:  6/18/20:  Indep assessment of imaging agree with report:  Left renal cyst about the same no sig changes mildly complex.  Reviewed history in detail. Doing well overall.   4/23/19: No hematuria in the interval and none today.  Stable 5cm left renal cyst on reassuring US.  Prefers annual followup with US.  Reviewed history in detail.  4/2/18: Returns for followup.  No hematuria in the interval.  MRI shows known renal cysts unchanged from 2015 to 2017.  10/19/15: CT Urogram normal except for benign left upper pole renal cyst. Cysto normal.  10/12/15: 61 yo woman referred by RAYSHAWN Campuzano for gross hematuria presumed to be UTI and not resolving with continued microhematuria after UTI treated.  No abd/pelvic pain and no exac/rel factors; except some crampy discomfort sometime.  No prior hematuria.  No urolithiasis.  No urinary bother.  No  history.  Normal sexual  function.  UA confirms hematuria with -UCx on two occasions.  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  Allergies:  Patient has no known allergies.    Medications:  has a current medication list which includes the following prescription(s): cephalexin, fluticasone propionate, metoprolol succinate, entresto, and trazodone.    Review of Systems:  General: No fever, chills, fatigability, or weight loss.  Skin: No rashes, itching, or changes in color or texture of skin.  Chest: Denies CALDERÓN, cyanosis, wheezing, cough, and sputum production.  Abdomen: Appetite fine. No weight loss. Denies diarrhea, abdominal pain, hematemesis, or blood in stool.  Musculoskeletal: No joint stiffness or swelling. Denies back pain.  : As above.  All other review of systems negative.    PMH:   has a past medical history of Arthritis of right knee, Breast cancer, Cardiomyopathy, CHF (congestive heart failure), Genetic testing (11/06/2023), Hypertension, and Uterine fibroid.    PSH:   has a past surgical history that includes Cholecystectomy; Breast biopsy; Mediport insertion, single; Breast surgery; Mastectomy; Breast cyst excision (Right); Colonoscopy (N/A, 9/10/2018); Colonoscopy (N/A, 4/28/2020); Esophagogastroduodenoscopy (N/A, 4/28/2020); Injection for sentinel node identification (Right, 12/12/2023); mastectomy, skin sparing, simple (Right, 12/12/2023); [place] lymph node biopsy (Right, 12/12/2023); Insertion of breast tissue expander (Right, 12/12/2023); and Placement of acellular human dermal allograft (Right, 12/12/2023).    FamHx: family history includes Breast cancer in her maternal aunt and mother; Cancer in her maternal cousin; Cancer (age of onset: 55) in her maternal aunt; Diabetes in her father and mother; Heart failure in her paternal grandfather; No Known Problems in her brother, maternal grandfather, maternal grandmother, paternal grandmother, and sister; Stroke in her father and paternal aunt.    SocHx:  reports that  she has never smoked. She has never used smokeless tobacco. She reports current alcohol use. She reports that she does not use drugs.      Physical Exam:  General:  Morbidly obese female, A&Ox3, no apparent distress, no deformities  Neck: No masses, normal thyroid  Lungs: normal inspiration, no use of accessory muscles  Heart: normal pulse, no arrhythmias  Abdomen: Soft, NT, ND, no masses, no hernias, no hepatosplenomegaly  Lymphatic: Neck and groin nodes negative  Skin: The skin is warm and dry. No jaundice.    Labs/Studies:   See HPI  Impression/Plan:   Gross hematuria  Urine was sent for urinalysis and culture.  Patient was empirically treated with Omnicef.  If urine culture is negative, will proceed with CT urogram and cystoscopy.

## 2024-02-29 ENCOUNTER — PATIENT MESSAGE (OUTPATIENT)
Dept: PRIMARY CARE CLINIC | Facility: CLINIC | Age: 69
End: 2024-02-29
Payer: MEDICARE

## 2024-02-29 ENCOUNTER — PATIENT MESSAGE (OUTPATIENT)
Dept: PSYCHIATRY | Facility: CLINIC | Age: 69
End: 2024-02-29
Payer: MEDICARE

## 2024-02-29 LAB
BACTERIA UR CULT: NORMAL
BACTERIA UR CULT: NORMAL

## 2024-03-06 ENCOUNTER — LAB VISIT (OUTPATIENT)
Dept: LAB | Facility: HOSPITAL | Age: 69
End: 2024-03-06
Attending: UROLOGY
Payer: MEDICARE

## 2024-03-06 ENCOUNTER — OFFICE VISIT (OUTPATIENT)
Dept: UROLOGY | Facility: CLINIC | Age: 69
End: 2024-03-06
Payer: MEDICARE

## 2024-03-06 VITALS
DIASTOLIC BLOOD PRESSURE: 81 MMHG | HEART RATE: 73 BPM | WEIGHT: 229.5 LBS | SYSTOLIC BLOOD PRESSURE: 123 MMHG | HEIGHT: 64 IN | BODY MASS INDEX: 39.18 KG/M2 | RESPIRATION RATE: 18 BRPM

## 2024-03-06 DIAGNOSIS — R31.0 GROSS HEMATURIA: ICD-10-CM

## 2024-03-06 DIAGNOSIS — R31.9 HEMATURIA, UNSPECIFIED TYPE: Primary | ICD-10-CM

## 2024-03-06 DIAGNOSIS — N28.1 RENAL CYST: ICD-10-CM

## 2024-03-06 LAB
BILIRUB UR QL STRIP: NEGATIVE
BUN SERPL-MCNC: 12 MG/DL (ref 8–23)
CREAT SERPL-MCNC: 0.8 MG/DL (ref 0.5–1.4)
EST. GFR  (NO RACE VARIABLE): >60 ML/MIN/1.73 M^2
GLUCOSE UR QL STRIP: NEGATIVE
KETONES UR QL STRIP: NEGATIVE
LEUKOCYTE ESTERASE UR QL STRIP: NEGATIVE
PH, POC UA: 6
POC BLOOD, URINE: POSITIVE
POC NITRATES, URINE: NEGATIVE
PROT UR QL STRIP: POSITIVE
SP GR UR STRIP: 1.02 (ref 1–1.03)
UROBILINOGEN UR STRIP-ACNC: 0.2 (ref 0.1–1.1)

## 2024-03-06 PROCEDURE — 99999 PR PBB SHADOW E&M-EST. PATIENT-LVL III: CPT | Mod: PBBFAC,,, | Performed by: UROLOGY

## 2024-03-06 PROCEDURE — 36415 COLL VENOUS BLD VENIPUNCTURE: CPT | Mod: PN | Performed by: UROLOGY

## 2024-03-06 PROCEDURE — 99213 OFFICE O/P EST LOW 20 MIN: CPT | Mod: PBBFAC,PN | Performed by: UROLOGY

## 2024-03-06 PROCEDURE — 81003 URINALYSIS AUTO W/O SCOPE: CPT | Mod: PBBFAC,PN | Performed by: UROLOGY

## 2024-03-06 PROCEDURE — 84520 ASSAY OF UREA NITROGEN: CPT | Performed by: UROLOGY

## 2024-03-06 PROCEDURE — 99999PBSHW POCT URINALYSIS, DIPSTICK, AUTOMATED, W/O SCOPE: Mod: PBBFAC,,,

## 2024-03-06 PROCEDURE — 82565 ASSAY OF CREATININE: CPT | Performed by: UROLOGY

## 2024-03-06 PROCEDURE — 99214 OFFICE O/P EST MOD 30 MIN: CPT | Mod: S$PBB,,, | Performed by: UROLOGY

## 2024-03-06 NOTE — PROGRESS NOTES
Chief Complaint   Patient presents with    Gross Hematuria     Patient complains of gross hematuria         History of Present Illness:   Josette Altamirano is a 68 y.o. female here for follow up.     3/6/24-Pt had non-painful, gross hematuria last week. She did have some vaginal itching after she urinated at that time. She was empirically treated with omnicef, but culture was negative. She hasn't seen any blood since the antibiotics. No abdominal or flank pain.     8/29/22-renal US done 8/24/22 showing a mildly complex L upper pole renal cyst, now measuring 5.0cm. 2.8cm L lower pole simple cyst. I have personally reviewed these images. Had some pain in her left side a few weeks ago when she would lay down. No longer having any pain. No gross hematuria. Mild CLAYTON, but not bothersome. Doing kegels, which do seem to help. No interval change in H&P.     7/28/21-Renal US stable. Personally reviewed, showing 4.5cm L upper pole mildly complex cyst. No gross hematuria. No dysuria. Only has CLAYTON if she laughs. No abd or flank pain. No recent UTI.   Per Ruth:  6/18/20:  Indep assessment of imaging agree with report:  Left renal cyst about the same no sig changes mildly complex.  Reviewed history in detail. Doing well overall.   4/23/19: No hematuria in the interval and none today.  Stable 5cm left renal cyst on reassuring US.  Prefers annual followup with US.  Reviewed history in detail.  4/2/18: Returns for followup.  No hematuria in the interval.  MRI shows known renal cysts unchanged from 2015 to 2017.  10/19/15: CT Urogram normal except for benign left upper pole renal cyst. Cysto normal.  10/12/15: 59 yo woman referred by RAYSHAWN Campuzano for gross hematuria presumed to be UTI and not resolving with continued microhematuria after UTI treated.  No abd/pelvic pain and no exac/rel factors; except some crampy discomfort sometime.  No prior hematuria.  No urolithiasis.  No urinary bother.  No  history.  Normal sexual function.  UA  confirms hematuria with -UCx on two occasions.  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    Review of Systems   Constitutional:  Negative for chills and fever.   HENT:  Negative for nosebleeds.    Respiratory:  Negative for shortness of breath.    Cardiovascular:  Negative for chest pain.   Gastrointestinal:  Negative for blood in stool.   Genitourinary:  Negative for dysuria.   Neurological:  Negative for numbness.   Hematological:  Does not bruise/bleed easily.   All other systems reviewed and are negative.      Current Outpatient Medications   Medication Sig Dispense Refill    cefdinir (OMNICEF) 300 MG capsule Take 1 capsule (300 mg total) by mouth 2 (two) times daily. for 10 days 20 capsule 0    fluticasone propionate (FLONASE) 50 mcg/actuation nasal spray 1 spray (50 mcg total) by Each Nostril route once daily. 11.1 mL 0    metoprolol succinate (TOPROL-XL) 50 MG 24 hr tablet Take 1 tablet (50 mg total) by mouth once daily. 30 tablet 6    sacubitriL-valsartan (ENTRESTO)  mg per tablet Take 1 tablet by mouth 2 (two) times daily. 180 tablet 1    traZODone (DESYREL) 50 MG tablet Take 1 tablet (50 mg total) by mouth every evening. 30 tablet 11     No current facility-administered medications for this visit.       Review of patient's allergies indicates:  No Known Allergies    Past medical, family, and social history reviewed as documented in chart with pertinent positive medical, family, and social history detailed in HPI.    Physical Exam  Vitals:    03/06/24 1118   BP: 123/81   Pulse: 73   Resp: 18       General: Well-developed, Well Nourished in No acute distress  HEENT: Normocephalic, Atraumatic, Extraocular Movements intact  Neck: Supple, trachea midline, no cervical or supraclavicular lymphadenopathy  Respirations: Even and unlabored  Back: Midline spine without tenderness, no CVA tenderness  Abdomen: Soft, Non-tender, non-distended, no hepatosplenomegaly, no rebound or guarding, no palpable  masses  Extremities: Moves all equally, atraumatic, no clubbing, cyanosis or edema  Skin: warm and dry  Psych: normal affect  Neuro: Alert and oriented x 3, Cranial nerves II-XII grossly intact      Urinalysis  Moderate blood, trace protein      Assesment:   1. Hematuria, unspecified type  POCT Urinalysis, Dipstick, Automated, W/O Scope      2. Gross hematuria  BUN    CT Urogram Abd Pelvis W WO    Creatinine, Serum      3. Renal cyst                Plan:  Hematuria, unspecified type  -     POCT Urinalysis, Dipstick, Automated, W/O Scope    Gross hematuria  -     BUN; Future; Expected date: 03/06/2024  -     CT Urogram Abd Pelvis W WO; Future; Expected date: 03/06/2024  -     Creatinine, Serum; Future; Expected date: 03/06/2024    Renal cyst          Follow up for Cystoscopy.

## 2024-03-21 ENCOUNTER — HOSPITAL ENCOUNTER (OUTPATIENT)
Dept: RADIOLOGY | Facility: HOSPITAL | Age: 69
Discharge: HOME | End: 2024-03-21
Attending: UROLOGY
Payer: MEDICARE

## 2024-03-21 DIAGNOSIS — R31.0 GROSS HEMATURIA: ICD-10-CM

## 2024-03-21 PROCEDURE — 74178 CT ABD&PLV WO CNTR FLWD CNTR: CPT | Mod: TC

## 2024-03-21 PROCEDURE — 74178 CT ABD&PLV WO CNTR FLWD CNTR: CPT | Mod: 26,,, | Performed by: STUDENT IN AN ORGANIZED HEALTH CARE EDUCATION/TRAINING PROGRAM

## 2024-03-21 PROCEDURE — 25500020 PHARM REV CODE 255: Performed by: UROLOGY

## 2024-03-21 RX ADMIN — IOHEXOL 100 ML: 350 INJECTION, SOLUTION INTRAVENOUS at 09:03

## 2024-03-25 ENCOUNTER — PATIENT MESSAGE (OUTPATIENT)
Dept: PREADMISSION TESTING | Facility: HOSPITAL | Age: 69
End: 2024-03-25
Payer: MEDICARE

## 2024-03-27 ENCOUNTER — ANESTHESIA EVENT (OUTPATIENT)
Dept: SURGERY | Facility: HOSPITAL | Age: 69
End: 2024-03-27
Payer: MEDICARE

## 2024-03-27 ENCOUNTER — PROCEDURE VISIT (OUTPATIENT)
Dept: UROLOGY | Facility: CLINIC | Age: 69
End: 2024-03-27
Payer: MEDICARE

## 2024-03-27 VITALS
BODY MASS INDEX: 39.22 KG/M2 | DIASTOLIC BLOOD PRESSURE: 85 MMHG | HEART RATE: 76 BPM | RESPIRATION RATE: 18 BRPM | HEIGHT: 64 IN | SYSTOLIC BLOOD PRESSURE: 129 MMHG | WEIGHT: 229.75 LBS

## 2024-03-27 DIAGNOSIS — K76.9 HEPATIC LESION: ICD-10-CM

## 2024-03-27 DIAGNOSIS — R31.9 HEMATURIA, UNSPECIFIED TYPE: Primary | ICD-10-CM

## 2024-03-27 DIAGNOSIS — R31.0 GROSS HEMATURIA: ICD-10-CM

## 2024-03-27 LAB
BILIRUB UR QL STRIP: NEGATIVE
GLUCOSE UR QL STRIP: NEGATIVE
KETONES UR QL STRIP: NEGATIVE
LEUKOCYTE ESTERASE UR QL STRIP: NEGATIVE
PH, POC UA: 5.5
POC BLOOD, URINE: NEGATIVE
POC NITRATES, URINE: NEGATIVE
PROT UR QL STRIP: POSITIVE
SP GR UR STRIP: 1.02 (ref 1–1.03)
UROBILINOGEN UR STRIP-ACNC: 0.2 (ref 0.1–1.1)

## 2024-03-27 PROCEDURE — 88112 CYTOPATH CELL ENHANCE TECH: CPT | Performed by: PATHOLOGY

## 2024-03-27 PROCEDURE — 52000 CYSTOURETHROSCOPY: CPT | Mod: S$PBB,,, | Performed by: UROLOGY

## 2024-03-27 PROCEDURE — 99999PBSHW POCT URINALYSIS, DIPSTICK, AUTOMATED, W/O SCOPE: Mod: PBBFAC,,,

## 2024-03-27 PROCEDURE — 81003 URINALYSIS AUTO W/O SCOPE: CPT | Mod: PBBFAC,PN | Performed by: UROLOGY

## 2024-03-27 PROCEDURE — 88112 CYTOPATH CELL ENHANCE TECH: CPT | Mod: 26,,, | Performed by: PATHOLOGY

## 2024-03-27 PROCEDURE — 52000 CYSTOURETHROSCOPY: CPT | Mod: PBBFAC,PN | Performed by: UROLOGY

## 2024-03-27 RX ORDER — LIDOCAINE HYDROCHLORIDE 20 MG/ML
JELLY TOPICAL
Status: COMPLETED | OUTPATIENT
Start: 2024-03-27 | End: 2024-03-27

## 2024-03-27 RX ORDER — CIPROFLOXACIN 500 MG/1
500 TABLET ORAL
Status: COMPLETED | OUTPATIENT
Start: 2024-03-27 | End: 2024-03-27

## 2024-03-27 RX ADMIN — CIPROFLOXACIN 500 MG: 500 TABLET ORAL at 01:03

## 2024-03-27 RX ADMIN — LIDOCAINE HYDROCHLORIDE 10 ML: 20 JELLY TOPICAL at 01:03

## 2024-03-27 NOTE — ANESTHESIA PREPROCEDURE EVALUATION
03/27/2024  Josette Altamirano is a 68 y.o., female.    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 She subsequently underwent left mastectomy and sentinel node biopsy followed by axillary dissection March 23, 2007. Histopathologic evaluation demonstrated again multiple foci of intracystic papillary carcinoma, as well as DCIS noncomed    Cardiomyopathy     CHEMO INDUCED- RESOLVED    CHF (congestive heart failure)     systolic    Genetic testing 11/06/2023    Inv[employer]ae Multi Cancer Panel (84 genes) - NEGATIVE, VUS in EGFR, POLE    Hypertension     Uterine fibroid      Past Surgical History:   Procedure Laterality Date    BREAST BIOPSY      BREAST CYST EXCISION Right     BREAST SURGERY      CHOLECYSTECTOMY      COLONOSCOPY N/A 9/10/2018    Procedure: COLONOSCOPY;  Surgeon: Indra Ramos MD;  Location: [facility];  Service: Endoscopy;  Laterality: N/A;    COLONOSCOPY N/A 4/28/2020    Procedure: COLONOSCOPY;  Surgeon: Dulce Maria Siegel MD;  Location: [facility];  Service: Endoscopy;  Laterality: N/A;    ESOPHAGOGASTRODUODENOSCOPY N/A 4/28/2020    Procedure: EGD (ESOPHAGOGASTRODUODENOSCOPY);  Surgeon: Dulce Maria Siegel MD;  Location: [facility];  Service: Endoscopy;  Laterality: N/A;    INJECTION FOR SENTINEL NODE IDENTIFICATION Right 12/12/2023    Procedure: INJECTION, FOR SENTINEL NODE IDENTIFICATION;  Surgeon: Shannon Galvan MD;  Location: [facility];  Service: General;  Laterality: Right;    INSERTION OF BREAST TISSUE EXPANDER Right 12/12/2023    Procedure: INSERTION, TISSUE EXPANDER, BREAST;  Surgeon: Rashawn James MD;  Location: [facility] OR;  Service: Plastics;  Laterality: Right;    MASTECTOMY      left     MASTECTOMY, SKIN SPARING, SIMPLE Right 12/12/2023    Procedure: MASTECTOMY, SKIN SPARING, SIMPLE;  Surgeon: Shannon Galvan MD;  Location: [facility] OR;  Service: General;  Laterality: Right;    MEDIPORT INSERTION, SINGLE      PLACEMENT OF ACELLULAR HUMAN DERMAL ALLOGRAFT Right 12/12/2023    Procedure: APPLICATION, ACELLULAR HUMAN DERMAL ALLOGRAFT;  Surgeon: Rashawn James MD;  Location: [facility] OR;  Service: Plastics;  Laterality: Right;    SENTINEL LYMPH NODE BIOPSY Right 12/12/2023    Procedure: BIOPSY, LYMPH NODE, SENTINEL;  Surgeon: Shannon Galvan MD;  Location: [facility] OR;  Service: General;  Laterality: Right;       Pre-op Assessment    I have reviewed the Patient Summary Reports.    I have reviewed the NPO Status.   I have reviewed the Medications.     Review of Systems  Anesthesia Hx:  No problems with previous Anesthesia   History of prior surgery of interest to airway management or planning:  Previous anesthesia: General        Denies Family Hx of Anesthesia complications.    Denies Personal Hx of Anesthesia complications.                    Social:  Non-Smoker       Hematology/Oncology:                      Current/Recent Cancer.  Breast              EENT/Dental:  chronic allergic rhinitis           Cardiovascular:     Hypertension       CHF              Congestive Heart Failure (CHF)                Hypertension         Musculoskeletal:  Arthritis               Neurological:    Neuromuscular Disease,                                 Neuromuscular Disease   Endocrine:        Obesity / BMI > 30      Physical Exam  General: Well nourished, Alert and Oriented    Airway:  Mallampati: II   Mouth Opening: Normal  TM Distance: Normal  Tongue: Normal  Neck ROM: Normal ROM    Dental:  Intact    Chest/Lungs:  Clear to auscultation, Normal Respiratory Rate    Heart:  Rate: Normal  Rhythm: Regular Rhythm        Anesthesia Plan  Type of Anesthesia, risks & benefits discussed:    Anesthesia Type: Gen ETT, Gen  Supraglottic Airway  Intra-op Monitoring Plan: Standard ASA Monitors  Post Op Pain Control Plan: multimodal analgesia and IV/PO Opioids PRN  Induction:  IV  Informed Consent: Informed consent signed with the Patient and all parties understand the risks and agree with anesthesia plan.  All questions answered. Patient consented to blood products? No  ASA Score: 3  Day of Surgery Review of History & Physical: H&P Update referred to the surgeon/provider.    Ready For Surgery From Anesthesia Perspective.     .

## 2024-03-27 NOTE — PROGRESS NOTES
.Patient came in for a cystoscopy, Ciprofloxacin 500 mg tab to be administered orally to help prevent infection. Confirmed patient's allergies, Ciprofloxacin 500 mg tab administered as ordered. Pt tolerated medication administration well. Patient agreed to wait 15 minutes after medication administration in the clinic and to report any adverse reactions.        Ivan Frias RN

## 2024-03-27 NOTE — PROCEDURES
Chief Complaint   Patient presents with    Follow-up     Cystoscopy          History of Present Illness:   Josette Altmairano is a 68 y.o. female here for follow up.     3/27/24-here for cysto. CT urogram showed indeterminate liver lesions and thickened bladder wall.     3/6/24-Pt had non-painful, gross hematuria last week. She did have some vaginal itching after she urinated at that time. She was empirically treated with omnicef, but culture was negative. She hasn't seen any blood since the antibiotics. No abdominal or flank pain.     8/29/22-renal US done 8/24/22 showing a mildly complex L upper pole renal cyst, now measuring 5.0cm. 2.8cm L lower pole simple cyst. I have personally reviewed these images. Had some pain in her left side a few weeks ago when she would lay down. No longer having any pain. No gross hematuria. Mild CLAYTON, but not bothersome. Doing kegels, which do seem to help. No interval change in H&P.     7/28/21-Renal US stable. Personally reviewed, showing 4.5cm L upper pole mildly complex cyst. No gross hematuria. No dysuria. Only has CLAYTON if she laughs. No abd or flank pain. No recent UTI.   Per Ruth:  6/18/20:  Indep assessment of imaging agree with report:  Left renal cyst about the same no sig changes mildly complex.  Reviewed history in detail. Doing well overall.   4/23/19: No hematuria in the interval and none today.  Stable 5cm left renal cyst on reassuring US.  Prefers annual followup with US.  Reviewed history in detail.  4/2/18: Returns for followup.  No hematuria in the interval.  MRI shows known renal cysts unchanged from 2015 to 2017.  10/19/15: CT Urogram normal except for benign left upper pole renal cyst. Cysto normal.  10/12/15: 61 yo woman referred by RAYSHAWN Campuzano for gross hematuria presumed to be UTI and not resolving with continued microhematuria after UTI treated.  No abd/pelvic pain and no exac/rel factors; except some crampy discomfort sometime.  No prior hematuria.  No  urolithiasis.  No urinary bother.  No  history.  Normal sexual function.  UA confirms hematuria with -UCx on two occasions.  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    Review of Systems   Constitutional:  Negative for chills and fever.   HENT:  Negative for nosebleeds.    Respiratory:  Negative for shortness of breath.    Cardiovascular:  Negative for chest pain.   Gastrointestinal:  Negative for blood in stool.   Genitourinary:  Negative for dysuria.   Neurological:  Negative for numbness.   Hematological:  Does not bruise/bleed easily.   All other systems reviewed and are negative.      Current Outpatient Medications   Medication Sig Dispense Refill    fluticasone propionate (FLONASE) 50 mcg/actuation nasal spray 1 spray (50 mcg total) by Each Nostril route once daily. 11.1 mL 0    metoprolol succinate (TOPROL-XL) 50 MG 24 hr tablet Take 1 tablet (50 mg total) by mouth once daily. 30 tablet 6    sacubitriL-valsartan (ENTRESTO)  mg per tablet Take 1 tablet by mouth 2 (two) times daily. 180 tablet 1    traZODone (DESYREL) 50 MG tablet Take 1 tablet (50 mg total) by mouth every evening. 30 tablet 11     No current facility-administered medications for this visit.       Review of patient's allergies indicates:  No Known Allergies    Past medical, family, and social history reviewed as documented in chart with pertinent positive medical, family, and social history detailed in HPI.    Physical Exam  Vitals:    03/27/24 1337   BP: 129/85   Pulse: 76   Resp: 18       General: Well-developed, Well Nourished in No acute distress  HEENT: Normocephalic, Atraumatic, Extraocular Movements intact  Neck: Supple, trachea midline, no cervical or supraclavicular lymphadenopathy  Respirations: Even and unlabored  Back: Midline spine without tenderness, no CVA tenderness  Abdomen: Soft, Non-tender, non-distended, no hepatosplenomegaly, no rebound or guarding, no palpable masses  Extremities: Moves all equally, atraumatic,  no clubbing, cyanosis or edema  Skin: warm and dry  Psych: normal affect  Neuro: Alert and oriented x 3, Cranial nerves II-XII grossly intact      Urinalysis  Negative for blood, LE, nit    Procedure: Cystoscopy      Procedure in detail:   Informed consent was obtained. The patient's genitalia was prepped and draped in the usual sterile fashion. Lidocaine jelly was administered per urethra. I utilized the flexible cystoscope and advanced it into the urethral meatus. Bladder access was obtained. Systematic review of the bladder was performed, noting no stones, foreign bodies or masses. Bilateral ureteral orifices were visualized and noted to be effluxing clear urine. Retroflexion was utilized to visualize the bladder neck, noting no lesions. The scope was slowly backed out of the urethra, noting no urethral lesions. The patient tolerated the procedure well. Estimated blood loss was 0cc.           Assesment:   1. Hematuria, unspecified type  POCT Urinalysis, Dipstick, Automated, W/O Scope      2. Hepatic lesion  Ambulatory referral/consult to Hepatology      3. Gross hematuria  Cytology, Urine              Plan:  Hematuria, unspecified type  -     POCT Urinalysis, Dipstick, Automated, W/O Scope    Hepatic lesion  -     Ambulatory referral/consult to Hepatology; Future; Expected date: 04/03/2024    Gross hematuria  -     Cytology, Urine    Other orders  -     ciprofloxacin HCl tablet 500 mg  -     LIDOcaine HCl 2% urojet          Follow up in about 6 months (around 9/27/2024).

## 2024-03-28 ENCOUNTER — PATIENT MESSAGE (OUTPATIENT)
Dept: HEPATOLOGY | Facility: CLINIC | Age: 69
End: 2024-03-28
Payer: MEDICARE

## 2024-03-28 ENCOUNTER — PATIENT MESSAGE (OUTPATIENT)
Dept: PREADMISSION TESTING | Facility: HOSPITAL | Age: 69
End: 2024-03-28
Payer: MEDICARE

## 2024-03-28 ENCOUNTER — TELEPHONE (OUTPATIENT)
Dept: PREADMISSION TESTING | Facility: HOSPITAL | Age: 69
End: 2024-03-28
Payer: MEDICARE

## 2024-04-01 ENCOUNTER — ANESTHESIA (OUTPATIENT)
Dept: SURGERY | Facility: HOSPITAL | Age: 69
End: 2024-04-01
Payer: MEDICARE

## 2024-04-01 ENCOUNTER — HOSPITAL ENCOUNTER (OUTPATIENT)
Facility: HOSPITAL | Age: 69
Discharge: HOME | End: 2024-04-01
Attending: SURGERY | Admitting: SURGERY
Payer: MEDICARE

## 2024-04-01 VITALS
WEIGHT: 227.88 LBS | SYSTOLIC BLOOD PRESSURE: 124 MMHG | RESPIRATION RATE: 14 BRPM | HEART RATE: 82 BPM | TEMPERATURE: 97 F | BODY MASS INDEX: 38.9 KG/M2 | DIASTOLIC BLOOD PRESSURE: 62 MMHG | HEIGHT: 64 IN | OXYGEN SATURATION: 97 %

## 2024-04-01 DIAGNOSIS — C50.811 MALIGNANT NEOPLASM OF OVERLAPPING SITES OF RIGHT BREAST IN FEMALE, ESTROGEN RECEPTOR POSITIVE: ICD-10-CM

## 2024-04-01 DIAGNOSIS — Z90.13 STATUS POST BILATERAL MASTECTOMY: Primary | ICD-10-CM

## 2024-04-01 DIAGNOSIS — Z85.3 HISTORY OF LEFT BREAST CANCER: ICD-10-CM

## 2024-04-01 DIAGNOSIS — Z17.0 MALIGNANT NEOPLASM OF OVERLAPPING SITES OF RIGHT BREAST IN FEMALE, ESTROGEN RECEPTOR POSITIVE: ICD-10-CM

## 2024-04-01 PROCEDURE — 63600175 PHARM REV CODE 636 W HCPCS: Performed by: SURGERY

## 2024-04-01 PROCEDURE — 71000016 HC POSTOP RECOV ADDL HR: Performed by: SURGERY

## 2024-04-01 PROCEDURE — 25000003 PHARM REV CODE 250: Performed by: NURSE ANESTHETIST, CERTIFIED REGISTERED

## 2024-04-01 PROCEDURE — 63600175 PHARM REV CODE 636 W HCPCS: Performed by: NURSE ANESTHETIST, CERTIFIED REGISTERED

## 2024-04-01 PROCEDURE — D9220A PRA ANESTHESIA: Mod: ANES,,, | Performed by: ANESTHESIOLOGY

## 2024-04-01 PROCEDURE — 36000706: Performed by: SURGERY

## 2024-04-01 PROCEDURE — 37000009 HC ANESTHESIA EA ADD 15 MINS: Performed by: SURGERY

## 2024-04-01 PROCEDURE — 36000707: Performed by: SURGERY

## 2024-04-01 PROCEDURE — C1789 PROSTHESIS, BREAST, IMP: HCPCS | Performed by: SURGERY

## 2024-04-01 PROCEDURE — 88300 SURGICAL PATH GROSS: CPT | Mod: 59 | Performed by: PATHOLOGY

## 2024-04-01 PROCEDURE — 37000008 HC ANESTHESIA 1ST 15 MINUTES: Performed by: SURGERY

## 2024-04-01 PROCEDURE — 71000015 HC POSTOP RECOV 1ST HR: Performed by: SURGERY

## 2024-04-01 PROCEDURE — 71000033 HC RECOVERY, INTIAL HOUR: Performed by: SURGERY

## 2024-04-01 PROCEDURE — 88300 SURGICAL PATH GROSS: CPT | Mod: 26,,, | Performed by: PATHOLOGY

## 2024-04-01 PROCEDURE — D9220A PRA ANESTHESIA: Mod: CRNA,,, | Performed by: NURSE ANESTHETIST, CERTIFIED REGISTERED

## 2024-04-01 RX ORDER — LIDOCAINE HYDROCHLORIDE AND EPINEPHRINE 10; 10 MG/ML; UG/ML
INJECTION, SOLUTION INFILTRATION; PERINEURAL
Status: DISCONTINUED | OUTPATIENT
Start: 2024-04-01 | End: 2024-04-01 | Stop reason: HOSPADM

## 2024-04-01 RX ORDER — PROPOFOL 10 MG/ML
VIAL (ML) INTRAVENOUS
Status: DISCONTINUED | OUTPATIENT
Start: 2024-04-01 | End: 2024-04-01

## 2024-04-01 RX ORDER — HYDROCODONE BITARTRATE AND ACETAMINOPHEN 5; 325 MG/1; MG/1
1 TABLET ORAL EVERY 4 HOURS PRN
Status: DISCONTINUED | OUTPATIENT
Start: 2024-04-01 | End: 2024-04-01 | Stop reason: HOSPADM

## 2024-04-01 RX ORDER — PHENYLEPHRINE HYDROCHLORIDE 10 MG/ML
INJECTION INTRAVENOUS
Status: DISCONTINUED | OUTPATIENT
Start: 2024-04-01 | End: 2024-04-01

## 2024-04-01 RX ORDER — DIPHENHYDRAMINE HYDROCHLORIDE 50 MG/ML
25 INJECTION INTRAMUSCULAR; INTRAVENOUS EVERY 6 HOURS PRN
Status: DISCONTINUED | OUTPATIENT
Start: 2024-04-01 | End: 2024-04-01 | Stop reason: HOSPADM

## 2024-04-01 RX ORDER — ONDANSETRON HYDROCHLORIDE 2 MG/ML
INJECTION, SOLUTION INTRAVENOUS
Status: DISCONTINUED | OUTPATIENT
Start: 2024-04-01 | End: 2024-04-01

## 2024-04-01 RX ORDER — CEFAZOLIN SODIUM 1 G/3ML
INJECTION, POWDER, FOR SOLUTION INTRAMUSCULAR; INTRAVENOUS
Status: DISCONTINUED
Start: 2024-04-01 | End: 2024-04-01 | Stop reason: HOSPADM

## 2024-04-01 RX ORDER — CEFAZOLIN SODIUM 1 G/3ML
INJECTION, POWDER, FOR SOLUTION INTRAMUSCULAR; INTRAVENOUS
Status: DISCONTINUED | OUTPATIENT
Start: 2024-04-01 | End: 2024-04-01 | Stop reason: HOSPADM

## 2024-04-01 RX ORDER — FENTANYL CITRATE 50 UG/ML
INJECTION, SOLUTION INTRAMUSCULAR; INTRAVENOUS
Status: DISCONTINUED | OUTPATIENT
Start: 2024-04-01 | End: 2024-04-01

## 2024-04-01 RX ORDER — HYDROCODONE BITARTRATE AND ACETAMINOPHEN 5; 325 MG/1; MG/1
1 TABLET ORAL
Status: DISCONTINUED | OUTPATIENT
Start: 2024-04-01 | End: 2024-04-01 | Stop reason: HOSPADM

## 2024-04-01 RX ORDER — CEFAZOLIN SODIUM 1 G/3ML
INJECTION, POWDER, FOR SOLUTION INTRAMUSCULAR; INTRAVENOUS
Status: DISCONTINUED | OUTPATIENT
Start: 2024-04-01 | End: 2024-04-01

## 2024-04-01 RX ORDER — LIDOCAINE HYDROCHLORIDE 10 MG/ML
1 INJECTION, SOLUTION EPIDURAL; INFILTRATION; INTRACAUDAL; PERINEURAL ONCE
Status: DISCONTINUED | OUTPATIENT
Start: 2024-04-01 | End: 2024-04-01 | Stop reason: HOSPADM

## 2024-04-01 RX ORDER — ROCURONIUM BROMIDE 10 MG/ML
INJECTION, SOLUTION INTRAVENOUS
Status: DISCONTINUED | OUTPATIENT
Start: 2024-04-01 | End: 2024-04-01

## 2024-04-01 RX ORDER — CHLORHEXIDINE GLUCONATE ORAL RINSE 1.2 MG/ML
10 SOLUTION DENTAL
Status: DISCONTINUED | OUTPATIENT
Start: 2024-04-01 | End: 2024-04-01 | Stop reason: HOSPADM

## 2024-04-01 RX ORDER — SUCCINYLCHOLINE CHLORIDE 20 MG/ML
INJECTION INTRAMUSCULAR; INTRAVENOUS
Status: DISCONTINUED | OUTPATIENT
Start: 2024-04-01 | End: 2024-04-01

## 2024-04-01 RX ORDER — SODIUM CHLORIDE 0.9 % (FLUSH) 0.9 %
10 SYRINGE (ML) INJECTION
Status: DISCONTINUED | OUTPATIENT
Start: 2024-04-01 | End: 2024-04-01 | Stop reason: HOSPADM

## 2024-04-01 RX ORDER — ACETAMINOPHEN 10 MG/ML
INJECTION, SOLUTION INTRAVENOUS
Status: DISCONTINUED | OUTPATIENT
Start: 2024-04-01 | End: 2024-04-01

## 2024-04-01 RX ORDER — DEXAMETHASONE SODIUM PHOSPHATE 4 MG/ML
INJECTION, SOLUTION INTRA-ARTICULAR; INTRALESIONAL; INTRAMUSCULAR; INTRAVENOUS; SOFT TISSUE
Status: DISCONTINUED | OUTPATIENT
Start: 2024-04-01 | End: 2024-04-01

## 2024-04-01 RX ORDER — LIDOCAINE HYDROCHLORIDE 20 MG/ML
INJECTION, SOLUTION EPIDURAL; INFILTRATION; INTRACAUDAL; PERINEURAL
Status: DISCONTINUED | OUTPATIENT
Start: 2024-04-01 | End: 2024-04-01

## 2024-04-01 RX ORDER — ALBUTEROL SULFATE 0.83 MG/ML
2.5 SOLUTION RESPIRATORY (INHALATION) EVERY 4 HOURS PRN
Status: DISCONTINUED | OUTPATIENT
Start: 2024-04-01 | End: 2024-04-01 | Stop reason: HOSPADM

## 2024-04-01 RX ORDER — MIDAZOLAM HYDROCHLORIDE 1 MG/ML
INJECTION INTRAMUSCULAR; INTRAVENOUS
Status: DISCONTINUED | OUTPATIENT
Start: 2024-04-01 | End: 2024-04-01

## 2024-04-01 RX ORDER — MEPERIDINE HYDROCHLORIDE 25 MG/ML
12.5 INJECTION INTRAMUSCULAR; INTRAVENOUS; SUBCUTANEOUS ONCE
Status: DISCONTINUED | OUTPATIENT
Start: 2024-04-01 | End: 2024-04-01 | Stop reason: HOSPADM

## 2024-04-01 RX ORDER — FENTANYL CITRATE 50 UG/ML
25 INJECTION, SOLUTION INTRAMUSCULAR; INTRAVENOUS EVERY 5 MIN PRN
Status: DISCONTINUED | OUTPATIENT
Start: 2024-04-01 | End: 2024-04-01 | Stop reason: HOSPADM

## 2024-04-01 RX ORDER — EPHEDRINE SULFATE 50 MG/ML
INJECTION, SOLUTION INTRAVENOUS
Status: DISCONTINUED | OUTPATIENT
Start: 2024-04-01 | End: 2024-04-01

## 2024-04-01 RX ORDER — ONDANSETRON HYDROCHLORIDE 2 MG/ML
4 INJECTION, SOLUTION INTRAVENOUS ONCE AS NEEDED
Status: DISCONTINUED | OUTPATIENT
Start: 2024-04-01 | End: 2024-04-01 | Stop reason: HOSPADM

## 2024-04-01 RX ORDER — CHLORHEXIDINE GLUCONATE ORAL RINSE 1.2 MG/ML
10 SOLUTION DENTAL 2 TIMES DAILY
Status: DISCONTINUED | OUTPATIENT
Start: 2024-04-01 | End: 2024-04-01 | Stop reason: HOSPADM

## 2024-04-01 RX ADMIN — SUCCINYLCHOLINE CHLORIDE 140 MG: 20 INJECTION, SOLUTION INTRAMUSCULAR; INTRAVENOUS; PARENTERAL at 09:04

## 2024-04-01 RX ADMIN — EPHEDRINE SULFATE 10 MG: 50 INJECTION INTRAVENOUS at 11:04

## 2024-04-01 RX ADMIN — PHENYLEPHRINE HYDROCHLORIDE 200 MCG: 10 INJECTION INTRAVENOUS at 10:04

## 2024-04-01 RX ADMIN — PROPOFOL 150 MG: 10 INJECTION, EMULSION INTRAVENOUS at 09:04

## 2024-04-01 RX ADMIN — EPHEDRINE SULFATE 10 MG: 50 INJECTION INTRAVENOUS at 10:04

## 2024-04-01 RX ADMIN — PHENYLEPHRINE HYDROCHLORIDE 100 MCG: 10 INJECTION INTRAVENOUS at 10:04

## 2024-04-01 RX ADMIN — ROCURONIUM BROMIDE 40 MG: 10 SOLUTION INTRAVENOUS at 09:04

## 2024-04-01 RX ADMIN — DEXAMETHASONE SODIUM PHOSPHATE 8 MG: 4 INJECTION, SOLUTION INTRA-ARTICULAR; INTRALESIONAL; INTRAMUSCULAR; INTRAVENOUS; SOFT TISSUE at 09:04

## 2024-04-01 RX ADMIN — SUGAMMADEX 200 MG: 100 INJECTION, SOLUTION INTRAVENOUS at 11:04

## 2024-04-01 RX ADMIN — CEFAZOLIN 2 G: 330 INJECTION, POWDER, FOR SOLUTION INTRAMUSCULAR; INTRAVENOUS at 09:04

## 2024-04-01 RX ADMIN — ONDANSETRON 4 MG: 2 INJECTION INTRAMUSCULAR; INTRAVENOUS at 11:04

## 2024-04-01 RX ADMIN — PROPOFOL 50 MG: 10 INJECTION, EMULSION INTRAVENOUS at 11:04

## 2024-04-01 RX ADMIN — LIDOCAINE HYDROCHLORIDE 100 MG: 20 INJECTION, SOLUTION EPIDURAL; INFILTRATION; INTRACAUDAL; PERINEURAL at 09:04

## 2024-04-01 RX ADMIN — FENTANYL CITRATE 50 MCG: 50 INJECTION, SOLUTION INTRAMUSCULAR; INTRAVENOUS at 09:04

## 2024-04-01 RX ADMIN — MIDAZOLAM HYDROCHLORIDE 2 MG: 1 INJECTION, SOLUTION INTRAMUSCULAR; INTRAVENOUS at 09:04

## 2024-04-01 RX ADMIN — ROCURONIUM BROMIDE 10 MG: 10 SOLUTION INTRAVENOUS at 09:04

## 2024-04-01 RX ADMIN — ACETAMINOPHEN 1000 MG: 10 INJECTION, SOLUTION INTRAVENOUS at 09:04

## 2024-04-01 RX ADMIN — ROCURONIUM BROMIDE 10 MG: 10 SOLUTION INTRAVENOUS at 10:04

## 2024-04-01 NOTE — DISCHARGE INSTRUCTIONS
[facility]  [facility]  [facility]    POSTOPERATIVE INSTRUCTIONS FOR BREAST RECONSTRUCTION    Following discharge, return home and rest for the remainder of the day and night. Someone  should stay with you for the next 24-48 hours. Please have someone assist you to the  restroom during this period. You should be up and walking around for short periods of time  several times (at least three times as a minimum) throughout the day, with assistance,  following your surgical procedure.    Take the prescribed pain medication as directed, but always eat something first (15-30  minutes before). You should expect discomfort, but severe pain is unusual and should be  reported immediately to the office. Do not drink alcohol while on pain medication. Please be  sure that you have an adequate supply of pain medication to last through the night or  weekend. If your pain is severe, you may rotate the narcotic with Ibuprofen every 4 hours.  Pain medication will only be refilled during business hours.    Diet may be as desired. We advise you to start off with liquids and progress as tolerated.  Avoid fried, rich foods for the first 24 hours after surgery, We also encourage you to drink 6-8  glasses of water daily and, if taking prescription medications. begin a stool softener.    If TISSUE EXPANDER AND/OR IMPLANT & DRAINS are present, DO NOT SHOWER, You may  sponge bathe as needed, keep drain sites CLEAN & DRY, Dr Shay will remove all bandages in  clinic at your first post op.    If you have drains with no expander or implant, you may shower as needed, 48 hours after  surgery, you may remove everything down to the skin (bra, white padding, tape) and shower  with body wash of your choice. Replace bra and line incisions with padding (we recommend  ultra-thin pantiliners) to collect any drainage. If drains are present, you will be instructed on  how to care for your drains upon discharge, Follow drain  instructions on attached sheet.  Empty and record drain output as directed. Make sure to include date, time and volume of  fluid removed. Anything from clear to yellow to red to dark red is perfectly normal  *Bring volume sheet with you to your 1st post op visit. Drainage is normal*    It is extremely important that you limit the use of your arms for the first 3 weeks following  surgery. Do not lift your arms to reach for things. No heavy lifting, pushing, or pulling should  be avoided for the next three to four weeks. No aerobic exercise or weightlifting. Do not lift  anything heavier than a gallon of milk.    No submerging in water, hot tubs, or swimming until directed to by Dr. James.    It is also not unusual for one breast to hurt, bruise, or swell more than the other. Please call  us immediately with a significant increase in pain, size of either breast, bleeding/drainage, or  rash. This could be evidenced by saturation of dressings and bra, or it may be noted that one  breast is unusually larger than the other.    A mild sore throat is not uncommon for 24-36 hours following general anesthesia, Lozenges  may be helpful. Headache, vague soreness, fatigue, or stiffness are common side effects of  anesthesia and may persist for several days.    Do not hesitate to call our office if you have any questions or problems. The answering service  will contact a physician for you after hours, The number to call is [phone]. Please DO NOT  go to the ER before calling our office.     You may reach me directly at [phone] (text or call) or email anytime at [email or web address].    POST-OP APPOINTMENT:  at [facility]  [address] [place] [address]

## 2024-04-01 NOTE — TRANSFER OF CARE
"Anesthesia Transfer of Care Note    Patient: Josette Altamirano    Procedure(s) Performed: Procedure(s) (LRB):  REMOVAL, TISSUE EXPANDER (Bilateral)  INSERTION, BREAST IMPLANT (Bilateral)  RECONSTRUCTION, BREAST (Bilateral)  BREAST REVISION SURGERY (Bilateral)    Patient location: PACU    Anesthesia Type: general    Transport from OR: Transported from OR on room air with adequate spontaneous ventilation    Post pain: adequate analgesia    Post assessment: no apparent anesthetic complications and tolerated procedure well    Post vital signs: stable    Level of consciousness: awake    Nausea/Vomiting: no nausea/vomiting    Complications: none    Transfer of care protocol was followed      Last vitals: Visit Vitals  BP (!) 122/58   Pulse 86   Temp 36.3 °C (97.3 °F) (Temporal)   Resp 13   Ht 5' 4" (1.626 m)   Wt 103.4 kg (227 lb 13.5 oz)   SpO2 (!) 94%   Breastfeeding No   BMI 39.11 kg/m²     "

## 2024-04-01 NOTE — BRIEF OP NOTE
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.0]    Procedure(s) (LRB):  REMOVAL, TISSUE EXPANDER (Bilateral)  INSERTION, BREAST IMPLANT (Bilateral)  RECONSTRUCTION, BREAST (Bilateral)  BREAST REVISION SURGERY (Bilateral)    Anesthesia: General    Operative Findings: Bilateral breast reconstruction revision, bilateral tissue expander to implant exchange, bilateral breast soft tissue excision, bilateral capsulorraphies    Estimated Blood Loss: 50 cc         Specimens:   Specimen (24h ago, onward)       Start     Ordered    04/01/24 1044  Specimen to Pathology, Surgery Breast  Once        Comments: Pre-op Diagnosis: Personal history of malignant neoplasm of breast [Z85.3]Status post bilateral mastectomy [Z90.13]Deformity of reconstructed breast [N65.0]Procedure(s):REMOVAL, TISSUE EXPANDERINSERTION, BREAST IMPLANTRECONSTRUCTION, BREASTBREAST REVISION SURGERY Number of specimens: 2Name of specimens: 1)  right breast tissue expander- GROSS ONLY2) left breast tissue expander- GROSS ONLY     References:    Click here for ordering Quick Tip   Question Answer Comment   Procedure Type: Breast    Which provider would you like to cc? RIKI SHAY    Release to patient Immediate        04/01/24 1138                      Discharge Note    OUTCOME: Patient tolerated treatment/procedure well without complication and is now ready for discharge.    DISPOSITION: Home or Self Care    FINAL DIAGNOSIS:  Malignant neoplasm of overlapping sites of right  breast in female, estrogen receptor positive    FOLLOWUP: In clinic    DISCHARGE INSTRUCTIONS:    Discharge Procedure Orders   Diet general

## 2024-04-01 NOTE — ANESTHESIA PROCEDURE NOTES
Intubation    Date/Time: 4/1/2024 9:48 AM    Performed by: Lucina Andre CRNA  Authorized by: Caridad Walton MD    Intubation:     Induction:  Intravenous    Intubated:  Postinduction    Mask Ventilation:  Easy mask    Attempts:  2    Attempted By:  CRNA    Method of Intubation:  Video laryngoscopy    Blade:  Jiménez 3    Laryngeal View Grade: Grade IIA - cords partially seen      Laryngeal View Grade comment:  LIGHT OF JIMÉNEZ DIMMED AFTER PLACING INTRAORAL    Attempted By (2nd Attempt):  CRNA    Method of Intubation (2nd Attempt):  Direct    Blade (2nd Attempt):  Woodall 2    Laryngeal View Grade (2nd Attempt): Grade IIa - cords partially seen      Difficult Airway Encountered?: No      Complications:  None    Airway Device:  Oral endotracheal tube    Airway Device Size:  7.0    Style/Cuff Inflation:  Cuffed    Inflation Amount (mL):  5    Tube secured:  22    Secured at:  The lips    Placement Verified By:  Capnometry    Complicating Factors:  None    Findings Post-Intubation:  BS equal bilateral and atraumatic/condition of teeth unchanged

## 2024-04-01 NOTE — OP NOTE
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.0]    Post-Operative Diagnosis: Post-Op Diagnosis Codes:     * Personal history of malignant neoplasm of breast [Z85.3]     * Status post bilateral mastectomy [Z90.13]     * Deformity of reconstructed breast [N65.0]    Anesthesia: General    Operative Findings (including complications, if any): Bilateral tissue expander to implant exchange, bilateral capsulorraphy, right capsulotomies, bilateral soft tissue excision, bilateral reconstruction revision: Right implant - 770cc, Ref [id number]; left implant - 520cc, Ref [id number]    Description of Technical Procedures:     Indications: Josette Altamirano is a pleasant 68 y.o. female with a history of bilateral breast cancer. She has bilateral tissue expanders in place and needs second stage surgery.  The risks, benefits, alternatives, and possible complications of the above procedure were discussed and the patient has elected to proceed with surgery.    Description of Operative Report:  Informed consent was obtained from the patient at the preoperative appointment.  On the morning of surgery, patient was seen, examined, and marked. Operative plan was reconfirmed with the patient along with markings.  Antibiotics and SCDs were begun in holding.  Patient was then brought to the operating room and placed supine on the operating room table. General endotracheal anesthesia was administered without difficulty.  Patient's chest was prepped and draped  in the standard sterile fashion.    First, Local anesthetic was placed along the planned incisions.  The left side was addressed first.  10 blade scalpel was used to excise the prior scar along the inferior skin paddle.  Cautery was used to dissect through the soft tissue down to the expander.  The expander was then deflated and removed from the pocket.  Pocket was inspected and there were no masses nor fluid collection noted.  Capsulotomies were performed superiorly and medially to improve position of the implant.  Inferolaterally capsulorraphy was performed with a 2 layered 0 PDO quill.  Pocket was washed with irrigation.  Meticulous hemostasis was confirmed.  Implant was then opened and washed with antibiotic irrigation and new sterile gloves were changed.  Implant was then placed in the pocket and inspected for position.  It was still inferior and lateral.  Implant was removed and placed back in antibiotic irrigation.  Another 2 layer capsulorraphy was performed to improve implant position.  Pocket was washed with antibiotic irrigation after hemostasis was achieved.  Implant was replaced and noted to be in excellent position.  Capsule and muscled were closed in 2-0 vicryl figure 8 interrupted stitches.  Multiple layers of 2-0 vicryl were used to closed the subcutaneous tissue.  3-0 monocryl was used to close the deep dermis.  Next, excess lateral soft tissue was excised along prior mastectomy scar in wedge fashion to improve lateral contour.  Soft tissue excision was 15 x 5 cm.  Meticulous hemostasis was achieved with cautery.  2-0 vicryl was used to close the deep layers and 3-0 monocryl to close the deep dermis.  A running 4-0 sub q monocryl was then used to close the skin.      Next, the contralateral side was begun.  A similar procedure was performed.  10 blade scalpel was used to excise the prior scar along the mastectomy scar.  Cautery was used to dissect through the soft tissue down to the expander.  The  expander was then deflated and removed from the pocket.  Pocket was inspected and there were no masses nor fluid collection noted.  Capsulotomies were performed superiorly and medially to improve position of the implant.  Inferolaterally capsulorraphy was performed with cautery in a popcorn fashion.  Pocket was washed with irrigation.  Meticulous hemostasis was confirmed.  Implant was then opened and washed with antibiotic irrigation and new sterile gloves were changed.  Implant was then placed in the pocket and inspected for position and noted to be in excellent position.  I did not want to further medialize the pocket to prevent asymmetry.  Capsule and muscled were closed in 2-0 ethibond figure 8 interrupted stitches secondary to the alloderm.    3-0 monocryl was used to close the deep dermis.  Next, excess lateral soft tissue was excised along prior mastectomy scar in wedge fashion to improve lateral contour.  Soft tissue excision was 10 x 3 cm. I was mainly able to remove skin as lymph nodes were very superficial and just under the skin. Meticulous hemostasis was achieved with cautery.  2-0 vicryl was used to close the deep layers and 3-0 monocryl to close the deep dermis.  A running 4-0 sub q monocryl was then used to close the skin.      Sterile dressings were placed on top. Flaps were healthy and viable at the end of the case.   Patient tolerated the procedure well and was brought to recovery in excellent condition.                 Significant Surgical Tasks Conducted by the Assistant(s), if Applicable: Ellie BAUTISTA - closed incisions    Estimated Blood Loss (EBL): 50 cc           Implants:   Implant Name Type Inv. Item Serial No.  Lot No. LRB No. 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 RIKI SHAY    Release to patient Immediate        04/01/24 1138                            Condition: Good    Disposition: PACU - hemodynamically stable.    Attestation: Op Note Attestation: I was physically present and scrubbed for the entire procedure.

## 2024-04-01 NOTE — ANESTHESIA POSTPROCEDURE EVALUATION
Anesthesia Post Evaluation    Patient: Josette Altamirano    Procedure(s) Performed: Procedure(s) (LRB):  REMOVAL, TISSUE EXPANDER (Bilateral)  INSERTION, BREAST IMPLANT (Bilateral)  RECONSTRUCTION, BREAST (Bilateral)  BREAST REVISION SURGERY (Bilateral)    Final Anesthesia Type: general      Patient location during evaluation: PACU  Patient participation: Yes- Able to Participate  Level of consciousness: awake and alert and oriented  Post-procedure vital signs: reviewed and stable  Pain management: adequate  Airway patency: patent    PONV status at discharge: No PONV  Anesthetic complications: no      Cardiovascular status: blood pressure returned to baseline, stable and hemodynamically stable  Respiratory status: unassisted  Hydration status: euvolemic  Follow-up not needed.              Vitals Value Taken Time   /62 04/01/24 1245   Temp 36.3 °C (97.3 °F) 04/01/24 1205   Pulse 80 04/01/24 1248   Resp 21 04/01/24 1247   SpO2 97 % 04/01/24 1248   Vitals shown include unvalidated device data.      Event Time   Out of Recovery 12:50:00         Pain/Edy Score: Edy Score: 10 (4/1/2024  1:05 PM)

## 2024-04-01 NOTE — H&P
[facility] Periop Services  History & Physical - Short Stay    Subjective:      Chief Complaint/Reason for Admission: Bilateral abscence of breasts        History of Present Illness:  Patient is a 68 y.o. female presents with history of bilateral breast cancer s/p bilateral mastectomies with tissue expanders.  She is doing well and needs her second stage surgery.  Risks, benefits, alternatives, and possible complications were discussed and she agreed to proceed with surgery.    Pain Scale:  0    No medications prior to admission.       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 She subsequently underwent left mastectomy and sentinel node biopsy followed by axillary dissection March 23, 2007. Histopathologic evaluation demonstrated again multiple foci of intracystic papillary carcinoma, as well as DCIS noncomed    Cardiomyopathy     CHEMO INDUCED- RESOLVED    CHF (congestive heart failure)     systolic    Genetic testing 11/06/2023    [employer] Multi Cancer Panel (84 genes) - NEGATIVE, VUS in EGFR, POLE    Hypertension     Uterine fibroid        Review of Systems:  No CP, SOB, H/A, N/V    OBJECTIVE:     Vital Signs (Most Recent):  VSS AF    Physical Exam:  Gen - AO x 3, NAD  Neck - supple  Chest - non labored respirations  Ht - RRR  Breast - bilateral tissue expanders in place, left inferolaterally displaced, excess tissue bilaterally needing resection  Abd - soft, NT  Ext - no CCE    Laboratory  I have reviewed all pertinent lab results within the past 24 hours.    Diagnostic Results:  reviewed    ASSESSMENT/PLAN:   69 y/o with bilateral abscence of breasts  To OR for bilateral second stage breast surgery

## 2024-04-01 NOTE — NURSING TRANSFER
Assumed care of patient from WILMAR Wang. Patient vitals stable, resting comfortably in bed with no complaints and call light within reach. Plan of care and discharge criteria reviewed with patient/family. Will continue to monitor

## 2024-04-01 NOTE — NURSING TRANSFER
Patient met criteria for discharge. Discharge instructions given, and medications received preoperatively. Patient dressed and wheeled to car by RN with no complaints.

## 2024-04-02 LAB
FINAL PATHOLOGIC DIAGNOSIS: NORMAL
GROSS: NORMAL
Lab: NORMAL

## 2024-04-03 LAB
FINAL PATHOLOGIC DIAGNOSIS: NORMAL
Lab: NORMAL

## 2024-04-23 RX ORDER — METOPROLOL SUCCINATE 50 MG/1
50 TABLET, EXTENDED RELEASE ORAL DAILY
Qty: 30 TABLET | Refills: 6 | Status: SHIPPED | OUTPATIENT
Start: 2024-04-23 | End: 2025-04-23

## 2024-05-04 ENCOUNTER — PATIENT MESSAGE (OUTPATIENT)
Dept: ADMINISTRATIVE | Facility: CLINIC | Age: 69
End: 2024-05-04
Payer: MEDICARE

## 2024-05-22 ENCOUNTER — PATIENT MESSAGE (OUTPATIENT)
Dept: SURGERY | Facility: HOSPITAL | Age: 69
End: 2024-05-22
Payer: MEDICARE

## 2024-06-03 PROBLEM — K76.9 LIVER LESION: Status: ACTIVE | Noted: 2024-06-03

## 2024-06-03 NOTE — PROGRESS NOTES
Hepatology Note    PATIENT: Josette Altamirano  MRN: [id number]  DATE: 6/5/2024    Provider: Hepatologist - Dr Steel  Urgency of review: non-urgent  Referring provider: Dr. Lamar Spears    Diagnosis:   1. Liver lesion    2. Hepatic lesion    3. Liver disease, unspecified    4. Abnormal findings on diagnostic imaging of heart and coronary circulation        Chief complaint:   Chief Complaint   Patient presents with    Mass       Subjective:    Initial History: Josette Altamirano is a 68 y.o. female who was referred to Hepatology Clinic for consultation of liver lesion      06/05/2024   Her recent CT during evaluation of Hematuria reported as There are hepatic cysts measuring 2.0 cm and 2.1 cm within the right lobe. Hypodensity along the ligamentum teres is suggestive of focal fatty infiltration. A hyperenhancing focus measuring 1.8 cm is present within segment 4a. An ill-defined hypodensity measuring 1.7 cm present within segment 3. Patient has same cyst in 2020. Patient does not have any new complains from liver standpoint. Liver enzymes are stable      As regards to liver disease,  - The patient reports no symptoms of hepatitis including malaise or flu-like symptoms to suggest a flare.  - The patient reports no new manifestations of portal hypertension including ascites, edema, GI bleeding, or hepatic encephalopathy to suggest liver decompensation.  - The patient reports no fevers/chills or pruritis to suggest biliary disease.        Prior Relevant History:    She  denies hepatotoxic medication    Review of systems:  A review of 12+ systems was conducted with pertinent positive and negative findings documented in HPI with all other systems reviewed and negative.      PFSH:  Past medical, family, and social history reviewed as documented in chart with pertinent positive medical, family, and social history detailed in HPI.    Past Medical History:   Past Medical History:   Diagnosis Date    Arthritis of right knee     Breast  cancer     She has a history of a Stage IIA, 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 She subsequently underwent left mastectomy and sentinel node biopsy followed by axillary dissection March 23, 2007. Histopathologic evaluation demonstrated again multiple foci of intracystic papillary carcinoma, as well as DCIS noncomed    Cardiomyopathy     CHEMO INDUCED- RESOLVED    CHF (congestive heart failure)     systolic    Genetic testing 11/06/2023    [facility] Multi Cancer Panel (84 genes) - NEGATIVE, VUS in EGFR, POLE    Hypertension     Uterine fibroid        Past Surgical HIstory:   Past Surgical History:   Procedure Laterality Date    BREAST BIOPSY      BREAST CYST EXCISION Right     BREAST RECONSTRUCTION Bilateral 4/1/2024    Procedure: RECONSTRUCTION, BREAST;  Surgeon: Rashawn James MD;  Location: [facility];  Service: Plastics;  Laterality: Bilateral;    BREAST REVISION SURGERY Bilateral 4/1/2024    Procedure: BREAST REVISION SURGERY;  Surgeon: Rashawn James MD;  Location: [facility];  Service: Plastics;  Laterality: Bilateral;    BREAST SURGERY      CHOLECYSTECTOMY      COLONOSCOPY N/A 9/10/2018    Procedure: COLONOSCOPY;  Surgeon: Indra Ramos MD;  Location: [facility];  Service: Endoscopy;  Laterality: N/A;    COLONOSCOPY N/A 4/28/2020    Procedure: COLONOSCOPY;  Surgeon: Dulce Maria Siegel MD;  Location: [facility];  Service: Endoscopy;  Laterality: N/A;    ESOPHAGOGASTRODUODENOSCOPY N/A 4/28/2020    Procedure: EGD (ESOPHAGOGASTRODUODENOSCOPY);  Surgeon: Dulce Maria Siegel MD;  Location: [facility];  Service: Endoscopy;  Laterality: N/A;    INJECTION FOR SENTINEL NODE IDENTIFICATION Right 12/12/2023    Procedure: INJECTION, FOR SENTINEL NODE IDENTIFICATION;  Surgeon: Shannon Galvan MD;  Location: [facility];  Service: General;  Laterality: Right;    INSERTION OF BREAST IMPLANT Bilateral 4/1/2024    Procedure: INSERTION,  BREAST IMPLANT;  Surgeon: Rashawn James MD;  Location: [facility];  Service: Plastics;  Laterality: Bilateral;    INSERTION OF BREAST TISSUE EXPANDER Right 12/12/2023    Procedure: INSERTION, TISSUE EXPANDER, BREAST;  Surgeon: Rashawn James MD;  Location: [facility];  Service: Plastics;  Laterality: Right;    MASTECTOMY      left    MASTECTOMY, SKIN SPARING, SIMPLE Right 12/12/2023    Procedure: MASTECTOMY, SKIN SPARING, SIMPLE;  Surgeon: Shannon Galvan MD;  Location: [facility];  Service: General;  Laterality: Right;    MEDIPORT INSERTION, SINGLE      PLACEMENT OF ACELLULAR HUMAN DERMAL ALLOGRAFT Right 12/12/2023    Procedure: APPLICATION, ACELLULAR HUMAN DERMAL ALLOGRAFT;  Surgeon: Rashawn James MD;  Location: [facility];  Service: Plastics;  Laterality: Right;    SENTINEL LYMPH NODE BIOPSY Right 12/12/2023    Procedure: BIOPSY, LYMPH NODE, SENTINEL;  Surgeon: Shannon Galvan MD;  Location: [facility];  Service: General;  Laterality: Right;    TISSUE EXPANDER REMOVAL Bilateral 4/1/2024    Procedure: REMOVAL, TISSUE EXPANDER;  Surgeon: Rashawn James MD;  Location: [facility];  Service: Plastics;  Laterality: Bilateral;       Family History:   Family History   Problem Relation Name Age of Onset    Diabetes Mother      Breast cancer Mother      Diabetes Father      Stroke Father      No Known Problems Sister      No Known Problems Brother      Cancer Maternal Aunt  55        breast cancer    Breast cancer Maternal Aunt      Stroke Paternal Aunt      No Known Problems Maternal Grandmother      No Known Problems Maternal Grandfather      No Known Problems Paternal Grandmother      Heart failure Paternal Grandfather      Cancer Maternal Cousin      Melanoma Neg Hx      Psoriasis Neg Hx      Lupus Neg Hx      Eczema Neg Hx       She has no known family history of liver disease.     Social History:  reports that she has never smoked. She has never been exposed to tobacco smoke. She has never used smokeless  tobacco. She reports current alcohol use of about 2.0 standard drinks of alcohol per week. She reports that she does not use drugs.    She has no history of Alcohol     She denies history of IV drug use/Tatto  She  denies high-risk sexual contacts, no raw seafood, no sick contacts      Allergies:  Review of patient's allergies indicates:  No Known Allergies    Medications:  Current Outpatient Medications   Medication Sig Dispense Refill    metoprolol succinate (TOPROL-XL) 50 MG 24 hr tablet Take 1 tablet (50 mg total) by mouth once daily. 30 tablet 6    sacubitriL-valsartan (ENTRESTO)  mg per tablet Take 1 tablet by mouth 2 (two) times daily. 180 tablet 1    fluticasone propionate (FLONASE) 50 mcg/actuation nasal spray 1 spray (50 mcg total) by Each Nostril route once daily. (Patient not taking: Reported on 6/5/2024) 11.1 mL 0    traZODone (DESYREL) 50 MG tablet Take 1 tablet (50 mg total) by mouth every evening. (Patient not taking: Reported on 6/5/2024) 30 tablet 11     No current facility-administered medications for this visit.       Review of Systems   Constitutional:  Negative for fatigue, fever and unexpected weight change.   HENT:  Negative for ear pain, nosebleeds and trouble swallowing.    Eyes:  Negative for discharge and redness.   Respiratory:  Negative for cough and shortness of breath.    Cardiovascular:  Negative for palpitations and leg swelling.   Gastrointestinal:  Negative for abdominal distention, abdominal pain, diarrhea and vomiting.   Endocrine: Negative for cold intolerance and polyuria.   Genitourinary:  Negative for flank pain and hematuria.   Musculoskeletal:  Negative for back pain.   Skin:  Negative for pallor.   Neurological:  Negative for seizures and headaches.   Hematological:  Does not bruise/bleed easily.   Psychiatric/Behavioral:  Negative for confusion and hallucinations.            Objective:      Vitals:   Vitals:    06/05/24 0937   BP: 112/78   BP Location: Right arm  "  Pulse: 78   SpO2: (!) 91%   Weight: 105 kg (231 lb 7.7 oz)   Height: 5' 4" (1.626 m)       Physical Exam  Constitutional:       Appearance: Normal appearance.   HENT:      Head: Normocephalic and atraumatic.      Right Ear: Tympanic membrane and external ear normal.      Left Ear: Tympanic membrane and external ear normal.      Mouth/Throat:      Mouth: Mucous membranes are moist.   Eyes:      Extraocular Movements: Extraocular movements intact.      Pupils: Pupils are equal, round, and reactive to light.   Cardiovascular:      Rate and Rhythm: Normal rate and regular rhythm.      Pulses: Normal pulses.      Heart sounds: Normal heart sounds.   Pulmonary:      Effort: Pulmonary effort is normal.      Breath sounds: Normal breath sounds.   Abdominal:      General: Bowel sounds are normal. There is no distension.      Palpations: Abdomen is soft. There is no mass.      Tenderness: There is no abdominal tenderness.   Musculoskeletal:         General: No swelling or deformity. Normal range of motion.      Cervical back: Normal range of motion and neck supple.   Skin:     Coloration: Skin is not jaundiced.   Neurological:      General: No focal deficit present.      Mental Status: She is alert and oriented to person, place, and time.      Cranial Nerves: No cranial nerve deficit.   Psychiatric:         Mood and Affect: Mood normal.         Behavior: Behavior normal.         Laboratory Data:  No visits with results within 1 Week(s) from this visit.   Latest known visit with results is:   Admission on 04/01/2024, Discharged on 04/01/2024   Component Date Value Ref Range Status    Final Pathologic Diagnosis 04/01/2024    Final                    Value:1. RIGHT BREAST TISSUE EXPANDER, REMOVAL (gross only).      2. LEFT BREAST TISSUE EXPANDER, REMOVAL (gross only).        Interp By Mary Goss MD, Signed on 04/02/2024 at 19:50    Gross 04/01/2024    Final                    Value:1: Surgery ID:  [id number]    " "Pathology ID:  [id number]  1. Received fresh labeled &quot;right breast tissue expander&quot; is an 81 g, 15 cm in diameter clear plastic tissue expander with 5 blue tabs and 1 clear tab.  The expander is inscribed with &quot;Allergan style SMX 15 cm-700 cc lot [id number]. &quot;    There is a 3.5 cm in diameter silver metallic device located within the expander inscribed with &quot;[id number].&quot;  Minimal residual clear fluid is located within the expander.  A 0.3 cm linear defect is identified.  No sections are submitted.    The specimen is submitted for gross examination only.      Grossed by: Agustina Morris MS, PA([facility])   2: Surgery ID:  [id number]    Pathology ID:  [id number]  2. Received fresh labeled &quot;left breast tissue expander&quot; is a 73 g, 13 cm in diameter tissue expander with 5 blue tabs and 1 clear tab.  The expander is inscribed with &quot;Allergan style SMX 13 cm-500 cc lot [id number]. &quot; there is a 3.5 cm   in diameter silver metallic de                          vice located within the expander inscribed with &quot;[id number]. &quot;  There is minimal residual clear fluid located within the expander.  A 0.4 cm linear defect is identified.  No sections are submitted.  The specimen   is submitted for gross examination only.      Grossed by: Agustina Morris MS, PA([facility])      Disclaimer 04/01/2024 Unless the case is a 'gross only' or additional testing only, the final diagnosis for each specimen is based on a microscopic examination of appropriate tissue sections.   Final       No results found for: "INR", "PROTIME"    No results found for: "SMOOTHMUSCAB", "MITOAB"  Lab Results   Component Value Date    IRON 92 05/17/2022    TIBC 337 05/17/2022    FERRITIN 179 05/17/2022     No results found for: "HAV", "HEPAIGM", "HEPBIGM", "HEPBCAB", "HBEAG", "HEPCAB"  Lab Results   Component Value Date    TSH 0.741 05/17/2022     Lab Results   Component Value Date    MARZENA Negative 07/11/2006       No results " "found for: "ABORH"        Lab Results   Component Value Date    HGBA1C 5.4 10/26/2006     Lab Results   Component Value Date    CHOL 249 (H) 06/01/2023    CHOL 234 (H) 05/17/2022    CHOL 226 (H) 04/27/2021     Lab Results   Component Value Date    HDL 82 (H) 06/01/2023    HDL 79 (H) 05/17/2022    HDL 80 (H) 04/27/2021     Lab Results   Component Value Date    LDLCALC 150.8 06/01/2023    LDLCALC 139.0 05/17/2022    LDLCALC 131.4 04/27/2021     Lab Results   Component Value Date    TRIG 81 06/01/2023    TRIG 80 05/17/2022    TRIG 73 04/27/2021     Lab Results   Component Value Date    CHOLHDL 32.9 06/01/2023    CHOLHDL 33.8 05/17/2022    CHOLHDL 35.4 04/27/2021         I personally reviewed imaging studies and outside records..      Assessment:       1. Liver lesion    2. Hepatic lesion    3. Liver disease, unspecified    4. Abnormal findings on diagnostic imaging of heart and coronary circulation                 Plan:     Problem List Items Addressed This Visit          GI    Liver lesion - Primary    Relevant Orders    Alpha-Fetoprotein and AFP L-3    MOSES-Gamma-Carboxy Prothrombin (DCP)    AFP Tumor Marker     Other Visit Diagnoses       Hepatic lesion        Relevant Orders    MRI Abdomen W WO Contrast    Creatinine, serum    Liver disease, unspecified        Relevant Orders    MRI Abdomen W WO Contrast    Abnormal findings on diagnostic imaging of heart and coronary circulation        Relevant Orders    AFP Tumor Marker            Josette was seen today for mass.    Diagnoses and all orders for this visit:    Liver lesion  -     Alpha-Fetoprotein and AFP L-3; Future  -     MOSES-Gamma-Carboxy Prothrombin (DCP); Future  -     AFP Tumor Marker; Future    Hepatic lesion  -     Ambulatory referral/consult to Hepatology  -     MRI Abdomen W WO Contrast; Future  -     Creatinine, serum; Future    Liver disease, unspecified  -     MRI Abdomen W WO Contrast; Future    Abnormal findings on diagnostic imaging of heart and " coronary circulation  -     AFP Tumor Marker; Future        Liver lesion  MRI liver  Plan to discuss with IR conference for any recommendation  Stable. NO surveillance needed    Hematuria  Resolved        I have sent communication to the referring physician and/or primary care provider.      Time Statement  A total time spent includes time preparing to see patient, reviewing  diagnostic studies and records, direct face-to-face visit, completing orders, medications , reconciliation, prescription management, and care coordination    We discussed in depth the nature of the patient's disease, the management plan in details. I have provided the patient with an opportunity to ask questions and have all questions answered to his satisfaction.     Discussed with patient that it is likely that she will see results before Myself or my nurse are able to view them and report results due to the Cures Act passed 4/1/21. Results will be sent immediately to the patient who are enrolled in the patient portal. If results come through after business hours or on weekend, we will not see them until the next business day that we are in the office. If resulted during the business day, we will likely not be able to review them until after completing all patient visits in office that day.       Micheal Steel MD  Transplant Hepatologist and Gastroenterologist  [facility]

## 2024-06-05 ENCOUNTER — OFFICE VISIT (OUTPATIENT)
Dept: HEPATOLOGY | Facility: CLINIC | Age: 69
End: 2024-06-05
Payer: MEDICARE

## 2024-06-05 ENCOUNTER — PATIENT MESSAGE (OUTPATIENT)
Dept: HEPATOLOGY | Facility: CLINIC | Age: 69
End: 2024-06-05

## 2024-06-05 VITALS
SYSTOLIC BLOOD PRESSURE: 112 MMHG | BODY MASS INDEX: 39.52 KG/M2 | DIASTOLIC BLOOD PRESSURE: 78 MMHG | WEIGHT: 231.5 LBS | HEART RATE: 78 BPM | HEIGHT: 64 IN | OXYGEN SATURATION: 91 %

## 2024-06-05 DIAGNOSIS — K76.9 LIVER LESION: Primary | ICD-10-CM

## 2024-06-05 DIAGNOSIS — K76.9 HEPATIC LESION: ICD-10-CM

## 2024-06-05 DIAGNOSIS — K76.9 LIVER DISEASE, UNSPECIFIED: ICD-10-CM

## 2024-06-05 DIAGNOSIS — R93.1 ABNORMAL FINDINGS ON DIAGNOSTIC IMAGING OF HEART AND CORONARY CIRCULATION: ICD-10-CM

## 2024-06-05 PROCEDURE — 99214 OFFICE O/P EST MOD 30 MIN: CPT | Mod: PBBFAC | Performed by: INTERNAL MEDICINE

## 2024-06-05 PROCEDURE — 99204 OFFICE O/P NEW MOD 45 MIN: CPT | Mod: S$PBB,,, | Performed by: INTERNAL MEDICINE

## 2024-06-05 PROCEDURE — 99999 PR PBB SHADOW E&M-EST. PATIENT-LVL IV: CPT | Mod: PBBFAC,,, | Performed by: INTERNAL MEDICINE

## 2024-06-05 NOTE — PATIENT INSTRUCTIONS
What causes liver cysts?  The cause of most liver cysts is unknown. Liver cysts can be present at birth or can develop at a later time. They usually grow slowly and are not detected until adulthood.      What are the symptoms of liver cysts?  Most liver cysts do not cause any symptoms. However, if cysts become large, they can cause bloating and pain in the upper right part of your abdomen. Sometimes, liver cysts become large enough that you can feel them through your abdomen.    What are the complications of liver cysts?  Liver cysts can have rare complications of liver failure and liver cancer.    How are liver cysts diagnosed?  Since most liver cysts do not cause any symptoms, they usually are detected only on ultrasounds or computerized tomography (CT) scans. If symptoms do occur, a doctor may perform an abdominal CT scan to look at the liver.    How are liver cysts treated?  Most liver cysts do not need to be treated. However, if cysts get large and painful, they may need to be drained or surgically removed. Cysts also may be surgically removed if they are stopping bile from reaching your intestine.

## 2024-06-11 DIAGNOSIS — I42.7 CARDIOMYOPATHY DUE TO CHEMOTHERAPY: ICD-10-CM

## 2024-06-11 DIAGNOSIS — T45.1X5A CARDIOMYOPATHY DUE TO CHEMOTHERAPY: ICD-10-CM

## 2024-06-11 DIAGNOSIS — I50.22 CHRONIC SYSTOLIC HEART FAILURE: ICD-10-CM

## 2024-06-11 RX ORDER — SACUBITRIL AND VALSARTAN 97; 103 MG/1; MG/1
1 TABLET, FILM COATED ORAL 2 TIMES DAILY
Qty: 180 TABLET | Refills: 1 | Status: SHIPPED | OUTPATIENT
Start: 2024-06-11

## 2024-06-18 ENCOUNTER — TELEPHONE (OUTPATIENT)
Dept: SURGERY | Facility: CLINIC | Age: 69
End: 2024-06-18
Payer: MEDICARE

## 2024-06-18 ENCOUNTER — LAB VISIT (OUTPATIENT)
Dept: LAB | Facility: HOSPITAL | Age: 69
End: 2024-06-18
Attending: INTERNAL MEDICINE
Payer: MEDICARE

## 2024-06-18 DIAGNOSIS — R93.1 ABNORMAL FINDINGS ON DIAGNOSTIC IMAGING OF HEART AND CORONARY CIRCULATION: ICD-10-CM

## 2024-06-18 DIAGNOSIS — K76.9 LIVER LESION: ICD-10-CM

## 2024-06-18 LAB — AFP SERPL-MCNC: 6.1 NG/ML (ref 0–8.4)

## 2024-06-18 PROCEDURE — 36415 COLL VENOUS BLD VENIPUNCTURE: CPT | Performed by: INTERNAL MEDICINE

## 2024-06-18 PROCEDURE — 82105 ALPHA-FETOPROTEIN SERUM: CPT | Performed by: INTERNAL MEDICINE

## 2024-06-18 NOTE — TELEPHONE ENCOUNTER
Contacting patient to reschedule appt from today. No answer--lm stating we would be cancelling her appt today.

## 2024-06-21 LAB
ACARBOXYPROTHROMBIN SERPL-MCNC: 0.2 NG/ML
AFP L3 MFR SERPL: <0.5 %
AFP SERPL-MCNC: 3.6 NG/ML
IMMUNOLOGIST REVIEW: NORMAL

## 2024-07-29 ENCOUNTER — PATIENT MESSAGE (OUTPATIENT)
Dept: SURGERY | Facility: CLINIC | Age: 69
End: 2024-07-29
Payer: MEDICARE

## 2024-08-07 DIAGNOSIS — I50.22 NYHA CLASS 2 AND ACC/AHA STAGE C CHRONIC SYSTOLIC CONGESTIVE HEART FAILURE: Primary | Chronic | ICD-10-CM

## 2024-08-08 ENCOUNTER — OFFICE VISIT (OUTPATIENT)
Dept: CARDIOLOGY | Facility: CLINIC | Age: 69
End: 2024-08-08
Payer: MEDICARE

## 2024-08-08 ENCOUNTER — HOSPITAL ENCOUNTER (OUTPATIENT)
Dept: CARDIOLOGY | Facility: HOSPITAL | Age: 69
Discharge: HOME | End: 2024-08-08
Attending: STUDENT IN AN ORGANIZED HEALTH CARE EDUCATION/TRAINING PROGRAM
Payer: MEDICARE

## 2024-08-08 VITALS
WEIGHT: 232.38 LBS | DIASTOLIC BLOOD PRESSURE: 85 MMHG | OXYGEN SATURATION: 98 % | HEIGHT: 64 IN | HEART RATE: 71 BPM | SYSTOLIC BLOOD PRESSURE: 146 MMHG | BODY MASS INDEX: 39.67 KG/M2

## 2024-08-08 DIAGNOSIS — I50.22 CHRONIC SYSTOLIC HEART FAILURE: ICD-10-CM

## 2024-08-08 DIAGNOSIS — I10 ESSENTIAL HYPERTENSION: ICD-10-CM

## 2024-08-08 DIAGNOSIS — I50.22 NYHA CLASS 2 AND ACC/AHA STAGE C CHRONIC SYSTOLIC CONGESTIVE HEART FAILURE: Chronic | ICD-10-CM

## 2024-08-08 DIAGNOSIS — I50.22 NYHA CLASS 2 AND ACC/AHA STAGE C CHRONIC SYSTOLIC CONGESTIVE HEART FAILURE: Primary | ICD-10-CM

## 2024-08-08 DIAGNOSIS — R07.89 CHEST TIGHTNESS: ICD-10-CM

## 2024-08-08 DIAGNOSIS — E66.01 SEVERE OBESITY (BMI 35.0-35.9 WITH COMORBIDITY): ICD-10-CM

## 2024-08-08 PROBLEM — I70.0 AORTIC ATHEROSCLEROSIS: Status: ACTIVE | Noted: 2024-08-08

## 2024-08-08 PROBLEM — I70.0 AORTIC ATHEROSCLEROSIS: Status: RESOLVED | Noted: 2024-08-08 | Resolved: 2024-08-08

## 2024-08-08 LAB
OHS QRS DURATION: 82 MS
OHS QTC CALCULATION: 465 MS

## 2024-08-08 PROCEDURE — 99213 OFFICE O/P EST LOW 20 MIN: CPT | Mod: PBBFAC | Performed by: STUDENT IN AN ORGANIZED HEALTH CARE EDUCATION/TRAINING PROGRAM

## 2024-08-08 PROCEDURE — 99999 PR PBB SHADOW E&M-EST. PATIENT-LVL III: CPT | Mod: PBBFAC,,, | Performed by: STUDENT IN AN ORGANIZED HEALTH CARE EDUCATION/TRAINING PROGRAM

## 2024-08-08 PROCEDURE — 93010 ELECTROCARDIOGRAM REPORT: CPT | Mod: ,,, | Performed by: INTERNAL MEDICINE

## 2024-08-08 PROCEDURE — 93005 ELECTROCARDIOGRAM TRACING: CPT

## 2024-08-20 ENCOUNTER — OFFICE VISIT (OUTPATIENT)
Dept: PHYSICAL MEDICINE AND REHAB | Facility: CLINIC | Age: 69
End: 2024-08-20
Payer: MEDICARE

## 2024-08-20 ENCOUNTER — HOSPITAL ENCOUNTER (OUTPATIENT)
Dept: RADIOLOGY | Facility: HOSPITAL | Age: 69
Discharge: HOME | End: 2024-08-20
Attending: PHYSICAL MEDICINE & REHABILITATION
Payer: MEDICARE

## 2024-08-20 VITALS — BODY MASS INDEX: 39.67 KG/M2 | WEIGHT: 232.38 LBS | HEIGHT: 64 IN

## 2024-08-20 DIAGNOSIS — M67.911 TENDINOPATHY OF ROTATOR CUFF, RIGHT: ICD-10-CM

## 2024-08-20 DIAGNOSIS — M67.911 TENDINOPATHY OF ROTATOR CUFF, RIGHT: Primary | ICD-10-CM

## 2024-08-20 PROCEDURE — 99213 OFFICE O/P EST LOW 20 MIN: CPT | Mod: PBBFAC,25 | Performed by: PHYSICAL MEDICINE & REHABILITATION

## 2024-08-20 PROCEDURE — 99999 PR PBB SHADOW E&M-EST. PATIENT-LVL III: CPT | Mod: PBBFAC,,, | Performed by: PHYSICAL MEDICINE & REHABILITATION

## 2024-08-20 PROCEDURE — 99214 OFFICE O/P EST MOD 30 MIN: CPT | Mod: S$PBB,,, | Performed by: PHYSICAL MEDICINE & REHABILITATION

## 2024-08-20 PROCEDURE — 73030 X-RAY EXAM OF SHOULDER: CPT | Mod: 26,RT,, | Performed by: RADIOLOGY

## 2024-08-20 PROCEDURE — 73030 X-RAY EXAM OF SHOULDER: CPT | Mod: TC,RT

## 2024-08-20 RX ORDER — METHYLPREDNISOLONE 4 MG/1
TABLET ORAL
Qty: 21 EACH | Refills: 0 | Status: SHIPPED | OUTPATIENT
Start: 2024-08-20 | End: 2024-09-10

## 2024-08-20 NOTE — PROGRESS NOTES
PM&R CLINIC NOTE    Chief Complaint   Patient presents with    Arm Pain     right       HPI: This is a 69 y.o.  female being seen in clinic today for return of right shoulder/arm pain that began about 2weeks ago.  She was reaching up in a cabinet and quickly caught an item that was falling.  Lying down can increase the throbbing pain.  Sitting up provides some relief. She has limited ROM in her arm-abduction, reaching.    History obtained from patient    Functional History:  Walking: Not limited  Transfers: Independent  Assistive devices: No  Power mobility: No  Falls: None   Directional preference:    Employment status:educator    Needs help with:  Nothing - all ADLS normal    Review of Systems:     General- denies lethargy, weight change, fever, chills  Head/neck- denies swallowing difficulties  ENT- denies hearing changes  Cardiovascular-denies chest pain  Pulmonary- denies shortness of breath  GI- denies constipation or bowel incontinence  - denies bladder incontinence  Skin- denies wounds or rashes  Musculoskeletal- denies weakness, +pain  Neurologic- denies numbness and tingling  Psychiatric- denies depressive or psychotic features, denies anxiety  Lymphatic-denies swelling  Endocrine- denies hypoglycemic symptoms/DM history    Physical Examination:  General: Well developed, well nourished female, NAD  HEENT: NCAT EOMI  Pulmonary: Normal respirations    Spinal Examination: CERVICAL  Active ROM is within normal limits.  Inspection: No deformity of spinal alignment.    Spinal Examination: LUMBAR or THORACIC  Active ROM is limited in full flex and extension  Inspection: No deformity of spinal alignment.      Bilateral Upper and Lower Extremities:  Pulses are 2+ at radial, bilaterally.  Shoulder/Elbow/Wrist/Hand ROM wnl except limited in all planes at right shoulder-worse in abd, flex, ER/IR, ttp at right ac jt. +neers, chirinos, neg drop  Hip/Knee/Ankle ROM wnl  Bilateral Extremities show normal capillary  refill.  No signs of cyanosis, rubor, edema, skin changes, or dysvascular changes of appendages.  Nails appear intact.    Neurological Exam:  Cranial Nerves:  II-XII grossly intact    Manual Muscle Testing: (Motor 5=normal)  5/5 strength bilateral upper extremities except for 4p/5 at sab on right  No focal atrophy is noted of either upper or lower extremity.    Bilateral Reflexes:hypo at bic tric br  Hoffmans neg bilaterally    Sensation: tested to light touch  - intact in arms  Gait: Narrow base and good arm swing.    Cerebellar: tandem gait.     IMPRESSION/PLAN: This is a 69 y.o.  female with rotator cuff tendinopathy     1. Light ROM exercises provided  2. Xray shoulder  3. Steroid pack  4. Refer to odette Bonilla M.D.  Physical Medicine and Rehab

## 2024-08-21 ENCOUNTER — OFFICE VISIT (OUTPATIENT)
Dept: SPORTS MEDICINE | Facility: CLINIC | Age: 69
End: 2024-08-21
Payer: MEDICARE

## 2024-08-21 VITALS — HEIGHT: 64 IN | BODY MASS INDEX: 39.67 KG/M2 | WEIGHT: 232.38 LBS

## 2024-08-21 DIAGNOSIS — S46.011A TRAUMATIC INCOMPLETE TEAR OF RIGHT ROTATOR CUFF, INITIAL ENCOUNTER: Primary | ICD-10-CM

## 2024-08-21 DIAGNOSIS — M75.41 SUBACROMIAL IMPINGEMENT OF RIGHT SHOULDER: ICD-10-CM

## 2024-08-21 PROCEDURE — 99999PBSHW PR PBB SHADOW TECHNICAL ONLY FILED TO HB: Mod: PBBFAC,,,

## 2024-08-21 PROCEDURE — 20610 DRAIN/INJ JOINT/BURSA W/O US: CPT | Mod: PBBFAC | Performed by: PHYSICIAN ASSISTANT

## 2024-08-21 PROCEDURE — 99999 PR PBB SHADOW E&M-EST. PATIENT-LVL III: CPT | Mod: PBBFAC,,, | Performed by: PHYSICIAN ASSISTANT

## 2024-08-21 PROCEDURE — 99213 OFFICE O/P EST LOW 20 MIN: CPT | Mod: PBBFAC | Performed by: PHYSICIAN ASSISTANT

## 2024-08-21 RX ORDER — CELECOXIB 200 MG/1
200 CAPSULE ORAL 2 TIMES DAILY
Qty: 60 CAPSULE | Refills: 1 | Status: SHIPPED | OUTPATIENT
Start: 2024-08-21

## 2024-08-21 RX ORDER — TRIAMCINOLONE ACETONIDE 40 MG/ML
40 INJECTION, SUSPENSION INTRA-ARTICULAR; INTRAMUSCULAR
Status: DISCONTINUED | OUTPATIENT
Start: 2024-08-21 | End: 2024-08-21 | Stop reason: HOSPADM

## 2024-08-21 RX ADMIN — TRIAMCINOLONE ACETONIDE 40 MG: 200 INJECTION, SUSPENSION INTRA-ARTICULAR; INTRAMUSCULAR at 10:08

## 2024-08-21 NOTE — PROGRESS NOTES
Orthopaedics Sports Medicine     Shoulder Initial Visit         8/21/2024    Referring MD: Sarika Bonilla MD    Chief Complaint   Patient presents with    Right Shoulder - Pain         History of Present Illness:   Josette Altamirano is a 69 y.o. right-hand dominant female who presents with right shoulder pain and dysfunction.    Onset of the symptoms was 2 weeks ago     Inciting event:  Patient was grabbing a tea kettle at the top of her cabinet when it began to fall and she tried to catch it causing her to do a jerking motion of her right shoulder, she had immediate pain and limited range of motion shortly thereafter    Current symptoms include anterior and lateral shoulder pain, limited range of motion due to pain, severe night pain that has limiting her ability to sleep     Pain is aggravated by certain movements of the shoulder, laying on the affected side      Evaluation to date: X-Ray     Treatment to date: Rest, activity modification, oral anti-inflammatories, over-the-counter Tylenol     Past Medical History:   Past Medical History:   Diagnosis Date    Arthritis of right knee     Breast cancer     She has a history of a Stage IIA, 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 She subsequently underwent left mastectomy and sentinel node biopsy followed by axillary dissection March 23, 2007. Histopathologic evaluation demonstrated again multiple foci of intracystic papillary carcinoma, as well as DCIS noncomed    Cardiomyopathy     CHEMO INDUCED- RESOLVED    CHF (congestive heart failure)     systolic    Genetic testing 11/06/2023    [employer] Multi Cancer Panel (84 genes) - NEGATIVE, VUS in EGFR, POLE    Hypertension     Uterine fibroid        Past Surgical History:   Past Surgical History:   Procedure Laterality Date    BREAST BIOPSY      BREAST CYST EXCISION Right     BREAST RECONSTRUCTION Bilateral 4/1/2024    Procedure:  RECONSTRUCTION, BREAST;  Surgeon: Rashawn James MD;  Location: [facility] OR;  Service: Plastics;  Laterality: Bilateral;    BREAST REVISION SURGERY Bilateral 4/1/2024    Procedure: BREAST REVISION SURGERY;  Surgeon: Rashawn James MD;  Location: [facility] OR;  Service: Plastics;  Laterality: Bilateral;    BREAST SURGERY      CHOLECYSTECTOMY      COLONOSCOPY N/A 9/10/2018    Procedure: COLONOSCOPY;  Surgeon: Indra Ramos MD;  Location: [facility] ENDO;  Service: Endoscopy;  Laterality: N/A;    COLONOSCOPY N/A 4/28/2020    Procedure: COLONOSCOPY;  Surgeon: Dulce Maria Siegel MD;  Location: [facility];  Service: Endoscopy;  Laterality: N/A;    ESOPHAGOGASTRODUODENOSCOPY N/A 4/28/2020    Procedure: EGD (ESOPHAGOGASTRODUODENOSCOPY);  Surgeon: Dulce Maria Siegel MD;  Location: [facility];  Service: Endoscopy;  Laterality: N/A;    INJECTION FOR SENTINEL NODE IDENTIFICATION Right 12/12/2023    Procedure: INJECTION, FOR SENTINEL NODE IDENTIFICATION;  Surgeon: Shannon Galvan MD;  Location: [facility];  Service: General;  Laterality: Right;    INSERTION OF BREAST IMPLANT Bilateral 4/1/2024    Procedure: INSERTION, BREAST IMPLANT;  Surgeon: Rashawn James MD;  Location: [facility];  Service: Plastics;  Laterality: Bilateral;    INSERTION OF BREAST TISSUE EXPANDER Right 12/12/2023    Procedure: INSERTION, TISSUE EXPANDER, BREAST;  Surgeon: Rashawn James MD;  Location: [facility] OR;  Service: Plastics;  Laterality: Right;    MASTECTOMY      left    MASTECTOMY, SKIN SPARING, SIMPLE Right 12/12/2023    Procedure: MASTECTOMY, SKIN SPARING, SIMPLE;  Surgeon: Shannon Galvan MD;  Location: [facility] OR;  Service: General;  Laterality: Right;    MEDIPORT INSERTION, SINGLE      PLACEMENT OF ACELLULAR HUMAN DERMAL ALLOGRAFT Right 12/12/2023    Procedure: APPLICATION, ACELLULAR HUMAN DERMAL ALLOGRAFT;  Surgeon: Rashawn James MD;  Location: [facility] OR;  Service: Plastics;  Laterality: Right;    SENTINEL LYMPH NODE BIOPSY Right 12/12/2023     "Procedure: BIOPSY, LYMPH NODE, SENTINEL;  Surgeon: Shannon Galvan MD;  Location: [facility] OR;  Service: General;  Laterality: Right;    TISSUE EXPANDER REMOVAL Bilateral 4/1/2024    Procedure: REMOVAL, TISSUE EXPANDER;  Surgeon: Rashawn James MD;  Location: [facility] OR;  Service: Plastics;  Laterality: Bilateral;       Medications:  Patient's Medications   New Prescriptions    No medications on file   Previous Medications    FLUTICASONE PROPIONATE (FLONASE) 50 MCG/ACTUATION NASAL SPRAY    1 spray (50 mcg total) by Each Nostril route once daily.    METHYLPREDNISOLONE (MEDROL DOSEPACK) 4 MG TABLET    use as directed    METOPROLOL SUCCINATE (TOPROL-XL) 50 MG 24 HR TABLET    Take 1 tablet (50 mg total) by mouth once daily.    SACUBITRIL-VALSARTAN (ENTRESTO)  MG PER TABLET    Take 1 tablet by mouth 2 (two) times daily.    TRAZODONE (DESYREL) 50 MG TABLET    Take 1 tablet (50 mg total) by mouth every evening.   Modified Medications    No medications on file   Discontinued Medications    No medications on file       Allergies: Review of patient's allergies indicates:  No Known Allergies    Social History:   Home town: [place], [place]  Occupation: [employer]   Alcohol use: She reports current alcohol use of about 2.0 standard drinks of alcohol per week.  Tobacco use: She reports that she has never smoked. She has never been exposed to tobacco smoke. She has never used smokeless tobacco.    Review of systems:  History of recent illness, fevers, shakes, or chills: no  History of cardiac problems or chest pain: HTN, CHF  History of pulmonary problems or asthma: no  History of diabetes: no  History of prior dvt or clotting problems: no  History of sleep apnea: no      Physical Examination:  Estimated body mass index is 39.89 kg/m² as calculated from the following:    Height as of this encounter: 5' 4" (1.626 m).    Weight as of this encounter: 105.4 kg (232 lb 5.8 oz).    General  Healthy appearing female in " no acute distress  Alert and oriented, normal mood, appropriate affect    Shoulder Examination:  Patient is alert and oriented, no distress. Skin is intact. Neuro is normal with no focal motor or sensory findings.    Cervical exam is unremarkable. Intact cervical ROM. Negative Spurling's test    Physical Exam:  RIGHT    LEFT    Scap Dyskinesis/Winging (-)    (-)    Tenderness:          Greater Tuberosity             +    (-)  Bicipital Groove  (-)    (-)  AC joint   (-)    (-)  Other:     ROM:  Forward Elevation 140    160  Abduction  100    120  ER (at side)  60    80  IR   T10    T8    Strength:   Supraspinatus  4+/5    5/5  Infraspinatus  3/5    5/5  Subscap / IR  5/5    5/5     Special Tests:   Neer:    (-)    (-)   Lazaro:   +    (-)   SS Stress:   +    (-)   Bear Hug:   (-)    (-)   Blue Bell's:   +    (-)   Resisted Thrower's:   +    (-)   Cross Arm Abduction:  +    (-)    Neurovascular examination  - Motor grossly intact bilaterally to shoulder abduction, elbow flexion and extension, wrist flexion and extension, and intrinsic hand musculature  - Sensation intact to light touch bilaterally in axillary, median, radial, and ulnar distributions  - Symmetrical radial pulses      Imaging:  XR Results:  Results for orders placed during the hospital encounter of 08/20/24    X-ray Shoulder 2 or More Views Right    Narrative  EXAMINATION:  XR SHOULDER COMPLETE 2 OR MORE VIEWS RIGHT    CLINICAL HISTORY:  Unspecified disorder of synovium and tendon, right shoulder    TECHNIQUE:  Two or three views of the right shoulder were preformed.    COMPARISON:  08/02/2017    FINDINGS:  No acute fracture or dislocation.  No significant AC joint arthropathy noted.  There is some minimal spurring associated with the acromion.  No significant degenerative change at the glenohumeral joint.  Surgical clips seen projecting over the right axillary region.    Impression  1.  As above      Electronically signed by: Ernesto Butts  DO  Date:    08/20/2024  Time:    11:15      Physician Read: I agree with the above impression.      Impression:  69 y.o. female with suspected right shoulder rotator cuff tear, subacromial impingement      Plan:  Discussed diagnosis and treatment options with patient today.  Her history and physical exam is consistent with a right shoulder rotator cuff tear.  Reviewed her x-rays with her today, she does have some degenerative changes at her rotator cuff insertion which can be seen in the setting of chronic rotator cuff tearing. She did have a recent injury that made her pain and range of motion in worse, this can be found in the setting of acute rotator cuff tearing  We discussed several treatment options today.  Discussed moving forward with a subacromial corticosteroid injection 1st as well as oral anti-inflammatories and a home exercise program versus moving forward with an MRI.  Discussed her that based off of her x-ray findings, I would recommend more conservative treatment 1st; however, her pain persists with conservative treatment we can always move forward with an MRI patient voiced understanding  I recommend we move forward with a right shoulder subacromial corticosteroid injection, oral anti-inflammatories, and a home exercise program  Right shoulder SAS CSI given in clinic, patient tolerated the procedure well with no immediate complications  Prescription for Celebrex twice a day provided, encouraged to take with meals.  Encouraged to take during times of increased inflammation.  AAOS shoulder strengthening conditioning program printed, reviewed, provided  I would like for her to return to 8 weeks, if her pain persists at that time we will more than likely move forward with an MRI. Discussed that if her pain is persisting sooner than that to let us know and we can move forward with an MRI sooner, patient voiced understanding  Follow up with me in 8 weeks          Marti Zaldivar PA-C  Sports Medicine  Physician Assistant       Disclaimer: This note was prepared using a voice recognition system and is likely to have sound alike errors within the text.

## 2024-08-21 NOTE — PROCEDURES
Large Joint Aspiration/Injection: R subacromial bursa    Date/Time: 8/21/2024 10:00 AM    Performed by: Marti Zaldivar PA-C  Authorized by: Marti Zaldivar PA-C    Consent Done?:  Yes (Verbal)  Indications:  Pain  Site marked: the procedure site was marked    Timeout: prior to procedure the correct patient, procedure, and site was verified    Prep: patient was prepped and draped in usual sterile fashion      Local anesthesia used?: Yes    Anesthesia:  Local infiltration  Local anesthetic:  Lidocaine 1% without epinephrine    Details:  Needle Size:  22 G  Ultrasonic Guidance for needle placement?: No    Approach:  Posterior  Location:  Shoulder  Site:  R subacromial bursa  Medications:  40 mg triamcinolone acetonide 40 mg/mL  Patient tolerance:  Patient tolerated the procedure well with no immediate complications    Procedure Note:  We discussed the risk and benefits of injections, including pain, infection, bleeding, damage to adjacent structures, risk of reaction to injection. We discussed the steroid/cortisone injections will not heal the problem but mat help decrease inflammation and help with symptoms. We discussed the risk of repeated injections. The patient expressed understanding and wanted to proceed with the injection. We performed a timeout to verify the proper patient, proper procedure, and the proper site. The injection site was prepared in a sterile fashion. The patient tolerated it well and there were no complication. We did discuss with the patient that steroid injections can cause some increase in blood sugar and blood pressure for up to a week after the injection.

## 2024-08-26 ENCOUNTER — TELEPHONE (OUTPATIENT)
Dept: SPORTS MEDICINE | Facility: CLINIC | Age: 69
End: 2024-08-26
Payer: MEDICARE

## 2024-08-26 NOTE — TELEPHONE ENCOUNTER
Reached out to pt regarding to her message requesting an appointment, Spoke with patient and scheduled an appointment but advised patient she still has some time for injection to work and if she is feeling better to cancel her appt, pt expressed verbal understanding and agreement

## 2024-09-03 ENCOUNTER — OFFICE VISIT (OUTPATIENT)
Dept: INTERNAL MEDICINE | Facility: CLINIC | Age: 69
End: 2024-09-03
Payer: MEDICARE

## 2024-09-03 VITALS
RESPIRATION RATE: 18 BRPM | HEIGHT: 64 IN | DIASTOLIC BLOOD PRESSURE: 70 MMHG | WEIGHT: 230.81 LBS | BODY MASS INDEX: 39.4 KG/M2 | TEMPERATURE: 97 F | SYSTOLIC BLOOD PRESSURE: 118 MMHG | HEART RATE: 72 BPM

## 2024-09-03 DIAGNOSIS — I42.7 CARDIOMYOPATHY DUE TO CHEMOTHERAPY: ICD-10-CM

## 2024-09-03 DIAGNOSIS — T45.1X5A CARDIOMYOPATHY DUE TO CHEMOTHERAPY: ICD-10-CM

## 2024-09-03 DIAGNOSIS — Z00.00 ENCOUNTER FOR PREVENTIVE HEALTH EXAMINATION: Primary | ICD-10-CM

## 2024-09-03 DIAGNOSIS — I50.22 NYHA CLASS 2 AND ACC/AHA STAGE C CHRONIC SYSTOLIC CONGESTIVE HEART FAILURE: Chronic | ICD-10-CM

## 2024-09-03 DIAGNOSIS — C50.811 MALIGNANT NEOPLASM OF OVERLAPPING SITES OF RIGHT BREAST IN FEMALE, ESTROGEN RECEPTOR POSITIVE: ICD-10-CM

## 2024-09-03 DIAGNOSIS — G89.29 CHRONIC RIGHT SHOULDER PAIN: ICD-10-CM

## 2024-09-03 DIAGNOSIS — E66.01 MORBID OBESITY: ICD-10-CM

## 2024-09-03 DIAGNOSIS — D56.3 ALPHA THALASSEMIA TRAIT: ICD-10-CM

## 2024-09-03 DIAGNOSIS — M25.511 CHRONIC RIGHT SHOULDER PAIN: ICD-10-CM

## 2024-09-03 DIAGNOSIS — I10 ESSENTIAL HYPERTENSION: Chronic | ICD-10-CM

## 2024-09-03 DIAGNOSIS — R31.9 HEMATURIA, UNSPECIFIED TYPE: ICD-10-CM

## 2024-09-03 DIAGNOSIS — Z17.0 MALIGNANT NEOPLASM OF OVERLAPPING SITES OF RIGHT BREAST IN FEMALE, ESTROGEN RECEPTOR POSITIVE: ICD-10-CM

## 2024-09-03 PROBLEM — Z12.11 SPECIAL SCREENING FOR MALIGNANT NEOPLASMS, COLON: Status: RESOLVED | Noted: 2018-09-09 | Resolved: 2024-09-03

## 2024-09-03 PROCEDURE — 99999 PR PBB SHADOW E&M-EST. PATIENT-LVL III: CPT | Mod: PBBFAC,,, | Performed by: NURSE PRACTITIONER

## 2024-09-03 PROCEDURE — G0439 PPPS, SUBSEQ VISIT: HCPCS | Mod: ,,, | Performed by: NURSE PRACTITIONER

## 2024-09-03 PROCEDURE — 99213 OFFICE O/P EST LOW 20 MIN: CPT | Mod: PBBFAC | Performed by: NURSE PRACTITIONER

## 2024-09-03 NOTE — PATIENT INSTRUCTIONS
Counseling and Referral of Other Preventative  (Italic type indicates deductible and co-insurance are waived)    Patient Name: Josette Altamirano  Today's Date: 9/3/2024    Health Maintenance       Date Due Completion Date    Hepatitis C Screening Never done ---    Shingles Vaccine (1 of 2) Never done ---    Pneumococcal Vaccines (Age 65+) (2 of 2 - PCV) 11/30/2008 11/30/2007    Hemoglobin A1c (Diabetic Prevention Screening) 10/26/2009 10/26/2006    RSV Vaccine (Age 60+ and Pregnant patients) (1 - 1-dose 60+ series) Never done ---    Influenza Vaccine (1) 09/01/2024 10/21/2020    COVID-19 Vaccine (5 - 2023-24 season) 09/01/2024 5/17/2022    DEXA Scan 07/14/2026 7/14/2023    Lipid Panel 08/08/2029 8/8/2024    Colorectal Cancer Screening 04/28/2030 4/28/2020    TETANUS VACCINE 11/22/2033 11/22/2023        No orders of the defined types were placed in this encounter.    The following information is provided to all patients.  This information is to help you find resources for any of the problems found today that may be affecting your health:                  Living healthy guide: www.[facility].[place].gov      Understanding Diabetes: www.diabetes.org      Eating healthy: www.cdc.gov/healthyweight      CDC home safety checklist: www.cdc.gov/steadi/patient.html      Agency on Aging: www.goea.[place].[facility]      Alcoholics anonymous (AA): www.aa.org      Physical Activity: www.esha.nih.gov/ce4jelo      Tobacco use: www.quitwithusla.org

## 2024-09-03 NOTE — PROGRESS NOTES
"  Josette Altamirano presented for an initial Medicare AWV today. The following components were reviewed and updated:    Medical history  Family History  Social history  Allergies and Current Medications  Health Risk Assessment  Health Maintenance  Care Team    **See Completed Assessments for Annual Wellness visit with in the encounter summary    The following assessments were completed:  Depression Screening  Cognitive function Screening  Timed Get Up Test  Whisper Test      Opioid documentation:      Patient does not have a current opioid prescription.          Vitals:    09/03/24 1118   BP: 118/70   BP Location: Left arm   Patient Position: Sitting   Pulse: 72   Resp: 18   Temp: 97.4 °F (36.3 °C)   Weight: 104.7 kg (230 lb 13.2 oz)   Height: 5' 4" (1.626 m)     Body mass index is 39.62 kg/m².       Physical Exam  Constitutional:       Appearance: Normal appearance. She is obese.   HENT:      Head: Normocephalic.   Cardiovascular:      Rate and Rhythm: Normal rate and regular rhythm.   Musculoskeletal:         General: Normal range of motion.   Skin:     General: Skin is warm.   Neurological:      General: No focal deficit present.      Mental Status: She is alert and oriented to person, place, and time.   Psychiatric:         Mood and Affect: Mood normal.         Behavior: Behavior normal.         Thought Content: Thought content normal.         Judgment: Judgment normal.       Current Outpatient Medications:     celecoxib (CELEBREX) 200 MG capsule, Take 1 capsule (200 mg total) by mouth 2 (two) times daily., Disp: 60 capsule, Rfl: 1    fluticasone propionate (FLONASE) 50 mcg/actuation nasal spray, 1 spray (50 mcg total) by Each Nostril route once daily., Disp: 11.1 mL, Rfl: 0    metoprolol succinate (TOPROL-XL) 50 MG 24 hr tablet, Take 1 tablet (50 mg total) by mouth once daily., Disp: 30 tablet, Rfl: 6    sacubitriL-valsartan (ENTRESTO)  mg per tablet, Take 1 tablet by mouth 2 (two) times daily., Disp: 180 " tablet, Rfl: 1        Diagnoses and health risks identified today and associated recommendations/orders:  1. Encounter for preventive health examination  Reviewed missed vaccine    2. Morbid obesity  BMI 39.6 kg  Discussed and recommend  low fat/carb/chol diet. Cardio exercise as tolerated. Life style modifications.      3. NYHA class 2 and LINDA/AHA stage C chronic systolic congestive heart failure  Chronic and Stable on Toprol and Entreso. Continue current treatment plan as previously prescribed with your PCP/card md     4. Malignant neoplasm of overlapping sites of right breast in female, estrogen receptor positive  Stable and controlled  She has a history of a Stage IIA, TXN1M0 intracystic papillary carcinoma with multifocal DCIS and one of three positive sentinel lymph nodes diagnosed in Feb 2007  .She subsequently underwent left mastectomy and sentinel node biopsy followed by axillary dissection March 23, 2007.   Histopathologic evaluation demonstrated again multiple foci of intracystic papillary carcinoma, as well as DCIS noncomed  Pt now has breast implants  Followed by breast specialist    5. Cardiomyopathy due to chemotherapy  Chronic and Stable on Toprol and Entresol. Continue current treatment plan as previously prescribed with your PCP/card Md     6. Essential hypertension  Chronic and Stable on Toprol and Entresol. Continue current treatment plan as previously prescribed with your PCP/card Md     7. Alpha thalassemia trait  Chronic and Stable. Continue current treatment plan as previously prescribed with your PCP\    8. Chronic right shoulder pain  Chronic and Ongoing on Celebrex-steroid inj. Continue current treatment plan as previously prescribed with your sport medicine      9. Hematuria, unspecified type  Stable and controlled.  No s/s of blood in urine Scheduled for repeat testing-followed by urologist      Karena Finch with a 5-10 year written screening schedule and personal prevention plan.  Recommendations were developed using the USPSTF age appropriate recommendations. Education, counseling, and referrals were provided as needed.  After Visit Summary printed and given to patient which includes a list of additional screenings\tests needed.    Follow up in about 1 year (around 9/3/2025).    Leia Avila NP

## 2024-09-04 ENCOUNTER — OFFICE VISIT (OUTPATIENT)
Dept: OBSTETRICS AND GYNECOLOGY | Facility: CLINIC | Age: 69
End: 2024-09-04
Payer: MEDICARE

## 2024-09-04 VITALS
HEIGHT: 64 IN | BODY MASS INDEX: 39.48 KG/M2 | WEIGHT: 231.25 LBS | DIASTOLIC BLOOD PRESSURE: 72 MMHG | SYSTOLIC BLOOD PRESSURE: 118 MMHG

## 2024-09-04 DIAGNOSIS — N95.0 POST-MENOPAUSAL BLEEDING: Primary | ICD-10-CM

## 2024-09-04 PROCEDURE — 99999 PR PBB SHADOW E&M-EST. PATIENT-LVL III: CPT | Mod: PBBFAC,,, | Performed by: STUDENT IN AN ORGANIZED HEALTH CARE EDUCATION/TRAINING PROGRAM

## 2024-09-04 PROCEDURE — 88305 TISSUE EXAM BY PATHOLOGIST: CPT | Performed by: PATHOLOGY

## 2024-09-04 PROCEDURE — 99213 OFFICE O/P EST LOW 20 MIN: CPT | Mod: PBBFAC | Performed by: STUDENT IN AN ORGANIZED HEALTH CARE EDUCATION/TRAINING PROGRAM

## 2024-09-04 NOTE — ASSESSMENT & PLAN NOTE
Postmenopausal bleeding  Possible etiologies include atrophy, intrauterine lesions such as fibroids/endometrial polyps, endometrial hyperplasia, or malignancy  Will obtain TVUS to assess for intracavitary lesions.   If endometrial stripe is less than or equal to 4mm, this has a negative predictive value of greater than 99% for endometrial cancer. If bleeding is recurrent or persistent, endometrial biopsy should be performed  If endometrial stripe is greater than 4mm, then recommend endometrial biopsy  Endometrial biopsy accepted today  If biopsy results benign, then can consider expectant management  Medical management if bleeding persists including daily Provera 10 mg or Liletta IUD insertion  In addition, if bleeding persists, consider surgical management options include D&C in the OR for better endometrial sampling or hysterectomy for definitive treatment  Consider other sources of bleeding such as GI, , cervical, vaginal, vulvar

## 2024-09-04 NOTE — PROGRESS NOTES
Subjective     Patient ID: Josette Altamirano is a 69 y.o. female.    Chief Complaint:  Vaginal Bleeding      History of Present Illness  Postmenopausal Bleeding  Patient complains of vaginal bleeding. She has been menopausal for 22 years. Currently on no HRT. Bleeding is described as spotting and has occurred 2 times since menopause. Had bleeding about 8 years ago and was evaluated. Had blood in her urine and that was evaluated about 5 months and was treated with antibiotics.Other menopausal symptoms include: none. Workup to date: none.  No pain at this time. Some cramping in bowels yesterday.    Is currently packing and moving things out of her mothers house.     She specfically denies fevers, chills, headaches, nausea, vomiting, shortness of breath, chest pain, abdominal pain, abnormal vaginal discharge, bowel or bladder changes, leg swelling.             Menstrual History:  OB History          2    Para   2    Term   2            AB        Living   2         SAB        IAB        Ectopic        Multiple        Live Births                      No LMP recorded. Patient is postmenopausal.       GYN & OB History  No LMP recorded. Patient is postmenopausal.   Date of Last Pap: 2021  Pap Hx: 2021 ASCUS      OB History    Para Term  AB Living   2 2 2     2   SAB IAB Ectopic Multiple Live Births                  # Outcome Date GA Lbr Jna/2nd Weight Sex Type Anes PTL Lv   2 Term            1 Term              Past Medical History:   Diagnosis Date    Arthritis of right knee     Breast cancer     She has a history of a Stage IIA, TXN1M0 intracystic papillary carcinoma with multifocal DCIS and one of three positive sentinel lymph nodes diagnosed in 2007. She subsequently underwent left mastectomy and sentinel node biopsy followed by axillary dissection 2007. Histopathologic evaluation demonstrated again multiple foci of intracystic papillary carcinoma, as well as DCIS noncomed     Cardiomyopathy     CHEMO INDUCED- RESOLVED    CHF (congestive heart failure)     systolic    Genetic testing 11/06/2023    [employer] Multi Cancer Panel (84 genes) - NEGATIVE, VUS in EGFR, POLE    Hypertension     Uterine fibroid      Past Surgical History:   Procedure Laterality Date    BREAST BIOPSY      BREAST CYST EXCISION Right     BREAST RECONSTRUCTION Bilateral 4/1/2024    Procedure: RECONSTRUCTION, BREAST;  Surgeon: Rashawn James MD;  Location: [facility];  Service: Plastics;  Laterality: Bilateral;    BREAST REVISION SURGERY Bilateral 4/1/2024    Procedure: BREAST REVISION SURGERY;  Surgeon: Rashawn James MD;  Location: [facility] OR;  Service: Plastics;  Laterality: Bilateral;    BREAST SURGERY      CHOLECYSTECTOMY      COLONOSCOPY N/A 9/10/2018    Procedure: COLONOSCOPY;  Surgeon: Indra Ramos MD;  Location: [facility];  Service: Endoscopy;  Laterality: N/A;    COLONOSCOPY N/A 4/28/2020    Procedure: COLONOSCOPY;  Surgeon: Dulce Maria Siegel MD;  Location: [facility];  Service: Endoscopy;  Laterality: N/A;    ESOPHAGOGASTRODUODENOSCOPY N/A 4/28/2020    Procedure: EGD (ESOPHAGOGASTRODUODENOSCOPY);  Surgeon: Dulce Maria Siegel MD;  Location: [facility];  Service: Endoscopy;  Laterality: N/A;    INJECTION FOR SENTINEL NODE IDENTIFICATION Right 12/12/2023    Procedure: INJECTION, FOR SENTINEL NODE IDENTIFICATION;  Surgeon: Shannon Galvan MD;  Location: [facility];  Service: General;  Laterality: Right;    INSERTION OF BREAST IMPLANT Bilateral 4/1/2024    Procedure: INSERTION, BREAST IMPLANT;  Surgeon: Rashawn James MD;  Location: [facility] OR;  Service: Plastics;  Laterality: Bilateral;    INSERTION OF BREAST TISSUE EXPANDER Right 12/12/2023    Procedure: INSERTION, TISSUE EXPANDER, BREAST;  Surgeon: Rashawn James MD;  Location: [facility] OR;  Service: Plastics;  Laterality: Right;    MASTECTOMY      left    MASTECTOMY, SKIN SPARING, SIMPLE Right 12/12/2023    Procedure: MASTECTOMY, SKIN SPARING, SIMPLE;   Surgeon: Shannon Galvan MD;  Location: [facility] OR;  Service: General;  Laterality: Right;    MEDIPORT INSERTION, SINGLE      PLACEMENT OF ACELLULAR HUMAN DERMAL ALLOGRAFT Right 12/12/2023    Procedure: APPLICATION, ACELLULAR HUMAN DERMAL ALLOGRAFT;  Surgeon: Rashawn James MD;  Location: [facility] OR;  Service: Plastics;  Laterality: Right;    SENTINEL LYMPH NODE BIOPSY Right 12/12/2023    Procedure: BIOPSY, LYMPH NODE, SENTINEL;  Surgeon: Shannon Galvan MD;  Location: [facility] OR;  Service: General;  Laterality: Right;    TISSUE EXPANDER REMOVAL Bilateral 4/1/2024    Procedure: REMOVAL, TISSUE EXPANDER;  Surgeon: Rashawn James MD;  Location: [facility] OR;  Service: Plastics;  Laterality: Bilateral;     Review of patient's allergies indicates:  No Known Allergies  Current Outpatient Medications   Medication Instructions    celecoxib (CELEBREX) 200 mg, Oral, 2 times daily    fluticasone propionate (FLONASE) 50 mcg, Each Nostril, Daily    metoprolol succinate (TOPROL-XL) 50 mg, Oral, Daily    sacubitriL-valsartan (ENTRESTO)  mg per tablet 1 tablet, Oral, 2 times daily     Social History: She  reports that she has never smoked. She has never been exposed to tobacco smoke. She has never used smokeless tobacco. She reports current alcohol use of about 2.0 standard drinks of alcohol per week. She reports that she does not use drugs.  Family History: family history includes Breast cancer in her maternal aunt and mother; Cancer in her maternal cousin; Cancer (age of onset: 55) in her maternal aunt; Diabetes in her father and mother; Heart failure in her paternal grandfather; No Known Problems in her brother, maternal grandfather, maternal grandmother, paternal grandmother, and sister; Stroke in her father and paternal aunt.        Objective   Physical Exam:   Constitutional: She is oriented to person, place, and time. She appears well-developed and well-nourished. No distress.    HENT:   Head: Normocephalic and  atraumatic.    Eyes: Conjunctivae and EOM are normal. Right eye exhibits no discharge. Left eye exhibits no discharge.     Cardiovascular:  Normal rate.             Pulmonary/Chest: Effort normal. No accessory muscle usage. No tachypnea. No respiratory distress.        Abdominal: Soft. She exhibits no distension. There is no abdominal tenderness.     Genitourinary:    Uterus normal.      Pelvic exam was performed with patient supine.   The external female genitalia was normal.   No external genitalia lesions identified,  Labial bartholins normal.There is no rash, tenderness, lesion or injury on the right labia. There is no rash, tenderness, lesion or injury on the left labia. Cervix is normal. Right adnexum displays no mass, no tenderness and no fullness. Left adnexum displays no mass, no tenderness and no fullness. There is bleeding (1 colposwab worth of blood in vaginal vault) in the vagina. No erythema, vaginal discharge or tenderness in the vagina.    No foreign body in the vagina.      No signs of injury in the vagina.   Cervix exhibits no motion tenderness, no lesion, no discharge, no friability (scant blood noted at cervical os), no tenderness and no polyp. Uterus is not deviated, not enlarged, not fixed and not tender.           Musculoskeletal: Normal range of motion and moves all extremeties. No tenderness or edema.       Neurological: She is alert and oriented to person, place, and time.    Skin: Skin is warm and dry.    Psychiatric: She has a normal mood and affect. Her speech is normal and behavior is normal. Mood, affect and thought content normal.        Endometrial Biopsy Procedure Details  Urine pregnancy test not done, menopausal patient.  The risks (including infection, bleeding, pain, and uterine perforation) and benefits of the procedure were explained to the patient and Verbal informed consent was obtained.  Antibiotic prophylaxis against endocarditis was not indicated.     The patient was  placed in the dorsal lithotomy position. A Graves' speculum inserted in the vagina, and the cervix prepped with povidone iodine. A sharp tenaculum was applied to the anterior lip of the cervix for stabilization.  A sterile uterine sound was used to sound the uterus to a depth of 6cm.  A Pipelle endometrial aspirator was used to sample the endometrium.  Sample was sent for pathologic examination.    Condition:  Stable    Complications:  None      Specimen: to patholgogy  Post Op Diagnosis: post menopausal bleeding            Assessment and Plan     1. Post-menopausal bleeding    2. PMB (postmenopausal bleeding)          Plan:  1. Post-menopausal bleeding  Assessment & Plan:  Postmenopausal bleeding  Possible etiologies include atrophy, intrauterine lesions such as fibroids/endometrial polyps, endometrial hyperplasia, or malignancy  Will obtain TVUS to assess for intracavitary lesions.   If endometrial stripe is less than or equal to 4mm, this has a negative predictive value of greater than 99% for endometrial cancer. If bleeding is recurrent or persistent, endometrial biopsy should be performed  If endometrial stripe is greater than 4mm, then recommend endometrial biopsy  Endometrial biopsy accepted today  If biopsy results benign, then can consider expectant management  Medical management if bleeding persists including daily Provera 10 mg or Liletta IUD insertion  In addition, if bleeding persists, consider surgical management options include D&C in the OR for better endometrial sampling or hysterectomy for definitive treatment  Consider other sources of bleeding such as GI, , cervical, vaginal, vulvar      Orders:  -     US PELVIS COMPLETE NON OB WITH DOPPLER (XPD); Future; Expected date: 09/04/2024  -     Specimen to Pathology, Ob/Gyn  -     Endometrial biopsy; Future    2. PMB (postmenopausal bleeding)             Anamika Eller MD  Obstetrics and Gynecology  [facility] [place], [place]

## 2024-09-06 ENCOUNTER — TELEPHONE (OUTPATIENT)
Dept: PRIMARY CARE CLINIC | Facility: CLINIC | Age: 69
End: 2024-09-06
Payer: MEDICARE

## 2024-09-06 ENCOUNTER — HOSPITAL ENCOUNTER (OUTPATIENT)
Dept: RADIOLOGY | Facility: HOSPITAL | Age: 69
Discharge: HOME | End: 2024-09-06
Attending: STUDENT IN AN ORGANIZED HEALTH CARE EDUCATION/TRAINING PROGRAM
Payer: MEDICARE

## 2024-09-06 DIAGNOSIS — N95.0 POST-MENOPAUSAL BLEEDING: ICD-10-CM

## 2024-09-06 LAB
FINAL PATHOLOGIC DIAGNOSIS: NORMAL
GROSS: NORMAL
Lab: NORMAL

## 2024-09-06 PROCEDURE — 76856 US EXAM PELVIC COMPLETE: CPT | Mod: TC

## 2024-09-06 PROCEDURE — 76830 TRANSVAGINAL US NON-OB: CPT | Mod: 26,,, | Performed by: RADIOLOGY

## 2024-09-06 PROCEDURE — 76856 US EXAM PELVIC COMPLETE: CPT | Mod: 26,,, | Performed by: RADIOLOGY

## 2024-09-06 PROCEDURE — 76830 TRANSVAGINAL US NON-OB: CPT | Mod: TC

## 2024-09-09 NOTE — PROGRESS NOTES
Called patient with results. Discussed need for hysteroscopy D&C. Patient agreeable. Will move forward with procedure planning and surgical clearance.     Anamika Eller MD  Obstetrics and Gynecology  [facility] [place], [place]

## 2024-09-23 ENCOUNTER — TELEPHONE (OUTPATIENT)
Dept: OBSTETRICS AND GYNECOLOGY | Facility: CLINIC | Age: 69
End: 2024-09-23
Payer: MEDICARE

## 2024-09-23 ENCOUNTER — PATIENT MESSAGE (OUTPATIENT)
Dept: OBSTETRICS AND GYNECOLOGY | Facility: CLINIC | Age: 69
End: 2024-09-23
Payer: MEDICARE

## 2024-09-23 DIAGNOSIS — N95.0 POST-MENOPAUSAL BLEEDING: Primary | ICD-10-CM

## 2024-09-23 NOTE — TELEPHONE ENCOUNTER
----- Message from Saumya Encinas MA sent at 9/23/2024  3:21 PM CDT -----  Contact: Josette    ----- Message -----  From: Erika Lanza  Sent: 9/23/2024  11:37 AM CDT  To: Raftia Willson Staff    .Type:  Patient Returning Call    Who Called: Josette   Who Left Message for Patient: Nurse   Does the patient know what this is regarding?: yes   Would the patient rather a call back or a response via [facility]?  Call back   Best Call Back Number:  [phone]  Additional Information:        Thanks

## 2024-09-23 NOTE — TELEPHONE ENCOUNTER
----- Message from Anamika Eller MD sent at 9/13/2024 12:27 PM CDT -----  Hi team,    Please schedule this patient for a hysteroscopy D&C in the Main OR. She will also need a pre-op appointment.    Thanks,  Dr. Eller

## 2024-09-24 ENCOUNTER — OFFICE VISIT (OUTPATIENT)
Dept: SURGERY | Facility: CLINIC | Age: 69
End: 2024-09-24
Payer: MEDICARE

## 2024-09-24 VITALS — WEIGHT: 231.94 LBS | HEIGHT: 64 IN | RESPIRATION RATE: 16 BRPM | BODY MASS INDEX: 39.6 KG/M2

## 2024-09-24 DIAGNOSIS — Z71.89 COUNSELING AND COORDINATION OF CARE: ICD-10-CM

## 2024-09-24 DIAGNOSIS — Z08 ENCOUNTER FOR FOLLOW-UP SURVEILLANCE OF BREAST CANCER: ICD-10-CM

## 2024-09-24 DIAGNOSIS — Z85.3 ENCOUNTER FOR FOLLOW-UP SURVEILLANCE OF BREAST CANCER: ICD-10-CM

## 2024-09-24 DIAGNOSIS — C50.811 MALIGNANT NEOPLASM OF OVERLAPPING SITES OF RIGHT BREAST IN FEMALE, ESTROGEN RECEPTOR POSITIVE: Primary | ICD-10-CM

## 2024-09-24 DIAGNOSIS — Z17.0 MALIGNANT NEOPLASM OF OVERLAPPING SITES OF RIGHT BREAST IN FEMALE, ESTROGEN RECEPTOR POSITIVE: Primary | ICD-10-CM

## 2024-09-24 DIAGNOSIS — Z12.39 ENCOUNTER FOR SCREENING BREAST EXAMINATION: ICD-10-CM

## 2024-09-24 DIAGNOSIS — Z85.3 HISTORY OF LEFT BREAST CANCER: ICD-10-CM

## 2024-09-24 DIAGNOSIS — Z71.89 COUNSELING ON HEALTH PROMOTION AND DISEASE PREVENTION: ICD-10-CM

## 2024-09-24 PROCEDURE — 99212 OFFICE O/P EST SF 10 MIN: CPT | Mod: PBBFAC | Performed by: NURSE PRACTITIONER

## 2024-09-24 PROCEDURE — 99999 PR PBB SHADOW E&M-EST. PATIENT-LVL II: CPT | Mod: PBBFAC,,, | Performed by: NURSE PRACTITIONER

## 2024-10-01 ENCOUNTER — PATIENT MESSAGE (OUTPATIENT)
Dept: PREADMISSION TESTING | Facility: HOSPITAL | Age: 69
End: 2024-10-01
Payer: MEDICARE

## 2024-10-07 ENCOUNTER — OFFICE VISIT (OUTPATIENT)
Dept: UROLOGY | Facility: CLINIC | Age: 69
End: 2024-10-07
Payer: MEDICARE

## 2024-10-07 ENCOUNTER — E-CONSULT (OUTPATIENT)
Dept: CARDIOLOGY | Facility: HOSPITAL | Age: 69
End: 2024-10-07
Payer: MEDICARE

## 2024-10-07 VITALS
DIASTOLIC BLOOD PRESSURE: 82 MMHG | SYSTOLIC BLOOD PRESSURE: 121 MMHG | BODY MASS INDEX: 39.37 KG/M2 | HEART RATE: 97 BPM | RESPIRATION RATE: 18 BRPM | WEIGHT: 230.63 LBS | TEMPERATURE: 98 F | HEIGHT: 64 IN

## 2024-10-07 DIAGNOSIS — R07.89 CHEST TIGHTNESS: ICD-10-CM

## 2024-10-07 DIAGNOSIS — N95.0 POST-MENOPAUSAL BLEEDING: ICD-10-CM

## 2024-10-07 DIAGNOSIS — I10 ESSENTIAL HYPERTENSION: Primary | Chronic | ICD-10-CM

## 2024-10-07 DIAGNOSIS — R31.0 GROSS HEMATURIA: Primary | ICD-10-CM

## 2024-10-07 PROCEDURE — 99213 OFFICE O/P EST LOW 20 MIN: CPT | Mod: S$PBB,,, | Performed by: UROLOGY

## 2024-10-07 PROCEDURE — 99213 OFFICE O/P EST LOW 20 MIN: CPT | Mod: PBBFAC | Performed by: UROLOGY

## 2024-10-07 PROCEDURE — 99999 PR PBB SHADOW E&M-EST. PATIENT-LVL III: CPT | Mod: PBBFAC,,, | Performed by: UROLOGY

## 2024-10-07 NOTE — PROGRESS NOTES
Chief Complaint   Patient presents with    Hematuria         History of Present Illness:   Josette Altamirano is a 69 y.o. female here for follow up.     10/7/24-Pt reports that she is having a D&C later this week for post-menopausal vaginal bleeding. No gross hematuria. No dysuria or incontinence. No abdominal or flank pain.     3/27/24-here for cysto. CT urogram showed indeterminate liver lesions and thickened bladder wall.     3/6/24-Pt had non-painful, gross hematuria last week. She did have some vaginal itching after she urinated at that time. She was empirically treated with omnicef, but culture was negative. She hasn't seen any blood since the antibiotics. No abdominal or flank pain.     8/29/22-renal US done 8/24/22 showing a mildly complex L upper pole renal cyst, now measuring 5.0cm. 2.8cm L lower pole simple cyst. I have personally reviewed these images. Had some pain in her left side a few weeks ago when she would lay down. No longer having any pain. No gross hematuria. Mild CLAYTON, but not bothersome. Doing kegels, which do seem to help. No interval change in H&P.     7/28/21-Renal US stable. Personally reviewed, showing 4.5cm L upper pole mildly complex cyst. No gross hematuria. No dysuria. Only has CLAYTON if she laughs. No abd or flank pain. No recent UTI.   Per Miranda:  6/18/20:  Indep assessment of imaging agree with report:  Left renal cyst about the same no sig changes mildly complex.  Reviewed history in detail. Doing well overall.   4/23/19: No hematuria in the interval and none today.  Stable 5cm left renal cyst on reassuring US.  Prefers annual followup with US.  Reviewed history in detail.  4/2/18: Returns for followup.  No hematuria in the interval.  MRI shows known renal cysts unchanged from 2015 to 2017.  10/19/15: CT Urogram normal except for benign left upper pole renal cyst. Cysto normal.  10/12/15: 59 yo woman referred by RAYSHAWN Campuzano for gross hematuria presumed to be UTI and not resolving with  continued microhematuria after UTI treated.  No abd/pelvic pain and no exac/rel factors; except some crampy discomfort sometime.  No prior hematuria.  No urolithiasis.  No urinary bother.  No  history.  Normal sexual function.  UA confirms hematuria with -UCx on two occasions.  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    Review of Systems   Constitutional:  Negative for chills and fever.   HENT:  Negative for nosebleeds.    Respiratory:  Negative for shortness of breath.    Cardiovascular:  Negative for chest pain.   Gastrointestinal:  Negative for blood in stool.   Genitourinary:  Negative for dysuria.   Neurological:  Negative for numbness.   Hematological:  Does not bruise/bleed easily.   All other systems reviewed and are negative.      Current Outpatient Medications   Medication Sig Dispense Refill    metoprolol succinate (TOPROL-XL) 50 MG 24 hr tablet Take 1 tablet (50 mg total) by mouth once daily. 30 tablet 6    sacubitriL-valsartan (ENTRESTO)  mg per tablet Take 1 tablet by mouth 2 (two) times daily. 180 tablet 1     No current facility-administered medications for this visit.       Review of patient's allergies indicates:  No Known Allergies    Past medical, family, and social history reviewed as documented in chart with pertinent positive medical, family, and social history detailed in HPI.    Physical Exam  Vitals:    10/07/24 1218   BP: 121/82   Pulse: 97   Resp: 18   Temp: 98 °F (36.7 °C)       General: Well-developed, Well Nourished in No acute distress  HEENT: Normocephalic, Atraumatic, Extraocular Movements intact  Neck: Supple, trachea midline, no cervical or supraclavicular lymphadenopathy  Respirations: Even and unlabored  Back: Midline spine without tenderness, no CVA tenderness  Abdomen: Soft, Non-tender, non-distended, no hepatosplenomegaly, no rebound or guarding, no palpable masses  Extremities: Moves all equally, atraumatic, no clubbing, cyanosis or edema  Skin: warm and  dry  Psych: normal affect  Neuro: Alert and oriented x 3, Cranial nerves II-XII grossly intact      Assesment:   1. Gross hematuria  Urinalysis Microscopic    Urine culture                Plan:  Gross hematuria  -     Urinalysis Microscopic; Future; Expected date: 11/07/2024  -     Urine culture; Future; Expected date: 11/07/2024      Follow up in about 6 months (around 4/7/2025).

## 2024-10-08 NOTE — CONSULTS
[facility]  Response for E-Consult     Patient Name: Josette Altamirano  MRN: [id number]  Primary Care Provider: Eun Booker MD   Requesting Provider: Jaciel Vivas II, MD  Consults        69-year-old female with history of congestive heart failure, hypertension, obesity.  Last echocardiogram May of 2023 with EF 55%.  She has been having postmenopausal bleeding and will be having an OBGYN procedure.  HysteroscopyD&C. Patient is moderate CV risk for procedure.  No further cardiac workup is needed.    Contingency that warrants a repeat eConsult or referral:     Total time of Consultation: 15 minute    I did not speak to the requesting provider verbally about this.     *This eConsult is based on the clinical data available to me and is furnished without benefit of a physical examination. The eConsult will need to be interpreted in light of any clinical issues or changes in patient status not available to me at the time of filing this eConsults. Significant changes in patient condition or level of acuity should result in immediate formal consultation and reevaluation. Please alert me if you have further questions.    Thank you for this eConsult referral.     Jelly Arguelles MD  [facility]

## 2024-10-09 ENCOUNTER — PATIENT MESSAGE (OUTPATIENT)
Dept: PREADMISSION TESTING | Facility: HOSPITAL | Age: 69
End: 2024-10-09
Payer: MEDICARE

## 2024-10-09 ENCOUNTER — LAB VISIT (OUTPATIENT)
Dept: LAB | Facility: HOSPITAL | Age: 69
End: 2024-10-09
Attending: STUDENT IN AN ORGANIZED HEALTH CARE EDUCATION/TRAINING PROGRAM
Payer: MEDICARE

## 2024-10-09 ENCOUNTER — OFFICE VISIT (OUTPATIENT)
Dept: OBSTETRICS AND GYNECOLOGY | Facility: CLINIC | Age: 69
End: 2024-10-09
Payer: MEDICARE

## 2024-10-09 VITALS
SYSTOLIC BLOOD PRESSURE: 116 MMHG | HEIGHT: 64 IN | DIASTOLIC BLOOD PRESSURE: 78 MMHG | BODY MASS INDEX: 39.55 KG/M2 | WEIGHT: 231.69 LBS

## 2024-10-09 DIAGNOSIS — N95.0 POST-MENOPAUSAL BLEEDING: Primary | ICD-10-CM

## 2024-10-09 DIAGNOSIS — N95.0 POST-MENOPAUSAL BLEEDING: ICD-10-CM

## 2024-10-09 LAB
ANION GAP SERPL CALC-SCNC: 8 MMOL/L (ref 8–16)
BASOPHILS # BLD AUTO: 0.03 K/UL (ref 0–0.2)
BASOPHILS NFR BLD: 0.7 % (ref 0–1.9)
BUN SERPL-MCNC: 11 MG/DL (ref 8–23)
CALCIUM SERPL-MCNC: 9.1 MG/DL (ref 8.7–10.5)
CHLORIDE SERPL-SCNC: 108 MMOL/L (ref 95–110)
CO2 SERPL-SCNC: 25 MMOL/L (ref 23–29)
CREAT SERPL-MCNC: 0.8 MG/DL (ref 0.5–1.4)
DIFFERENTIAL METHOD BLD: ABNORMAL
EOSINOPHIL # BLD AUTO: 0.1 K/UL (ref 0–0.5)
EOSINOPHIL NFR BLD: 3 % (ref 0–8)
ERYTHROCYTE [DISTWIDTH] IN BLOOD BY AUTOMATED COUNT: 15.5 % (ref 11.5–14.5)
EST. GFR  (NO RACE VARIABLE): >60 ML/MIN/1.73 M^2
GLUCOSE SERPL-MCNC: 99 MG/DL (ref 70–110)
HCT VFR BLD AUTO: 39.7 % (ref 37–48.5)
HGB BLD-MCNC: 12.1 G/DL (ref 12–16)
IMM GRANULOCYTES # BLD AUTO: 0.01 K/UL (ref 0–0.04)
IMM GRANULOCYTES NFR BLD AUTO: 0.2 % (ref 0–0.5)
LYMPHOCYTES # BLD AUTO: 1.1 K/UL (ref 1–4.8)
LYMPHOCYTES NFR BLD: 26.9 % (ref 18–48)
MCH RBC QN AUTO: 24.3 PG (ref 27–31)
MCHC RBC AUTO-ENTMCNC: 30.5 G/DL (ref 32–36)
MCV RBC AUTO: 80 FL (ref 82–98)
MONOCYTES # BLD AUTO: 0.3 K/UL (ref 0.3–1)
MONOCYTES NFR BLD: 8 % (ref 4–15)
NEUTROPHILS # BLD AUTO: 2.5 K/UL (ref 1.8–7.7)
NEUTROPHILS NFR BLD: 61.2 % (ref 38–73)
NRBC BLD-RTO: 0 /100 WBC
PLATELET # BLD AUTO: 288 K/UL (ref 150–450)
PMV BLD AUTO: 10.7 FL (ref 9.2–12.9)
POTASSIUM SERPL-SCNC: 4.3 MMOL/L (ref 3.5–5.1)
RBC # BLD AUTO: 4.97 M/UL (ref 4–5.4)
SODIUM SERPL-SCNC: 141 MMOL/L (ref 136–145)
WBC # BLD AUTO: 4.02 K/UL (ref 3.9–12.7)

## 2024-10-09 PROCEDURE — 36415 COLL VENOUS BLD VENIPUNCTURE: CPT | Performed by: STUDENT IN AN ORGANIZED HEALTH CARE EDUCATION/TRAINING PROGRAM

## 2024-10-09 PROCEDURE — 80048 BASIC METABOLIC PNL TOTAL CA: CPT | Performed by: STUDENT IN AN ORGANIZED HEALTH CARE EDUCATION/TRAINING PROGRAM

## 2024-10-09 PROCEDURE — 99212 OFFICE O/P EST SF 10 MIN: CPT | Mod: PBBFAC | Performed by: STUDENT IN AN ORGANIZED HEALTH CARE EDUCATION/TRAINING PROGRAM

## 2024-10-09 PROCEDURE — 85025 COMPLETE CBC W/AUTO DIFF WBC: CPT | Performed by: STUDENT IN AN ORGANIZED HEALTH CARE EDUCATION/TRAINING PROGRAM

## 2024-10-09 PROCEDURE — 99499 UNLISTED E&M SERVICE: CPT | Mod: S$PBB,,, | Performed by: STUDENT IN AN ORGANIZED HEALTH CARE EDUCATION/TRAINING PROGRAM

## 2024-10-09 PROCEDURE — 99999 PR PBB SHADOW E&M-EST. PATIENT-LVL II: CPT | Mod: PBBFAC,,, | Performed by: STUDENT IN AN ORGANIZED HEALTH CARE EDUCATION/TRAINING PROGRAM

## 2024-10-09 RX ORDER — SODIUM CHLORIDE 9 MG/ML
INJECTION, SOLUTION INTRAVENOUS CONTINUOUS
Status: CANCELLED | OUTPATIENT
Start: 2024-10-09

## 2024-10-09 RX ORDER — ACETAMINOPHEN 500 MG
1000 TABLET ORAL
Status: CANCELLED | OUTPATIENT
Start: 2024-10-09

## 2024-10-09 RX ORDER — FAMOTIDINE 20 MG/1
20 TABLET, FILM COATED ORAL
Status: CANCELLED | OUTPATIENT
Start: 2024-10-09

## 2024-10-09 NOTE — H&P
History & Physical    SUBJECTIVE:     History of Present Illness:  Patient is a 69 y.o. female presents for pre-op visit for postmenopausal bleeding.  Planning for hysteroscopy D&C for endometrial sampling.      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 She subsequently underwent left mastectomy and sentinel node biopsy followed by axillary dissection March 23, 2007. Histopathologic evaluation demonstrated again multiple foci of intracystic papillary carcinoma, as well as DCIS noncomed    Cardiomyopathy     CHEMO INDUCED- RESOLVED    CHF (congestive heart failure)     systolic    Genetic testing 11/06/2023    [employer] Multi Cancer Panel (84 genes) - NEGATIVE, VUS in EGFR, POLE    Hypertension     Uterine fibroid      Past Surgical History:   Procedure Laterality Date    BREAST BIOPSY      BREAST CYST EXCISION Right     BREAST RECONSTRUCTION Bilateral 4/1/2024    Procedure: RECONSTRUCTION, BREAST;  Surgeon: Rashawn James MD;  Location: [facility];  Service: Plastics;  Laterality: Bilateral;    BREAST REVISION SURGERY Bilateral 4/1/2024    Procedure: BREAST REVISION SURGERY;  Surgeon: Rashawn James MD;  Location: [facility];  Service: Plastics;  Laterality: Bilateral;    BREAST SURGERY      CHOLECYSTECTOMY      COLONOSCOPY N/A 9/10/2018    Procedure: COLONOSCOPY;  Surgeon: Indra Ramos MD;  Location: [facility];  Service: Endoscopy;  Laterality: N/A;    COLONOSCOPY N/A 4/28/2020    Procedure: COLONOSCOPY;  Surgeon: Dulce Maria Siegel MD;  Location: [facility];  Service: Endoscopy;  Laterality: N/A;    ESOPHAGOGASTRODUODENOSCOPY N/A 4/28/2020    Procedure: EGD (ESOPHAGOGASTRODUODENOSCOPY);  Surgeon: Dulce Maria Siegel MD;  Location: [facility];  Service: Endoscopy;  Laterality: N/A;    INJECTION FOR SENTINEL NODE  IDENTIFICATION Right 12/12/2023    Procedure: INJECTION, FOR SENTINEL NODE IDENTIFICATION;  Surgeon: Shannon Galvan MD;  Location: [facility] OR;  Service: General;  Laterality: Right;    INSERTION OF BREAST IMPLANT Bilateral 4/1/2024    Procedure: INSERTION, BREAST IMPLANT;  Surgeon: Rashawn James MD;  Location: [facility] OR;  Service: Plastics;  Laterality: Bilateral;    INSERTION OF BREAST TISSUE EXPANDER Right 12/12/2023    Procedure: INSERTION, TISSUE EXPANDER, BREAST;  Surgeon: Rashawn James MD;  Location: [facility] OR;  Service: Plastics;  Laterality: Right;    MASTECTOMY      left    MASTECTOMY, SKIN SPARING, SIMPLE Right 12/12/2023    Procedure: MASTECTOMY, SKIN SPARING, SIMPLE;  Surgeon: Shannon Galvan MD;  Location: [facility] OR;  Service: General;  Laterality: Right;    MEDIPORT INSERTION, SINGLE      PLACEMENT OF ACELLULAR HUMAN DERMAL ALLOGRAFT Right 12/12/2023    Procedure: APPLICATION, ACELLULAR HUMAN DERMAL ALLOGRAFT;  Surgeon: Rashawn James MD;  Location: [facility] OR;  Service: Plastics;  Laterality: Right;    SENTINEL LYMPH NODE BIOPSY Right 12/12/2023    Procedure: BIOPSY, LYMPH NODE, SENTINEL;  Surgeon: Shannon Galvan MD;  Location: [facility] OR;  Service: General;  Laterality: Right;    TISSUE EXPANDER REMOVAL Bilateral 4/1/2024    Procedure: REMOVAL, TISSUE EXPANDER;  Surgeon: Rashawn James MD;  Location: [facility] OR;  Service: Plastics;  Laterality: Bilateral;     Social History     Socioeconomic History    Marital status:    Tobacco Use    Smoking status: Never     Passive exposure: Never    Smokeless tobacco: Never   Substance and Sexual Activity    Alcohol use: Yes     Alcohol/week: 2.0 standard drinks of alcohol     Types: 2 Glasses of wine per week     Comment: Ocassionally    Drug use: No    Sexual activity: Yes     Partners: Male     Birth control/protection: None, Post-menopausal     Social Drivers of Health     Financial Resource Strain: Low Risk  (9/3/2024)    Overall  Financial Resource Strain (CARDIA)     Difficulty of Paying Living Expenses: Not hard at all   Food Insecurity: No Food Insecurity (9/3/2024)    Hunger Vital Sign     Worried About Running Out of Food in the Last Year: Never true     Ran Out of Food in the Last Year: Never true   Transportation Needs: No Transportation Needs (9/3/2024)    PRAPARE - Transportation     Lack of Transportation (Medical): No     Lack of Transportation (Non-Medical): No   Physical Activity: Insufficiently Active (9/3/2024)    Exercise Vital Sign     Days of Exercise per Week: 4 days     Minutes of Exercise per Session: 30 min   Stress: Stress Concern Present (9/3/2024)    [employer] - Occupational Stress Questionnaire     Feeling of Stress : To some extent   Housing Stability: Low Risk  (9/3/2024)    Housing Stability Vital Sign     Unable to Pay for Housing in the Last Year: No     Homeless in the Last Year: No     Family History   Problem Relation Name Age of Onset    Diabetes Mother      Breast cancer Mother      Diabetes Father      Stroke Father      No Known Problems Sister      No Known Problems Brother      Cancer Maternal Aunt  55        breast cancer    Breast cancer Maternal Aunt      Stroke Paternal Aunt      No Known Problems Maternal Grandmother      No Known Problems Maternal Grandfather      No Known Problems Paternal Grandmother      Heart failure Paternal Grandfather      Cancer Maternal Cousin      Melanoma Neg Hx      Psoriasis Neg Hx      Lupus Neg Hx      Eczema Neg Hx       Review of patient's allergies indicates:  No Known Allergies  Current Outpatient Medications   Medication Sig Dispense Refill    metoprolol succinate (TOPROL-XL) 50 MG 24 hr tablet Take 1 tablet (50 mg total) by mouth once daily. 30 tablet 6    sacubitriL-valsartan (ENTRESTO)  mg per tablet Take 1 tablet by mouth 2 (two) times daily. 180 tablet 1     No current facility-administered medications for this visit.             Review of Systems:  Constitutional: no fever or chills  Respiratory: no cough or shortness of breath  Cardiovascular: no chest pain or palpitations  Gastrointestinal: no nausea or vomiting, tolerating diet  Genitourinary: no dysuria  Hematologic/Lymphatic: no easy bruising or lymphadenopathy  Neurological: no seizures or tremors      OBJECTIVE:     Vitals:    10/09/24 1048   BP: 116/78         Physical Exam:  General: well developed, well nourished, no distress  Head: normocephalic  Neck: supple, symmetrical, trachea midline  Lungs:  clear to auscultation bilaterally and normal respiratory effort  Chest Wall: no tenderness  Heart: regular rate and rhythm, S1, S2 normal, no murmur, rub or gallop  Abdomen: soft, non-tender non-distented; bowel sounds normal; no masses,  no organomegaly  Extremities: no cyanosis or edema, or clubbing  Pulses: 2+ and symmetric      Laboratory  CBC and BMP collected today    Diagnostic Results:  9/4/2024: Endometrial biopsy: Scant fragments of benign endocervical type glandular epithelium and detached fragments of squamous epithelium all admixed with some mucus, no endometrial tissue identified.   ASSESSMENT/PLAN:   To OR for scheduled procedure on 10/10/2024.    Anamika Eller MD  Obstetrics and Gynecology  [facility] [place], [place]

## 2024-10-10 ENCOUNTER — HOSPITAL ENCOUNTER (OUTPATIENT)
Facility: HOSPITAL | Age: 69
Discharge: HOME | End: 2024-10-10
Attending: STUDENT IN AN ORGANIZED HEALTH CARE EDUCATION/TRAINING PROGRAM | Admitting: STUDENT IN AN ORGANIZED HEALTH CARE EDUCATION/TRAINING PROGRAM
Payer: MEDICARE

## 2024-10-10 ENCOUNTER — ANESTHESIA EVENT (OUTPATIENT)
Dept: SURGERY | Facility: HOSPITAL | Age: 69
End: 2024-10-10
Payer: MEDICARE

## 2024-10-10 ENCOUNTER — ANESTHESIA (OUTPATIENT)
Dept: SURGERY | Facility: HOSPITAL | Age: 69
End: 2024-10-10
Payer: MEDICARE

## 2024-10-10 VITALS
HEIGHT: 64 IN | RESPIRATION RATE: 9 BRPM | TEMPERATURE: 98 F | OXYGEN SATURATION: 99 % | DIASTOLIC BLOOD PRESSURE: 56 MMHG | BODY MASS INDEX: 38.96 KG/M2 | SYSTOLIC BLOOD PRESSURE: 111 MMHG | WEIGHT: 228.19 LBS | HEART RATE: 78 BPM

## 2024-10-10 DIAGNOSIS — N95.0 POST-MENOPAUSAL BLEEDING: ICD-10-CM

## 2024-10-10 DIAGNOSIS — Z98.890 POSTOPERATIVE STATE: Primary | ICD-10-CM

## 2024-10-10 PROBLEM — N84.0 UTERINE POLYP: Status: ACTIVE | Noted: 2024-10-10

## 2024-10-10 PROCEDURE — 88305 TISSUE EXAM BY PATHOLOGIST: CPT | Mod: 26,,, | Performed by: PATHOLOGY

## 2024-10-10 PROCEDURE — 36000707: Performed by: STUDENT IN AN ORGANIZED HEALTH CARE EDUCATION/TRAINING PROGRAM

## 2024-10-10 PROCEDURE — 25000003 PHARM REV CODE 250

## 2024-10-10 PROCEDURE — 71000015 HC POSTOP RECOV 1ST HR: Performed by: STUDENT IN AN ORGANIZED HEALTH CARE EDUCATION/TRAINING PROGRAM

## 2024-10-10 PROCEDURE — 63600175 PHARM REV CODE 636 W HCPCS

## 2024-10-10 PROCEDURE — 37000008 HC ANESTHESIA 1ST 15 MINUTES: Performed by: STUDENT IN AN ORGANIZED HEALTH CARE EDUCATION/TRAINING PROGRAM

## 2024-10-10 PROCEDURE — 58558 HYSTEROSCOPY BIOPSY: CPT | Mod: ,,, | Performed by: STUDENT IN AN ORGANIZED HEALTH CARE EDUCATION/TRAINING PROGRAM

## 2024-10-10 PROCEDURE — 27201423 OPTIME MED/SURG SUP & DEVICES STERILE SUPPLY: Performed by: STUDENT IN AN ORGANIZED HEALTH CARE EDUCATION/TRAINING PROGRAM

## 2024-10-10 PROCEDURE — 88305 TISSUE EXAM BY PATHOLOGIST: CPT | Performed by: PATHOLOGY

## 2024-10-10 PROCEDURE — 37000009 HC ANESTHESIA EA ADD 15 MINS: Performed by: STUDENT IN AN ORGANIZED HEALTH CARE EDUCATION/TRAINING PROGRAM

## 2024-10-10 PROCEDURE — 71000033 HC RECOVERY, INTIAL HOUR: Performed by: STUDENT IN AN ORGANIZED HEALTH CARE EDUCATION/TRAINING PROGRAM

## 2024-10-10 PROCEDURE — 36000706: Performed by: STUDENT IN AN ORGANIZED HEALTH CARE EDUCATION/TRAINING PROGRAM

## 2024-10-10 PROCEDURE — 25000003 PHARM REV CODE 250: Performed by: STUDENT IN AN ORGANIZED HEALTH CARE EDUCATION/TRAINING PROGRAM

## 2024-10-10 PROCEDURE — C1782 MORCELLATOR: HCPCS | Performed by: STUDENT IN AN ORGANIZED HEALTH CARE EDUCATION/TRAINING PROGRAM

## 2024-10-10 RX ORDER — ONDANSETRON HYDROCHLORIDE 2 MG/ML
INJECTION, SOLUTION INTRAVENOUS
Status: DISCONTINUED | OUTPATIENT
Start: 2024-10-10 | End: 2024-10-10

## 2024-10-10 RX ORDER — PROPOFOL 10 MG/ML
VIAL (ML) INTRAVENOUS
Status: DISCONTINUED | OUTPATIENT
Start: 2024-10-10 | End: 2024-10-10

## 2024-10-10 RX ORDER — KETOROLAC TROMETHAMINE 30 MG/ML
15 INJECTION, SOLUTION INTRAMUSCULAR; INTRAVENOUS EVERY 8 HOURS PRN
Status: DISCONTINUED | OUTPATIENT
Start: 2024-10-10 | End: 2024-10-10 | Stop reason: HOSPADM

## 2024-10-10 RX ORDER — HYDROCODONE BITARTRATE AND ACETAMINOPHEN 5; 325 MG/1; MG/1
1 TABLET ORAL EVERY 4 HOURS PRN
Status: DISCONTINUED | OUTPATIENT
Start: 2024-10-10 | End: 2024-10-10 | Stop reason: HOSPADM

## 2024-10-10 RX ORDER — DIPHENHYDRAMINE HYDROCHLORIDE 50 MG/ML
25 INJECTION INTRAMUSCULAR; INTRAVENOUS EVERY 4 HOURS PRN
Status: DISCONTINUED | OUTPATIENT
Start: 2024-10-10 | End: 2024-10-10 | Stop reason: HOSPADM

## 2024-10-10 RX ORDER — DEXAMETHASONE SODIUM PHOSPHATE 4 MG/ML
INJECTION, SOLUTION INTRA-ARTICULAR; INTRALESIONAL; INTRAMUSCULAR; INTRAVENOUS; SOFT TISSUE
Status: DISCONTINUED | OUTPATIENT
Start: 2024-10-10 | End: 2024-10-10

## 2024-10-10 RX ORDER — SODIUM CHLORIDE, SODIUM LACTATE, POTASSIUM CHLORIDE, CALCIUM CHLORIDE 600; 310; 30; 20 MG/100ML; MG/100ML; MG/100ML; MG/100ML
INJECTION, SOLUTION INTRAVENOUS CONTINUOUS PRN
Status: DISCONTINUED | OUTPATIENT
Start: 2024-10-10 | End: 2024-10-10

## 2024-10-10 RX ORDER — EPHEDRINE SULFATE 50 MG/ML
INJECTION, SOLUTION INTRAVENOUS
Status: DISCONTINUED | OUTPATIENT
Start: 2024-10-10 | End: 2024-10-10

## 2024-10-10 RX ORDER — ONDANSETRON 8 MG/1
8 TABLET, ORALLY DISINTEGRATING ORAL EVERY 8 HOURS PRN
Status: DISCONTINUED | OUTPATIENT
Start: 2024-10-10 | End: 2024-10-10 | Stop reason: HOSPADM

## 2024-10-10 RX ORDER — KETOROLAC TROMETHAMINE 30 MG/ML
INJECTION, SOLUTION INTRAMUSCULAR; INTRAVENOUS
Status: DISCONTINUED | OUTPATIENT
Start: 2024-10-10 | End: 2024-10-10

## 2024-10-10 RX ORDER — FAMOTIDINE 20 MG/1
20 TABLET, FILM COATED ORAL
Status: COMPLETED | OUTPATIENT
Start: 2024-10-10 | End: 2024-10-10

## 2024-10-10 RX ORDER — HYDROMORPHONE HYDROCHLORIDE 1 MG/ML
0.2 INJECTION, SOLUTION INTRAMUSCULAR; INTRAVENOUS; SUBCUTANEOUS EVERY 5 MIN PRN
Status: DISCONTINUED | OUTPATIENT
Start: 2024-10-10 | End: 2024-10-10 | Stop reason: HOSPADM

## 2024-10-10 RX ORDER — SODIUM CHLORIDE 9 MG/ML
INJECTION, SOLUTION INTRAVENOUS CONTINUOUS
Status: DISCONTINUED | OUTPATIENT
Start: 2024-10-10 | End: 2024-10-10 | Stop reason: HOSPADM

## 2024-10-10 RX ORDER — LIDOCAINE HYDROCHLORIDE 10 MG/ML
INJECTION, SOLUTION EPIDURAL; INFILTRATION; INTRACAUDAL; PERINEURAL
Status: DISCONTINUED | OUTPATIENT
Start: 2024-10-10 | End: 2024-10-10

## 2024-10-10 RX ORDER — FENTANYL CITRATE 50 UG/ML
INJECTION, SOLUTION INTRAMUSCULAR; INTRAVENOUS
Status: DISCONTINUED | OUTPATIENT
Start: 2024-10-10 | End: 2024-10-10

## 2024-10-10 RX ORDER — OXYCODONE AND ACETAMINOPHEN 5; 325 MG/1; MG/1
1 TABLET ORAL
Status: DISCONTINUED | OUTPATIENT
Start: 2024-10-10 | End: 2024-10-10 | Stop reason: HOSPADM

## 2024-10-10 RX ORDER — MIDAZOLAM HYDROCHLORIDE 1 MG/ML
INJECTION INTRAMUSCULAR; INTRAVENOUS
Status: DISCONTINUED | OUTPATIENT
Start: 2024-10-10 | End: 2024-10-10

## 2024-10-10 RX ORDER — PHENYLEPHRINE HYDROCHLORIDE 10 MG/ML
INJECTION INTRAVENOUS
Status: DISCONTINUED | OUTPATIENT
Start: 2024-10-10 | End: 2024-10-10

## 2024-10-10 RX ORDER — ONDANSETRON HYDROCHLORIDE 2 MG/ML
4 INJECTION, SOLUTION INTRAVENOUS DAILY PRN
Status: DISCONTINUED | OUTPATIENT
Start: 2024-10-10 | End: 2024-10-10 | Stop reason: HOSPADM

## 2024-10-10 RX ORDER — ACETAMINOPHEN 500 MG
1000 TABLET ORAL
Status: COMPLETED | OUTPATIENT
Start: 2024-10-10 | End: 2024-10-10

## 2024-10-10 RX ORDER — DIPHENHYDRAMINE HCL 25 MG
25 CAPSULE ORAL EVERY 4 HOURS PRN
Status: DISCONTINUED | OUTPATIENT
Start: 2024-10-10 | End: 2024-10-10 | Stop reason: HOSPADM

## 2024-10-10 RX ADMIN — PROPOFOL 50 MG: 10 INJECTION, EMULSION INTRAVENOUS at 08:10

## 2024-10-10 RX ADMIN — SODIUM CHLORIDE, SODIUM LACTATE, POTASSIUM CHLORIDE, AND CALCIUM CHLORIDE: .6; .31; .03; .02 INJECTION, SOLUTION INTRAVENOUS at 08:10

## 2024-10-10 RX ADMIN — PHENYLEPHRINE HYDROCHLORIDE 200 MCG: 10 INJECTION INTRAVENOUS at 08:10

## 2024-10-10 RX ADMIN — PHENYLEPHRINE HYDROCHLORIDE 100 MCG: 10 INJECTION INTRAVENOUS at 08:10

## 2024-10-10 RX ADMIN — FAMOTIDINE 20 MG: 20 TABLET ORAL at 08:10

## 2024-10-10 RX ADMIN — KETOROLAC TROMETHAMINE 15 MG: 30 INJECTION, SOLUTION INTRAMUSCULAR; INTRAVENOUS at 09:10

## 2024-10-10 RX ADMIN — EPHEDRINE SULFATE 20 MG: 50 INJECTION INTRAVENOUS at 08:10

## 2024-10-10 RX ADMIN — PROPOFOL 150 MG: 10 INJECTION, EMULSION INTRAVENOUS at 08:10

## 2024-10-10 RX ADMIN — MIDAZOLAM HYDROCHLORIDE 2 MG: 1 INJECTION, SOLUTION INTRAMUSCULAR; INTRAVENOUS at 08:10

## 2024-10-10 RX ADMIN — FENTANYL CITRATE 50 MCG: 50 INJECTION, SOLUTION INTRAMUSCULAR; INTRAVENOUS at 08:10

## 2024-10-10 RX ADMIN — ACETAMINOPHEN 1000 MG: 500 TABLET ORAL at 08:10

## 2024-10-10 RX ADMIN — ONDANSETRON 4 MG: 2 INJECTION INTRAMUSCULAR; INTRAVENOUS at 08:10

## 2024-10-10 RX ADMIN — DEXAMETHASONE SODIUM PHOSPHATE 8 MG: 4 INJECTION, SOLUTION INTRA-ARTICULAR; INTRALESIONAL; INTRAMUSCULAR; INTRAVENOUS; SOFT TISSUE at 08:10

## 2024-10-10 RX ADMIN — LIDOCAINE HYDROCHLORIDE 50 MG: 10 SOLUTION INTRAVENOUS at 08:10

## 2024-10-10 RX ADMIN — PHENYLEPHRINE HYDROCHLORIDE 50 MCG: 10 INJECTION INTRAVENOUS at 08:10

## 2024-10-10 NOTE — ANESTHESIA PREPROCEDURE EVALUATION
10/10/2024  Josette Altamirano is a 69 y.o., female.    Patient Active Problem List   Diagnosis    Periostitis, without mention of osteomyelitis, ankle and foot    Enthesopathy of ankle and tarsus, unspecified    Syringoma of eyelid - Both Eyes    Refractive error - Both Eyes    Cardiomyopathy due to chemotherapy    Essential hypertension    Rotator cuff impingement syndrome of right shoulder    Alpha thalassemia trait    NYHA class 2 and LINDA/AHA stage C chronic systolic congestive heart failure    Diverticulosis of large intestine without hemorrhage    Morbid obesity    Abnormal CT scan, colon    Anemia    Altered bowel habits    Malignant neoplasm of overlapping sites of right breast in female, estrogen receptor positive    History of left breast cancer    Status post bilateral mastectomy    Liver lesion    Post-menopausal bleeding     Past Surgical History:   Procedure Laterality Date    BREAST BIOPSY      BREAST CYST EXCISION Right     BREAST RECONSTRUCTION Bilateral 4/1/2024    Procedure: RECONSTRUCTION, BREAST;  Surgeon: Rashawn James MD;  Location: [facility];  Service: Plastics;  Laterality: Bilateral;    BREAST REVISION SURGERY Bilateral 4/1/2024    Procedure: BREAST REVISION SURGERY;  Surgeon: Rashawn James MD;  Location: [facility] OR;  Service: Plastics;  Laterality: Bilateral;    BREAST SURGERY      CHOLECYSTECTOMY      COLONOSCOPY N/A 9/10/2018    Procedure: COLONOSCOPY;  Surgeon: Indra Ramos MD;  Location: [facility];  Service: Endoscopy;  Laterality: N/A;    COLONOSCOPY N/A 4/28/2020    Procedure: COLONOSCOPY;  Surgeon: Dulce Maria Siegel MD;  Location: [facility];  Service: Endoscopy;  Laterality: N/A;    ESOPHAGOGASTRODUODENOSCOPY N/A 4/28/2020    Procedure: EGD (ESOPHAGOGASTRODUODENOSCOPY);  Surgeon: Dulce Maria Siegel MD;  Location: [facility];  Service: Endoscopy;  Laterality: N/A;    INJECTION  "FOR SENTINEL NODE IDENTIFICATION Right 12/12/2023    Procedure: INJECTION, FOR SENTINEL NODE IDENTIFICATION;  Surgeon: Shannon Galvan MD;  Location: [facility] OR;  Service: General;  Laterality: Right;    INSERTION OF BREAST IMPLANT Bilateral 4/1/2024    Procedure: INSERTION, BREAST IMPLANT;  Surgeon: Rashawn James MD;  Location: [facility] OR;  Service: Plastics;  Laterality: Bilateral;    INSERTION OF BREAST TISSUE EXPANDER Right 12/12/2023    Procedure: INSERTION, TISSUE EXPANDER, BREAST;  Surgeon: Rashawn James MD;  Location: [facility] OR;  Service: Plastics;  Laterality: Right;    MASTECTOMY      left    MASTECTOMY, SKIN SPARING, SIMPLE Right 12/12/2023    Procedure: MASTECTOMY, SKIN SPARING, SIMPLE;  Surgeon: Shannon Galvan MD;  Location: [facility] OR;  Service: General;  Laterality: Right;    MEDIPORT INSERTION, SINGLE      PLACEMENT OF ACELLULAR HUMAN DERMAL ALLOGRAFT Right 12/12/2023    Procedure: APPLICATION, ACELLULAR HUMAN DERMAL ALLOGRAFT;  Surgeon: Rashawn James MD;  Location: [facility] OR;  Service: Plastics;  Laterality: Right;    SENTINEL LYMPH NODE BIOPSY Right 12/12/2023    Procedure: BIOPSY, LYMPH NODE, SENTINEL;  Surgeon: Shannon Galvan MD;  Location: [facility];  Service: General;  Laterality: Right;    TISSUE EXPANDER REMOVAL Bilateral 4/1/2024    Procedure: REMOVAL, TISSUE EXPANDER;  Surgeon: Rashawn James MD;  Location: [facility];  Service: Plastics;  Laterality: Bilateral;       Pre-op Assessment    I have reviewed the Patient Summary Reports.    I have reviewed the NPO Status.   I have reviewed the Medications.     Review of Systems  Anesthesia Hx:  No problems with previous Anesthesia                Social:  Non-Smoker       Hematology/Oncology:  Hematology Normal                                     Cardiovascular:     Hypertension       CHF       ECG has been reviewed. Cardiomyopathy    Seen by cards:  "Patient is moderate CV risk for procedure.  No further cardiac workup is needed."  "                        Pulmonary:  Pulmonary Normal                       Renal/:  Renal/ Normal                 Hepatic/GI:  Hepatic/GI Normal                 Musculoskeletal:  Arthritis               OB/GYN/PEDS:  Post-menopausal bleeding           Neurological:  Neurology Normal                                      Endocrine:  Endocrine Normal          Obesity / BMI > 30      Physical Exam  General: Well nourished    Airway:  Mallampati: II   Mouth Opening: Normal  TM Distance: Normal  Neck ROM: Normal ROM    Dental:  Intact        Anesthesia Plan  Type of Anesthesia, risks & benefits discussed:    Anesthesia Type: Gen ETT, Gen Supraglottic Airway  Intra-op Monitoring Plan: Standard ASA Monitors  Post Op Pain Control Plan: multimodal analgesia  Induction:  IV  Airway Plan: , Post-Induction  Informed Consent: Informed consent signed with the Patient and all parties understand the risks and agree with anesthesia plan.  All questions answered.   ASA Score: 3    Ready For Surgery From Anesthesia Perspective.     .      Chemistry        Component Value Date/Time     10/09/2024 1120    K 4.3 10/09/2024 1120     10/09/2024 1120    CO2 25 10/09/2024 1120    BUN 11 10/09/2024 1120    CREATININE 0.8 10/09/2024 1120    GLU 99 10/09/2024 1120        Component Value Date/Time    CALCIUM 9.1 10/09/2024 1120    ALKPHOS 84 02/27/2024 1343    AST 22 02/27/2024 1343    ALT 19 02/27/2024 1343    BILITOT 1.1 (H) 02/27/2024 1343    ESTGFRAFRICA >60 05/17/2022 1145    EGFRNONAA >60 05/17/2022 1145        Lab Results   Component Value Date    WBC 4.02 10/09/2024    HGB 12.1 10/09/2024    HCT 39.7 10/09/2024    MCV 80 (L) 10/09/2024     10/09/2024       Normal sinus rhythm   Normal ECG   When compared with ECG of 13-NOV-2023 11:41,   QRS voltage has decreased   Confirmed by PAIGE ZEPEDA MD ([id number]) on 8/8/2024 2:40:08 PM       Echo 5/16/23:  The left ventricle is normal in size with concentric remodeling and  normal systolic function.  The estimated ejection fraction is 55%.  Normal left ventricular diastolic function.  Normal right ventricular size with normal right ventricular systolic function.  Mild tricuspid regurgitation.  Intermediate central venous pressure (8 mmHg).  The estimated PA systolic pressure is 28 mmHg.

## 2024-10-10 NOTE — ANESTHESIA PROCEDURE NOTES
Intubation    Date/Time: 10/10/2024 8:36 AM    Performed by: Huang Woodall CRNA  Authorized by: Jaciel Vivas II, MD    Intubation:     Induction:  Intravenous    Intubated:  Postinduction    Mask Ventilation:  Easy mask    Attempts:  1    Attempted By:  CRNA    Difficult Airway Encountered?: No      Complications:  None    Airway Device:  Supraglottic airway/LMA    Airway Device Size:  4.0    Style/Cuff Inflation:  Cuffed (inflated to minimal occlusive pressure)    Placement Verified By:  Capnometry    Complicating Factors:  None    Findings Post-Intubation:  BS equal bilateral

## 2024-10-10 NOTE — TRANSFER OF CARE
"Anesthesia Transfer of Care Note    Patient: Josette Altamirano    Procedure(s) Performed: Procedure(s) (LRB):  HYSTEROSCOPY, WITH DILATION AND CURETTAGE OF UTERUS (N/A)  POLYPECTOMY, UTERUS, HYSTEROSCOPIC (N/A)    Patient location: PACU    Anesthesia Type: general    Transport from OR: Transported from OR on room air with adequate spontaneous ventilation    Post pain: adequate analgesia    Post assessment: no apparent anesthetic complications    Post vital signs: stable    Level of consciousness: responds to stimulation    Nausea/Vomiting: no nausea/vomiting    Complications: none    Transfer of care protocol was followed      Last vitals: Visit Vitals  BP (!) 107/59 (BP Location: Left arm, Patient Position: Lying)   Pulse 90   Temp 36.6 °C (97.8 °F) (Temporal)   Resp 13   Ht 5' 4" (1.626 m)   Wt 103.5 kg (228 lb 2.8 oz)   SpO2 97%   Breastfeeding No   BMI 39.17 kg/m²     "

## 2024-10-10 NOTE — ANESTHESIA POSTPROCEDURE EVALUATION
Anesthesia Post Evaluation    Patient: Josette Altamirano    Procedure(s) Performed: Procedure(s) (LRB):  HYSTEROSCOPY, WITH DILATION AND CURETTAGE OF UTERUS (N/A)  POLYPECTOMY, UTERUS, HYSTEROSCOPIC (N/A)    Final Anesthesia Type: general      Patient location during evaluation: PACU  Patient participation: Yes- Able to Participate  Level of consciousness: awake and alert  Post-procedure vital signs: reviewed and stable  Pain management: adequate  Airway patency: patent  GIULIA mitigation strategies: Extubation while patient is awake  PONV status at discharge: No PONV  Anesthetic complications: no      Cardiovascular status: hemodynamically stable  Respiratory status: spontaneous ventilation  Hydration status: euvolemic  Follow-up not needed.              Vitals Value Taken Time   /58 10/10/24 0935   Temp 36.6 °C (97.8 °F) 10/10/24 0915   Pulse 79 10/10/24 0935   Resp 12 10/10/24 0935   SpO2 98 % 10/10/24 0935   Vitals shown include unfiled device data.      No case tracking events are documented in the log.      Pain/Edy Score: Pain Rating Prior to Med Admin: 0 (10/10/2024  8:12 AM)  Edy Score: 6 (10/10/2024  9:15 AM)

## 2024-10-14 LAB
FINAL PATHOLOGIC DIAGNOSIS: NORMAL
GROSS: NORMAL
Lab: NORMAL

## 2024-10-18 ENCOUNTER — PATIENT MESSAGE (OUTPATIENT)
Dept: OBSTETRICS AND GYNECOLOGY | Facility: CLINIC | Age: 69
End: 2024-10-18
Payer: MEDICARE

## 2024-10-18 DIAGNOSIS — N85.01 COMPLEX ENDOMETRIAL HYPERPLASIA WITHOUT ATYPIA: Primary | ICD-10-CM

## 2024-10-18 NOTE — PROGRESS NOTES
Referral placed to [facility]. Patient declines IUD/progestin therapy.     Anamika Eller MD  Obstetrics and Gynecology  [facility] [place]

## 2024-10-21 ENCOUNTER — TELEPHONE (OUTPATIENT)
Dept: GYNECOLOGIC ONCOLOGY | Facility: CLINIC | Age: 69
End: 2024-10-21
Payer: MEDICARE

## 2024-10-21 ENCOUNTER — OFFICE VISIT (OUTPATIENT)
Dept: SPORTS MEDICINE | Facility: CLINIC | Age: 69
End: 2024-10-21
Payer: MEDICARE

## 2024-10-21 VITALS — WEIGHT: 228.19 LBS | HEIGHT: 64 IN | BODY MASS INDEX: 38.96 KG/M2 | RESPIRATION RATE: 17 BRPM

## 2024-10-21 DIAGNOSIS — S46.011A TRAUMATIC INCOMPLETE TEAR OF RIGHT ROTATOR CUFF, INITIAL ENCOUNTER: Primary | ICD-10-CM

## 2024-10-21 DIAGNOSIS — M75.41 SUBACROMIAL IMPINGEMENT OF RIGHT SHOULDER: ICD-10-CM

## 2024-10-21 DIAGNOSIS — G89.29 CHRONIC RIGHT SHOULDER PAIN: ICD-10-CM

## 2024-10-21 DIAGNOSIS — M25.511 CHRONIC RIGHT SHOULDER PAIN: ICD-10-CM

## 2024-10-21 PROCEDURE — 99999 PR PBB SHADOW E&M-EST. PATIENT-LVL III: CPT | Mod: PBBFAC,,, | Performed by: PHYSICIAN ASSISTANT

## 2024-10-21 PROCEDURE — 99213 OFFICE O/P EST LOW 20 MIN: CPT | Mod: PBBFAC | Performed by: PHYSICIAN ASSISTANT

## 2024-10-21 PROCEDURE — 99213 OFFICE O/P EST LOW 20 MIN: CPT | Mod: S$PBB,,, | Performed by: PHYSICIAN ASSISTANT

## 2024-10-21 NOTE — PROGRESS NOTES
Orthopaedic Follow-Up Visit    Last Appointment: 8/21/24  Diagnosis: Traumatic incomplete tear of right rotator cuff, initial encounter, Subacromial impingement of right shoulder    Prior Procedure: CSI (8/21/24), HEP, celebrex    Josette Altamirano is a 69 y.o. female who is here for f/u evaluation of the Right Shoulder. The patient was last seen here by Me on 8/21/24 at which point we decided try a Cortisone injection prior to considering further treatment options. The patient returns today reporting that the symptoms have persisted and is interested in discussing further treatment options.     To review her history, Josette Altamirano is a 69 y.o. right-hand dominant female who initially presented to clinic on 08/21/2024 for right shoulder pain that began 2 weeks prior. She reports she was grabbing a tea kettle at the top of her cabinet when it began to fall and she tried to catch it causing her to do a jerking motion of her right shoulder. She had immediate pain and limited range of motion shortly thereafter. Her symptoms include anterior and lateral shoulder pain, limited range of motion due to pain, severe night pain that has limiting her ability to sleep. Her pain is made worse by certain movements of the shoulder, laying on the affected side. Her treatment has included rest, activity modification, oral anti-inflammatories, over-the-counter Tylenol, home exercise program, SAS CSI    Patient's medications, allergies, past medical, surgical, social and family histories were reviewed and updated as appropriate.    Review of Systems   All systems reviewed were negative.  Specifically, the patient denies fever, chills, weight loss, chest pain, shortness of breath, or dyspnea on exertion.      Past Medical History:   Diagnosis Date    Arthritis of right knee     Breast cancer     She has a history of a Stage IIA, [id number] intracystic papillary carcinoma with multifocal DCIS and one of three positive sentinel lymph nodes  diagnosed in Feb 2007. She subsequently underwent left mastectomy and sentinel node biopsy followed by axillary dissection March 23, 2007. Histopathologic evaluation demonstrated again multiple foci of intracystic papillary carcinoma, as well as DCIS noncomed    Cardiomyopathy     CHEMO INDUCED- RESOLVED    CHF (congestive heart failure)     systolic    Genetic testing 11/06/2023    [employer] Multi Cancer Panel (84 genes) - NEGATIVE, VUS in EGFR, POLE    Hypertension     Uterine fibroid        Past Surgical History:   Procedure Laterality Date    BREAST BIOPSY      BREAST CYST EXCISION Right     BREAST RECONSTRUCTION Bilateral 4/1/2024    Procedure: RECONSTRUCTION, BREAST;  Surgeon: Rashawn James MD;  Location: [facility];  Service: Plastics;  Laterality: Bilateral;    BREAST REVISION SURGERY Bilateral 4/1/2024    Procedure: BREAST REVISION SURGERY;  Surgeon: Rashawn James MD;  Location: [facility] OR;  Service: Plastics;  Laterality: Bilateral;    BREAST SURGERY      CHOLECYSTECTOMY      COLONOSCOPY N/A 9/10/2018    Procedure: COLONOSCOPY;  Surgeon: Indra Ramos MD;  Location: [facility];  Service: Endoscopy;  Laterality: N/A;    COLONOSCOPY N/A 4/28/2020    Procedure: COLONOSCOPY;  Surgeon: Dulce Maria Siegel MD;  Location: [facility];  Service: Endoscopy;  Laterality: N/A;    ESOPHAGOGASTRODUODENOSCOPY N/A 4/28/2020    Procedure: EGD (ESOPHAGOGASTRODUODENOSCOPY);  Surgeon: Dulce Maria Siegel MD;  Location: [facility];  Service: Endoscopy;  Laterality: N/A;    HYSTEROSCOPIC POLYPECTOMY OF UTERUS N/A 10/10/2024    Procedure: POLYPECTOMY, UTERUS, HYSTEROSCOPIC;  Surgeon: Anamika Eller MD;  Location: [facility];  Service: OB/GYN;  Laterality: N/A;    HYSTEROSCOPY WITH DILATION AND CURETTAGE OF UTERUS N/A 10/10/2024    Procedure: HYSTEROSCOPY, WITH DILATION AND CURETTAGE OF UTERUS;  Surgeon: Anamika Eller MD;  Location: [facility] OR;  Service: OB/GYN;  Laterality: N/A;    INJECTION FOR SENTINEL NODE IDENTIFICATION Right  12/12/2023    Procedure: INJECTION, FOR SENTINEL NODE IDENTIFICATION;  Surgeon: Shannon Galvan MD;  Location: [facility] OR;  Service: General;  Laterality: Right;    INSERTION OF BREAST IMPLANT Bilateral 4/1/2024    Procedure: INSERTION, BREAST IMPLANT;  Surgeon: Rashawn James MD;  Location: [facility] OR;  Service: Plastics;  Laterality: Bilateral;    INSERTION OF BREAST TISSUE EXPANDER Right 12/12/2023    Procedure: INSERTION, TISSUE EXPANDER, BREAST;  Surgeon: Rashawn James MD;  Location: [facility] OR;  Service: Plastics;  Laterality: Right;    MASTECTOMY      left    MASTECTOMY, SKIN SPARING, SIMPLE Right 12/12/2023    Procedure: MASTECTOMY, SKIN SPARING, SIMPLE;  Surgeon: Shannon Galvan MD;  Location: [facility] OR;  Service: General;  Laterality: Right;    MEDIPORT INSERTION, SINGLE      PLACEMENT OF ACELLULAR HUMAN DERMAL ALLOGRAFT Right 12/12/2023    Procedure: APPLICATION, ACELLULAR HUMAN DERMAL ALLOGRAFT;  Surgeon: Rashawn James MD;  Location: [facility] OR;  Service: Plastics;  Laterality: Right;    SENTINEL LYMPH NODE BIOPSY Right 12/12/2023    Procedure: BIOPSY, LYMPH NODE, SENTINEL;  Surgeon: Shannon Galvan MD;  Location: [facility] OR;  Service: General;  Laterality: Right;    TISSUE EXPANDER REMOVAL Bilateral 4/1/2024    Procedure: REMOVAL, TISSUE EXPANDER;  Surgeon: Rashawn James MD;  Location: [facility] OR;  Service: Plastics;  Laterality: Bilateral;       Patient's Medications   New Prescriptions    No medications on file   Previous Medications    METOPROLOL SUCCINATE (TOPROL-XL) 50 MG 24 HR TABLET    Take 1 tablet (50 mg total) by mouth once daily.    SACUBITRIL-VALSARTAN (ENTRESTO)  MG PER TABLET    Take 1 tablet by mouth 2 (two) times daily.   Modified Medications    No medications on file   Discontinued Medications    No medications on file       Family History   Problem Relation Name Age of Onset    Diabetes Mother      Breast cancer Mother      Diabetes Father      Stroke Father      No  Known Problems Sister      No Known Problems Brother      Cancer Maternal Aunt  55        breast cancer    Breast cancer Maternal Aunt      Stroke Paternal Aunt      No Known Problems Maternal Grandmother      No Known Problems Maternal Grandfather      No Known Problems Paternal Grandmother      Heart failure Paternal Grandfather      Cancer Maternal Cousin      Melanoma Neg Hx      Psoriasis Neg Hx      Lupus Neg Hx      Eczema Neg Hx         Review of patient's allergies indicates:  No Known Allergies      Objective:      Physical Exam  Patient is alert and oriented, no distress. Skin is intact. Neuro is normal with no focal motor or sensory findings.    Cervical exam is unremarkable. Intact cervical ROM. Negative Spurling's test     Physical Exam:                       RIGHT                                     LEFT     Scap Dyskinesis/Winging       (-)                                             (-)     Tenderness:                                                                              Greater Tuberosity                  +                                              (-)  Bicipital Groove                       (-)                                             (-)  AC joint                                   (-)                                             (-)  Other:      ROM:  Forward [id number]                                          160  Abduction                    100                                          120  ER (at side)                 60                                            80  IR                                 T10                                          T8     Strength:   Supraspinatus             4+/5                                         5/5  Infraspinatus               3/5                                           5/5  Subscap / IR               5/5                                           5/5      Special Tests:              Neer:                                       (-)                                              (-)              Lazaro:                                 +                                              (-)              SS Stress:                               +                                              (-)              Bear Hug:                                (-)                                             (-)              Alcalde's:                                 +                                              (-)              Resisted Thrower's:                +                                              (-)              Cross Arm Abduction:             +                                              (-)    Neurovascular examination  - Motor grossly intact bilaterally to shoulder abduction, elbow flexion and extension, wrist flexion and extension, and intrinsic hand musculature  - Sensation intact to light touch bilaterally in axillary, median, radial, and ulnar distributions  - Symmetrical radial pulses    Imaging:    X-ray Shoulder 2 or More Views Right     Narrative  EXAMINATION:  XR SHOULDER COMPLETE 2 OR MORE VIEWS RIGHT     CLINICAL HISTORY:  Unspecified disorder of synovium and tendon, right shoulder     TECHNIQUE:  Two or three views of the right shoulder were preformed.     COMPARISON:  08/02/2017     FINDINGS:  No acute fracture or dislocation.  No significant AC joint arthropathy noted.  There is some minimal spurring associated with the acromion.  No significant degenerative change at the glenohumeral joint.  Surgical clips seen projecting over the right axillary region.     Impression  1.  As above        Electronically signed by:Ernesto Butts DO  Date:                                                08/20/2024  Time:                                               11:15    Physician read: I agree with the above impression.    Assessment/Plan:   Josette Altamirano is a 69 y.o. female with suspected right shoulder rotator cuff tear, subacromial  impingement    Plan:    Discussed diagnosis and treatment options with the patient today.  At last visit she was diagnosed with right shoulder rotator cuff tear as well as subacromial impingement.  At last visit we elected to try a subacromial corticosteroid injection, home exercise program, and oral anti-inflammatories.  Despite these interventions she still continues to have persistent right shoulder pain that has affecting her activities of daily living  I recommend we move forward with a right shoulder MRI to evaluate the integrity of her rotator cuff due to her failing greater than 6 weeks of conservative treatment  Follow up with me after MRI to review results          Marti Zaldivar PA-C  Sports Medicine Physician Assistant       Disclaimer: This note was prepared using a voice recognition system and is likely to have sound alike errors within the text.

## 2024-10-24 ENCOUNTER — HOSPITAL ENCOUNTER (OUTPATIENT)
Dept: RADIOLOGY | Facility: HOSPITAL | Age: 69
Discharge: HOME | End: 2024-10-24
Attending: PHYSICIAN ASSISTANT
Payer: MEDICARE

## 2024-10-24 DIAGNOSIS — M25.511 CHRONIC RIGHT SHOULDER PAIN: ICD-10-CM

## 2024-10-24 DIAGNOSIS — S46.011A TRAUMATIC INCOMPLETE TEAR OF RIGHT ROTATOR CUFF, INITIAL ENCOUNTER: ICD-10-CM

## 2024-10-24 DIAGNOSIS — G89.29 CHRONIC RIGHT SHOULDER PAIN: ICD-10-CM

## 2024-10-24 DIAGNOSIS — M75.41 SUBACROMIAL IMPINGEMENT OF RIGHT SHOULDER: ICD-10-CM

## 2024-10-24 PROCEDURE — 73221 MRI JOINT UPR EXTREM W/O DYE: CPT | Mod: 26,RT,, | Performed by: RADIOLOGY

## 2024-10-24 PROCEDURE — 73221 MRI JOINT UPR EXTREM W/O DYE: CPT | Mod: TC,RT

## 2024-10-30 ENCOUNTER — OFFICE VISIT (OUTPATIENT)
Dept: SPORTS MEDICINE | Facility: CLINIC | Age: 69
End: 2024-10-30
Payer: MEDICARE

## 2024-10-30 ENCOUNTER — PATIENT MESSAGE (OUTPATIENT)
Dept: CARDIOLOGY | Facility: CLINIC | Age: 69
End: 2024-10-30
Payer: MEDICARE

## 2024-10-30 VITALS — WEIGHT: 228.19 LBS | BODY MASS INDEX: 38.96 KG/M2 | HEIGHT: 64 IN

## 2024-10-30 DIAGNOSIS — S46.011A TRAUMATIC INCOMPLETE TEAR OF RIGHT ROTATOR CUFF, INITIAL ENCOUNTER: Primary | ICD-10-CM

## 2024-10-30 DIAGNOSIS — M75.41 SUBACROMIAL IMPINGEMENT OF RIGHT SHOULDER: ICD-10-CM

## 2024-10-30 PROCEDURE — 99999 PR PBB SHADOW E&M-EST. PATIENT-LVL III: CPT | Mod: PBBFAC,,, | Performed by: PHYSICIAN ASSISTANT

## 2024-10-30 PROCEDURE — 99213 OFFICE O/P EST LOW 20 MIN: CPT | Mod: PBBFAC | Performed by: PHYSICIAN ASSISTANT

## 2024-10-30 PROCEDURE — 99213 OFFICE O/P EST LOW 20 MIN: CPT | Mod: S$PBB,,, | Performed by: PHYSICIAN ASSISTANT

## 2024-10-30 RX ORDER — METHOCARBAMOL 750 MG/1
750 TABLET, FILM COATED ORAL NIGHTLY
Qty: 30 TABLET | Refills: 0 | Status: SHIPPED | OUTPATIENT
Start: 2024-10-30 | End: 2024-11-29

## 2024-10-31 ENCOUNTER — HOSPITAL ENCOUNTER (OUTPATIENT)
Dept: CARDIOLOGY | Facility: HOSPITAL | Age: 69
Discharge: HOME | End: 2024-10-31
Attending: STUDENT IN AN ORGANIZED HEALTH CARE EDUCATION/TRAINING PROGRAM
Payer: MEDICARE

## 2024-10-31 ENCOUNTER — OFFICE VISIT (OUTPATIENT)
Dept: CARDIOLOGY | Facility: CLINIC | Age: 69
End: 2024-10-31
Payer: MEDICARE

## 2024-10-31 VITALS
HEIGHT: 64 IN | DIASTOLIC BLOOD PRESSURE: 74 MMHG | BODY MASS INDEX: 40.19 KG/M2 | HEART RATE: 67 BPM | OXYGEN SATURATION: 99 % | SYSTOLIC BLOOD PRESSURE: 108 MMHG | WEIGHT: 235.44 LBS

## 2024-10-31 DIAGNOSIS — I50.22 NYHA CLASS 2 AND ACC/AHA STAGE C CHRONIC SYSTOLIC CONGESTIVE HEART FAILURE: ICD-10-CM

## 2024-10-31 DIAGNOSIS — E66.01 MORBID OBESITY WITH BMI OF 40.0-44.9, ADULT: ICD-10-CM

## 2024-10-31 DIAGNOSIS — I50.22 CHRONIC SYSTOLIC HEART FAILURE: ICD-10-CM

## 2024-10-31 DIAGNOSIS — I50.22 NYHA CLASS 2 AND ACC/AHA STAGE C CHRONIC SYSTOLIC CONGESTIVE HEART FAILURE: Chronic | ICD-10-CM

## 2024-10-31 DIAGNOSIS — I10 ESSENTIAL HYPERTENSION: Primary | ICD-10-CM

## 2024-10-31 DIAGNOSIS — T45.1X5A CARDIOMYOPATHY DUE TO CHEMOTHERAPY: ICD-10-CM

## 2024-10-31 DIAGNOSIS — R07.89 CHEST TIGHTNESS: ICD-10-CM

## 2024-10-31 DIAGNOSIS — E66.01 SEVERE OBESITY (BMI 35.0-35.9 WITH COMORBIDITY): ICD-10-CM

## 2024-10-31 DIAGNOSIS — Z01.810 PREOP CARDIOVASCULAR EXAM: ICD-10-CM

## 2024-10-31 DIAGNOSIS — I10 ESSENTIAL HYPERTENSION: Primary | Chronic | ICD-10-CM

## 2024-10-31 DIAGNOSIS — Z17.0 MALIGNANT NEOPLASM OF OVERLAPPING SITES OF RIGHT BREAST IN FEMALE, ESTROGEN RECEPTOR POSITIVE: ICD-10-CM

## 2024-10-31 DIAGNOSIS — C50.811 MALIGNANT NEOPLASM OF OVERLAPPING SITES OF RIGHT BREAST IN FEMALE, ESTROGEN RECEPTOR POSITIVE: ICD-10-CM

## 2024-10-31 DIAGNOSIS — I10 ESSENTIAL HYPERTENSION: Chronic | ICD-10-CM

## 2024-10-31 DIAGNOSIS — I42.7 CARDIOMYOPATHY DUE TO CHEMOTHERAPY: ICD-10-CM

## 2024-10-31 LAB
OHS QRS DURATION: 80 MS
OHS QTC CALCULATION: 475 MS

## 2024-10-31 PROCEDURE — 93010 ELECTROCARDIOGRAM REPORT: CPT | Mod: ,,, | Performed by: INTERNAL MEDICINE

## 2024-10-31 PROCEDURE — 99213 OFFICE O/P EST LOW 20 MIN: CPT | Mod: PBBFAC,25 | Performed by: STUDENT IN AN ORGANIZED HEALTH CARE EDUCATION/TRAINING PROGRAM

## 2024-10-31 PROCEDURE — 93005 ELECTROCARDIOGRAM TRACING: CPT

## 2024-10-31 PROCEDURE — 99999 PR PBB SHADOW E&M-EST. PATIENT-LVL III: CPT | Mod: PBBFAC,,, | Performed by: STUDENT IN AN ORGANIZED HEALTH CARE EDUCATION/TRAINING PROGRAM

## 2024-11-04 ENCOUNTER — LAB VISIT (OUTPATIENT)
Dept: LAB | Facility: HOSPITAL | Age: 69
End: 2024-11-04
Attending: UROLOGY
Payer: MEDICARE

## 2024-11-04 ENCOUNTER — OFFICE VISIT (OUTPATIENT)
Dept: GYNECOLOGIC ONCOLOGY | Facility: CLINIC | Age: 69
End: 2024-11-04
Payer: MEDICARE

## 2024-11-04 ENCOUNTER — TELEPHONE (OUTPATIENT)
Dept: GYNECOLOGIC ONCOLOGY | Facility: CLINIC | Age: 69
End: 2024-11-04
Payer: MEDICARE

## 2024-11-04 VITALS
BODY MASS INDEX: 40.34 KG/M2 | OXYGEN SATURATION: 100 % | HEIGHT: 64 IN | WEIGHT: 236.31 LBS | HEART RATE: 69 BPM | DIASTOLIC BLOOD PRESSURE: 72 MMHG | SYSTOLIC BLOOD PRESSURE: 123 MMHG

## 2024-11-04 DIAGNOSIS — N95.0 POST-MENOPAUSAL BLEEDING: ICD-10-CM

## 2024-11-04 DIAGNOSIS — N84.0 UTERINE POLYP: ICD-10-CM

## 2024-11-04 DIAGNOSIS — R31.0 GROSS HEMATURIA: ICD-10-CM

## 2024-11-04 DIAGNOSIS — E66.01 CLASS 3 SEVERE OBESITY WITH SERIOUS COMORBIDITY AND BODY MASS INDEX (BMI) OF 40.0 TO 44.9 IN ADULT, UNSPECIFIED OBESITY TYPE: ICD-10-CM

## 2024-11-04 DIAGNOSIS — I50.22 NYHA CLASS 2 AND ACC/AHA STAGE C CHRONIC SYSTOLIC CONGESTIVE HEART FAILURE: Chronic | ICD-10-CM

## 2024-11-04 DIAGNOSIS — Z85.3 HISTORY OF LEFT BREAST CANCER: ICD-10-CM

## 2024-11-04 DIAGNOSIS — E66.813 CLASS 3 SEVERE OBESITY WITH SERIOUS COMORBIDITY AND BODY MASS INDEX (BMI) OF 40.0 TO 44.9 IN ADULT, UNSPECIFIED OBESITY TYPE: ICD-10-CM

## 2024-11-04 DIAGNOSIS — N85.01 COMPLEX ENDOMETRIAL HYPERPLASIA WITHOUT ATYPIA: Primary | ICD-10-CM

## 2024-11-04 DIAGNOSIS — C50.811 MALIGNANT NEOPLASM OF OVERLAPPING SITES OF RIGHT BREAST IN FEMALE, ESTROGEN RECEPTOR POSITIVE: ICD-10-CM

## 2024-11-04 DIAGNOSIS — Z17.0 MALIGNANT NEOPLASM OF OVERLAPPING SITES OF RIGHT BREAST IN FEMALE, ESTROGEN RECEPTOR POSITIVE: ICD-10-CM

## 2024-11-04 LAB
BACTERIA #/AREA URNS HPF: ABNORMAL /HPF
MICROSCOPIC COMMENT: ABNORMAL
RBC #/AREA URNS HPF: 0 /HPF (ref 0–4)
SQUAMOUS #/AREA URNS HPF: 30 /HPF
WBC #/AREA URNS HPF: 20 /HPF (ref 0–5)

## 2024-11-04 PROCEDURE — 87086 URINE CULTURE/COLONY COUNT: CPT | Performed by: UROLOGY

## 2024-11-04 PROCEDURE — 99213 OFFICE O/P EST LOW 20 MIN: CPT | Mod: PBBFAC | Performed by: OBSTETRICS & GYNECOLOGY

## 2024-11-04 PROCEDURE — 81000 URINALYSIS NONAUTO W/SCOPE: CPT | Performed by: UROLOGY

## 2024-11-04 PROCEDURE — 99999 PR PBB SHADOW E&M-EST. PATIENT-LVL III: CPT | Mod: PBBFAC,,, | Performed by: OBSTETRICS & GYNECOLOGY

## 2024-11-04 NOTE — PROGRESS NOTES
SUBJECTIVE     Chief complaint: Complex Hyperplasia with atypia    Referring provider: Anamika Eller MD  History of present Illness:  Josette Altamirano is a 69 y.o.  female  (SVDx2) with HTN, HLD, CHF  personal history of breast cancer presenting with new diagnosis of complex hyperplasia of the endometrium diagnosed on Hysteroscopy/D&C performed by her gyn for post menopausal bleeding. US prior to this showed 10mm endometrium. Denies ongoing bleeding.  pap was  ASCUS, HPV Negative.       She  has a past medical history of Arthritis of right knee, Breast cancer, Cardiomyopathy, CHF (congestive heart failure), Genetic testing (2023), Hypertension, and Uterine fibroid.     She has a past surgical history of laparoscopic cholecystectomy.     She has a family history of breast cancer in aunts / denies family history of gyn/colon cancer.    Data Reviewed:   TVUS:  Sonographic evaluation of the pelvis was performed both transabdominally and endovaginally.  The uterus measures 5.9 x 3.2 x 5.0 cm.  The endometrial stripe is significantly thickened for patient age and postmenopausal status measuring up to 10 mm.  Its appearance raises concern for endometrial hyperplasia or malignancy.  There are multiple uterine fibroids identified including the largest fibroid in the left side of the uterine fundus measuring 2.2 x 2.2 x 2.2 cm abutting the endometrium is a submucosal fibroid.  An exophytic fibroid is also identified projecting posteriorly from the body of the uterus measuring 19 x 18 x 16 mm and a third small mildly hyperechoic fibroid is visible anteriorly in the body of the uterus measuring 12 x 8 x 8 mm.   Both ovaries are identified and appear sonographically unremarkable.  No free pelvic fluid or abnormal adnexal masses are visible.  Impression:  1.  Thickened endometrium for patient age and postmenopausal status measuring up to 10 mm.  Endometrial hyperplasia or neoplasm are concerned.  2. 3 incidental  uterine fibroids are also identified.  3.  No adnexal abnormalities or free pelvic fluid are visible    Path:   Final Pathologic Diagnosis ENDOMETRIUM, POLYP, EXCISION:  - Complex endometrial hyperplasia without atypia arising in fragments of an endometrial polyp.  - No atypia or malignancy.   Comment: Interp By Cheri Sarmiento MD, Signed on 10/14/2024 at 16:40       Oncology History   Malignant neoplasm of overlapping sites of right breast in female, estrogen receptor positive   9/19/2023 Initial Diagnosis    Malignant neoplasm of overlapping sites of right breast in female, estrogen receptor positive     9/19/2023 Cancer Staged    Staging form: Breast, AJCC 8th Edition  - Clinical stage from 9/19/2023: Stage IA (cT1b, cN0, cM0, G2, ER+, SD+, HER2-)     12/26/2023 Cancer Staged    Staging form: Breast, AJCC 8th Edition  - Pathologic stage from 12/26/2023: pT0, pN0(sn), cM0, G1, ER+, SD+, HER2-     1/25/2024 Genomic Testing    Oncotype Dx Breast   Report#: [id number]  Report date: 1/23/24  Unable to report due to insufficient carcinoma present         Genetic Testing    Patient has genetic testing done for .    [employer]                                          Results revealed patient has the following mutation(s):NEGATIVE          Review of Systems per HPI     Past Medical History:   Diagnosis Date    Arthritis of right knee     Breast cancer     She has a history of a Stage IIA, 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 She subsequently underwent left mastectomy and sentinel node biopsy followed by axillary dissection March 23, 2007. Histopathologic evaluation demonstrated again multiple foci of intracystic papillary carcinoma, as well as DCIS noncomed    Cardiomyopathy     CHEMO INDUCED- RESOLVED    CHF (congestive heart failure)     systolic    Genetic testing 11/06/2023    [employer] Multi Cancer Panel (84 genes) - NEGATIVE,  VUS in EGFR, POLE    Hypertension     Uterine fibroid       Past Surgical History:   Procedure Laterality Date    BREAST BIOPSY      BREAST CYST EXCISION Right     BREAST RECONSTRUCTION Bilateral 4/1/2024    Procedure: RECONSTRUCTION, BREAST;  Surgeon: Rashawn James MD;  Location: [facility];  Service: Plastics;  Laterality: Bilateral;    BREAST REVISION SURGERY Bilateral 4/1/2024    Procedure: BREAST REVISION SURGERY;  Surgeon: Rashawn James MD;  Location: [facility] OR;  Service: Plastics;  Laterality: Bilateral;    BREAST SURGERY      CHOLECYSTECTOMY      COLONOSCOPY N/A 9/10/2018    Procedure: COLONOSCOPY;  Surgeon: Indra Ramos MD;  Location: [facility];  Service: Endoscopy;  Laterality: N/A;    COLONOSCOPY N/A 4/28/2020    Procedure: COLONOSCOPY;  Surgeon: Dulce Maria Siegel MD;  Location: [facility];  Service: Endoscopy;  Laterality: N/A;    ESOPHAGOGASTRODUODENOSCOPY N/A 4/28/2020    Procedure: EGD (ESOPHAGOGASTRODUODENOSCOPY);  Surgeon: Dulce Maria Siegel MD;  Location: [facility];  Service: Endoscopy;  Laterality: N/A;    HYSTEROSCOPIC POLYPECTOMY OF UTERUS N/A 10/10/2024    Procedure: POLYPECTOMY, UTERUS, HYSTEROSCOPIC;  Surgeon: Anamika Eller MD;  Location: [facility];  Service: OB/GYN;  Laterality: N/A;    HYSTEROSCOPY WITH DILATION AND CURETTAGE OF UTERUS N/A 10/10/2024    Procedure: HYSTEROSCOPY, WITH DILATION AND CURETTAGE OF UTERUS;  Surgeon: Anamika Eller MD;  Location: [facility];  Service: OB/GYN;  Laterality: N/A;    INJECTION FOR SENTINEL NODE IDENTIFICATION Right 12/12/2023    Procedure: INJECTION, FOR SENTINEL NODE IDENTIFICATION;  Surgeon: Shannon Galvan MD;  Location: [facility];  Service: General;  Laterality: Right;    INSERTION OF BREAST IMPLANT Bilateral 4/1/2024    Procedure: INSERTION, BREAST IMPLANT;  Surgeon: Rashawn James MD;  Location: [facility];  Service: Plastics;  Laterality: Bilateral;    INSERTION OF BREAST TISSUE EXPANDER Right 12/12/2023    Procedure: INSERTION, TISSUE  EXPANDER, BREAST;  Surgeon: Rashawn James MD;  Location: [facility] OR;  Service: Plastics;  Laterality: Right;    MASTECTOMY      left    MASTECTOMY, SKIN SPARING, SIMPLE Right 2023    Procedure: MASTECTOMY, SKIN SPARING, SIMPLE;  Surgeon: Shannon Galvan MD;  Location: [facility];  Service: General;  Laterality: Right;    MEDIPORT INSERTION, SINGLE      PLACEMENT OF ACELLULAR HUMAN DERMAL ALLOGRAFT Right 2023    Procedure: APPLICATION, ACELLULAR HUMAN DERMAL ALLOGRAFT;  Surgeon: Rashawn James MD;  Location: [facility] OR;  Service: Plastics;  Laterality: Right;    SENTINEL LYMPH NODE BIOPSY Right 2023    Procedure: BIOPSY, LYMPH NODE, SENTINEL;  Surgeon: Shannon Galvan MD;  Location: [facility];  Service: General;  Laterality: Right;    TISSUE EXPANDER REMOVAL Bilateral 2024    Procedure: REMOVAL, TISSUE EXPANDER;  Surgeon: Rashawn James MD;  Location: [facility];  Service: Plastics;  Laterality: Bilateral;      Review of patient's allergies indicates:  No Known Allergies  Current Outpatient Medications   Medication Instructions    methocarbamoL (ROBAXIN) 750 mg, Oral, Nightly    metoprolol succinate (TOPROL-XL) 50 mg, Oral, Daily    sacubitriL-valsartan (ENTRESTO)  mg per tablet 1 tablet, Oral, 2 times daily     OB History    Para Term  AB Living   2 2 2     2   SAB IAB Ectopic Multiple Live Births                  # Outcome Date GA Lbr Jan/2nd Weight Sex Type Anes PTL Lv   2 Term            1 Term              Social History     Tobacco Use    Smoking status: Never     Passive exposure: Never    Smokeless tobacco: Never   Substance Use Topics    Alcohol use: Yes     Alcohol/week: 2.0 standard drinks of alcohol     Types: 2 Glasses of wine per week     Comment: Ocassionally    Drug use: No      Family History   Problem Relation Name Age of Onset    Diabetes Mother      Breast cancer Mother      Diabetes Father      Stroke Father      No Known Problems Sister      No  "Known Problems Brother      Cancer Maternal Aunt  55        breast cancer    Breast cancer Maternal Aunt      Stroke Paternal Aunt      No Known Problems Maternal Grandmother      No Known Problems Maternal Grandfather      No Known Problems Paternal Grandmother      Heart failure Paternal Grandfather      Cancer Maternal Cousin      Melanoma Neg Hx      Psoriasis Neg Hx      Lupus Neg Hx      Eczema Neg Hx       Health Maintenance Topics with due status: Not Due       Topic Last Completion Date    Colorectal Cancer Screening 04/28/2020    DEXA Scan 07/14/2023    TETANUS VACCINE 11/22/2023    Lipid Panel 08/08/2024     Health Maintenance Due   Topic Date Due    Hepatitis C Screening  Never done    Shingles Vaccine (1 of 2) Never done    Pneumococcal Vaccines (Age 65+) (2 of 2 - PCV) 11/30/2008    Hemoglobin A1c (Diabetic Prevention Screening)  10/26/2009    RSV Vaccine (Age 60+ and Pregnant patients) (1 - Risk 60-74 years 1-dose series) Never done    Influenza Vaccine (1) 09/01/2024    COVID-19 Vaccine (5 - 2024-25 season) 09/01/2024     OBJECTIVE   /72 (Patient Position: Sitting)   Pulse 69   Ht 5' 4" (1.626 m)   Wt 107.2 kg (236 lb 5.3 oz)   SpO2 100%   BMI 40.57 kg/m²     Physical Exam  Exam conducted with a chaperone present.   Constitutional:       Appearance: Normal appearance.   Pulmonary:      Effort: Pulmonary effort is normal.   Abdominal:      General: Abdomen is flat.      Palpations: Abdomen is soft.   Genitourinary:     General: Normal vulva.      Vagina: Normal.      Cervix: Normal.      Uterus: Normal.       Adnexa: Right adnexa normal and left adnexa normal.      Comments: Myomatous uterus, mobile, 6 week size.   Neurological:      General: No focal deficit present.      Mental Status: She is alert and oriented to person, place, and time.   Psychiatric:         Mood and Affect: Mood normal.         Behavior: Behavior normal.        ASSESSMENT      1. Complex endometrial hyperplasia " without atypia  - Ambulatory referral/consult to Gynecologic Oncology    2. Malignant neoplasm of overlapping sites of right breast in female, estrogen receptor positive    3. Class 3 severe obesity with serious comorbidity and body mass index (BMI) of 40.0 to 44.9 in adult, unspecified obesity type    4. NYHA class 2 and LINDA/AHA stage C chronic systolic congestive heart failure    PLAN     Complex Endometrial Hyperplasia   - Today the patients diagnosis of complex  hyperplasia without atypia was discussed with her in detail with the use of visual aids.   Risk of progression to malignancy is 5-10%. We discussed definitive treatment is hysterectomy/BSO. Alternative acceptable treatment options are hormonal with Progesterone IUD or PO progesterone follow by  close follow up and repeat sampling at 3-6 months to ensure resolution and no recurrence. We discussed risks and benefits of definitive surgery, IUD insertion, and PO progesterone and she would like to proceed with definitive surgery. Proceed with RATLH/BSO. We reviewed risks of surgery including bleeding, need for transfusion, infection, trauma to surrounding structures (blood vessels, nerves, organs including bowel, bladder, ureters), cardiac events, VTE and even death. She understands and agrees to proceed. Consents signed.    Class 3 obesity with co morbidities  - Related comorbid conditions Include CHF, HTN, HLD and endometrial hyperplasia     CHF  - TTE in 2023 with EF - 55%  - Cleared by cardiology for hysteroscopy/D&C in 10/2024     Sofiya Nguyen MD  Gynecologic Oncology     A total of 60 minutes were spent on encounter including record review, imaging review, counseling patient, coordinating future care.

## 2024-11-04 NOTE — H&P (VIEW-ONLY)
SUBJECTIVE     Chief complaint: Complex Hyperplasia with atypia    Referring provider: Anamika Eller MD  History of present Illness:  Josette Altamirano is a 69 y.o.  female  (SVDx2) with HTN, HLD, CHF  personal history of breast cancer presenting with new diagnosis of complex hyperplasia of the endometrium diagnosed on Hysteroscopy/D&C performed by her gyn for post menopausal bleeding. US prior to this showed 10mm endometrium. Denies ongoing bleeding.  pap was  ASCUS, HPV Negative.       She  has a past medical history of Arthritis of right knee, Breast cancer, Cardiomyopathy, CHF (congestive heart failure), Genetic testing (2023), Hypertension, and Uterine fibroid.     She has a past surgical history of laparoscopic cholecystectomy.     She has a family history of breast cancer in aunts / denies family history of gyn/colon cancer.    Data Reviewed:   TVUS:  Sonographic evaluation of the pelvis was performed both transabdominally and endovaginally.  The uterus measures 5.9 x 3.2 x 5.0 cm.  The endometrial stripe is significantly thickened for patient age and postmenopausal status measuring up to 10 mm.  Its appearance raises concern for endometrial hyperplasia or malignancy.  There are multiple uterine fibroids identified including the largest fibroid in the left side of the uterine fundus measuring 2.2 x 2.2 x 2.2 cm abutting the endometrium is a submucosal fibroid.  An exophytic fibroid is also identified projecting posteriorly from the body of the uterus measuring 19 x 18 x 16 mm and a third small mildly hyperechoic fibroid is visible anteriorly in the body of the uterus measuring 12 x 8 x 8 mm.   Both ovaries are identified and appear sonographically unremarkable.  No free pelvic fluid or abnormal adnexal masses are visible.  Impression:  1.  Thickened endometrium for patient age and postmenopausal status measuring up to 10 mm.  Endometrial hyperplasia or neoplasm are concerned.  2. 3 incidental  uterine fibroids are also identified.  3.  No adnexal abnormalities or free pelvic fluid are visible    Path:   Final Pathologic Diagnosis ENDOMETRIUM, POLYP, EXCISION:  - Complex endometrial hyperplasia without atypia arising in fragments of an endometrial polyp.  - No atypia or malignancy.   Comment: Interp By Cheri Sarmiento MD, Signed on 10/14/2024 at 16:40       Oncology History   Malignant neoplasm of overlapping sites of right breast in female, estrogen receptor positive   9/19/2023 Initial Diagnosis    Malignant neoplasm of overlapping sites of right breast in female, estrogen receptor positive     9/19/2023 Cancer Staged    Staging form: Breast, AJCC 8th Edition  - Clinical stage from 9/19/2023: Stage IA (cT1b, cN0, cM0, G2, ER+, MS+, HER2-)     12/26/2023 Cancer Staged    Staging form: Breast, AJCC 8th Edition  - Pathologic stage from 12/26/2023: pT0, pN0(sn), cM0, G1, ER+, MS+, HER2-     1/25/2024 Genomic Testing    Oncotype Dx Breast   Report#: [id number]  Report date: 1/23/24  Unable to report due to insufficient carcinoma present         Genetic Testing    Patient has genetic testing done for .    [id number]                                          Results revealed patient has the following mutation(s):NEGATIVE          Review of Systems per HPI     Past Medical History:   Diagnosis Date    Arthritis of right knee     Breast cancer     She has a history of a Stage IIA, 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 She subsequently underwent left mastectomy and sentinel node biopsy followed by axillary dissection March 23, 2007. Histopathologic evaluation demonstrated again multiple foci of intracystic papillary carcinoma, as well as DCIS noncomed    Cardiomyopathy     CHEMO INDUCED- RESOLVED    CHF (congestive heart failure)     systolic    Genetic testing 11/06/2023    [employer] Multi Cancer Panel (84 genes) - NEGATIVE,  VUS in EGFR, POLE    Hypertension     Uterine fibroid       Past Surgical History:   Procedure Laterality Date    BREAST BIOPSY      BREAST CYST EXCISION Right     BREAST RECONSTRUCTION Bilateral 4/1/2024    Procedure: RECONSTRUCTION, BREAST;  Surgeon: Rashawn James MD;  Location: [facility];  Service: Plastics;  Laterality: Bilateral;    BREAST REVISION SURGERY Bilateral 4/1/2024    Procedure: BREAST REVISION SURGERY;  Surgeon: Rashawn James MD;  Location: [facility] OR;  Service: Plastics;  Laterality: Bilateral;    BREAST SURGERY      CHOLECYSTECTOMY      COLONOSCOPY N/A 9/10/2018    Procedure: COLONOSCOPY;  Surgeon: Indra Ramos MD;  Location: [facility];  Service: Endoscopy;  Laterality: N/A;    COLONOSCOPY N/A 4/28/2020    Procedure: COLONOSCOPY;  Surgeon: Dulce Maria Siegel MD;  Location: [facility];  Service: Endoscopy;  Laterality: N/A;    ESOPHAGOGASTRODUODENOSCOPY N/A 4/28/2020    Procedure: EGD (ESOPHAGOGASTRODUODENOSCOPY);  Surgeon: Dulce Maria Siegel MD;  Location: [facility];  Service: Endoscopy;  Laterality: N/A;    HYSTEROSCOPIC POLYPECTOMY OF UTERUS N/A 10/10/2024    Procedure: POLYPECTOMY, UTERUS, HYSTEROSCOPIC;  Surgeon: Anaimka Eller MD;  Location: [facility];  Service: OB/GYN;  Laterality: N/A;    HYSTEROSCOPY WITH DILATION AND CURETTAGE OF UTERUS N/A 10/10/2024    Procedure: HYSTEROSCOPY, WITH DILATION AND CURETTAGE OF UTERUS;  Surgeon: Anamika Eller MD;  Location: [facility];  Service: OB/GYN;  Laterality: N/A;    INJECTION FOR SENTINEL NODE IDENTIFICATION Right 12/12/2023    Procedure: INJECTION, FOR SENTINEL NODE IDENTIFICATION;  Surgeon: Shannon Galvan MD;  Location: [facility];  Service: General;  Laterality: Right;    INSERTION OF BREAST IMPLANT Bilateral 4/1/2024    Procedure: INSERTION, BREAST IMPLANT;  Surgeon: Rashawn James MD;  Location: [facility];  Service: Plastics;  Laterality: Bilateral;    INSERTION OF BREAST TISSUE EXPANDER Right 12/12/2023    Procedure: INSERTION, TISSUE  EXPANDER, BREAST;  Surgeon: Rashawn James MD;  Location: [facility] OR;  Service: Plastics;  Laterality: Right;    MASTECTOMY      left    MASTECTOMY, SKIN SPARING, SIMPLE Right 2023    Procedure: MASTECTOMY, SKIN SPARING, SIMPLE;  Surgeon: Shannon Galvan MD;  Location: [facility];  Service: General;  Laterality: Right;    MEDIPORT INSERTION, SINGLE      PLACEMENT OF ACELLULAR HUMAN DERMAL ALLOGRAFT Right 2023    Procedure: APPLICATION, ACELLULAR HUMAN DERMAL ALLOGRAFT;  Surgeon: Rashawn James MD;  Location: [facility] OR;  Service: Plastics;  Laterality: Right;    SENTINEL LYMPH NODE BIOPSY Right 2023    Procedure: BIOPSY, LYMPH NODE, SENTINEL;  Surgeon: Shannon Galvan MD;  Location: [facility];  Service: General;  Laterality: Right;    TISSUE EXPANDER REMOVAL Bilateral 2024    Procedure: REMOVAL, TISSUE EXPANDER;  Surgeon: Rashawn James MD;  Location: [facility];  Service: Plastics;  Laterality: Bilateral;      Review of patient's allergies indicates:  No Known Allergies  Current Outpatient Medications   Medication Instructions    methocarbamoL (ROBAXIN) 750 mg, Oral, Nightly    metoprolol succinate (TOPROL-XL) 50 mg, Oral, Daily    sacubitriL-valsartan (ENTRESTO)  mg per tablet 1 tablet, Oral, 2 times daily     OB History    Para Term  AB Living   2 2 2     2   SAB IAB Ectopic Multiple Live Births                  # Outcome Date GA Lbr Jan/2nd Weight Sex Type Anes PTL Lv   2 Term            1 Term              Social History     Tobacco Use    Smoking status: Never     Passive exposure: Never    Smokeless tobacco: Never   Substance Use Topics    Alcohol use: Yes     Alcohol/week: 2.0 standard drinks of alcohol     Types: 2 Glasses of wine per week     Comment: Ocassionally    Drug use: No      Family History   Problem Relation Name Age of Onset    Diabetes Mother      Breast cancer Mother      Diabetes Father      Stroke Father      No Known Problems Sister      No  "Known Problems Brother      Cancer Maternal Aunt  55        breast cancer    Breast cancer Maternal Aunt      Stroke Paternal Aunt      No Known Problems Maternal Grandmother      No Known Problems Maternal Grandfather      No Known Problems Paternal Grandmother      Heart failure Paternal Grandfather      Cancer Maternal Cousin      Melanoma Neg Hx      Psoriasis Neg Hx      Lupus Neg Hx      Eczema Neg Hx       Health Maintenance Topics with due status: Not Due       Topic Last Completion Date    Colorectal Cancer Screening 04/28/2020    DEXA Scan 07/14/2023    TETANUS VACCINE 11/22/2023    Lipid Panel 08/08/2024     Health Maintenance Due   Topic Date Due    Hepatitis C Screening  Never done    Shingles Vaccine (1 of 2) Never done    Pneumococcal Vaccines (Age 65+) (2 of 2 - PCV) 11/30/2008    Hemoglobin A1c (Diabetic Prevention Screening)  10/26/2009    RSV Vaccine (Age 60+ and Pregnant patients) (1 - Risk 60-74 years 1-dose series) Never done    Influenza Vaccine (1) 09/01/2024    COVID-19 Vaccine (5 - 2024-25 season) 09/01/2024     OBJECTIVE   /72 (Patient Position: Sitting)   Pulse 69   Ht 5' 4" (1.626 m)   Wt 107.2 kg (236 lb 5.3 oz)   SpO2 100%   BMI 40.57 kg/m²     Physical Exam  Exam conducted with a chaperone present.   Constitutional:       Appearance: Normal appearance.   Pulmonary:      Effort: Pulmonary effort is normal.   Abdominal:      General: Abdomen is flat.      Palpations: Abdomen is soft.   Genitourinary:     General: Normal vulva.      Vagina: Normal.      Cervix: Normal.      Uterus: Normal.       Adnexa: Right adnexa normal and left adnexa normal.      Comments: Myomatous uterus, mobile, 6 week size.   Neurological:      General: No focal deficit present.      Mental Status: She is alert and oriented to person, place, and time.   Psychiatric:         Mood and Affect: Mood normal.         Behavior: Behavior normal.        ASSESSMENT      1. Complex endometrial hyperplasia " without atypia  - Ambulatory referral/consult to Gynecologic Oncology    2. Malignant neoplasm of overlapping sites of right breast in female, estrogen receptor positive    3. Class 3 severe obesity with serious comorbidity and body mass index (BMI) of 40.0 to 44.9 in adult, unspecified obesity type    4. NYHA class 2 and LINDA/AHA stage C chronic systolic congestive heart failure    PLAN     Complex Endometrial Hyperplasia   - Today the patients diagnosis of complex  hyperplasia without atypia was discussed with her in detail with the use of visual aids.   Risk of progression to malignancy is 5-10%. We discussed definitive treatment is hysterectomy/BSO. Alternative acceptable treatment options are hormonal with Progesterone IUD or PO progesterone follow by  close follow up and repeat sampling at 3-6 months to ensure resolution and no recurrence. We discussed risks and benefits of definitive surgery, IUD insertion, and PO progesterone and she would like to proceed with definitive surgery. Proceed with RATLH/BSO. We reviewed risks of surgery including bleeding, need for transfusion, infection, trauma to surrounding structures (blood vessels, nerves, organs including bowel, bladder, ureters), cardiac events, VTE and even death. She understands and agrees to proceed. Consents signed.    Class 3 obesity with co morbidities  - Related comorbid conditions Include CHF, HTN, HLD and endometrial hyperplasia     CHF  - TTE in 2023 with EF - 55%  - Cleared by cardiology for hysteroscopy/D&C in 10/2024     Sofiya Nguyen MD  Gynecologic Oncology     A total of 60 minutes were spent on encounter including record review, imaging review, counseling patient, coordinating future care.

## 2024-11-06 LAB
BACTERIA UR CULT: NORMAL
BACTERIA UR CULT: NORMAL

## 2024-11-14 NOTE — PROGRESS NOTES
Orthopaedic Follow-Up Visit    Last Appointment:  10/30/2024  Diagnosis: right shoulder full-thickness supraspinatus tear  Prior Procedure:  Right shoulder CSI 08/21/2024, HEP, celebrex    Josette Altamirano is a 69 y.o. female who is here for f/u evaluation of right shoulder pain. The patient was last seen here by me on 10/30/2024 at which point her MRI confirmed right shoulder rotator cuff tear. She was interested in potential operative treatment and was referred to me for surgical consult. The patient returns today to further discuss potential surgery. Of not she is currently scheduled for hysterectomy on 12/4/24.     Josette presents with shoulder pain that has been bothering her for approximately 2.5 to three months. She describes the onset as possibly related to moving activities at her parents' house, including packing, moving, and pulling items from high places. She also mentions a specific incident where she reached for a teapot and caught it before it fell, which she believes may have contributed to the injury. Josette localizes the pain to the side of her shoulder, indicating the area with her hand. She reports difficulty with certain movements, stating that she experiences limitations when reaching or turning, such as when opening blinds, and performs these actions slowly. She also notes inability to reach behind her back and experiences throbbing pain when lying down. Josette describes being very conscious of movements due to the pain and expresses significant discomfort.    An MRI has revealed a rotator cuff tear, with the tendon pulled off by almost three cm. Josette has an upcoming surgery scheduled for December 4th, which is complicating the timing for addressing her shoulder issue. She denies any specific injuries that started the shoulder pain and denies having diabetes.    Josette mentions being a cancer survivor and having had bilateral mastectomies.    Her treatment has included rest, activity modification,  oral anti-inflammatories, over-the-counter Tylenol, physician directed home exercise program, SAS CSI. She returned for follow-up on 10/21/24 and noted continued symptoms so was sent for MRI.     Treatment to date: Rest, activity modification, right shoulder CSI 08/21/2024, HEP, celebrex    Patient's medications, allergies, past medical, surgical, social and family histories were reviewed and updated as appropriate.    Review of Systems   All systems reviewed were negative.  Specifically, the patient denies fever, chills, weight loss, chest pain, shortness of breath, or dyspnea on exertion.      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 She subsequently underwent left mastectomy and sentinel node biopsy followed by axillary dissection March 23, 2007. Histopathologic evaluation demonstrated again multiple foci of intracystic papillary carcinoma, as well as DCIS noncomed    Cardiomyopathy     CHEMO INDUCED- RESOLVED    CHF (congestive heart failure)     systolic    Genetic testing 11/06/2023    [employer] Multi Cancer Panel (84 genes) - NEGATIVE, VUS in EGFR, POLE    Hypertension     Uterine fibroid        Past Surgical History:   Procedure Laterality Date    BREAST BIOPSY      BREAST CYST EXCISION Right     BREAST RECONSTRUCTION Bilateral 4/1/2024    Procedure: RECONSTRUCTION, BREAST;  Surgeon: Rashawn James MD;  Location: [facility] OR;  Service: Plastics;  Laterality: Bilateral;    BREAST REVISION SURGERY Bilateral 4/1/2024    Procedure: BREAST REVISION SURGERY;  Surgeon: Rashawn James MD;  Location: [facility] OR;  Service: Plastics;  Laterality: Bilateral;    BREAST SURGERY      CHOLECYSTECTOMY      COLONOSCOPY N/A 9/10/2018    Procedure: COLONOSCOPY;  Surgeon: Indra Ramos MD;  Location: [facility];  Service:  Endoscopy;  Laterality: N/A;    COLONOSCOPY N/A 4/28/2020    Procedure: COLONOSCOPY;  Surgeon: Dulce Maria Siegel MD;  Location: [facility] ENDO;  Service: Endoscopy;  Laterality: N/A;    ESOPHAGOGASTRODUODENOSCOPY N/A 4/28/2020    Procedure: EGD (ESOPHAGOGASTRODUODENOSCOPY);  Surgeon: Dulce Maria Siegel MD;  Location: [facility] ENDO;  Service: Endoscopy;  Laterality: N/A;    HYSTEROSCOPIC POLYPECTOMY OF UTERUS N/A 10/10/2024    Procedure: POLYPECTOMY, UTERUS, HYSTEROSCOPIC;  Surgeon: Anamika Eller MD;  Location: [facility] [place] OR;  Service: OB/GYN;  Laterality: N/A;    HYSTEROSCOPY WITH DILATION AND CURETTAGE OF UTERUS N/A 10/10/2024    Procedure: HYSTEROSCOPY, WITH DILATION AND CURETTAGE OF UTERUS;  Surgeon: Anamika Eller MD;  Location: [facility] [place] OR;  Service: OB/GYN;  Laterality: N/A;    INJECTION FOR SENTINEL NODE IDENTIFICATION Right 12/12/2023    Procedure: INJECTION, FOR SENTINEL NODE IDENTIFICATION;  Surgeon: Shannon Galvan MD;  Location: [facility];  Service: General;  Laterality: Right;    INSERTION OF BREAST IMPLANT Bilateral 4/1/2024    Procedure: INSERTION, BREAST IMPLANT;  Surgeon: Rashawn James MD;  Location: [facility] OR;  Service: Plastics;  Laterality: Bilateral;    INSERTION OF BREAST TISSUE EXPANDER Right 12/12/2023    Procedure: INSERTION, TISSUE EXPANDER, BREAST;  Surgeon: Rashawn James MD;  Location: [facility] OR;  Service: Plastics;  Laterality: Right;    MASTECTOMY      left    MASTECTOMY, SKIN SPARING, SIMPLE Right 12/12/2023    Procedure: MASTECTOMY, SKIN SPARING, SIMPLE;  Surgeon: Shannon Galvan MD;  Location: [facility] OR;  Service: General;  Laterality: Right;    MEDIPORT INSERTION, SINGLE      PLACEMENT OF ACELLULAR HUMAN DERMAL ALLOGRAFT Right 12/12/2023    Procedure: APPLICATION, ACELLULAR HUMAN DERMAL ALLOGRAFT;  Surgeon: Rashawn James MD;  Location: [facility] OR;  Service: Plastics;  Laterality: Right;    SENTINEL LYMPH NODE BIOPSY Right 12/12/2023    Procedure: BIOPSY, LYMPH NODE, SENTINEL;  Surgeon:  Shannon Galvan MD;  Location: [facility] OR;  Service: General;  Laterality: Right;    TISSUE EXPANDER REMOVAL Bilateral 4/1/2024    Procedure: REMOVAL, TISSUE EXPANDER;  Surgeon: Rashawn James MD;  Location: [facility] OR;  Service: Plastics;  Laterality: Bilateral;       Patient's Medications   New Prescriptions    No medications on file   Previous Medications    METHOCARBAMOL (ROBAXIN) 750 MG TAB    Take 1 tablet (750 mg total) by mouth nightly.    METOPROLOL SUCCINATE (TOPROL-XL) 50 MG 24 HR TABLET    Take 1 tablet (50 mg total) by mouth once daily.    SACUBITRIL-VALSARTAN (ENTRESTO)  MG PER TABLET    Take 1 tablet by mouth 2 (two) times daily.   Modified Medications    No medications on file   Discontinued Medications    No medications on file       Family History   Problem Relation Name Age of Onset    Diabetes Mother      Breast cancer Mother      Diabetes Father      Stroke Father      No Known Problems Sister      No Known Problems Brother      Cancer Maternal Aunt  55        breast cancer    Breast cancer Maternal Aunt      Stroke Paternal Aunt      No Known Problems Maternal Grandmother      No Known Problems Maternal Grandfather      No Known Problems Paternal Grandmother      Heart failure Paternal Grandfather      Cancer Maternal Cousin      Melanoma Neg Hx      Psoriasis Neg Hx      Lupus Neg Hx      Eczema Neg Hx         Review of patient's allergies indicates:  No Known Allergies      Objective:      Physical Exam  Patient is alert and oriented, no distress. Skin is intact. Neuro is normal with no focal motor or sensory findings.    Cervical exam is unremarkable. Intact cervical ROM. Negative Spurling's test     Physical Exam:                       RIGHT                                     LEFT     Scap Dyskinesis/Winging       (-)                                             (-)     Tenderness:                                                                              Greater Tuberosity                   +                                              (-)  Bicipital Groove                       (-)                                            (-)  AC joint                                   (-)                                             (-)  Other:      ROM:  Forward Elevation  140                                         160  Abduction                    100                                         120  ER (at side)                 60                                            80  IR                                 T10                                         T8     Strength:   Supraspinatus             4/5                                          5/5  Infraspinatus               3/5                                           5/5  Subscap / IR               5/5                                           5/5      Special Tests:              Neer:                                       (-)                                            (-)              Lazaro:                                 +                                              (-)              SS Stress:                               +                                              (-)              Bear Hug:                                (-)                                            (-)              Westbrook's:                                 +                                             (-)              Resisted Thrower's:                +                                              (-)              Cross Arm Abduction:             +                                              (-)       Neurovascular examination  - Motor grossly intact bilaterally to shoulder abduction, elbow flexion and extension, wrist flexion and extension, and intrinsic hand musculature  - Sensation intact to light touch bilaterally in axillary, median, radial, and ulnar distributions  - Symmetrical radial pulses    Imaging:    XR Results:  Results for orders placed during the hospital  encounter of 08/20/24    X-ray Shoulder 2 or More Views Right    Narrative  EXAMINATION:  XR SHOULDER COMPLETE 2 OR MORE VIEWS RIGHT    CLINICAL HISTORY:  Unspecified disorder of synovium and tendon, right shoulder    TECHNIQUE:  Two or three views of the right shoulder were preformed.    COMPARISON:  08/02/2017    FINDINGS:  No acute fracture or dislocation.  No significant AC joint arthropathy noted.  There is some minimal spurring associated with the acromion.  No significant degenerative change at the glenohumeral joint.  Surgical clips seen projecting over the right axillary region.    Impression  1.  As above      Electronically signed by: Ernesto Butts DO  Date:    08/20/2024  Time:    11:15      MRI Results:  Results for orders placed during the hospital encounter of 10/24/24    MRI Shoulder Without Contrast Right    Narrative  EXAM:  MRI SHOULDER WITHOUT CONTRAST RIGHT    CLINICAL INDICATION: Strain of muscle and tendon of the rotator cuff of right shoulder; initial encounter.    TECHNIQUE: MR right shoulder performed with multiplanar multisequence imaging.    COMPARISON STUDY:  None.    FINDINGS: There is a complete full-thickness tear supraspinatus tendon, 3 cm retraction, without muscle belly atrophy.    There is advanced infraspinatus tendinosis with superimposed generalized moderate grade partial-thickness tearing in addition to a full-thickness anterior leading edge tear.  Moderate infraspinatus muscle belly atrophy.    There is also advanced subscapularis tendinosis without tear.    Long head biceps tendon is intact.  Negative for labral tear.  Glenohumeral chondral surfaces well preserved.  There is a small glenohumeral joint effusion.  Normal IGHL.    There is a type II acromion with os acromiale with moderate spurring at the synchondrosis.  Minimal AC joint spurring.  There is a large subacromial subdeltoid bursal fluid collection conjunction rotator cuff tear.    The bone marrow signal intensity  is normal.    Impression  1.  Complete full-thickness tear supraspinatus tendon, 3 cm retraction, without atrophy.  2.  Advanced infraspinatus tendinosis with generalized moderate grade partial-thickness tearing/fraying, anterior full-thickness tear.  Moderate infraspinatus muscle belly atrophy  3.  Advanced subscapularis tendinosis without tear.  4.  Type II acromion, os acromiale with moderate spurring.  5.  Small glenoid humeral joint effusion.    Finalized on: 10/25/2024 9:22 AM By:  River Naidu MD  R# [id number]      2024-10-25 09:[id number]    [facility]        Physician read: I agree with the above impression.    Assessment/Plan:   Josette Altamirano is a 69 y.o. female with right shoulder full-thickness supraspinatus tear, subacromial impingement, biceps tendinitis    Plan:    Reviewed imaging and discussed diagnosis and treatment options with the patient today. Her MRI does show rotator cuff tear of the right shoulder and evidence contributing to subacromial impingement.   Discussed non-operative treatment options in the form of rest, activity modifications, oral anti-inflammatories, corticosteroid injections, and physical therapy/physician directed home exercise program versus potential operative treatment in the form of rotator cuff repair.   The patient has tried considerable conservative treatment in the form of rest, activity modifications, oral anti-inflammatories, home exercise program, and corticosteroid injection. Despite these interventions, she continues to experience symptoms on a daily basis that limit her activities of daily living and diminish her quality of life.    recommend proceeding with right shoulder arthroscopy with subacromial decompression, and possible biceps tenodesis.    We reviewed the proposed procedure in detail, which included discussion of risks and benefits, techniques, and possible complications of the procedure. Risks include infection, bleeding, damage to artery and nerves, continual  pain and possible stiffness, and blood clots. We reviewed the post-operative restrictions, recovery period, and rehabilitation.  All patient questions were answered. Despite the risks, she elected to proceed with surgery and the consent was freely signed.  At least 10 minutes were spent instructing the patient in home care following surgery including, but not limited to: sling use, sleeping, hygiene, post-operative exercises, preventing post-operative complications, etc.  All questions were answered.  This service was performed under the direction of Griffin Shahid MD.  CPT 12900-YN.  Follow up with me 10-14 days after surgery        Griffin Shahid MD    I, Miky Osman, acted as a scribe for Griffin Shahid MD for the duration of this office visit.

## 2024-11-15 RX ORDER — METOPROLOL SUCCINATE 50 MG/1
50 TABLET, EXTENDED RELEASE ORAL DAILY
Qty: 30 TABLET | Refills: 6 | Status: SHIPPED | OUTPATIENT
Start: 2024-11-15 | End: 2025-11-15

## 2024-11-20 ENCOUNTER — OFFICE VISIT (OUTPATIENT)
Dept: SPORTS MEDICINE | Facility: CLINIC | Age: 69
End: 2024-11-20
Payer: MEDICARE

## 2024-11-20 VITALS — HEIGHT: 64 IN | WEIGHT: 236.31 LBS | BODY MASS INDEX: 40.34 KG/M2

## 2024-11-20 DIAGNOSIS — S46.011A TRAUMATIC INCOMPLETE TEAR OF RIGHT ROTATOR CUFF, INITIAL ENCOUNTER: Primary | ICD-10-CM

## 2024-11-20 DIAGNOSIS — Z01.818 PRE-OP TESTING: ICD-10-CM

## 2024-11-20 DIAGNOSIS — M75.41 SUBACROMIAL IMPINGEMENT OF RIGHT SHOULDER: ICD-10-CM

## 2024-11-20 PROCEDURE — 99213 OFFICE O/P EST LOW 20 MIN: CPT | Mod: PBBFAC | Performed by: STUDENT IN AN ORGANIZED HEALTH CARE EDUCATION/TRAINING PROGRAM

## 2024-11-20 PROCEDURE — 99999 PR PBB SHADOW E&M-EST. PATIENT-LVL III: CPT | Mod: PBBFAC,,, | Performed by: STUDENT IN AN ORGANIZED HEALTH CARE EDUCATION/TRAINING PROGRAM

## 2024-11-20 PROCEDURE — 97110 THERAPEUTIC EXERCISES: CPT | Mod: PBBFAC | Performed by: STUDENT IN AN ORGANIZED HEALTH CARE EDUCATION/TRAINING PROGRAM

## 2024-11-20 NOTE — PATIENT INSTRUCTIONS
In preparation for you upcoming surgery, here are some things to keep in mind leading up to and after your surgery:    PRE-ADMIT APPOINTMENT  We have a department that will review your chart for any health conditions or other issues to make sure that it is safe from an anesthesia standpoint to undergo surgery.   If they have any concerns they may schedule an appointment for you to be evaluated and have any further testing done. This may include but is not limited to bloodwork, EKG, chest X-ray, referral to cardiologist for additional testing/clearance, referral to pulmonologist for additional testing/clearance, or referral to any other needed specialties for additional testing/clearance.  If only basic testing is needed this appointment may be scheduled a few days before your actually surgery. Unless any new concerns or issues arise, this typically does not affect the date of your surgery.   If you are on any medications, at this appointment they will also review and discuss/provide instructions on when to stop or start taking these medications before and after surgery.   INSTRUCTIONS FOR SURGERY   The day before your surgery (usually between 1-3 PM), someone will call you to give you the scheduled time for you surgery, what time you need to arrive, what time to not eat/drink past, and any other final instructions  If your surgery is scheduled for Monday then they will call you on Friday afternoon.   If you do not receive this call please reach out to our office before the end of the day (4 PM) so that we may assist you.   HOME EXERCISES AFTER SURGERY        PHYSICAL THERAPY  A referral for physical therapy will be placed to the location we discussed today. If you would like to make changes to this, please give us a call or send us a [employer] message as soon as possible so we may coordinate these changes.   We will send that referral to the desired location and also provide them with the rehabilitation protocol that  will be followed to make sure you are progressed appropriately after surgery.   They will also be provided with the start date of your PT after surgery. You will likely start PT prior to you first post-op appointment. Depending on the procedure you have performed this may be as soon as 3 days after surgery or up to 2 weeks after surgery. Below are a few examples of some common time frames for certain procedures:  Rotator cuff surgery- 10-11 days after surgery  Shoulder labrum surgery- 3-5 days after surgery   Shoulder replacement- 3-5 days after surgery  ACL Reconstruction- 3-5 days after surgery  Hip scope- 3-5 days after surgery  Distal biceps tendon repair- once post-op splint is removed/per Dr. Shahid recommendation  Fracture- once post-op splint is removed/per Dr. Shahid recommendation   If you ever have any problems or issues with your physical therapy or wish to change locations at any point, please let us know and we are happy to assist with that change.   POST-OP APPOINTMENTS  Post-op appointments will be scheduled at 2 weeks, 6 weeks, and 3 months from the date of your surgery. We will schedule these appointments prior to your surgery and they will be able to be viewed in [employer].  2 week post-op appointment  This appointment will be with Marti Zaldivar who is Dr. Shahid's physician assistant (PA). At this appointment we will be removing your sutures, checking for any signs of infection or other concerning issues, and checking to make sure your range of motion is appropriate.   Dr. Shahid will also be in clinic on the same day as this appointment and can step in to see you if there is anything of concern that needs to be addressed.   You may also have X-rays scheduled at this appointment, depending on the procedure you had performed (shoulder replacement, ACL surgery, surgery for a fracture, etc.)  6 week post-op appointment  This appointment will be with Dr. Shahid. He will make sure  everything is progressing well and may also review your pictures from surgery if any were taken.   You may also have X-rays at this appointment, depending on the procedure you had performed (shoulder replacement, surgery for a fracture, etc.)  3 month post-op appointment  This appointment will be with Dr. Shahid. He will make sure you continue to progress appropriately.  Any follow-ups after this visit will be at the discretion of Dr. Shahid based upon your recovery/progress, procedure performed, etc.   FMLA OR SHORT TERM DISABILITY PAPERWORK  If you have any paperwork that needs to be filled out in regards to FMLA leave or short term disability leave, you may drop these forms off at the [facility] or attachment them to a patient message via [employer].   These forms will be filled out within a few days of your actual surgery being performed in case your surgery date is rescheduled or changed and the forms would need to potentially be filled out again.   Please try to provide these forms to our office in a timely manner, as we ask for 5-7 business days for them to be completed.       Surgical Treatment of Rotator Cuff Tears    Your doctor may recommend surgery if your pain does not improve with nonsurgical methods. Continued pain is the main indication for surgery. However, your doctor may also suggest surgery if you are very active and/or use your arms for overhead work or sports.     Other signs that surgery may be a good option for you include:  Your symptoms have lasted 6 to 12 months  You have a large tear (more than 3 cm) and the quality of the surrounding tissue is good  You have significant weakness and loss of function in your shoulder  Your tear was caused by a recent, acute injury    Surgery to repair a torn rotator cuff most often involves re-attaching the tendon to the head of humerus (upper arm bone).  Most surgical repairs can be done on an outpatient basis and do not require you to stay overnight in the  [facility].     You may have other shoulder problems in addition to a rotator cuff tear, such as:  Biceps tendon tears (biceps tenotomy versus tenodesis)  Osteoarthritis  Bone spurs (subacromial decompression)  Other soft tissue tears    During the operation, your surgeon may be able to take care of these problems, as well.     The three techniques most commonly used for rotator cuff repair are:  Open repair  Arthroscopic repair (most commonly used today)  Mini-open repair      ALL ARTHROSCOPIC REPAIR  During arthroscopy, your surgeon inserts a small camera, called an arthroscope, into your shoulder joint. The camera displays a live video feed on a monitor, and your surgeon uses these images to guide miniature surgical instruments. Because the arthroscope and surgical instruments are small and thin, your surgeon can use very small incisions (portals), rather than the larger incision needed for standard, open surgery. All-arthroscopic repair is usually an outpatient procedure and is the least invasive method to repair a torn rotator cuff.              PREPARING FOR SURGERY    Medical Evaluation  If you decide to have shoulder replacement surgery, your orthopaedic surgeon may ask you to schedule a complete physical examination with your family physician several weeks before surgery. This is needed to make sure you are healthy enough to have the surgery and complete the recovery process. Many patients with chronic medical conditions, like heart disease, must also be evaluated by a specialist, such as a cardiologist, before the surgery.    Medications  Be sure to talk to your orthopaedic surgeon about the medications you take. Some medications may need to be stopped before surgery. For example, the following over-the-counter medicines may cause excessive bleeding and should be stopped 2 weeks before surgery:  Non-steroidal anti-inflammatory drugs (NSAIDs), such as aspirin, ibuprofen, and naproxen  Most arthritis  medications    If you take blood thinners, either your primary care doctor or cardiologist will advise you about stopping these medications before surgery.    Home Planning  Making simple changes in your home before surgery can make your recovery period easier.For the first several weeks after your surgery, it will be hard to reach high shelves and cupboards. Before your surgery, be sure to go through your home and place any items you may need afterwards on low shelves. When you come home from the hospital, you will need help for a few weeks with some daily tasks like dressing, bathing, cooking, and laundry. If you will not have any support at home immediately after surgery, you may need a short stay in a rehabilitation facility until you become more independent.      YOUR SURGERY    Before Your Operation  Wear loose-fitting clothes and a button-front shirt when you go to the hospital for your surgery. After surgery, you will be wearing a sling and will have limited use of your arm.    Nerve Block  Will receive a nerve block for your surgery to help control your pain after surgery. This cab cause your arm (down to your hand) to feel numb for up to 3 days after surgery. However, it may wear off sooner.      Surgical Procedure  The surgery usually takes about 1-1.5 hours depending on the extent of your tear and any other procedures that need to be performed. After surgery, you will be moved to the recovery room, where you will remain  while your recovery from anesthesia is monitored. Barring any complications you will go home the day of your surgery.   A video of what arthroscopic rotator cuff repair looks like can be found at the following link: Athroscopic Rotator Cuff Repair        AFTER YOUR SURGERY    Immobilization  When you leave the hospital, your arm will be in a sling. You will need the sling to support and protect your shoulder for the first 2 to 6 weeks after surgery, depending on the complexity of your  surgery and your surgeon's preference.    Dressing and Wound Care  Keep the dressing clean and dry. It is normal for there to be some drainage after surgery since the shoulder was irrigated with large amounts of fluid. Reinforce with additional gauze as necessary.    Remove the dressing the 2nd day after surgery and begin changing daily with clean gauze or Band-Aids®. Keep your incisions covered until you follow up in clinic.  If you have Steri-Strips in place of stitches, allow them to stay in place as long as possible. Steri-Strips are made of a fabric material that can get wet in the shower and pat dry with a towel. They usually fall off on their own within 7 to 10 days. You may trim the edges as they begin to curl.   You may bathe or shower on the 2nd day after surgery, but do not scrub or soak the incisions. Dry the area by gently blotting it with a gauze or towel. After it is completely dry, cover the wound with clean gauze or Band-Aids®. Do NOT submerge the incisions (bath/swim) until after the sutures are removed and the wound has completely healed.     Post-Op Medications    Pain Control  After surgery, you will feel pain. However, it is important to stay ahead of pain as it becomes challenging to get under control if you fall behind. Ice and elevation can help and should be used as much as possible in the first few days.   Narcotic pain medications, such as tramadol and oxycodone, should be taken as prescribed. The Tramadol is intended to be taken first as the primary medicine and then oxycodone taken for breakthrough pain. Wean off as soon as possible. Take these with food to decrease the chances of nausea and vomiting. Do not drink alcohol, drive a vehicle, or use heavy machinery while taking narcotic pain medications.   NSAID medications are used for pain control and to decrease inflammation. You may be prescribed an NSAID such as celebrex. Take as instructed. Other NSAID medications such as  ibuprofen, Motrin, Advil, naproxen, or Aleve can be used once you have finished the celebrex, or if a prescription for celebrex was not provided.   Acetaminophen (Tylenol) is an effective over-the-counter pain medication that can be used with NSAID medications and non-acetaminophen containing narcotics such as plain oxycodone.     Blood Clot Prevention  You should take one 81 mg baby aspirin twice daily for two weeks starting the evening of the day you have surgery unless instructed otherwise or taking a different blood thinner such as enoxaparin or warfarin. If you are aware that you are at high risk for a blood clot, notify your physician as soon as possible.   Take aspirin at least 30 minutes before taking ibuprofen or Toradol.    Constipation Prevention  Anesthesia and pain medications, changes in eating and drinking, and less activity can all lead to constipation after surgery. To prevent or reduce constipation, take an over-the-counter stool softener (brands include Colace and Miralax). Follow the directions on the bottle. Drink plenty of water and eat high fiber foods including whole grains, fresh fruits, vegetables, beans, prunes or prune juice.      Physical Therapy  Exercise is a critical component of home care, particularly during the first few weeks after surgery and this will include physical therapy, which will play a vital role in getting you back to your daily activities by helping you regain shoulder strength and motion. Physical therapy will start 10-14 days after your surgery (it can start before your first post-op visit with your doctor). It will progress as follows  Passive exercise: Even though your tear has been repaired, the muscles around your arm remain weak. Once your surgeon decides it is safe for you to move your arm and shoulder, a therapist will help you with passive exercises to improve range of motion in your shoulder. With passive exercise, your therapist supports your arm and moves  it in different positions. In most cases, passive exercise is begun within the first 4 to 6 weeks after surgery.  Active exercise: After 6 weeks, you will progress to doing active exercises without the help of your therapist. Moving your muscles on your own will gradually increase your strength and improve your arm control. At 8 to 12 weeks, your therapist will start you on a strengthening exercise program.  Examples of shoulder exercises can be found at the following link: Rotator Cuff and Shoulder Rehabilitation Exercises    Driving  Your physician will provide recommendations on when it is safe to drive after surgery. At minimum you must NOT be taking any narcotic/opoid medications and feel you are capable of driving. It is NOT recommended that you drive while wearing a sling.       Outcome  The majority of patients report improved shoulder strength and less pain after surgery for a torn rotator cuff. Expect a complete recovery to take several months. Most patients have a functional range of motion and adequate strength by 4 to 6 months after surgery but it may take up to 1 full year from your surgery to be completely healed.  Although it is a slow process, your commitment to rehabilitation is key to a successful outcome.    Factors that can decrease the likelihood of a satisfactory result include:  Poor tendon/tissue quality  Large or massive tears  Poor patient compliance with/participation in restrictions and rehabilitation after surgery  Patient age (older than 65 years)  Smoking and use of other nicotine products  Workers' compensation claims    Complications  After rotator cuff surgery, a small percentage of patients experience complications. In addition to the risks of surgery in general, such as blood loss or problems related to anesthesia, complications of rotator cuff surgery may include:  Nerve injury: This typically involves the nerve that activates your shoulder muscle (deltoid) but this is not  common in shoulder arthroscopy.   Infection: Patients are given antibiotics during the procedure to lessen the risk for infection. If an infection develops, additional surgery and/or prolonged antibiotic treatment may be needed.  Stiffness: Early rehabilitation lessens the likelihood of permanent stiffness or loss of motion. Most of the time, stiffness will improve with more aggressive therapy and exercise. A more advanced type of stiffness called adhesive capsulitis (frozen shoulder) can also develop secondary to an overactive inflammatory response when the shoulder is healing. This can be more common in individuals with diabetes or thyroid disorders. Yo  Tendon re-tear: There is a chance for re-tear following all types of repairs. The larger the tear, the higher the risk of re-tear. Patients who re-tear their tendons usually do not have greater pain or decreased shoulder function. Repeat surgery is needed only if there is severe pain or loss of function.      Links  [employer] Clinical Practice Guideline on the Management of Rotator Cuff Injuries  Rotator Cuff Tears  Management of Rotator Cuff Injuries  Rotator Cuff and Shoulder Rehabilitation Exercises  Rotator Cuff Tear Surgical Treament  Athroscopic Rotator Cuff Repair (Video link)

## 2024-11-25 ENCOUNTER — ANESTHESIA EVENT (OUTPATIENT)
Dept: SURGERY | Facility: OTHER | Age: 69
DRG: 742 | End: 2024-11-25
Payer: MEDICARE

## 2024-11-25 RX ORDER — ACETAMINOPHEN 500 MG
1000 TABLET ORAL
Status: CANCELLED | OUTPATIENT
Start: 2024-11-25 | End: 2024-11-25

## 2024-11-25 RX ORDER — LIDOCAINE HYDROCHLORIDE 10 MG/ML
0.5 INJECTION, SOLUTION EPIDURAL; INFILTRATION; INTRACAUDAL; PERINEURAL ONCE
Status: CANCELLED | OUTPATIENT
Start: 2024-11-25 | End: 2024-11-25

## 2024-11-25 RX ORDER — SODIUM CHLORIDE, SODIUM LACTATE, POTASSIUM CHLORIDE, CALCIUM CHLORIDE 600; 310; 30; 20 MG/100ML; MG/100ML; MG/100ML; MG/100ML
INJECTION, SOLUTION INTRAVENOUS CONTINUOUS
Status: CANCELLED | OUTPATIENT
Start: 2024-11-25

## 2024-12-02 ENCOUNTER — TELEPHONE (OUTPATIENT)
Dept: GYNECOLOGIC ONCOLOGY | Facility: CLINIC | Age: 69
End: 2024-12-02
Payer: MEDICARE

## 2024-12-03 ENCOUNTER — HOSPITAL ENCOUNTER (OUTPATIENT)
Dept: PREADMISSION TESTING | Facility: OTHER | Age: 69
Discharge: HOME | DRG: 742 | End: 2024-12-03
Attending: OBSTETRICS & GYNECOLOGY
Payer: MEDICARE

## 2024-12-03 VITALS
DIASTOLIC BLOOD PRESSURE: 68 MMHG | WEIGHT: 236 LBS | BODY MASS INDEX: 40.29 KG/M2 | SYSTOLIC BLOOD PRESSURE: 135 MMHG | OXYGEN SATURATION: 99 % | RESPIRATION RATE: 19 BRPM | HEART RATE: 74 BPM | HEIGHT: 64 IN | TEMPERATURE: 98 F

## 2024-12-03 DIAGNOSIS — N85.01 COMPLEX ENDOMETRIAL HYPERPLASIA WITHOUT ATYPIA: Primary | ICD-10-CM

## 2024-12-03 DIAGNOSIS — N85.01 ENDOMETRIAL HYPERPLASIA WITHOUT ATYPIA, COMPLEX: ICD-10-CM

## 2024-12-03 DIAGNOSIS — Z01.818 PREOPERATIVE TESTING: Primary | ICD-10-CM

## 2024-12-03 LAB
ABO + RH BLD: NORMAL
BASOPHILS # BLD AUTO: 0.04 K/UL (ref 0–0.2)
BASOPHILS NFR BLD: 1 % (ref 0–1.9)
BLD GP AB SCN CELLS X3 SERPL QL: NORMAL
DIFFERENTIAL METHOD BLD: ABNORMAL
EOSINOPHIL # BLD AUTO: 0.2 K/UL (ref 0–0.5)
EOSINOPHIL NFR BLD: 3.6 % (ref 0–8)
ERYTHROCYTE [DISTWIDTH] IN BLOOD BY AUTOMATED COUNT: 14.4 % (ref 11.5–14.5)
HCT VFR BLD AUTO: 38.2 % (ref 37–48.5)
HGB BLD-MCNC: 11.8 G/DL (ref 12–16)
IMM GRANULOCYTES # BLD AUTO: 0.01 K/UL (ref 0–0.04)
IMM GRANULOCYTES NFR BLD AUTO: 0.2 % (ref 0–0.5)
LYMPHOCYTES # BLD AUTO: 1 K/UL (ref 1–4.8)
LYMPHOCYTES NFR BLD: 23.8 % (ref 18–48)
MCH RBC QN AUTO: 24.4 PG (ref 27–31)
MCHC RBC AUTO-ENTMCNC: 30.9 G/DL (ref 32–36)
MCV RBC AUTO: 79 FL (ref 82–98)
MONOCYTES # BLD AUTO: 0.3 K/UL (ref 0.3–1)
MONOCYTES NFR BLD: 6.9 % (ref 4–15)
NEUTROPHILS # BLD AUTO: 2.7 K/UL (ref 1.8–7.7)
NEUTROPHILS NFR BLD: 64.5 % (ref 38–73)
NRBC BLD-RTO: 0 /100 WBC
PLATELET # BLD AUTO: 266 K/UL (ref 150–450)
PMV BLD AUTO: 9.4 FL (ref 9.2–12.9)
RBC # BLD AUTO: 4.84 M/UL (ref 4–5.4)
SPECIMEN OUTDATE: NORMAL
WBC # BLD AUTO: 4.2 K/UL (ref 3.9–12.7)

## 2024-12-03 PROCEDURE — 85025 COMPLETE CBC W/AUTO DIFF WBC: CPT | Performed by: ANESTHESIOLOGY

## 2024-12-03 PROCEDURE — 86900 BLOOD TYPING SEROLOGIC ABO: CPT | Performed by: ANESTHESIOLOGY

## 2024-12-03 RX ORDER — LIDOCAINE HYDROCHLORIDE 10 MG/ML
1 INJECTION, SOLUTION EPIDURAL; INFILTRATION; INTRACAUDAL; PERINEURAL ONCE
Status: CANCELLED | OUTPATIENT
Start: 2024-12-03 | End: 2024-12-03

## 2024-12-03 RX ORDER — HEPARIN SODIUM 5000 [USP'U]/ML
5000 INJECTION, SOLUTION INTRAVENOUS; SUBCUTANEOUS ONCE
Status: CANCELLED | OUTPATIENT
Start: 2024-12-03

## 2024-12-03 RX ORDER — MUPIROCIN 20 MG/G
OINTMENT TOPICAL
Status: CANCELLED | OUTPATIENT
Start: 2024-12-03

## 2024-12-03 NOTE — DISCHARGE INSTRUCTIONS
Information to Prepare you for your Surgery    PRE-ADMIT TESTING -  [phone]    [address]  [facility]          Your surgery has been scheduled at [facility]. We are pleased to have the opportunity to serve you. For Further Information please call [phone].    On the day of surgery please report to the Information Desk on the 1st floor.    CONTACT YOUR PHYSICIAN'S OFFICE THE DAY PRIOR TO YOUR SURGERY TO OBTAIN YOUR ARRIVAL TIME.     The evening before surgery do not eat anything after 9 p.m. ( this includes hard candy, chewing gum and mints).  You may only have GATORADE, POWERADE AND WATER  from 9 p.m. until you leave your home.   DO NOT DRINK ANY LIQUIDS ON THE WAY TO THE HOSPITAL.      Why does your anesthesiologist allow you to drink Gatorade/Powerade before surgery?  Gatorade/Powerade helps to increase your comfort before surgery and to decrease your nausea after surgery. The carbohydrates in Gatorade/Powerade help reduce your body's stress response to surgery.  If you are a diabetic-drink only water prior to surgery.    Outpatient Surgery- May allow 2 adult (18 and older) Support Persons (1 being the designated ) for all surgical/procedural patients. A breastfeeding mother will be allowed her infant and 2 adult Support Persons. No one under the age of 18 will be allowed in the building. No swapping out of visitors in the [facility].      SPECIAL MEDICATION INSTRUCTIONS: TAKE medications checked off by the Anesthesiologist on your Medication List.    Angiogram Patients: Take medications as instructed by your physician, including aspirin.     Surgery Patients:    If you take ASPIRIN - Your PHYSICIAN/SURGEON will need to inform you IF/OR when you need to stop taking aspirin prior to your surgery.     The week prior to surgery do not ot take any medications containing IBUPROFEN or NSAIDS ( Advil, Motrin, Goodys, BC, Aleve, Naproxen etc)  If you are not sure if you should take a medicine please call your surgeon's office.  Ok to take Tylenol    Do Not Wear any make-up (especially eye make-up) to surgery. Please remove any false eyelashes or eyelash extensions. If you arrive the day of surgery with makeup/eyelashes on you will be required to remove prior to surgery. (There is a risk of corneal abrasions if eye makeup/eyelash extensions are not removed)      Leave all valuables at home.   Do Not wear any jewelry or watches, including any metal in body piercings. Jewelry must be removed prior to coming to the hospital.  There is a possibility that rings that are unable to be removed may be cut off if they are on the surgical extremity.    Please remove all hair extensions, wigs, clips and any other metal accessories/ ornaments from your hair.  These items may pose a flammable/fire risk in Surgery and must be removed.    Do not shave your surgical area at least 5 days prior to your surgery. The surgical prep will be performed at the hospital according to Infection Control regulations.    Contact Lens must be removed before surgery. Either do not wear the contact lens or bring a case and solution for storage.  Please bring a container for eyeglasses or dentures as required.  Bring any paperwork your physician has provided, such as consent forms,  history and physicals, doctor's orders, etc.   Bring comfortable clothes that are loose fitting to wear upon discharge. Take into consideration the type of surgery being performed.  Maintain your diet as advised per your physician the day prior to surgery.      Adequate rest the night before surgery is advised.   Park in the Parking lot behind the hospital or in the [facility] across the street from the parking lot. Parking is complimentary.  If you will be discharged the same day as your procedure, please arrange for a responsible adult to drive you home or to accompany you if traveling by taxi.    YOU WILL NOT BE PERMITTED TO DRIVE OR TO LEAVE THE HOSPITAL ALONE AFTER SURGERY.   If you are being discharged the same day, it is strongly recommended that you arrange for someone to remain with you for the first 24 hrs following your surgery.    The Surgeon will speak to your family/visitor after your surgery regarding the outcome of your surgery and post op care.  The Surgeon may speak to you after your surgery, but there is a possibility you may not remember the details.  Please check with your family members regarding the conversation with the Surgeon.    We strongly recommend whoever is bringing you home be present for discharge instructions.  This will ensure a thorough understanding for your post op home care.    If the patient has fever, cough, or signs/symptoms of Flu or Covid please do not come in for your surgery. Contact your surgeon and your primary care physician for further instructions.           Thank you for your cooperation.  The Staff of [facility].            Bathing Instructions with Hibiclens    Shower the evening before and morning of your procedure with Chlorhexidine (Hibiclens)  do not use Chlorhexidine on your face or genitals. Do not get in your eyes.  Wash your face with water and your regular face wash/soap  Use your regular shampoo  Apply Chlorhexidine (Hibiclens) directly on your skin or on a wet washcloth and wash gently. When showering: Move away from the shower stream when applying Chlorhexidine (Hibiclens) to avoid rinsing off too soon.  Rinse thoroughly with warm water  Do not dilute Chlorhexidine (Hibiclens)   Dry off as usual, do not use any deodorant, powder, body lotions, perfume, after shave or cologne.

## 2024-12-03 NOTE — ANESTHESIA PREPROCEDURE EVALUATION
12/03/2024  Josette Altamirano is a 69 y.o., female.      Pre-op Assessment    I have reviewed the Patient Summary Reports.     I have reviewed the Nursing Notes. I have reviewed the NPO Status.   I have reviewed the Medications.     Review of Systems  Anesthesia Hx:  No problems with previous Anesthesia   History of prior surgery of interest to airway management or planning:  Previous anesthesia: General Hysteroscopy in [place] in Oct with general anesthesia.  Procedure performed at an [facility].       Denies Family Hx of Anesthesia complications.    Denies Personal Hx of Anesthesia complications.                    Social:  Non-Smoker       Hematology/Oncology:  Hematology Normal                       --  Cancer in past history:       Breast    bilateral          EENT/Dental:  EENT/Dental Normal           Cardiovascular:     Hypertension               · The left ventricle is normal in size with concentric remodeling and normal systolic function.  · The estimated ejection fraction is 55%.  · Normal left ventricular diastolic function.  · Normal right ventricular size with normal right ventricular systolic function.  · Mild tricuspid regurgitation.  · Intermediate central venous pressure (8 mmHg).  · The estimated PA systolic pressure is 28 mmHg.                                Pulmonary:  Pulmonary Normal                       Renal/:  Renal/ Normal                 Hepatic/GI:  Hepatic/GI Normal                    Musculoskeletal:  Musculoskeletal Normal                Neurological:  Neurology Normal                                      Endocrine:  Endocrine Normal          Morbid Obesity / BMI > 40  Dermatological:  Skin Normal    Psych:  Psychiatric Normal                    Physical Exam  General: Well nourished and Alert    Airway:  Mallampati: II   Mouth Opening: Normal  Tongue:  Normal    Dental:  Intact        Anesthesia Plan  Type of Anesthesia, risks & benefits discussed:    Anesthesia Type: Gen ETT  Intra-op Monitoring Plan: Standard ASA Monitors  Post Op Pain Control Plan: multimodal analgesia  Induction:  IV  Airway Plan: Video  Informed Consent: Informed consent signed with the Patient and all parties understand the risks and agree with anesthesia plan.  All questions answered.   ASA Score: 3  Anesthesia Plan Notes: Cardiology note from 10/31  T&S pending   Labs/EKG in [facility]    Ready For Surgery From Anesthesia Perspective.     .

## 2024-12-04 ENCOUNTER — ANESTHESIA (OUTPATIENT)
Dept: SURGERY | Facility: OTHER | Age: 69
DRG: 742 | End: 2024-12-04
Payer: MEDICARE

## 2024-12-04 ENCOUNTER — HOSPITAL ENCOUNTER (INPATIENT)
Facility: OTHER | Age: 69
LOS: 1 days | Discharge: HOME | DRG: 742 | End: 2024-12-04
Attending: OBSTETRICS & GYNECOLOGY | Admitting: OBSTETRICS & GYNECOLOGY
Payer: MEDICARE

## 2024-12-04 VITALS
HEIGHT: 64 IN | SYSTOLIC BLOOD PRESSURE: 129 MMHG | RESPIRATION RATE: 14 BRPM | OXYGEN SATURATION: 96 % | DIASTOLIC BLOOD PRESSURE: 65 MMHG | TEMPERATURE: 98 F | BODY MASS INDEX: 40.29 KG/M2 | WEIGHT: 236 LBS | HEART RATE: 64 BPM

## 2024-12-04 DIAGNOSIS — N85.01 COMPLEX ENDOMETRIAL HYPERPLASIA WITHOUT ATYPIA: ICD-10-CM

## 2024-12-04 DIAGNOSIS — Z90.710 STATUS POST HYSTERECTOMY: Primary | ICD-10-CM

## 2024-12-04 LAB — POCT GLUCOSE: 100 MG/DL (ref 70–110)

## 2024-12-04 PROCEDURE — 63600175 PHARM REV CODE 636 W HCPCS: Performed by: OBSTETRICS & GYNECOLOGY

## 2024-12-04 PROCEDURE — 0UT74ZZ RESECTION OF BILATERAL FALLOPIAN TUBES, PERCUTANEOUS ENDOSCOPIC APPROACH: ICD-10-PCS | Performed by: OBSTETRICS & GYNECOLOGY

## 2024-12-04 PROCEDURE — 36000713 HC OR TIME LEV V EA ADD 15 MIN: Performed by: OBSTETRICS & GYNECOLOGY

## 2024-12-04 PROCEDURE — 71000015 HC POSTOP RECOV 1ST HR: Performed by: OBSTETRICS & GYNECOLOGY

## 2024-12-04 PROCEDURE — 63600175 PHARM REV CODE 636 W HCPCS: Performed by: ANESTHESIOLOGY

## 2024-12-04 PROCEDURE — 8E0W4CZ ROBOTIC ASSISTED PROCEDURE OF TRUNK REGION, PERCUTANEOUS ENDOSCOPIC APPROACH: ICD-10-PCS | Performed by: OBSTETRICS & GYNECOLOGY

## 2024-12-04 PROCEDURE — 0UT94ZZ RESECTION OF UTERUS, PERCUTANEOUS ENDOSCOPIC APPROACH: ICD-10-PCS | Performed by: OBSTETRICS & GYNECOLOGY

## 2024-12-04 PROCEDURE — 71000039 HC RECOVERY, EACH ADD'L HOUR: Performed by: OBSTETRICS & GYNECOLOGY

## 2024-12-04 PROCEDURE — 37000008 HC ANESTHESIA 1ST 15 MINUTES: Performed by: OBSTETRICS & GYNECOLOGY

## 2024-12-04 PROCEDURE — P9045 ALBUMIN (HUMAN), 5%, 250 ML: HCPCS | Mod: JZ,JG | Performed by: NURSE ANESTHETIST, CERTIFIED REGISTERED

## 2024-12-04 PROCEDURE — 0TJB8ZZ INSPECTION OF BLADDER, VIA NATURAL OR ARTIFICIAL OPENING ENDOSCOPIC: ICD-10-PCS | Performed by: OBSTETRICS & GYNECOLOGY

## 2024-12-04 PROCEDURE — 63600175 PHARM REV CODE 636 W HCPCS: Mod: JZ,JG | Performed by: OBSTETRICS & GYNECOLOGY

## 2024-12-04 PROCEDURE — 36000712 HC OR TIME LEV V 1ST 15 MIN: Performed by: OBSTETRICS & GYNECOLOGY

## 2024-12-04 PROCEDURE — 63600175 PHARM REV CODE 636 W HCPCS: Performed by: NURSE ANESTHETIST, CERTIFIED REGISTERED

## 2024-12-04 PROCEDURE — 25000003 PHARM REV CODE 250: Performed by: ANESTHESIOLOGY

## 2024-12-04 PROCEDURE — 25000003 PHARM REV CODE 250: Performed by: OBSTETRICS & GYNECOLOGY

## 2024-12-04 PROCEDURE — 37000009 HC ANESTHESIA EA ADD 15 MINS: Performed by: OBSTETRICS & GYNECOLOGY

## 2024-12-04 PROCEDURE — 0DNW4ZZ RELEASE PERITONEUM, PERCUTANEOUS ENDOSCOPIC APPROACH: ICD-10-PCS | Performed by: OBSTETRICS & GYNECOLOGY

## 2024-12-04 PROCEDURE — 82962 GLUCOSE BLOOD TEST: CPT | Performed by: OBSTETRICS & GYNECOLOGY

## 2024-12-04 PROCEDURE — 71000016 HC POSTOP RECOV ADDL HR: Performed by: OBSTETRICS & GYNECOLOGY

## 2024-12-04 PROCEDURE — 25000003 PHARM REV CODE 250: Performed by: NURSE ANESTHETIST, CERTIFIED REGISTERED

## 2024-12-04 PROCEDURE — 71000033 HC RECOVERY, INTIAL HOUR: Performed by: OBSTETRICS & GYNECOLOGY

## 2024-12-04 PROCEDURE — 88309 TISSUE EXAM BY PATHOLOGIST: CPT | Performed by: STUDENT IN AN ORGANIZED HEALTH CARE EDUCATION/TRAINING PROGRAM

## 2024-12-04 PROCEDURE — 27201423 OPTIME MED/SURG SUP & DEVICES STERILE SUPPLY: Performed by: OBSTETRICS & GYNECOLOGY

## 2024-12-04 PROCEDURE — 0UT24ZZ RESECTION OF BILATERAL OVARIES, PERCUTANEOUS ENDOSCOPIC APPROACH: ICD-10-PCS | Performed by: OBSTETRICS & GYNECOLOGY

## 2024-12-04 RX ORDER — HEPARIN SODIUM 5000 [USP'U]/ML
5000 INJECTION, SOLUTION INTRAVENOUS; SUBCUTANEOUS
Status: DISCONTINUED | OUTPATIENT
Start: 2024-12-04 | End: 2024-12-04 | Stop reason: HOSPADM

## 2024-12-04 RX ORDER — HYDROMORPHONE HYDROCHLORIDE 2 MG/ML
0.2 INJECTION, SOLUTION INTRAMUSCULAR; INTRAVENOUS; SUBCUTANEOUS
Status: CANCELLED | OUTPATIENT
Start: 2024-12-04

## 2024-12-04 RX ORDER — ALBUMIN HUMAN 50 G/1000ML
SOLUTION INTRAVENOUS
Status: DISCONTINUED | OUTPATIENT
Start: 2024-12-04 | End: 2024-12-04

## 2024-12-04 RX ORDER — ROCURONIUM BROMIDE 10 MG/ML
INJECTION, SOLUTION INTRAVENOUS
Status: DISCONTINUED | OUTPATIENT
Start: 2024-12-04 | End: 2024-12-04

## 2024-12-04 RX ORDER — ACETAMINOPHEN 500 MG
1000 TABLET ORAL
Status: COMPLETED | OUTPATIENT
Start: 2024-12-04 | End: 2024-12-04

## 2024-12-04 RX ORDER — KETAMINE HCL IN 0.9 % NACL 50 MG/5 ML
SYRINGE (ML) INTRAVENOUS
Status: DISCONTINUED | OUTPATIENT
Start: 2024-12-04 | End: 2024-12-04

## 2024-12-04 RX ORDER — ONDANSETRON HYDROCHLORIDE 2 MG/ML
4 INJECTION, SOLUTION INTRAVENOUS EVERY 4 HOURS PRN
Status: CANCELLED | OUTPATIENT
Start: 2024-12-04

## 2024-12-04 RX ORDER — OXYCODONE HYDROCHLORIDE 5 MG/1
5 TABLET ORAL EVERY 4 HOURS PRN
Qty: 12 TABLET | Refills: 0 | Status: SHIPPED | OUTPATIENT
Start: 2024-12-04

## 2024-12-04 RX ORDER — PHENYLEPHRINE HYDROCHLORIDE 10 MG/ML
INJECTION INTRAVENOUS
Status: DISCONTINUED | OUTPATIENT
Start: 2024-12-04 | End: 2024-12-04

## 2024-12-04 RX ORDER — ONDANSETRON HYDROCHLORIDE 2 MG/ML
INJECTION, SOLUTION INTRAVENOUS
Status: DISCONTINUED | OUTPATIENT
Start: 2024-12-04 | End: 2024-12-04

## 2024-12-04 RX ORDER — OXYCODONE HYDROCHLORIDE 5 MG/1
5 TABLET ORAL
Status: DISCONTINUED | OUTPATIENT
Start: 2024-12-04 | End: 2024-12-04 | Stop reason: HOSPADM

## 2024-12-04 RX ORDER — ONDANSETRON 8 MG/1
8 TABLET, ORALLY DISINTEGRATING ORAL ONCE
Status: COMPLETED | OUTPATIENT
Start: 2024-12-04 | End: 2024-12-04

## 2024-12-04 RX ORDER — OXYCODONE HYDROCHLORIDE 5 MG/1
5 TABLET ORAL EVERY 4 HOURS PRN
Status: CANCELLED | OUTPATIENT
Start: 2024-12-04

## 2024-12-04 RX ORDER — HYDROMORPHONE HYDROCHLORIDE 2 MG/ML
0.4 INJECTION, SOLUTION INTRAMUSCULAR; INTRAVENOUS; SUBCUTANEOUS EVERY 5 MIN PRN
Status: DISCONTINUED | OUTPATIENT
Start: 2024-12-04 | End: 2024-12-04 | Stop reason: HOSPADM

## 2024-12-04 RX ORDER — DEXAMETHASONE SODIUM PHOSPHATE 4 MG/ML
INJECTION, SOLUTION INTRA-ARTICULAR; INTRALESIONAL; INTRAMUSCULAR; INTRAVENOUS; SOFT TISSUE
Status: DISCONTINUED | OUTPATIENT
Start: 2024-12-04 | End: 2024-12-04

## 2024-12-04 RX ORDER — CEFAZOLIN 2 G/1
2 INJECTION, POWDER, FOR SOLUTION INTRAMUSCULAR; INTRAVENOUS
Status: COMPLETED | OUTPATIENT
Start: 2024-12-04 | End: 2024-12-04

## 2024-12-04 RX ORDER — KETOROLAC TROMETHAMINE 30 MG/ML
INJECTION, SOLUTION INTRAMUSCULAR; INTRAVENOUS
Status: DISCONTINUED | OUTPATIENT
Start: 2024-12-04 | End: 2024-12-04

## 2024-12-04 RX ORDER — BUPIVACAINE HYDROCHLORIDE 2.5 MG/ML
INJECTION, SOLUTION EPIDURAL; INFILTRATION; INTRACAUDAL
Status: DISCONTINUED | OUTPATIENT
Start: 2024-12-04 | End: 2024-12-04 | Stop reason: HOSPADM

## 2024-12-04 RX ORDER — ACETAMINOPHEN 500 MG
1000 TABLET ORAL EVERY 6 HOURS
Qty: 120 TABLET | Refills: 0 | Status: SHIPPED | OUTPATIENT
Start: 2024-12-04

## 2024-12-04 RX ORDER — PROCHLORPERAZINE EDISYLATE 5 MG/ML
5 INJECTION INTRAMUSCULAR; INTRAVENOUS EVERY 30 MIN PRN
Status: DISCONTINUED | OUTPATIENT
Start: 2024-12-04 | End: 2024-12-04 | Stop reason: HOSPADM

## 2024-12-04 RX ORDER — METHYLENE BLUE 5 MG/ML
INJECTION INTRAVENOUS
Status: DISCONTINUED | OUTPATIENT
Start: 2024-12-04 | End: 2024-12-04 | Stop reason: HOSPADM

## 2024-12-04 RX ORDER — IBUPROFEN 600 MG/1
600 TABLET ORAL EVERY 6 HOURS
Qty: 60 TABLET | Refills: 0 | Status: SHIPPED | OUTPATIENT
Start: 2024-12-04

## 2024-12-04 RX ORDER — KETOROLAC TROMETHAMINE 30 MG/ML
15 INJECTION, SOLUTION INTRAMUSCULAR; INTRAVENOUS EVERY 6 HOURS PRN
Status: CANCELLED | OUTPATIENT
Start: 2024-12-04 | End: 2024-12-07

## 2024-12-04 RX ORDER — LIDOCAINE HYDROCHLORIDE 10 MG/ML
0.5 INJECTION, SOLUTION EPIDURAL; INFILTRATION; INTRACAUDAL; PERINEURAL ONCE
Status: DISCONTINUED | OUTPATIENT
Start: 2024-12-04 | End: 2024-12-04 | Stop reason: HOSPADM

## 2024-12-04 RX ORDER — FENTANYL CITRATE 50 UG/ML
INJECTION, SOLUTION INTRAMUSCULAR; INTRAVENOUS
Status: DISCONTINUED | OUTPATIENT
Start: 2024-12-04 | End: 2024-12-04

## 2024-12-04 RX ORDER — GLUCAGON 1 MG
1 KIT INJECTION
Status: DISCONTINUED | OUTPATIENT
Start: 2024-12-04 | End: 2024-12-04 | Stop reason: HOSPADM

## 2024-12-04 RX ORDER — MUPIROCIN 20 MG/G
OINTMENT TOPICAL
Status: DISCONTINUED | OUTPATIENT
Start: 2024-12-04 | End: 2024-12-04 | Stop reason: HOSPADM

## 2024-12-04 RX ORDER — LIDOCAINE HYDROCHLORIDE 20 MG/ML
INJECTION INTRAVENOUS
Status: DISCONTINUED | OUTPATIENT
Start: 2024-12-04 | End: 2024-12-04

## 2024-12-04 RX ORDER — MEPERIDINE HYDROCHLORIDE 25 MG/ML
12.5 INJECTION INTRAMUSCULAR; INTRAVENOUS; SUBCUTANEOUS ONCE AS NEEDED
Status: DISCONTINUED | OUTPATIENT
Start: 2024-12-04 | End: 2024-12-04 | Stop reason: HOSPADM

## 2024-12-04 RX ORDER — SODIUM CHLORIDE, SODIUM LACTATE, POTASSIUM CHLORIDE, CALCIUM CHLORIDE 600; 310; 30; 20 MG/100ML; MG/100ML; MG/100ML; MG/100ML
INJECTION, SOLUTION INTRAVENOUS CONTINUOUS
Status: DISCONTINUED | OUTPATIENT
Start: 2024-12-04 | End: 2024-12-04 | Stop reason: HOSPADM

## 2024-12-04 RX ORDER — PROPOFOL 10 MG/ML
VIAL (ML) INTRAVENOUS
Status: DISCONTINUED | OUTPATIENT
Start: 2024-12-04 | End: 2024-12-04

## 2024-12-04 RX ORDER — SODIUM CHLORIDE 0.9 % (FLUSH) 0.9 %
3 SYRINGE (ML) INJECTION
Status: DISCONTINUED | OUTPATIENT
Start: 2024-12-04 | End: 2024-12-04 | Stop reason: HOSPADM

## 2024-12-04 RX ORDER — DOCUSATE SODIUM 100 MG/1
100 CAPSULE, LIQUID FILLED ORAL 2 TIMES DAILY
Qty: 60 CAPSULE | Refills: 0 | Status: SHIPPED | OUTPATIENT
Start: 2024-12-04

## 2024-12-04 RX ORDER — ACETAMINOPHEN 500 MG
1000 TABLET ORAL EVERY 6 HOURS PRN
Status: CANCELLED | OUTPATIENT
Start: 2024-12-04

## 2024-12-04 RX ORDER — LIDOCAINE HYDROCHLORIDE 10 MG/ML
1 INJECTION, SOLUTION EPIDURAL; INFILTRATION; INTRACAUDAL; PERINEURAL ONCE
Status: DISCONTINUED | OUTPATIENT
Start: 2024-12-04 | End: 2024-12-04 | Stop reason: HOSPADM

## 2024-12-04 RX ADMIN — PROCHLORPERAZINE EDISYLATE 2.5 MG: 5 INJECTION INTRAMUSCULAR; INTRAVENOUS at 01:12

## 2024-12-04 RX ADMIN — PROPOFOL 30 MG: 10 INJECTION, EMULSION INTRAVENOUS at 10:12

## 2024-12-04 RX ADMIN — Medication 25 MG: at 08:12

## 2024-12-04 RX ADMIN — ONDANSETRON HYDROCHLORIDE 4 MG: 2 INJECTION INTRAMUSCULAR; INTRAVENOUS at 10:12

## 2024-12-04 RX ADMIN — LIDOCAINE HYDROCHLORIDE 60 MG: 20 INJECTION, SOLUTION INTRAVENOUS at 08:12

## 2024-12-04 RX ADMIN — ALBUMIN (HUMAN) 500 ML: 12.5 SOLUTION INTRAVENOUS at 08:12

## 2024-12-04 RX ADMIN — ROCURONIUM BROMIDE 50 MG: 10 SOLUTION INTRAVENOUS at 08:12

## 2024-12-04 RX ADMIN — HYDROMORPHONE HYDROCHLORIDE 0.4 MG: 2 INJECTION INTRAMUSCULAR; INTRAVENOUS; SUBCUTANEOUS at 11:12

## 2024-12-04 RX ADMIN — ROCURONIUM BROMIDE 20 MG: 10 SOLUTION INTRAVENOUS at 09:12

## 2024-12-04 RX ADMIN — ACETAMINOPHEN 1000 MG: 500 TABLET, FILM COATED ORAL at 06:12

## 2024-12-04 RX ADMIN — SODIUM CHLORIDE, SODIUM LACTATE, POTASSIUM CHLORIDE, AND CALCIUM CHLORIDE: 600; 310; 30; 20 INJECTION, SOLUTION INTRAVENOUS at 10:12

## 2024-12-04 RX ADMIN — CEFAZOLIN 2 G: 2 INJECTION, POWDER, FOR SOLUTION INTRAMUSCULAR; INTRAVENOUS at 08:12

## 2024-12-04 RX ADMIN — GLYCOPYRROLATE 0.2 MG: 0.2 INJECTION, SOLUTION INTRAMUSCULAR; INTRAVITREAL at 08:12

## 2024-12-04 RX ADMIN — ONDANSETRON 8 MG: 8 TABLET, ORALLY DISINTEGRATING ORAL at 02:12

## 2024-12-04 RX ADMIN — HEPARIN SODIUM 5000 UNITS: 5000 INJECTION INTRAVENOUS; SUBCUTANEOUS at 07:12

## 2024-12-04 RX ADMIN — PROPOFOL 200 MG: 10 INJECTION, EMULSION INTRAVENOUS at 08:12

## 2024-12-04 RX ADMIN — MUPIROCIN: 20 OINTMENT TOPICAL at 06:12

## 2024-12-04 RX ADMIN — SUGAMMADEX 200 MG: 100 INJECTION, SOLUTION INTRAVENOUS at 10:12

## 2024-12-04 RX ADMIN — ROCURONIUM BROMIDE 30 MG: 10 SOLUTION INTRAVENOUS at 08:12

## 2024-12-04 RX ADMIN — FENTANYL CITRATE 50 MCG: 50 INJECTION, SOLUTION INTRAMUSCULAR; INTRAVENOUS at 09:12

## 2024-12-04 RX ADMIN — PHENYLEPHRINE HYDROCHLORIDE 100 MCG: 10 INJECTION INTRAVENOUS at 08:12

## 2024-12-04 RX ADMIN — FENTANYL CITRATE 100 MCG: 50 INJECTION, SOLUTION INTRAMUSCULAR; INTRAVENOUS at 08:12

## 2024-12-04 RX ADMIN — SODIUM CHLORIDE, SODIUM LACTATE, POTASSIUM CHLORIDE, AND CALCIUM CHLORIDE: 600; 310; 30; 20 INJECTION, SOLUTION INTRAVENOUS at 07:12

## 2024-12-04 RX ADMIN — DEXAMETHASONE SODIUM PHOSPHATE 8 MG: 4 INJECTION, SOLUTION INTRAMUSCULAR; INTRAVENOUS at 08:12

## 2024-12-04 NOTE — ANESTHESIA PROCEDURE NOTES
Intubation    Date/Time: 12/4/2024 8:07 AM    Performed by: Gail Barrera CRNA  Authorized by: Dirk Amezcua MD    Intubation:     Induction:  Intravenous    Intubated:  Postinduction    Mask Ventilation:  Easy with oral airway    Attempts:  1    Attempted By:  CRNA    Method of Intubation:  Video laryngoscopy    Blade:  Raza 3    Laryngeal View Grade: Grade I - full view of cords      Difficult Airway Encountered?: No      Complications:  None    Airway Device:  Oral endotracheal tube    Airway Device Size:  7.5    Style/Cuff Inflation:  Cuffed (inflated to minimal occlusive pressure)    Tube secured:  22    Secured at:  The lips    Placement Verified By:  Capnometry    Complicating Factors:  None    Findings Post-Intubation:  BS equal bilateral and atraumatic/condition of teeth unchanged

## 2024-12-04 NOTE — OP NOTE
DATE OF PROCEDURE:  12/04/2024      SURGEON: Sofiya Nguyen MD      ASSISTANTS:   Ailyn Estes PA at bedside as no qualified resident available   Akanksha Alvares MD observing at console      PREOPERATIVE DIAGNOSES:   Complex Endometrial Hyperplasia without Atypia  Post menopausal bleeding  Uterine Fibroids  Class III Obesity (BMI=40)   History of breast cancer  CHF   HTN      POSTOPERATIVE DIAGNOSES: Same      PROCEDURES:  Robotic-assisted laparoscopic hysterectomy and bilateral salpingo-oophorectomy. Lysis of adhesions. Cystoscopy.      COMPLICATIONS: None     ESTIMATED BLOOD LOSS: 50 cc     ANESTHESIA: GETA     INTRAOPERATIVE FINDINGS:  8 cm uterus with fibroids. No intrapelvic or intraabdominal abnormal findings.  Normal bilateral tubes and ovaries.   Adhesions of sigmoid colon to left pelvic sidewall and adnexa. Adhesions of appendix to right pelvic sidewall. Redundant bladder with adhesions to lower uterine segment and cervix. Cystoscopy at case completion revealed intact bladder and efflux of urine from bilateral ureteral orifices.     PROCEDURE IN DETAIL: Informed consent was obtained and the patient was taken to  the Operating Suite.  General anesthesia was administered.  Once felt to be adequate, she was placed in dorsal lithotomy position with her arms tucked.  The  abdomen and pelvis were prepped and draped in the usual fashion and a speculum was placed in the vagina.  The cervix was visualized, grasped with a single-tooth tenaculum and the uterus sounded to approximately 8.  Serial dilation of cervix was performed and a medium  VCare manipulator was placed without difficulty.  Lassiter catheter was placed to gravity drainage and attention was turned to the abdominal portion of the procedure. A Veress needle was placed through an incision made at Wall's point in the left upper quadrant .  Pneumoperitoneum was obtained with carbon dioxide and once this was felt to be adequate, an 8 mm incision was made and a  robotic trocar with 5mm laparoscopy was placed through Wall's point for optiview entry.  Lateral robotic trocars x3 were placed through 8 mm incisions.  A right upper quadrant 8 mm AirSeal accessory port was placed.  The patient was placed in deep Trendelenburg.  The robot was docked and instruments were passed in the operative field. Attention was first turned to the left side where the left. Adhesions of the sigmoid colon to sidewall and adnexa were lysed. The  round ligament was transected after sacrificing with bipolar cautery.  The anterior and posterior leaflets of the broad ligament were opened. The pararectal space was developed.  The ureter was identified and a window   was made beneath the infundibulopelvic ligament.  This was sacrificed with bipolar cautery and transected.  The medial fold of the peritoneum was then   taken to the uterosacrals.  Attention was turned to the right side, which was taken down in an identical manner.  Anteriorly, the vesicouterine peritoneum was  incised and the bladder was mobilized inferiorly over the ring.  Next, the bilateral cardinal ligaments and uterine vessels were skeletonized of their peritoneal attachments, sacrificed with bipolar cautery and transected. They were lateralized off the off of the V care cup in a similar fashion. Next, circumferential colpotomy was completed beginning anteriorly and the uterus, cervix,  bilateral tubes and ovaries were removed.  The cuff was then closed with a 0 Vlock.  Cystoscopy revealed bladder to be intact and efflux of urine from bilateral ureteral orifices was noted.   The pelvis was irrigated. There was some bleeding an epiploica of the sigmoid colon at its junction to the colon wall and this was oversewn with 3-0 vicryl. Hemostasis noted. Hemoblast applied diffusely in the dissection bed. A final survey was done of the abdomen and pelvis.  There was no active bleeding and the integrity of the bowel, bladder, and other visible  organs and tissues was again confirmed.Instruments were removed, the robot was undocked and the pneumoperitoneum was evacuated.  The patient was flattened.  All ports were removed and port sites were inspected and made hemostatic with electrocautery and closed with subcuticular 4-0 Monocryl suture.  Steri-Strips and pressure dressing were applied and the patient was awoken and taken to Recovery Room in stable condition.     Of note, due to BMI >40 and adhesions, this case took twice as long as usual.         Sofiya Nguyen MD  Gynecologic Oncology

## 2024-12-04 NOTE — DISCHARGE SUMMARY
Discharge Summary  Gynecology Oncology      Admit Date: 2024    Discharge Date and Time: 2024     Attending Physician: Sofiya Nguyen MD    Principal Diagnoses: Status post hysterectomy    Active Hospital Problems    Diagnosis  POA    *S/P RA-TLH/BSO/Cystoscopy [Z90.710]  No      Resolved Hospital Problems   No resolved problems to display.       Procedures: Procedure(s) (LRB):  XI ROBOTIC HYSTERECTOMY (N/A)  XI ROBOTIC SALPINGO-OOPHORECTOMY (Bilateral)  CYSTOSCOPY (N/A)    Discharged Condition: good    Hospital Course:   Josette Altamirano is a 69 y.o. y.o.  female who presented on 2024 for above procedures for the treatment of endometrial hyperplasia. Patient tolerated procedure. Please see op note for more details. Post-operative course was uncomplicated.  On day of discharge, patient was urinating, ambulating, and tolerating a regular diet without difficulty. Pain was well controlled on PO medication. She was discharged home on POD#0 in stable condition with instructions to follow up with Dr. Nguyen on 2024.     Consults: None    Significant Diagnostic Studies:  Recent Labs   Lab 24  1026   WBC 4.20   HGB 11.8*   HCT 38.2   MCV 79*           Treatments:   1. Surgery as above    Disposition: Home or Self Care    Patient Instructions:   Current Discharge Medication List        START taking these medications    Details   acetaminophen (TYLENOL) 500 MG tablet Take 2 tablets (1,000 mg total) by mouth every 6 (six) hours. Alternate between ibuprofen and tylenol every 3 hours. For example: @0800: ibuprofen 600mg @1100: tylenol 1000mg @1400: ibuprofen 600mg @1700: tylenol 1000 mg @2000: ibuprofen 600mg  Qty: 120 tablet, Refills: 0      docusate sodium (COLACE) 100 MG capsule Take 1 capsule (100 mg total) by mouth 2 (two) times daily.  Qty: 60 capsule, Refills: 0      ibuprofen (ADVIL,MOTRIN) 600 MG tablet Take 1 tablet (600 mg total) by mouth every 6 (six) hours. Alternate between  ibuprofen and tylenol every 3 hours. For example: @0800: ibuprofen 600mg @1100: tylenol 1000mg @1400: ibuprofen 600mg @1700: tylenol 1000 mg @2000: ibuprofen 600mg  Qty: 60 tablet, Refills: 0      oxyCODONE (ROXICODONE) 5 MG immediate release tablet Take 1 tablet (5 mg total) by mouth every 4 (four) hours as needed for Pain.  Qty: 12 tablet, Refills: 0    Comments: Quantity prescribed more than 7 day supply? No  Associated Diagnoses: Status post hysterectomy           CONTINUE these medications which have NOT CHANGED    Details   metoprolol succinate (TOPROL-XL) 50 MG 24 hr tablet Take 1 tablet (50 mg total) by mouth once daily.  Qty: 30 tablet, Refills: 6    Comments: .      sacubitriL-valsartan (ENTRESTO)  mg per tablet Take 1 tablet by mouth 2 (two) times daily.  Qty: 180 tablet, Refills: 1    Associated Diagnoses: Chronic systolic heart failure; Cardiomyopathy due to chemotherapy             Discharge Procedure Orders   Diet general     Lifting restrictions   Order Comments: No lifting greater than 15 pounds for six weeks.     Other restrictions (specify):   Order Comments: PELVIC REST:  No douching, tampons, or intercourse for 6 weeks.    If prescribed, vaginal estrogen cream may be used during the postoperative period.     DRIVING:  No driving while on narcotics. Driving may be resumed initially with a competent passenger one to two weeks after surgery if no longer taking narcotics.     EXERCISE:  For six weeks your exercise should be limited to walking. You may walk as far as you wish, as long as you increase your level of exertion gradually and avoid slippery surfaces. You may climb stairs as needed to get around, but should not use stair climbing for exercise.     Remove dressing in 24 hours   Order Comments: If you have a bandage on wound, you may remove it the day after dismissal.  If you had steri-strips remove them once they begin to peel off (usually 2 weeks). Keep incision clean and dry.   Inspect the incision daily for signs and symptoms of infection.     Wound care routine (specify)   Order Comments: WOUND CARE:  If you have a band-aid or bandage on your wound, you may remove it the day after dismissal.  If you had steri-strips remove them once they begin to peel off (usually 2 weeks).  If your steri-strips still haven't come off in 2 weeks, please remove them. You may wash the wound with mild soap and water.   You may shower at any time but should avoid immersing any abdominal incisions in water for at least two weeks after surgery or until the wound is completely healed. If given, please shower with Hibiclens soap until bottle is completely finished. Keep your wound clean and dry.  You should observe your incision for signs of infection which include redness, warmth, drainage or fever.     Call MD for:  temperature >100.4     Call MD for:  persistent nausea and vomiting     Call MD for:  severe uncontrolled pain     Call MD for:  difficulty breathing, headache or visual disturbances     Call MD for:  redness, tenderness, or signs of infection (pain, swelling, redness, odor or green/yellow discharge around incision site)     Call MD for:  hives     Call MD for:   Order Comments: inability to void,urine is ketchup colored or you have large clots, vaginal bleeding is heavier than a period.    VAGINAL DISCHARGE: You may develop a vaginal discharge and intermittent vaginal spotting after surgery and up to 6 weeks postoperatively.  The discharge may have an odor and may change in color but it is normal.  This is due to dissolving stiches.  Contact your surgical team if you develop vaginal or vulvar irritation along with a discharge.  Also contact your surgical team if you have vaginal discharge that smells like urine or stool.    CONSTIPATION REMEDIES: Patients are often constipated after surgery or with use of oral narcotic medicine. You should continue to take the stool softener, Senokot-S during the  next six weeks, and consume adequate amounts of water.  If you have not had a bowel movement for 3 days after dismissal, or are uncomfortable and unable to pass stool, please try one or all of the following measures:  1.  Milk of Magnesia - 30 cc by mouth every 12 hours   2.  Dulcolax suppository - One suppository per rectum every 4-6 hours   3.  Metamucil, Fibercon or other bulk former - use as directed  4.  Fleets Enema        PAIN MEDICATIONS:     Take your pain medications as instructed. It is best to take pain medications before your pain becomes severe. This will allow you to take less medication yet have better pain relief. For the first 2 or 3 days it may be helpful to take your pain medications on a regular schedule (e.g. every 4 to 6 hours). This will help you to keep your pain under better control. You should then begin to take fewer medications each day until you no longer need them. Do not take pain medication on an empty stomach. This may lead to nausea and vomiting.     Activity as tolerated   Order Comments: Return to normal activity slowly as you feel able.  For 6 weeks your exercise should be limited to walking.  You may walk as far as you wish, as long as you increase your level of exertion gradually and avoid slippery surfaces.    If you had a hysterectomy at the surgery do not insert anything in your vagina for 9 weeks.     Shower on day dressing removed (No bath)   Order Comments: You may shower at any time but should avoid immersing any abdominal incisions in water for at least 2 weeks after surgery or until the wound is completely healed.  If given, please shower with Hibaclens soap until bottle is completely finish        Follow-up Information       Sofiya Nguyen MD Follow up on 1/6/2025.    Specialty: Gynecologic Oncology  Why: Please attend post-operative visit with Dr. Nguyen.  Contact information:  [address]  [facility] [address] [phone]                             Akanksha  MD Mackenzie, MPH  OBGYN PGY-4

## 2024-12-04 NOTE — TRANSFER OF CARE
"Anesthesia Transfer of Care Note    Patient: Josette Altamirano    Procedure(s) Performed: Procedure(s) (LRB):  XI ROBOTIC HYSTERECTOMY (N/A)  XI ROBOTIC SALPINGO-OOPHORECTOMY (Bilateral)  CYSTOSCOPY (N/A)    Patient location: PACU    Anesthesia Type: general    Transport from OR: Transported from OR on 6-10 L/min O2 by face mask with adequate spontaneous ventilation    Post pain: adequate analgesia    Post assessment: no apparent anesthetic complications and tolerated procedure well    Post vital signs: stable    Level of consciousness: awake    Nausea/Vomiting: no nausea/vomiting    Complications: none    Transfer of care protocol was followed      Last vitals: Visit Vitals  /71 (BP Location: Right arm, Patient Position: Lying)   Pulse 78   Temp 36.9 °C (98.5 °F) (Temporal)   Resp 16   Ht 5' 4" (1.626 m)   Wt 107 kg (236 lb)   SpO2 100%   Breastfeeding No   BMI 40.51 kg/m²     "

## 2024-12-04 NOTE — ANESTHESIA POSTPROCEDURE EVALUATION
Anesthesia Post Evaluation    Patient: Josette Altamirano    Procedure(s) Performed: Procedure(s) (LRB):  XI ROBOTIC HYSTERECTOMY (N/A)  XI ROBOTIC SALPINGO-OOPHORECTOMY (Bilateral)  CYSTOSCOPY (N/A)    Final Anesthesia Type: general      Patient location during evaluation: PACU  Patient participation: Yes- Able to Participate  Level of consciousness: awake and alert  Post-procedure vital signs: reviewed and stable  Pain management: adequate  Airway patency: patent    PONV status at discharge: No PONV  Anesthetic complications: no      Cardiovascular status: blood pressure returned to baseline  Respiratory status: unassisted  Hydration status: euvolemic  Follow-up not needed.              Vitals Value Taken Time   /72 12/04/24 1147   Temp 36.7 °C (98 °F) 12/04/24 1147   Pulse 67 12/04/24 1150   Resp 16 12/04/24 1114   SpO2 94 % 12/04/24 1150   Vitals shown include unfiled device data.      No case tracking events are documented in the log.      Pain/Edy Score: Pain Rating Prior to Med Admin: 7 (12/4/2024 11:14 AM)  Edy Score: 9 (12/4/2024 11:27 AM)

## 2024-12-04 NOTE — PLAN OF CARE
Josette Altamirano has met all discharge criteria from Phase II. Vital Signs are stable, ambulating  without difficulty. Discharge instructions given, patient verbalized understanding. Patient states nausea is improved and ready for discharge. Discharged from facility via wheelchair in stable condition.

## 2024-12-05 ENCOUNTER — PATIENT OUTREACH (OUTPATIENT)
Dept: ADMINISTRATIVE | Facility: CLINIC | Age: 69
End: 2024-12-05
Payer: MEDICARE

## 2024-12-05 ENCOUNTER — PATIENT MESSAGE (OUTPATIENT)
Dept: ADMINISTRATIVE | Facility: CLINIC | Age: 69
End: 2024-12-05
Payer: MEDICARE

## 2024-12-05 NOTE — PROGRESS NOTES
C3 nurse attempted to contact Josette Altamirano  for a TCC post hospital discharge follow up call. No answer. Left voicemail with callback information. The patient does not have a scheduled HOSFU appointment.    Please contact patient and schedule follow up appointment using HOSFU visit type on or before 12/11/2024.    Message sent to PCP staff

## 2024-12-06 ENCOUNTER — TELEPHONE (OUTPATIENT)
Dept: GYNECOLOGIC ONCOLOGY | Facility: CLINIC | Age: 69
End: 2024-12-06
Payer: MEDICARE

## 2024-12-06 DIAGNOSIS — Z90.710 HISTORY OF ROBOT-ASSISTED LAPAROSCOPIC HYSTERECTOMY: Primary | ICD-10-CM

## 2024-12-06 LAB
FINAL PATHOLOGIC DIAGNOSIS: NORMAL
GROSS: NORMAL
Lab: NORMAL

## 2024-12-06 NOTE — OPERATIVE NOTE ADDENDUM
Certification of Assistant at Surgery       Surgery Date: 12/4/2024     Participating Surgeons:  Surgeons and Role:     * Sofiya Nguyen MD - Primary     * Akanksha Mann MD - Resident - Assisting     * Pat Tom MD - Resident - Assisting    Procedures:  Procedure(s) (LRB):  XI ROBOTIC HYSTERECTOMY (N/A)  XI ROBOTIC SALPINGO-OOPHORECTOMY (Bilateral)  CYSTOSCOPY (N/A)    Assistant Surgeon's Certification of Necessity:  I understand that section 1842 (b) (6) (d) of the Social Security Act generally prohibits Medicare Part B reasonable charge payment for the services of assistants at surgery in teaching hospitals when qualified residents are available to furnish such services. I certify that the services for which payment is claimed were medically necessary, and that no qualified resident was available to perform the services. I further understand that these services are subject to post-payment review by the Medicare carrier.      RICARDA Muhammad    12/06/2024  1:06 PM

## 2024-12-06 NOTE — PROGRESS NOTES
C3 nurse spoke with Josette Altamirano  for a TCC post hospital discharge follow up call. The patient does not have a scheduled HOSFU appointment with Eun Booker MD  within 5-7 days post hospital discharge date 12/04/2024. C3 nurse was unable to schedule HOSFU appointment in [facility].  Please contact patient and schedule follow up appointment using HOSFU visit type on or before 12/11/2024.    Message sent to PCP staff.

## 2024-12-06 NOTE — TELEPHONE ENCOUNTER
----- Message from Sofiya Nguyen MD sent at 12/6/2024  3:51 PM CST -----  Regarding: FW:  Please give her a call and let her know the GOOD news. Final pathology has only the known hyperplasia, there was NO evidence of cancer.     Thanks   Sofiya  ----- Message -----  From: Thomas, [facility] Interface  Sent: 12/6/2024   3:14 PM CST  To: Sofiya Nguyen MD

## 2024-12-16 DIAGNOSIS — I42.7 CARDIOMYOPATHY DUE TO CHEMOTHERAPY: ICD-10-CM

## 2024-12-16 DIAGNOSIS — I50.22 CHRONIC SYSTOLIC HEART FAILURE: ICD-10-CM

## 2024-12-16 DIAGNOSIS — T45.1X5A CARDIOMYOPATHY DUE TO CHEMOTHERAPY: ICD-10-CM

## 2024-12-16 RX ORDER — SACUBITRIL AND VALSARTAN 97; 103 MG/1; MG/1
1 TABLET, FILM COATED ORAL 2 TIMES DAILY
Qty: 180 TABLET | Refills: 1 | Status: SHIPPED | OUTPATIENT
Start: 2024-12-16

## 2025-01-06 ENCOUNTER — OFFICE VISIT (OUTPATIENT)
Dept: GYNECOLOGIC ONCOLOGY | Facility: CLINIC | Age: 70
End: 2025-01-06
Payer: MEDICARE

## 2025-01-06 VITALS
WEIGHT: 235.25 LBS | OXYGEN SATURATION: 100 % | SYSTOLIC BLOOD PRESSURE: 130 MMHG | HEART RATE: 85 BPM | TEMPERATURE: 99 F | HEIGHT: 64 IN | BODY MASS INDEX: 40.16 KG/M2 | DIASTOLIC BLOOD PRESSURE: 80 MMHG

## 2025-01-06 DIAGNOSIS — Z90.710 STATUS POST HYSTERECTOMY: Primary | ICD-10-CM

## 2025-01-06 PROCEDURE — 99213 OFFICE O/P EST LOW 20 MIN: CPT | Mod: PBBFAC | Performed by: OBSTETRICS & GYNECOLOGY

## 2025-01-06 PROCEDURE — 99999 PR PBB SHADOW E&M-EST. PATIENT-LVL III: CPT | Mod: PBBFAC,,, | Performed by: OBSTETRICS & GYNECOLOGY

## 2025-01-06 PROCEDURE — 99024 POSTOP FOLLOW-UP VISIT: CPT | Mod: POP,,, | Performed by: OBSTETRICS & GYNECOLOGY

## 2025-01-06 NOTE — PROGRESS NOTES
SUBJECTIVE   Chief complaint: POST OP   Referring provider: Eun Booker MD  History of present Illness:  Josette Altamirano is a 69 y.o.  female  (SVDx2) with HTN, HLD, CHF  personal history of breast cancer presenting post op from a [facility] BSO for Complex Hyperplasia without atypia. She is doing well. Reports she is tolerating PO without issues. Having normal bowel and bladder function. Denies any abnormal vaginal bleeding, discharge or odor.     PATH:     1 - UTERUS, CERVIX, BILATERAL FALLOPIAN TUBES, AND BILATERAL OVARIES, TOTAL HYSTERECTOMY AND BILATERAL SALPINGO-OOPHORECTOMY:    - Uterus:     - Endometrium:         - Complex endometrial hyperplasia without atypia in a background of disordered, weakly proliferative endometrium. See Comment.       - Focal fibrosis is present, suggestive of a residual endometrial polyp.       - Negative for atypia and malignancy.     - Myometrium:       - Leiomyomata.       - Adenomyosis.       - Negative for malignancy.  - Cervix:     - Ectocervix:  Benign squamous mucosa within normal limits.     - Endocervix:  Benign endocervical columnar mucosa within normal limits.  - Ovaries:  Benign ovaries within normal limits.  - Fallopian Tubes:  Benign fallopian tubes within normal limits.    Comment: This case has also been reviewed by Dr. Goss who agrees with the above diagnoses.    Comment: Interp By Deniz Mars M.D., Signed on 2024 at 14:53     Oncology History   Malignant neoplasm of overlapping sites of right breast in female, estrogen receptor positive   2023 Initial Diagnosis    Malignant neoplasm of overlapping sites of right breast in female, estrogen receptor positive     2023 Cancer Staged    Staging form: Breast, AJCC 8th Edition  - Clinical stage from 2023: Stage IA (cT1b, cN0, cM0, G2, ER+, ME+, HER2-)     2023 Cancer Staged    Staging form: Breast, AJCC 8th Edition  - Pathologic stage from 2023: pT0, pN0(sn), cM0, G1,  "ER+, IA+, HER2-     1/25/2024 Genomic Testing    Oncotype Dx Breast   Report#: [id number]  Report date: 1/23/24  Unable to report due to insufficient carcinoma present         Genetic Testing    Patient has genetic testing done for .    INVITAE                                          Results revealed patient has the following mutation(s):NEGATIVE          Review of Systems per HPI   OBJECTIVE   /80 (Patient Position: Sitting)   Pulse 85   Temp 98.7 °F (37.1 °C)   Ht 5' 4" (1.626 m)   Wt 106.7 kg (235 lb 3.7 oz)   SpO2 100%   BMI 40.38 kg/m²     Physical Exam  Exam conducted with a chaperone present.   Pulmonary:      Effort: Pulmonary effort is normal.      Breath sounds: Normal breath sounds.   Abdominal:      General: Abdomen is flat.      Palpations: Abdomen is soft.      Comments: Incisions well healed    Genitourinary:     General: Normal vulva.      Vagina: Normal.      Comments: Vaginal cuff intact no signs separation or infection   Neurological:      General: No focal deficit present.      Mental Status: She is oriented to person, place, and time.   Psychiatric:         Mood and Affect: Mood normal.         Behavior: Behavior normal.        ASSESSMENT      1. S/P RA-TLH/BSO/Cystoscopy    PLAN     Well healed  Continue pelvic rest until 6 weeks post operative  Final pathology benign hyperplasia, and surgery is curative. No need for further oncologic follow up. Released to routine gyn care.      Sofiya Nguyen MD  Gynecologic Oncology   "

## 2025-01-07 ENCOUNTER — HOSPITAL ENCOUNTER (OUTPATIENT)
Dept: RADIOLOGY | Facility: HOSPITAL | Age: 70
Discharge: HOME | End: 2025-01-07
Attending: INTERNAL MEDICINE
Payer: MEDICARE

## 2025-01-07 DIAGNOSIS — K76.9 LIVER DISEASE, UNSPECIFIED: ICD-10-CM

## 2025-01-07 DIAGNOSIS — K76.9 HEPATIC LESION: ICD-10-CM

## 2025-01-07 PROCEDURE — A9585 GADOBUTROL INJECTION: HCPCS | Performed by: INTERNAL MEDICINE

## 2025-01-07 PROCEDURE — 25500020 PHARM REV CODE 255: Performed by: INTERNAL MEDICINE

## 2025-01-07 PROCEDURE — 74183 MRI ABD W/O CNTR FLWD CNTR: CPT | Mod: 26,,, | Performed by: RADIOLOGY

## 2025-01-07 PROCEDURE — 74183 MRI ABD W/O CNTR FLWD CNTR: CPT | Mod: TC

## 2025-01-07 RX ORDER — GADOBUTROL 604.72 MG/ML
10 INJECTION INTRAVENOUS
Status: COMPLETED | OUTPATIENT
Start: 2025-01-07 | End: 2025-01-07

## 2025-01-07 RX ADMIN — GADOBUTROL 10 ML: 604.72 INJECTION INTRAVENOUS at 09:01

## 2025-01-10 DIAGNOSIS — Z01.818 PRE-OP EXAM: Primary | ICD-10-CM

## 2025-01-13 ENCOUNTER — TELEPHONE (OUTPATIENT)
Dept: SPORTS MEDICINE | Facility: CLINIC | Age: 70
End: 2025-01-13
Payer: MEDICARE

## 2025-01-13 DIAGNOSIS — S46.011A TRAUMATIC INCOMPLETE TEAR OF RIGHT ROTATOR CUFF, INITIAL ENCOUNTER: Primary | ICD-10-CM

## 2025-01-13 DIAGNOSIS — M75.41 SUBACROMIAL IMPINGEMENT OF RIGHT SHOULDER: ICD-10-CM

## 2025-01-13 NOTE — TELEPHONE ENCOUNTER
Post-op PT referral and rehab protocol successfully faxed to [facility] PT- Elise at [phone] on 1/13/25. LEONOR

## 2025-01-14 ENCOUNTER — PATIENT MESSAGE (OUTPATIENT)
Dept: SPORTS MEDICINE | Facility: CLINIC | Age: 70
End: 2025-01-14
Payer: MEDICARE

## 2025-01-14 DIAGNOSIS — Z01.818 PREOPERATIVE TESTING: Primary | ICD-10-CM

## 2025-01-24 ENCOUNTER — LAB VISIT (OUTPATIENT)
Dept: LAB | Facility: HOSPITAL | Age: 70
End: 2025-01-24
Attending: STUDENT IN AN ORGANIZED HEALTH CARE EDUCATION/TRAINING PROGRAM
Payer: MEDICARE

## 2025-01-24 ENCOUNTER — ANESTHESIA EVENT (OUTPATIENT)
Dept: SURGERY | Facility: HOSPITAL | Age: 70
End: 2025-01-24
Payer: MEDICARE

## 2025-01-24 ENCOUNTER — PATIENT MESSAGE (OUTPATIENT)
Dept: PREADMISSION TESTING | Facility: HOSPITAL | Age: 70
End: 2025-01-24
Payer: MEDICARE

## 2025-01-24 DIAGNOSIS — Z01.818 PREOPERATIVE TESTING: ICD-10-CM

## 2025-01-24 PROCEDURE — 80053 COMPREHEN METABOLIC PANEL: CPT | Performed by: STUDENT IN AN ORGANIZED HEALTH CARE EDUCATION/TRAINING PROGRAM

## 2025-01-24 PROCEDURE — 36415 COLL VENOUS BLD VENIPUNCTURE: CPT | Performed by: STUDENT IN AN ORGANIZED HEALTH CARE EDUCATION/TRAINING PROGRAM

## 2025-01-24 PROCEDURE — 85025 COMPLETE CBC W/AUTO DIFF WBC: CPT | Performed by: STUDENT IN AN ORGANIZED HEALTH CARE EDUCATION/TRAINING PROGRAM

## 2025-01-24 NOTE — ANESTHESIA PREPROCEDURE EVALUATION
01/24/2025  Josette Altamirano is a 69 y.o., female.    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 She subsequently underwent left mastectomy and sentinel node biopsy followed by axillary dissection March 23, 2007. Histopathologic evaluation demonstrated again multiple foci of intracystic papillary carcinoma, as well as DCIS noncomed    Cardiomyopathy     CHEMO INDUCED- RESOLVED    CHF (congestive heart failure)     systolic    Genetic testing 11/06/2023    [employer] Multi Cancer Panel (84 genes) - NEGATIVE, VUS in EGFR, POLE    Hypertension     Uterine fibroid      Past Surgical History:   Procedure Laterality Date    BREAST BIOPSY      BREAST CYST EXCISION Right     BREAST RECONSTRUCTION Bilateral 4/1/2024    Procedure: RECONSTRUCTION, BREAST;  Surgeon: Rashawn James MD;  Location: [facility];  Service: Plastics;  Laterality: Bilateral;    BREAST REVISION SURGERY Bilateral 4/1/2024    Procedure: BREAST REVISION SURGERY;  Surgeon: Rashawn James MD;  Location: [facility];  Service: Plastics;  Laterality: Bilateral;    BREAST SURGERY      CHOLECYSTECTOMY      COLONOSCOPY N/A 9/10/2018    Procedure: COLONOSCOPY;  Surgeon: Indra Ramos MD;  Location: [facility];  Service: Endoscopy;  Laterality: N/A;    COLONOSCOPY N/A 4/28/2020    Procedure: COLONOSCOPY;  Surgeon: Dulce Maria Siegel MD;  Location: [facility];  Service: Endoscopy;  Laterality: N/A;    CYSTOSCOPY N/A 12/4/2024    Procedure: CYSTOSCOPY;  Surgeon: Sofiya Nguyen MD;  Location: [facility];  Service: Gynecology Oncology;  Laterality: N/A;    ESOPHAGOGASTRODUODENOSCOPY N/A 4/28/2020    Procedure: EGD (ESOPHAGOGASTRODUODENOSCOPY);  Surgeon: Dulce Maria MEYERS  MD Christal;  Location: [facility] ENDO;  Service: Endoscopy;  Laterality: N/A;    HYSTEROSCOPIC POLYPECTOMY OF UTERUS N/A 10/10/2024    Procedure: POLYPECTOMY, UTERUS, HYSTEROSCOPIC;  Surgeon: Anamika Eller MD;  Location: [facility] OR;  Service: OB/GYN;  Laterality: N/A;    HYSTEROSCOPY WITH DILATION AND CURETTAGE OF UTERUS N/A 10/10/2024    Procedure: HYSTEROSCOPY, WITH DILATION AND CURETTAGE OF UTERUS;  Surgeon: Anamika Eller MD;  Location: [facility] OR;  Service: OB/GYN;  Laterality: N/A;    INJECTION FOR SENTINEL NODE IDENTIFICATION Right 12/12/2023    Procedure: INJECTION, FOR SENTINEL NODE IDENTIFICATION;  Surgeon: Shannon Galvan MD;  Location: [facility] OR;  Service: General;  Laterality: Right;    INSERTION OF BREAST IMPLANT Bilateral 4/1/2024    Procedure: INSERTION, BREAST IMPLANT;  Surgeon: Rashawn James MD;  Location: [facility] OR;  Service: Plastics;  Laterality: Bilateral;    INSERTION OF BREAST TISSUE EXPANDER Right 12/12/2023    Procedure: INSERTION, TISSUE EXPANDER, BREAST;  Surgeon: Rashawn James MD;  Location: [facility] OR;  Service: Plastics;  Laterality: Right;    MASTECTOMY      left    MASTECTOMY, SKIN SPARING, SIMPLE Right 12/12/2023    Procedure: MASTECTOMY, SKIN SPARING, SIMPLE;  Surgeon: Shannon Galvan MD;  Location: [facility] OR;  Service: General;  Laterality: Right;    MEDIPORT INSERTION, SINGLE      PLACEMENT OF ACELLULAR HUMAN DERMAL ALLOGRAFT Right 12/12/2023    Procedure: APPLICATION, ACELLULAR HUMAN DERMAL ALLOGRAFT;  Surgeon: Rashawn James MD;  Location: [facility] OR;  Service: Plastics;  Laterality: Right;    ROBOT-ASSISTED LAPAROSCOPIC ABDOMINAL HYSTERECTOMY USING DA SILVINO XI N/A 12/4/2024    Procedure: XI ROBOTIC HYSTERECTOMY;  Surgeon: Sofiya Nguyen MD;  Location: [facility] OR;  Service: Gynecology Oncology;  Laterality: N/A;  2.5 hr case / surgery admit due to insurance    ROBOT-ASSISTED LAPAROSCOPIC SALPINGO-OOPHORECTOMY USING DA SILVINO XI Bilateral 12/4/2024    Procedure: XI ROBOTIC  SALPINGO-OOPHORECTOMY;  Surgeon: Sofiya Nguyen MD;  Location: [facility] OR;  Service: Gynecology Oncology;  Laterality: Bilateral;    SENTINEL LYMPH NODE BIOPSY Right 12/12/2023    Procedure: BIOPSY, LYMPH NODE, SENTINEL;  Surgeon: Shannon Galvan MD;  Location: [facility] OR;  Service: General;  Laterality: Right;    TISSUE EXPANDER REMOVAL Bilateral 4/1/2024    Procedure: REMOVAL, TISSUE EXPANDER;  Surgeon: Rashawn James MD;  Location: [facility] OR;  Service: Plastics;  Laterality: Bilateral;       Pre-op Assessment    I have reviewed the Patient Summary Reports.    I have reviewed the NPO Status.   I have reviewed the Medications.     Review of Systems  Anesthesia Hx:  No problems with previous Anesthesia  2 attempts with video laryngoscopy. History of prior surgery of interest to airway management or planning:  Previous anesthesia: General       Airway issues documented on chart review include videolaryngoscope used     Denies Family Hx of Anesthesia complications.    Denies Personal Hx of Anesthesia complications.                    Social:  Non-Smoker       Hematology/Oncology:                      Current/Recent Cancer.  Breast              EENT/Dental:  chronic allergic rhinitis           Cardiovascular:     Hypertension       CHF                Congestive Heart Failure (CHF)                Hypertension         Musculoskeletal:  Arthritis               Neurological:    Neuromuscular Disease,                                 Neuromuscular Disease   Endocrine:        Obesity / BMI > 30      Physical Exam  General: Well nourished, Alert and Oriented    Airway:  Mallampati: II   Mouth Opening: Normal  TM Distance: Normal  Tongue: Normal  Neck ROM: Normal ROM    Dental:  Intact        Anesthesia Plan  Type of Anesthesia, risks & benefits discussed:    Anesthesia Type: Gen ETT  Intra-op Monitoring Plan: Standard ASA Monitors  Post Op Pain Control Plan: multimodal analgesia, IV/PO Opioids PRN and peripheral nerve  block  Induction:  IV  Informed Consent: Informed consent signed with the Patient and all parties understand the risks and agree with anesthesia plan.  All questions answered. Patient consented to blood products? No  ASA Score: 3  Day of Surgery Review of History & Physical: H&P Update referred to the surgeon/provider.    Ready For Surgery From Anesthesia Perspective.     .

## 2025-01-25 LAB
ALBUMIN SERPL BCP-MCNC: 3.9 G/DL (ref 3.5–5.2)
ALP SERPL-CCNC: 97 U/L (ref 40–150)
ALT SERPL W/O P-5'-P-CCNC: 18 U/L (ref 10–44)
ANION GAP SERPL CALC-SCNC: 10 MMOL/L (ref 8–16)
AST SERPL-CCNC: 18 U/L (ref 10–40)
BASOPHILS # BLD AUTO: 0.02 K/UL (ref 0–0.2)
BASOPHILS NFR BLD: 0.5 % (ref 0–1.9)
BILIRUB SERPL-MCNC: 0.8 MG/DL (ref 0.1–1)
BUN SERPL-MCNC: 12 MG/DL (ref 8–23)
CALCIUM SERPL-MCNC: 9 MG/DL (ref 8.7–10.5)
CHLORIDE SERPL-SCNC: 105 MMOL/L (ref 95–110)
CO2 SERPL-SCNC: 25 MMOL/L (ref 23–29)
CREAT SERPL-MCNC: 0.8 MG/DL (ref 0.5–1.4)
DIFFERENTIAL METHOD BLD: ABNORMAL
EOSINOPHIL # BLD AUTO: 0.1 K/UL (ref 0–0.5)
EOSINOPHIL NFR BLD: 3.2 % (ref 0–8)
ERYTHROCYTE [DISTWIDTH] IN BLOOD BY AUTOMATED COUNT: 14.2 % (ref 11.5–14.5)
EST. GFR  (NO RACE VARIABLE): >60 ML/MIN/1.73 M^2
GLUCOSE SERPL-MCNC: 108 MG/DL (ref 70–110)
HCT VFR BLD AUTO: 39.9 % (ref 37–48.5)
HGB BLD-MCNC: 11.8 G/DL (ref 12–16)
IMM GRANULOCYTES # BLD AUTO: 0.01 K/UL (ref 0–0.04)
IMM GRANULOCYTES NFR BLD AUTO: 0.2 % (ref 0–0.5)
LYMPHOCYTES # BLD AUTO: 1.2 K/UL (ref 1–4.8)
LYMPHOCYTES NFR BLD: 27 % (ref 18–48)
MCH RBC QN AUTO: 23.5 PG (ref 27–31)
MCHC RBC AUTO-ENTMCNC: 29.6 G/DL (ref 32–36)
MCV RBC AUTO: 80 FL (ref 82–98)
MONOCYTES # BLD AUTO: 0.3 K/UL (ref 0.3–1)
MONOCYTES NFR BLD: 7.5 % (ref 4–15)
NEUTROPHILS # BLD AUTO: 2.7 K/UL (ref 1.8–7.7)
NEUTROPHILS NFR BLD: 61.6 % (ref 38–73)
NRBC BLD-RTO: 0 /100 WBC
PLATELET # BLD AUTO: 315 K/UL (ref 150–450)
PMV BLD AUTO: 10.7 FL (ref 9.2–12.9)
POTASSIUM SERPL-SCNC: 4.3 MMOL/L (ref 3.5–5.1)
PROT SERPL-MCNC: 7.7 G/DL (ref 6–8.4)
RBC # BLD AUTO: 5.02 M/UL (ref 4–5.4)
SODIUM SERPL-SCNC: 140 MMOL/L (ref 136–145)
WBC # BLD AUTO: 4.4 K/UL (ref 3.9–12.7)

## 2025-01-27 ENCOUNTER — HOSPITAL ENCOUNTER (OUTPATIENT)
Facility: HOSPITAL | Age: 70
Discharge: HOME | End: 2025-01-27
Attending: STUDENT IN AN ORGANIZED HEALTH CARE EDUCATION/TRAINING PROGRAM | Admitting: STUDENT IN AN ORGANIZED HEALTH CARE EDUCATION/TRAINING PROGRAM
Payer: MEDICARE

## 2025-01-27 ENCOUNTER — ANESTHESIA (OUTPATIENT)
Dept: SURGERY | Facility: HOSPITAL | Age: 70
End: 2025-01-27
Payer: MEDICARE

## 2025-01-27 VITALS
HEART RATE: 79 BPM | TEMPERATURE: 98 F | RESPIRATION RATE: 20 BRPM | OXYGEN SATURATION: 98 % | WEIGHT: 231.5 LBS | SYSTOLIC BLOOD PRESSURE: 141 MMHG | DIASTOLIC BLOOD PRESSURE: 71 MMHG | HEIGHT: 64 IN | BODY MASS INDEX: 39.52 KG/M2

## 2025-01-27 DIAGNOSIS — M75.121 NONTRAUMATIC COMPLETE TEAR OF RIGHT ROTATOR CUFF: Primary | ICD-10-CM

## 2025-01-27 PROCEDURE — 63600175 PHARM REV CODE 636 W HCPCS: Performed by: ANESTHESIOLOGY

## 2025-01-27 PROCEDURE — 71000033 HC RECOVERY, INTIAL HOUR: Performed by: STUDENT IN AN ORGANIZED HEALTH CARE EDUCATION/TRAINING PROGRAM

## 2025-01-27 PROCEDURE — 29827 SHO ARTHRS SRG RT8TR CUF RPR: CPT | Mod: RT,,, | Performed by: STUDENT IN AN ORGANIZED HEALTH CARE EDUCATION/TRAINING PROGRAM

## 2025-01-27 PROCEDURE — 27200703 HC ULTRASOUND NDL GUIDE: Performed by: ANESTHESIOLOGY

## 2025-01-27 PROCEDURE — 63600175 PHARM REV CODE 636 W HCPCS: Performed by: STUDENT IN AN ORGANIZED HEALTH CARE EDUCATION/TRAINING PROGRAM

## 2025-01-27 PROCEDURE — 29828 SHO ARTHRS SRG BICP TENODSIS: CPT | Mod: 51,RT,, | Performed by: STUDENT IN AN ORGANIZED HEALTH CARE EDUCATION/TRAINING PROGRAM

## 2025-01-27 PROCEDURE — 64415 NJX AA&/STRD BRCH PLXS IMG: CPT | Performed by: ANESTHESIOLOGY

## 2025-01-27 PROCEDURE — C1713 ANCHOR/SCREW BN/BN,TIS/BN: HCPCS | Performed by: STUDENT IN AN ORGANIZED HEALTH CARE EDUCATION/TRAINING PROGRAM

## 2025-01-27 PROCEDURE — 25000003 PHARM REV CODE 250: Performed by: NURSE ANESTHETIST, CERTIFIED REGISTERED

## 2025-01-27 PROCEDURE — 37000009 HC ANESTHESIA EA ADD 15 MINS: Performed by: STUDENT IN AN ORGANIZED HEALTH CARE EDUCATION/TRAINING PROGRAM

## 2025-01-27 PROCEDURE — 36000710: Performed by: STUDENT IN AN ORGANIZED HEALTH CARE EDUCATION/TRAINING PROGRAM

## 2025-01-27 PROCEDURE — 63600175 PHARM REV CODE 636 W HCPCS: Performed by: NURSE ANESTHETIST, CERTIFIED REGISTERED

## 2025-01-27 PROCEDURE — 64450 NJX AA&/STRD OTHER PN/BRANCH: CPT | Performed by: STUDENT IN AN ORGANIZED HEALTH CARE EDUCATION/TRAINING PROGRAM

## 2025-01-27 PROCEDURE — 27200750 HC INSULATED NEEDLE/ STIMUPLEX: Performed by: ANESTHESIOLOGY

## 2025-01-27 PROCEDURE — 36000711: Performed by: STUDENT IN AN ORGANIZED HEALTH CARE EDUCATION/TRAINING PROGRAM

## 2025-01-27 PROCEDURE — 27201423 OPTIME MED/SURG SUP & DEVICES STERILE SUPPLY: Performed by: STUDENT IN AN ORGANIZED HEALTH CARE EDUCATION/TRAINING PROGRAM

## 2025-01-27 PROCEDURE — 37000008 HC ANESTHESIA 1ST 15 MINUTES: Performed by: STUDENT IN AN ORGANIZED HEALTH CARE EDUCATION/TRAINING PROGRAM

## 2025-01-27 PROCEDURE — 71000015 HC POSTOP RECOV 1ST HR: Performed by: STUDENT IN AN ORGANIZED HEALTH CARE EDUCATION/TRAINING PROGRAM

## 2025-01-27 PROCEDURE — 29826 SHO ARTHRS SRG DECOMPRESSION: CPT | Mod: RT,,, | Performed by: STUDENT IN AN ORGANIZED HEALTH CARE EDUCATION/TRAINING PROGRAM

## 2025-01-27 DEVICE — LNT IMPLANT SYSTEM, 4.75 BC SWIVELOCK
Type: IMPLANTABLE DEVICE | Site: SHOULDER | Status: FUNCTIONAL
Brand: ARTHREX®

## 2025-01-27 DEVICE — SWIVELOCK, SP BC KL 4.75MM
Type: IMPLANTABLE DEVICE | Site: SHOULDER | Status: FUNCTIONAL
Brand: ARTHREX®

## 2025-01-27 DEVICE — KL FBTK RC W/ (BLU)FT & (W/BLK)#2 MTS
Type: IMPLANTABLE DEVICE | Site: SHOULDER | Status: FUNCTIONAL
Brand: ARTHREX®

## 2025-01-27 RX ORDER — TRAMADOL HYDROCHLORIDE 50 MG/1
50 TABLET ORAL
Qty: 36 TABLET | Refills: 0 | Status: SHIPPED | OUTPATIENT
Start: 2025-01-27

## 2025-01-27 RX ORDER — EPHEDRINE SULFATE 50 MG/ML
INJECTION, SOLUTION INTRAVENOUS
Status: DISCONTINUED | OUTPATIENT
Start: 2025-01-27 | End: 2025-01-27

## 2025-01-27 RX ORDER — MIDAZOLAM HYDROCHLORIDE 1 MG/ML
INJECTION INTRAMUSCULAR; INTRAVENOUS
Status: DISCONTINUED | OUTPATIENT
Start: 2025-01-27 | End: 2025-01-27

## 2025-01-27 RX ORDER — ROCURONIUM BROMIDE 10 MG/ML
INJECTION, SOLUTION INTRAVENOUS
Status: DISCONTINUED | OUTPATIENT
Start: 2025-01-27 | End: 2025-01-27

## 2025-01-27 RX ORDER — EPINEPHRINE 1 MG/ML
INJECTION, SOLUTION, CONCENTRATE INTRAVENOUS
Status: DISCONTINUED
Start: 2025-01-27 | End: 2025-01-27 | Stop reason: HOSPADM

## 2025-01-27 RX ORDER — ASPIRIN 81 MG/1
81 TABLET ORAL 2 TIMES DAILY
Qty: 28 TABLET | Refills: 0 | Status: SHIPPED | OUTPATIENT
Start: 2025-01-27 | End: 2025-02-10

## 2025-01-27 RX ORDER — ROPIVACAINE HYDROCHLORIDE 5 MG/ML
INJECTION, SOLUTION EPIDURAL; INFILTRATION; PERINEURAL
Status: COMPLETED | OUTPATIENT
Start: 2025-01-27 | End: 2025-01-27

## 2025-01-27 RX ORDER — ONDANSETRON HYDROCHLORIDE 2 MG/ML
4 INJECTION, SOLUTION INTRAVENOUS DAILY PRN
Status: DISCONTINUED | OUTPATIENT
Start: 2025-01-27 | End: 2025-01-27 | Stop reason: HOSPADM

## 2025-01-27 RX ORDER — FENTANYL CITRATE 50 UG/ML
INJECTION, SOLUTION INTRAMUSCULAR; INTRAVENOUS
Status: DISCONTINUED | OUTPATIENT
Start: 2025-01-27 | End: 2025-01-27

## 2025-01-27 RX ORDER — PROPOFOL 10 MG/ML
VIAL (ML) INTRAVENOUS
Status: DISCONTINUED | OUTPATIENT
Start: 2025-01-27 | End: 2025-01-27

## 2025-01-27 RX ORDER — CELECOXIB 200 MG/1
200 CAPSULE ORAL 2 TIMES DAILY
Qty: 28 CAPSULE | Refills: 0 | Status: SHIPPED | OUTPATIENT
Start: 2025-01-27

## 2025-01-27 RX ORDER — MEPERIDINE HYDROCHLORIDE 25 MG/ML
12.5 INJECTION INTRAMUSCULAR; INTRAVENOUS; SUBCUTANEOUS ONCE AS NEEDED
Status: DISCONTINUED | OUTPATIENT
Start: 2025-01-27 | End: 2025-01-27 | Stop reason: HOSPADM

## 2025-01-27 RX ORDER — SUCCINYLCHOLINE CHLORIDE 20 MG/ML
INJECTION INTRAMUSCULAR; INTRAVENOUS
Status: DISCONTINUED | OUTPATIENT
Start: 2025-01-27 | End: 2025-01-27

## 2025-01-27 RX ORDER — OXYCODONE HYDROCHLORIDE 5 MG/1
5 TABLET ORAL EVERY 4 HOURS PRN
Qty: 36 TABLET | Refills: 0 | Status: SHIPPED | OUTPATIENT
Start: 2025-01-27

## 2025-01-27 RX ORDER — LIDOCAINE HYDROCHLORIDE 20 MG/ML
INJECTION, SOLUTION EPIDURAL; INFILTRATION; INTRACAUDAL; PERINEURAL
Status: COMPLETED | OUTPATIENT
Start: 2025-01-27 | End: 2025-01-27

## 2025-01-27 RX ORDER — FENTANYL CITRATE 50 UG/ML
25 INJECTION, SOLUTION INTRAMUSCULAR; INTRAVENOUS EVERY 5 MIN PRN
Status: DISCONTINUED | OUTPATIENT
Start: 2025-01-27 | End: 2025-01-27 | Stop reason: HOSPADM

## 2025-01-27 RX ORDER — EPINEPHRINE 1 MG/ML
INJECTION, SOLUTION, CONCENTRATE INTRAVENOUS
Status: DISCONTINUED | OUTPATIENT
Start: 2025-01-27 | End: 2025-01-27 | Stop reason: HOSPADM

## 2025-01-27 RX ORDER — GLUCAGON 1 MG
1 KIT INJECTION
Status: DISCONTINUED | OUTPATIENT
Start: 2025-01-27 | End: 2025-01-27 | Stop reason: HOSPADM

## 2025-01-27 RX ORDER — ACETAMINOPHEN 500 MG
1000 TABLET ORAL EVERY 8 HOURS PRN
Qty: 60 TABLET | Refills: 0 | Status: SHIPPED | OUTPATIENT
Start: 2025-01-27

## 2025-01-27 RX ORDER — LIDOCAINE HYDROCHLORIDE 20 MG/ML
INJECTION, SOLUTION EPIDURAL; INFILTRATION; INTRACAUDAL; PERINEURAL
Status: DISCONTINUED | OUTPATIENT
Start: 2025-01-27 | End: 2025-01-27

## 2025-01-27 RX ORDER — ONDANSETRON HYDROCHLORIDE 2 MG/ML
INJECTION, SOLUTION INTRAMUSCULAR; INTRAVENOUS
Status: DISCONTINUED | OUTPATIENT
Start: 2025-01-27 | End: 2025-01-27

## 2025-01-27 RX ORDER — OXYCODONE AND ACETAMINOPHEN 5; 325 MG/1; MG/1
1 TABLET ORAL
Status: DISCONTINUED | OUTPATIENT
Start: 2025-01-27 | End: 2025-01-27 | Stop reason: HOSPADM

## 2025-01-27 RX ORDER — DEXAMETHASONE SODIUM PHOSPHATE 4 MG/ML
INJECTION, SOLUTION INTRA-ARTICULAR; INTRALESIONAL; INTRAMUSCULAR; INTRAVENOUS; SOFT TISSUE
Status: DISCONTINUED | OUTPATIENT
Start: 2025-01-27 | End: 2025-01-27

## 2025-01-27 RX ADMIN — DEXAMETHASONE SODIUM PHOSPHATE 8 MG: 4 INJECTION, SOLUTION INTRA-ARTICULAR; INTRALESIONAL; INTRAMUSCULAR; INTRAVENOUS; SOFT TISSUE at 07:01

## 2025-01-27 RX ADMIN — MIDAZOLAM 2 MG: 1 INJECTION INTRAMUSCULAR; INTRAVENOUS at 06:01

## 2025-01-27 RX ADMIN — PROPOFOL 140 MG: 10 INJECTION, EMULSION INTRAVENOUS at 07:01

## 2025-01-27 RX ADMIN — LIDOCAINE HYDROCHLORIDE 3 ML: 20 INJECTION, SOLUTION EPIDURAL; INFILTRATION; INTRACAUDAL; PERINEURAL at 06:01

## 2025-01-27 RX ADMIN — EPHEDRINE SULFATE 20 MG: 50 INJECTION INTRAVENOUS at 07:01

## 2025-01-27 RX ADMIN — EPHEDRINE SULFATE 10 MG: 50 INJECTION INTRAVENOUS at 08:01

## 2025-01-27 RX ADMIN — EPHEDRINE SULFATE 10 MG: 50 INJECTION INTRAVENOUS at 07:01

## 2025-01-27 RX ADMIN — ROCURONIUM BROMIDE 10 MG: 10 SOLUTION INTRAVENOUS at 07:01

## 2025-01-27 RX ADMIN — FENTANYL CITRATE 50 MCG: 0.05 INJECTION, SOLUTION INTRAMUSCULAR; INTRAVENOUS at 07:01

## 2025-01-27 RX ADMIN — SODIUM CHLORIDE, POTASSIUM CHLORIDE, SODIUM LACTATE AND CALCIUM CHLORIDE: 600; 310; 30; 20 INJECTION, SOLUTION INTRAVENOUS at 06:01

## 2025-01-27 RX ADMIN — ROPIVACAINE HYDROCHLORIDE 20 ML: 5 INJECTION, SOLUTION EPIDURAL; INFILTRATION; PERINEURAL at 06:01

## 2025-01-27 RX ADMIN — SUCCINYLCHOLINE CHLORIDE 120 MG: 20 INJECTION, SOLUTION INTRAMUSCULAR; INTRAVENOUS; PARENTERAL at 07:01

## 2025-01-27 RX ADMIN — ONDANSETRON 4 MG: 2 INJECTION INTRAMUSCULAR; INTRAVENOUS at 07:01

## 2025-01-27 RX ADMIN — DEXTROSE 2 G: 50 INJECTION, SOLUTION INTRAVENOUS at 07:01

## 2025-01-27 RX ADMIN — LIDOCAINE HYDROCHLORIDE 40 MG: 20 INJECTION, SOLUTION EPIDURAL; INFILTRATION; INTRACAUDAL; PERINEURAL at 07:01

## 2025-01-27 NOTE — ANESTHESIA PROCEDURE NOTES
Intubation    Date/Time: 1/27/2025 7:07 AM    Performed by: Vanessa Wilson CRNA  Authorized by: Mikael Greenberg MD    Intubation:     Induction:  Intravenous    Intubated:  Postinduction    Mask Ventilation:  Easy mask    Attempts:  1    Attempted By:  CRNA    Method of Intubation:  Video laryngoscopy    Blade:  Raza 3    Laryngeal View Grade: Grade I - full view of cords      Difficult Airway Encountered?: No      Complications:  None    Airway Device:  Oral endotracheal tube    Airway Device Size:  7.5    Style/Cuff Inflation:  Cuffed    Inflation Amount (mL):  5    Tube secured:  22    Secured at:  The lips    Placement Verified By:  Capnometry and Revisualization with laryngoscopy (Bilateral Breath Sounds)    Complicating Factors:  None    Findings Post-Intubation:  BS equal bilateral and atraumatic/condition of teeth unchanged

## 2025-01-27 NOTE — TRANSFER OF CARE
"Anesthesia Transfer of Care Note    Patient: Josette Altamirano    Procedure(s) Performed: Procedure(s) (LRB):  REPAIR, ROTATOR CUFF, ARTHROSCOPIC (Right)  ARTHROSCOPY,SHOULDER,WITH BICEPS TENODESIS (Right)  ARTHROSCOPY, SHOULDER, WITH SUBACROMIAL SPACE DECOMPRESSION (Right)    Patient location: PACU    Anesthesia Type: general    Transport from OR: Transported from OR on room air with adequate spontaneous ventilation    Post pain: adequate analgesia    Post assessment: no apparent anesthetic complications    Post vital signs: stable    Level of consciousness: awake    Nausea/Vomiting: no nausea/vomiting    Complications: none    Transfer of care protocol was followed      Last vitals: Visit Vitals  /68   Pulse 66   Temp 36.7 °C (98 °F) (Temporal)   Resp 16   Ht 5' 4" (1.626 m)   Wt 105 kg (231 lb 7.7 oz)   SpO2 99%   Breastfeeding No   BMI 39.73 kg/m²     "

## 2025-01-27 NOTE — OP NOTE
ORTHOPAEDIC SURGERY OPERATIVE REPORT    DATE OF SERVICE: 1/27/2025    PRIMARY SURGEON: Griffin Shahid MD    DATE OF SURGERY: 1/27/2025    PATIENT'S NAME: Josette Altamirano    MEDICAL RECORD NUMBER: [id number]     PREOPERATIVE DIAGNOSES:   1. Right shoulder rotator cuff tear  2. Right shoulder biceps tendinitis, SLAP tear  3. Right shoulder impingement    POSTOPERATIVE DIAGNOSES:   1. Right shoulder rotator cuff tear, upper border subscapularis and full thickness delaminated supraspinatus  2. Right shoulder biceps tendinitis, SLAP tear  3. Right shoulder impingement    PROCEDURE PERFORMED:   1. Right shoulder arthroscopic rotator cuff repair including subscapularis and supraspinatus  2. Right shoulder arthroscopic biceps tenodesis  3. Right shoulder subacromial decompression    ANESTHESIA: General plus regional.     IMPLANTS USED:   Implant Name Type Inv. Item Serial No.  Lot No. LRB No. Used Action   SYS SWIVELOCK LNT 4.75BC - [id number]  SYS SWIVELOCK LNT 4.75BC  [employer]  Right 1 Implanted   ANCHOR SWIVELCK KL 4.75X24.5MM - [id number]  ANCHOR SWIVELCK KL 4.75X24.5MM  [employer] [id number] Right 2 Implanted   knotless 2.6 fibertak rc blue     [id number] Right 1 Implanted   knotless 2.6 fibertak rc white/black     [id number] Right 1 Implanted         COMPLICATIONS: None.     POSITION: Beachchair.     BRIEF INDICATIONS: This is a 69 y.o. female who presents with a symptomatic RIGHT rotator cuff tear and associated biceps tendonopathy. she has failed conservative treatment measures. We discussed surgical treatment options including risks and benefits. After a detailed explanation of the technical aspects of the procedure, the patient elected to proceed and signed consent.    OPERATIVE FINDINGS:   Biceps/Labrum: Inflamed tendon with intra-articular tearing, Type 2 SLAP tear  Glenohumeral joint: Glenoid and humeral head with grade 1-2 changes, no focal lesions  Rotator Cuff: Full thickness, delaminated tear of the  supraspinatus, upper border tear of subscapularis  Subacromial space: inflamed bursal tissue with anterolateral spur    DESCRIPTION OF PROCEDURE:   The patient was identified in the preoperative holding area. The operative upper extremity was marked.  Consent was verified. The patient was then taken for preoperative block. Following this, the patient was taken to the main operating room where she was laid supine on the operative table. General anesthetic was induced. Preoperative time-out was performed verifying the patient, procedure, and preoperative antibiotics. The patient was then positioned in the beachchair position with all bony prominences well padded. The operative upper extremity was then prepped and draped in the usual sterile fashion.     A posterior portal was established with an #11-blade. The arthroscope was inserted into the glenohumeral joint atraumatically. We then made an anterior portal using an outside-in technique with an #18-gauge spinal needle into the rotator interval. We then used an #11-blade for stab incision and then followed this with a straight hemostat to open our anterior portal. We then inserted our arthroscopic probe to probe the joint. We were able to visualize the biceps tendon which was inflamed and deformed with evidence longitudinal tearing. The labrum was probed and demonstrated frayed edges and a type 2 SLAP tear. The shaver was introduced to debride the frayed edges of labrum.    The subscapularis was viewed and probed and found to have a tear at the superior 1/3 of its insertion on the lesser tuberosity. A shaver and vapr were used to release adhesions around the tendon and the shaver was used to prepare the exposed bone on the lesser tuberosity. A passport cannula was inserted into the anterior portal. A scorpion device was then used to pass a suture tape through the superior aspect of the subscapularis tendon and looped in a luggage tag stitch. This was tensioned and  incorporated into the anchor for the biceps tenodesis.    Through the anterior portal we placed a passport cannula.  We then passed a FiberWire suture in a luggage tag configuration through and around the biceps tendon.  A Passer/grasper was then used to paulson the biceps tendon distal to the luggage tag and grab a tail again to lock it into place.  This was then loaded into a 4.75mm SwiveLock anchor outside the shoulder joint.  The biceps tendon was then transected proximal to the stitch, and the anchor was advanced to complete the tenodesis.    From the glenohumeral space we were also able to visualize a full thickness, delaminated rotator cuff tear.  We then localized a lateral portal under direct visualization with an #18-gauge spinal needle into the cuff tear. We then made a #11-blade stab incision in the lateral shoulder and then through this brought our arthroscopic shaver. We debrided the torn cuff tissue back to a stable rim and also began preparing the tuberosity for anchor placement. We then removed our instruments from the glenohumeral joint and placed the arthroscope from the posterior portal into the subacromial space. We debrided a significant amount of bursal tissue in the subacromial space. We identified the undersurface of the acromion. We used a VAPR to debride the undersurface of the acromion up to the anterior and lateral margins. We identified the CA ligament and we were careful not to remove this. We continued debridement of the bursal tissue, identified the rotator cuff tear, and worked to define the edges more clearly. Next, we did our acromioplasty by burring the anterolateral margin of the acromion then working carefully medially. The portals were switched and the acromioplasty was completed through the posterior portal in the cutting block fashion.The remnants of rotator cuff tissue at the greater tuberosity was debrided and the greater tuberosity preparation was completed by debriding  down to bleeding bone.     Once the rotator cuff was prepared, we then placed our medial row anchors.  We used 2 Arthrex 2.6mm FiberTak anchors preloaded with knotless mechanism and FiberTape for the medial row.  We then passed the sutures using the scorpion device from anterior to posterior.  The knotless mechanism was used in the deep articular layer to reduce the cuff to the articular margin. The FiberTapes were passed through the bursal layer and left long for incorporation into a lateral row. A luggage tag stitch was also placed in the far anterior fibers of the supraspinatus.     Next, we began debridement of the lateral humerus with a VAPR probe. We debrided down to bony base. We then placed our lateral row anchors, which consisted of 2 Arthrex 4.75mm SwiveLock anchors.  We brought sutures from each of the medial row anchors and tensioned them into the anterior lateral row anchor and then advanced the anchor.  This was repeated for the posterior lateral row anchor. Once we completed this, we took the remaining final arthroscopic pictures.     The portal sites were closed with 3-0 nylon sutures.  A light sterile dressing and sling shoulder immobilizer were applied.  The patient was then woken from anesthesia and brought to PACU in stable condition.    Plan:  Rotator Cuff Protocol  NWB RUE in sling  Ok to be out of sling for pendulums, elbow, wrist ROM  ASA 81mg BID x 2wks for DVT ppx  f/u 10-14 days for suture removal      Griffin Shahid MD

## 2025-01-27 NOTE — H&P
H&P Update:      Patient seen and examined at the bedside this morning. There have been no interval changes since last clinic visit.  Patient is here today for Right shoulder rotator cuff repair    Please see last clinic note below from 11/20/24 for more details.    Orthopaedic Follow-Up Visit    Last Appointment:  10/30/2024  Diagnosis: right shoulder full-thickness supraspinatus tear  Prior Procedure:  Right shoulder CSI 08/21/2024, HEP, celebrex    Josette Altamirano is a 69 y.o. female who is here for f/u evaluation of right shoulder pain. The patient was last seen here by me on 10/30/2024 at which point her MRI confirmed right shoulder rotator cuff tear. She was interested in potential operative treatment and was referred to me for surgical consult. The patient returns today to further discuss potential surgery. Of not she is currently scheduled for hysterectomy on 12/4/24.     Josette presents with shoulder pain that has been bothering her for approximately 2.5 to three months. She describes the onset as possibly related to moving activities at her parents' house, including packing, moving, and pulling items from high places. She also mentions a specific incident where she reached for a teapot and caught it before it fell, which she believes may have contributed to the injury. Josette localizes the pain to the side of her shoulder, indicating the area with her hand. She reports difficulty with certain movements, stating that she experiences limitations when reaching or turning, such as when opening blinds, and performs these actions slowly. She also notes inability to reach behind her back and experiences throbbing pain when lying down. Josette describes being very conscious of movements due to the pain and expresses significant discomfort.    An MRI has revealed a rotator cuff tear, with the tendon pulled off by almost three cm. Josette has an upcoming surgery scheduled for December 4th, which is complicating the timing  for addressing her shoulder issue. She denies any specific injuries that started the shoulder pain and denies having diabetes.    Josette mentions being a cancer survivor and having had bilateral mastectomies.    Her treatment has included rest, activity modification, oral anti-inflammatories, over-the-counter Tylenol, physician directed home exercise program, SAS CSI. She returned for follow-up on 10/21/24 and noted continued symptoms so was sent for MRI.     Treatment to date: Rest, activity modification, right shoulder CSI 08/21/2024, HEP, celebrex    Patient's medications, allergies, past medical, surgical, social and family histories were reviewed and updated as appropriate.    Review of Systems   All systems reviewed were negative.  Specifically, the patient denies fever, chills, weight loss, chest pain, shortness of breath, or dyspnea on exertion.      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 She subsequently underwent left mastectomy and sentinel node biopsy followed by axillary dissection March 23, 2007. Histopathologic evaluation demonstrated again multiple foci of intracystic papillary carcinoma, as well as DCIS noncomed    Cardiomyopathy     CHEMO INDUCED- RESOLVED    CHF (congestive heart failure)     systolic    Genetic testing 11/06/2023    [employer] Multi Cancer Panel (84 genes) - NEGATIVE, VUS in EGFR, POLE    Hypertension     Uterine fibroid        Past Surgical History:   Procedure Laterality Date    BREAST BIOPSY      BREAST CYST EXCISION Right     BREAST RECONSTRUCTION Bilateral 4/1/2024    Procedure: RECONSTRUCTION, BREAST;  Surgeon: Rashawn James MD;  Location: [facility];  Service: Plastics;  Laterality: Bilateral;    BREAST REVISION SURGERY Bilateral 4/1/2024    Procedure: BREAST  REVISION SURGERY;  Surgeon: Rashawn James MD;  Location: [facility] OR;  Service: Plastics;  Laterality: Bilateral;    BREAST SURGERY      CHOLECYSTECTOMY      COLONOSCOPY N/A 9/10/2018    Procedure: COLONOSCOPY;  Surgeon: Indra Ramos MD;  Location: [facility] ENDO;  Service: Endoscopy;  Laterality: N/A;    COLONOSCOPY N/A 4/28/2020    Procedure: COLONOSCOPY;  Surgeon: Dulce Maria Siegel MD;  Location: [facility] ENDO;  Service: Endoscopy;  Laterality: N/A;    ESOPHAGOGASTRODUODENOSCOPY N/A 4/28/2020    Procedure: EGD (ESOPHAGOGASTRODUODENOSCOPY);  Surgeon: Dulce Maria Siegel MD;  Location: [facility] ENDO;  Service: Endoscopy;  Laterality: N/A;    HYSTEROSCOPIC POLYPECTOMY OF UTERUS N/A 10/10/2024    Procedure: POLYPECTOMY, UTERUS, HYSTEROSCOPIC;  Surgeon: Anamika Eller MD;  Location: [facility] OR;  Service: OB/GYN;  Laterality: N/A;    HYSTEROSCOPY WITH DILATION AND CURETTAGE OF UTERUS N/A 10/10/2024    Procedure: HYSTEROSCOPY, WITH DILATION AND CURETTAGE OF UTERUS;  Surgeon: Anamika Eller MD;  Location: [facility] OR;  Service: OB/GYN;  Laterality: N/A;    INJECTION FOR SENTINEL NODE IDENTIFICATION Right 12/12/2023    Procedure: INJECTION, FOR SENTINEL NODE IDENTIFICATION;  Surgeon: Shannon Galvan MD;  Location: [facility];  Service: General;  Laterality: Right;    INSERTION OF BREAST IMPLANT Bilateral 4/1/2024    Procedure: INSERTION, BREAST IMPLANT;  Surgeon: Rashawn James MD;  Location: [facility] OR;  Service: Plastics;  Laterality: Bilateral;    INSERTION OF BREAST TISSUE EXPANDER Right 12/12/2023    Procedure: INSERTION, TISSUE EXPANDER, BREAST;  Surgeon: Rashawn James MD;  Location: [facility] OR;  Service: Plastics;  Laterality: Right;    MASTECTOMY      left    MASTECTOMY, SKIN SPARING, SIMPLE Right 12/12/2023    Procedure: MASTECTOMY, SKIN SPARING, SIMPLE;  Surgeon: Shannon Galvan MD;  Location: [facility] OR;  Service: General;  Laterality: Right;    MEDIPORT INSERTION, SINGLE      PLACEMENT OF ACELLULAR HUMAN DERMAL ALLOGRAFT  Right 12/12/2023    Procedure: APPLICATION, ACELLULAR HUMAN DERMAL ALLOGRAFT;  Surgeon: Rashawn James MD;  Location: [facility] OR;  Service: Plastics;  Laterality: Right;    SENTINEL LYMPH NODE BIOPSY Right 12/12/2023    Procedure: BIOPSY, LYMPH NODE, SENTINEL;  Surgeon: Shannon Galvan MD;  Location: [facility] OR;  Service: General;  Laterality: Right;    TISSUE EXPANDER REMOVAL Bilateral 4/1/2024    Procedure: REMOVAL, TISSUE EXPANDER;  Surgeon: Rashawn James MD;  Location: [facility] OR;  Service: Plastics;  Laterality: Bilateral;       Patient's Medications   New Prescriptions    No medications on file   Previous Medications    METHOCARBAMOL (ROBAXIN) 750 MG TAB    Take 1 tablet (750 mg total) by mouth nightly.    METOPROLOL SUCCINATE (TOPROL-XL) 50 MG 24 HR TABLET    Take 1 tablet (50 mg total) by mouth once daily.    SACUBITRIL-VALSARTAN (ENTRESTO)  MG PER TABLET    Take 1 tablet by mouth 2 (two) times daily.   Modified Medications    No medications on file   Discontinued Medications    No medications on file       Family History   Problem Relation Name Age of Onset    Diabetes Mother      Breast cancer Mother      Diabetes Father      Stroke Father      No Known Problems Sister      No Known Problems Brother      Cancer Maternal Aunt  55        breast cancer    Breast cancer Maternal Aunt      Stroke Paternal Aunt      No Known Problems Maternal Grandmother      No Known Problems Maternal Grandfather      No Known Problems Paternal Grandmother      Heart failure Paternal Grandfather      Cancer Maternal Cousin      Melanoma Neg Hx      Psoriasis Neg Hx      Lupus Neg Hx      Eczema Neg Hx         Review of patient's allergies indicates:  No Known Allergies      Objective:      Physical Exam  Patient is alert and oriented, no distress. Skin is intact. Neuro is normal with no focal motor or sensory findings.    Cervical exam is unremarkable. Intact cervical ROM. Negative Spurling's test     Physical Exam:                        RIGHT                                     LEFT     Scap Dyskinesis/Winging       (-)                                             (-)     Tenderness:                                                                              Greater Tuberosity                  +                                              (-)  Bicipital Groove                       (-)                                            (-)  AC joint                                   (-)                                             (-)  Other:      ROM:  Forward Elevation  140                                         160  Abduction                    100                                         120  ER (at side)                 60                                            80  IR                                 T10                                         T8     Strength:   Supraspinatus             4/5                                          5/5  Infraspinatus               3/5                                           5/5  Subscap / IR               5/5                                           5/5      Special Tests:              Neer:                                       (-)                                            (-)              Lazaro:                                 +                                              (-)              SS Stress:                               +                                              (-)              Bear Hug:                                (-)                                            (-)              [place]'s:                                 +                                             (-)              Resisted Thrower's:                +                                              (-)              Cross Arm Abduction:             +                                              (-)       Neurovascular examination  - Motor grossly intact bilaterally to shoulder abduction, elbow flexion and  extension, wrist flexion and extension, and intrinsic hand musculature  - Sensation intact to light touch bilaterally in axillary, median, radial, and ulnar distributions  - Symmetrical radial pulses    Imaging:    XR Results:  Results for orders placed during the hospital encounter of 08/20/24    X-ray Shoulder 2 or More Views Right    Narrative  EXAMINATION:  XR SHOULDER COMPLETE 2 OR MORE VIEWS RIGHT    CLINICAL HISTORY:  Unspecified disorder of synovium and tendon, right shoulder    TECHNIQUE:  Two or three views of the right shoulder were preformed.    COMPARISON:  08/02/2017    FINDINGS:  No acute fracture or dislocation.  No significant AC joint arthropathy noted.  There is some minimal spurring associated with the acromion.  No significant degenerative change at the glenohumeral joint.  Surgical clips seen projecting over the right axillary region.    Impression  1.  As above      Electronically signed by: Ernesto Butts DO  Date:    08/20/2024  Time:    11:15      MRI Results:  Results for orders placed during the hospital encounter of 10/24/24    MRI Shoulder Without Contrast Right    Narrative  EXAM:  MRI SHOULDER WITHOUT CONTRAST RIGHT    CLINICAL INDICATION: Strain of muscle and tendon of the rotator cuff of right shoulder; initial encounter.    TECHNIQUE: MR right shoulder performed with multiplanar multisequence imaging.    COMPARISON STUDY:  None.    FINDINGS: There is a complete full-thickness tear supraspinatus tendon, 3 cm retraction, without muscle belly atrophy.    There is advanced infraspinatus tendinosis with superimposed generalized moderate grade partial-thickness tearing in addition to a full-thickness anterior leading edge tear.  Moderate infraspinatus muscle belly atrophy.    There is also advanced subscapularis tendinosis without tear.    Long head biceps tendon is intact.  Negative for labral tear.  Glenohumeral chondral surfaces well preserved.  There is a small glenohumeral joint  effusion.  Normal IGHL.    There is a type II acromion with os acromiale with moderate spurring at the synchondrosis.  Minimal AC joint spurring.  There is a large subacromial subdeltoid bursal fluid collection conjunction rotator cuff tear.    The bone marrow signal intensity is normal.    Impression  1.  Complete full-thickness tear supraspinatus tendon, 3 cm retraction, without atrophy.  2.  Advanced infraspinatus tendinosis with generalized moderate grade partial-thickness tearing/fraying, anterior full-thickness tear.  Moderate infraspinatus muscle belly atrophy  3.  Advanced subscapularis tendinosis without tear.  4.  Type II acromion, os acromiale with moderate spurring.  5.  Small glenoid humeral joint effusion.    Finalized on: 10/25/2024 9:22 AM By:  River Naidu MD  [facility]# [id number]      2024-10-25 09:[id number]    [facility]        Physician read: I agree with the above impression.    Assessment/Plan:   Josette Altamirano is a 69 y.o. female with right shoulder full-thickness supraspinatus tear, subacromial impingement, biceps tendinitis    Plan:    Reviewed imaging and discussed diagnosis and treatment options with the patient today. Her MRI does show rotator cuff tear of the right shoulder and evidence contributing to subacromial impingement.   Discussed non-operative treatment options in the form of rest, activity modifications, oral anti-inflammatories, corticosteroid injections, and physical therapy/physician directed home exercise program versus potential operative treatment in the form of rotator cuff repair.   The patient has tried considerable conservative treatment in the form of rest, activity modifications, oral anti-inflammatories, home exercise program, and corticosteroid injection. Despite these interventions, she continues to experience symptoms on a daily basis that limit her activities of daily living and diminish her quality of life.    recommend proceeding with right shoulder arthroscopy with rotator  cuff repair, subacromial decompression, and possible biceps tenodesis.    We reviewed the proposed procedure in detail, which included discussion of risks and benefits, techniques, and possible complications of the procedure. Risks include infection, bleeding, damage to artery and nerves, continual pain and possible stiffness, and blood clots. We reviewed the post-operative restrictions, recovery period, and rehabilitation.  All patient questions were answered. Despite the risks, she elected to proceed with surgery and the consent was freely signed.  At least 10 minutes were spent instructing the patient in home care following surgery including, but not limited to: sling use, sleeping, hygiene, post-operative exercises, preventing post-operative complications, etc.  All questions were answered.  This service was performed under the direction of Griffin Shahid MD.  CPT 66741-UO.  Follow up with me 10-14 days after surgery        Griffin Shahid MD

## 2025-01-27 NOTE — ANESTHESIA PROCEDURE NOTES
Peripheral Block    Patient location during procedure: pre-op   Block not for primary anesthetic.  Reason for block: at surgeon's request and post-op pain management   Post-op Pain Location: right shoulder   Start time: 1/27/2025 6:52 AM  Timeout: 1/27/2025 6:52 AM   End time: 1/27/2025 6:54 AM    Staffing  Authorizing Provider: Mikael Greenberg MD  Performing Provider: Mikael Greenberg MD    Staffing  Performed by: Mikael Greenberg MD  Authorized by: Mikael Greenberg MD    Preanesthetic Checklist  Completed: patient identified, IV checked, site marked, risks and benefits discussed, surgical consent, monitors and equipment checked, pre-op evaluation and timeout performed  Peripheral Block  Patient position: sitting  Prep: ChloraPrep  Patient monitoring: heart rate, cardiac monitor, continuous pulse ox, continuous capnometry and frequent blood pressure checks  Block type: interscalene  Laterality: right  Injection technique: single shot  Needle  Needle type: Stimuplex   Needle gauge: 22 G  Needle length: 2 in  Needle localization: anatomical landmarks and ultrasound guidance   -ultrasound image captured on disc.  Assessment  Injection assessment: negative aspiration, negative parasthesia and local visualized surrounding nerve  Paresthesia pain: none  Heart rate change: no  Slow fractionated injection: yes  Pain Tolerance: comfortable throughout block  Medications:    Medications: ropivacaine (NAROPIN) injection 0.5% - Perineural   20 mL - 1/27/2025 6:52:00 AM  lidocaine (PF) 20 mg/mL (2%) injection - Other   3 mL - 1/27/2025 6:52:00 AM    Additional Notes  VSS.  [facility] RN monitoring vitals throughout procedure.  Patient tolerated procedure well.

## 2025-01-27 NOTE — DISCHARGE INSTRUCTIONS
DISCHARGE INSTRUCTIONS FOR ROTATOR CUFF REPAIR     Contact the [facility] Medicine [facility] at [phone] if you have questions about your instructions or follow-up appointment.     DIET:   Start with clear liquids and light foods to minimize nausea. Once these are tolerated, advance to a regular diet.     DRESSING AND WOUND CARE:   Keep the dressing clean and dry. It is normal for there to be some drainage after surgery since the shoulder was irrigated with large amounts of fluid. Reinforce with additional gauze as necessary.   Remove the dressing the 2nd day after surgery and begin changing daily with clean gauze or Band-Aids®. Keep your incisions covered until you follow up in clinic.   If you have Steri-Strips in place of stitches, allow them to stay in place as long as possible. Steri-Strips are made of a fabric material that can get wet in the shower and pat dry with a towel. They usually fall off on their own within 7 to 10 days. You may trim the edges as they begin to curl.     BATHING:   You may bathe or shower on the 2nd day after surgery, but do not scrub or soak the incisions. Dry the area by gently blotting it with a gauze or towel. After it is completely dry, cover the wound with clean gauze or Band-Aids®. Do NOT submerge the incisions (bath/swim) until after the sutures are removed and the wound has completely healed.     ACTIVITY:    Ice should be applied to the shoulder for 20-30 minutes, 5-6 times a day, to help control pain and swelling. Apply additional times as needed, especially after exercise, for the first 3-4 weeks. Do not apply ice directly to the skin; use a thin barrier in between. Also, do not use heat.    Elevate the shoulder by sleeping as upright as possible using extra pillows or a recliner. Do this for the first few days to help decrease pain and swelling.    Wear the sling at all times for 6 weeks including while sleeping. The only time you may remove the sling is for bathing and  exercises. Do not lean or put your body weight on your arm.    When your block wears off, start the following exercises:  Remove the sling for 5-10 minutes, 3 times a day, to do the following exercises:   Fully bend & straighten your fingers, your wrist, and your elbow several times.   Lean forward, bracing yourself on a table/counter with your normal arm. Let your surgical arm relax and hang straight down. Shift your weight so that your arm moves side to side, front to back, and in gentle circles like a pendulum or elephant's trunk. Use your body to generate the movement for this, NOT your surgical shoulder's muscles. (see drawing below)      Physical therapy will be started after your 1st follow-up visit. At that time, you will be given a prescription & rehabilitation protocol to take to the PT clinic of your choice. Plan to visit with a therapist within 3 days after your follow-up visit.     PAIN CONTROL:   It is important to stay ahead of pain as it becomes challenging to get under control if you fall behind. Ice and elevation can help and should be used as much as possible in the first few days.    Narcotic pain medications, such as tramadol and oxycodone, should be taken as prescribed. The Tramadol is intended to be taken first as the primary medicine and then oxycodone taken for breakthrough pain. Wean off as soon as possible. Take these with food to decrease the chances of nausea and vomiting. Do not drink alcohol, drive a vehicle, or use heavy machinery while taking narcotic pain medications.    NSAID medications are used for pain control and to decrease inflammation. You may be prescribed an NSAID such as celebrex. Take as instructed. Other NSAID medications such as ibuprofen, Motrin, Advil, naproxen, or Aleve can be used once you have finished the celebrex, or if a prescription for celebrex was not provided.    Acetaminophen (Tylenol) is an effective over-the-counter pain medication that can be used with  NSAID medications and non-acetaminophen containing narcotics such as plain oxycodone.     ASPIRIN FOR PREVENTION OF BLOOD CLOTS:   You should take one 81 mg baby aspirin twice daily for two weeks starting the evening of the day you have surgery unless instructed otherwise or taking a different blood thinner such as enoxaparin or warfarin. If you are aware that you are at high risk for a blood clot, notify your physician as soon as possible.   Take aspirin at least 30 minutes before taking ibuprofen or Toradol.    CONSTIPATION PREVENTION:   Anesthesia and pain medications, changes in eating and drinking, and less activity can all lead to constipation after surgery. To prevent or reduce constipation, take an over-the-counter stool softener (brands include Colace and Miralax). Follow the directions on the bottle. Drink plenty of water and eat high fiber foods including whole grains, fresh fruits, vegetables, beans, prunes or prune juice.     PROBLEMS TO REPORT:   Persistent bloody drainage that soaks through reinforced dressings.   Fever greater than 101F or 38C.   Incision that is very red, swollen, draining pus, shows red streaks, or feels hot.   Inability to urinate within 8 hours of surgery (a rare effect of the anesthesia).   If you develop a rash, generalized itching or swelling from the medications, STOP the medication and call the clinic or the orthopedic surgery resident on call.   Daytime calls should be directed to the Sports Medicine Clinic at [phone].   Night-time and weekend calls should be directed to the after hours nurse line at [phone]    FREQUENTLY ASKED QUESTIONS     WHAT DAILY ACTIVITIES CAN I DO?   After shoulder surgery, you may do what you feel comfortable doing in the sling. Do not lift anything with your operative arm or put yourself at risk of falling.     CAN I DRIVE OR RIDE BY CAR/ TRAIN/ PLANE?   You should not drive while using a sling. There are no forced restrictions  regarding operating a motor vehicle, however you must always be the  of whether you are able to operate it safely. You should not drive while taking narcotic pain medications. You may ride in a car after surgery as needed. You may take a train or even fly the day after your surgery as long as you feel secure and comfortable.     WHAT ABOUT WORK?   You may return to an office-type job or to school whenever comfortable. For most patients this occurs 1-2 weeks after surgery. For more active jobs that require some lifting, you can wait until after your follow-up appointment. Any other unusual types of jobs should be discussed to determine a date for return to work.     WHAT ABOUT SWELLING?   Expect swelling as a normal process after surgery. Ice, elevation, and other treatments provided at physical therapy will allow this to improve in time. Some swelling may remain for up to 8 weeks, and this is normal.     WHAT IF IT REALLY HURTS TOO MUCH?   Surgery hurts and you cannot expect to be pain free, but our goal is for it to be tolerable. Try to use all available pain therapies such as narcotics, NSAIDS, and acetaminophen. Always try more ice and elevation. If the pain is not tolerable, call the clinic or the after hours nurse line.

## 2025-01-27 NOTE — PLAN OF CARE
Right interscalene nerve block performed by Dr. Greenberg  with Jessica LOZOYA and Rosa URBAN at bedside assisting, pt on continuous cardiac monitor, pt tolerated well.

## 2025-01-27 NOTE — DISCHARGE SUMMARY
[facility] Services  Discharge Note  Short Stay    Procedure(s) (LRB):  REPAIR, ROTATOR CUFF, ARTHROSCOPIC (Right)  ARTHROSCOPY,SHOULDER,WITH BICEPS TENODESIS (Right)  ARTHROSCOPY, SHOULDER, WITH SUBACROMIAL SPACE DECOMPRESSION (Right)      OUTCOME: Patient tolerated treatment/procedure well without complication and is now ready for discharge.    DISPOSITION: Home or Self Care    FINAL DIAGNOSIS:  right shoulder rotator cuff tear    FOLLOWUP: In clinic    DISCHARGE INSTRUCTIONS:  No discharge procedures on file.     TIME SPENT ON DISCHARGE: 10 minutes

## 2025-01-27 NOTE — ANESTHESIA POSTPROCEDURE EVALUATION
Anesthesia Post Evaluation    Patient: Josette Altamirano    Procedure(s) Performed: Procedure(s) (LRB):  REPAIR, ROTATOR CUFF, ARTHROSCOPIC (Right)  ARTHROSCOPY,SHOULDER,WITH BICEPS TENODESIS (Right)  ARTHROSCOPY, SHOULDER, WITH SUBACROMIAL SPACE DECOMPRESSION (Right)    Final Anesthesia Type: general      Patient location during evaluation: PACU  Patient participation: Yes- Able to Participate  Level of consciousness: awake and alert and oriented  Post-procedure vital signs: reviewed and stable  Pain management: adequate  Airway patency: patent    PONV status at discharge: No PONV  Anesthetic complications: no      Cardiovascular status: blood pressure returned to baseline, stable and hemodynamically stable  Respiratory status: unassisted  Hydration status: euvolemic  Follow-up not needed.              Vitals Value Taken Time   /71 01/27/25 0920   Temp 36.4 °C (97.6 °F) 01/27/25 0845   Pulse 79 01/27/25 0920   Resp 20 01/27/25 0920   SpO2 98 % 01/27/25 0920         Event Time   Out of Recovery 09:15:00         Pain/Edy Score: Edy Score: 10 (1/27/2025  9:30 AM)

## 2025-01-31 NOTE — PROGRESS NOTES
Orthopaedics  Post-operative follow-up    Procedure Performed:   1. Right shoulder arthroscopic rotator cuff repair including subscapularis and supraspinatus  2. Right shoulder arthroscopic biceps tenodesis  3. Right shoulder subacromial decompression    Date of Surgery: 1/27/25    Subjective: Josette Altamirano is now almost 2 weeks out from her shoulder surgery.  She is doing well with no specific complaints other than the expected post-operative pain and stiffness.  She has been compliant with post-operative restrictions.  She is scheduled to begin PT at [facility] later this week.      Exam:  Sutures removed, C/D/I, incision sites benign with no drainage or redness  Mild bruising as anticipated  ROM fluid, will be formally assessed at next visit  Axillary nerve sensation and motor intact  Motor and sensory intact distally  Strong radial pulse, fingers warm and well perfused    Imaging:  No new imaging.     Impression:  S/p right shoulder arthroscopic rotator cuff repair including subscapularis and supraspinatus, biceps tenodesis, and subacromial decompression, initial post-operative visit - doing well    Plan:  Discussed surgical findings, operative procedure, reviewed intra-operative imaging  Reviewed post-operative instructions, restrictions, and rehabilitation  Provided PT script and protocol  Symptomatic treatment for pain / swelling  Instructed patient to call clinic if questions or concerns      Follow-up in 1 month with Dr. Shahid at 6 weeks post-op    Work status:  For weeks 0-4 from now:  1) Sling immobilization at all times, one armed work (other than typing)  2) Allow time for physical therapy    For weeks 4-8 from now:  1) Discontinue sling  2) Continue physical therapy  3) No lifting/pushing/pulling greater than 2 pounds  4) No overhead work  5) No repetitive motions        Marti Zaldivar PA-C  Sports Medicine Physician Assistant       Disclaimer: This note was prepared using a voice recognition system  and is likely to have sound alike errors within the text.

## 2025-02-11 ENCOUNTER — OFFICE VISIT (OUTPATIENT)
Dept: SPORTS MEDICINE | Facility: CLINIC | Age: 70
End: 2025-02-11
Payer: MEDICARE

## 2025-02-11 DIAGNOSIS — Z98.890 STATUS POST RIGHT ROTATOR CUFF REPAIR: Primary | ICD-10-CM

## 2025-02-11 PROCEDURE — 99024 POSTOP FOLLOW-UP VISIT: CPT | Mod: POP,,, | Performed by: PHYSICIAN ASSISTANT

## 2025-02-11 PROCEDURE — 99212 OFFICE O/P EST SF 10 MIN: CPT | Mod: PBBFAC | Performed by: PHYSICIAN ASSISTANT

## 2025-02-11 PROCEDURE — 99999 PR PBB SHADOW E&M-EST. PATIENT-LVL II: CPT | Mod: PBBFAC,,, | Performed by: PHYSICIAN ASSISTANT

## 2025-02-25 ENCOUNTER — TELEPHONE (OUTPATIENT)
Dept: SPORTS MEDICINE | Facility: CLINIC | Age: 70
End: 2025-02-25
Payer: MEDICARE

## 2025-02-25 NOTE — TELEPHONE ENCOUNTER
[facility] PT Progress Note signed and successfully faxed to [phone] on 2/25/25. LEONOR

## 2025-03-06 ENCOUNTER — OFFICE VISIT (OUTPATIENT)
Dept: CARDIOLOGY | Facility: CLINIC | Age: 70
End: 2025-03-06
Payer: MEDICARE

## 2025-03-06 VITALS
HEART RATE: 75 BPM | DIASTOLIC BLOOD PRESSURE: 82 MMHG | OXYGEN SATURATION: 96 % | WEIGHT: 234.69 LBS | SYSTOLIC BLOOD PRESSURE: 118 MMHG | BODY MASS INDEX: 40.28 KG/M2

## 2025-03-06 DIAGNOSIS — E66.01 MORBID (SEVERE) OBESITY DUE TO EXCESS CALORIES: ICD-10-CM

## 2025-03-06 DIAGNOSIS — I50.32 CHRONIC HEART FAILURE WITH PRESERVED EJECTION FRACTION: Primary | ICD-10-CM

## 2025-03-06 DIAGNOSIS — T45.1X5A CARDIOMYOPATHY DUE TO CHEMOTHERAPY: ICD-10-CM

## 2025-03-06 DIAGNOSIS — E66.01 CLASS 3 SEVERE OBESITY WITH SERIOUS COMORBIDITY AND BODY MASS INDEX (BMI) OF 40.0 TO 44.9 IN ADULT, UNSPECIFIED OBESITY TYPE: ICD-10-CM

## 2025-03-06 DIAGNOSIS — I50.9 HEART FAILURE, UNSPECIFIED HF CHRONICITY, UNSPECIFIED HEART FAILURE TYPE: ICD-10-CM

## 2025-03-06 DIAGNOSIS — I50.22 CHRONIC SYSTOLIC HEART FAILURE: ICD-10-CM

## 2025-03-06 DIAGNOSIS — I10 ESSENTIAL HYPERTENSION: ICD-10-CM

## 2025-03-06 DIAGNOSIS — E66.813 CLASS 3 SEVERE OBESITY WITH SERIOUS COMORBIDITY AND BODY MASS INDEX (BMI) OF 40.0 TO 44.9 IN ADULT, UNSPECIFIED OBESITY TYPE: ICD-10-CM

## 2025-03-06 DIAGNOSIS — I42.7 CARDIOMYOPATHY DUE TO CHEMOTHERAPY: ICD-10-CM

## 2025-03-06 PROCEDURE — 99999 PR PBB SHADOW E&M-EST. PATIENT-LVL III: CPT | Mod: PBBFAC,,, | Performed by: INTERNAL MEDICINE

## 2025-03-06 PROCEDURE — 99214 OFFICE O/P EST MOD 30 MIN: CPT | Mod: S$PBB,,, | Performed by: INTERNAL MEDICINE

## 2025-03-06 PROCEDURE — G2211 COMPLEX E/M VISIT ADD ON: HCPCS | Mod: S$PBB,,, | Performed by: INTERNAL MEDICINE

## 2025-03-06 PROCEDURE — 99213 OFFICE O/P EST LOW 20 MIN: CPT | Mod: PBBFAC | Performed by: INTERNAL MEDICINE

## 2025-03-06 RX ORDER — SACUBITRIL AND VALSARTAN 97; 103 MG/1; MG/1
1 TABLET, FILM COATED ORAL 2 TIMES DAILY
Qty: 180 TABLET | Refills: 1 | Status: SHIPPED | OUTPATIENT
Start: 2025-03-06

## 2025-03-06 RX ORDER — METOPROLOL SUCCINATE 50 MG/1
50 TABLET, EXTENDED RELEASE ORAL DAILY
Qty: 30 TABLET | Refills: 6 | Status: SHIPPED | OUTPATIENT
Start: 2025-03-06 | End: 2026-03-06

## 2025-03-06 NOTE — PROGRESS NOTES
Subjective:   Patient ID:  Josette Altamirano is a 69 y.o. female who presents for cardiac consult of Follow-up      Referral by: Self, Aaareferral  No address on file     Reason for consult:       HPI  The patient came in today for cardiac consult of Follow-up      Josette Altamirano is a 69 y.o. female pt with h/o breast cancer s/p left mastectomy/chemo, CHF, obesity, HTN here for CV follow up visit.              10/31/24  Having right shoulder pain, torn rotator cuff  Usually exercises on treadmill but held off recently due to vaginal bleeding issues, which has resolved   Will need surgery  BP stable  Weight up 3 lbs since last visit- weight stable  No falls     3/6/25  PT of Dr. Arguelles here for follow up visit.   She had Right shoulder surgery in Jan.   ECHO and ECG stress test neg 5/2023.   She is still wearing a sling, less pain now  Will likely get off sling soon.   BP and HR stable. BMI 40 - 234 lbs     FH - father and grandfather - CHF       Results for orders placed during the hospital encounter of 05/16/23    Echo    Interpretation Summary  · The left ventricle is normal in size with concentric remodeling and normal systolic function.  · The estimated ejection fraction is 55%.  · Normal left ventricular diastolic function.  · Normal right ventricular size with normal right ventricular systolic function.  · Mild tricuspid regurgitation.  · Intermediate central venous pressure (8 mmHg).  · The estimated PA systolic pressure is 28 mmHg.      Results for orders placed during the hospital encounter of 05/16/23    Exercise Stress - EKG    Interpretation Summary    The ECG portion of the study is negative for ischemia.    The patient reported no chest pain during the stress test.    The blood pressure response to stress was normal.    There were no arrhythmias during stress.    The exercise capacity was normal.    The patient exercised for 6 minutes  seconds on a Elmer protocol, achieving a peak heart rate of 144 bpm, which  is 97 % of the age predicted maximum heart rate. The patient experienced no angina during the test. Their exercise capacity was normal.    Asymptomatic pre, during, and post stress. Returned to baseline during recovery.      No results found for this or any previous visit.      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 She subsequently underwent left mastectomy and sentinel node biopsy followed by axillary dissection March 23, 2007. Histopathologic evaluation demonstrated again multiple foci of intracystic papillary carcinoma, as well as DCIS noncomed    Cardiomyopathy     CHEMO INDUCED- RESOLVED    CHF (congestive heart failure)     systolic    Genetic testing 11/06/2023    [employer] Multi Cancer Panel (84 genes) - NEGATIVE, VUS in EGFR, POLE    Hypertension     Uterine fibroid        Past Surgical History:   Procedure Laterality Date    ARTHROSCOPIC REPAIR OF ROTATOR CUFF OF SHOULDER Right 1/27/2025    Procedure: REPAIR, ROTATOR CUFF, ARTHROSCOPIC;  Surgeon: Griffin Shahid MD;  Location: [facility] OR;  Service: Orthopedics;  Laterality: Right;    ARTHROSCOPY OF SHOULDER WITH DECOMPRESSION OF SUBACROMIAL SPACE Right 1/27/2025    Procedure: ARTHROSCOPY, SHOULDER, WITH SUBACROMIAL SPACE DECOMPRESSION;  Surgeon: Griffin Shahid MD;  Location: [facility] OR;  Service: Orthopedics;  Laterality: Right;    ARTHROSCOPY,SHOULDER,WITH BICEPS TENODESIS Right 1/27/2025    Procedure: ARTHROSCOPY,SHOULDER,WITH BICEPS TENODESIS;  Surgeon: Griffin Shahid MD;  Location: [facility] OR;  Service: Orthopedics;  Laterality: Right;    BREAST BIOPSY      BREAST CYST EXCISION Right     BREAST RECONSTRUCTION Bilateral 4/1/2024    Procedure: RECONSTRUCTION,  BREAST;  Surgeon: Rashawn James MD;  Location: [facility] OR;  Service: Plastics;  Laterality: Bilateral;    BREAST REVISION SURGERY Bilateral 4/1/2024    Procedure: BREAST REVISION SURGERY;  Surgeon: Rashawn James MD;  Location: [facility] OR;  Service: Plastics;  Laterality: Bilateral;    BREAST SURGERY      CHOLECYSTECTOMY      COLONOSCOPY N/A 9/10/2018    Procedure: COLONOSCOPY;  Surgeon: Indra Ramos MD;  Location: [facility];  Service: Endoscopy;  Laterality: N/A;    COLONOSCOPY N/A 4/28/2020    Procedure: COLONOSCOPY;  Surgeon: Dulce Maria Siegel MD;  Location: [facility];  Service: Endoscopy;  Laterality: N/A;    CYSTOSCOPY N/A 12/4/2024    Procedure: CYSTOSCOPY;  Surgeon: Sofiya Nguyen MD;  Location: [facility];  Service: Gynecology Oncology;  Laterality: N/A;    ESOPHAGOGASTRODUODENOSCOPY N/A 4/28/2020    Procedure: EGD (ESOPHAGOGASTRODUODENOSCOPY);  Surgeon: Dulce Maria Siegel MD;  Location: [facility];  Service: Endoscopy;  Laterality: N/A;    HYSTEROSCOPIC POLYPECTOMY OF UTERUS N/A 10/10/2024    Procedure: POLYPECTOMY, UTERUS, HYSTEROSCOPIC;  Surgeon: Anamika Eller MD;  Location: [facility];  Service: OB/GYN;  Laterality: N/A;    HYSTEROSCOPY WITH DILATION AND CURETTAGE OF UTERUS N/A 10/10/2024    Procedure: HYSTEROSCOPY, WITH DILATION AND CURETTAGE OF UTERUS;  Surgeon: Anamika Eller MD;  Location: [facility];  Service: OB/GYN;  Laterality: N/A;    INJECTION FOR SENTINEL NODE IDENTIFICATION Right 12/12/2023    Procedure: INJECTION, FOR SENTINEL NODE IDENTIFICATION;  Surgeon: Shannon Galvan MD;  Location: [facility];  Service: General;  Laterality: Right;    INSERTION OF BREAST IMPLANT Bilateral 4/1/2024    Procedure: INSERTION, BREAST IMPLANT;  Surgeon: Rashawn James MD;  Location: [facility];  Service: Plastics;  Laterality: Bilateral;    INSERTION OF BREAST TISSUE EXPANDER Right 12/12/2023    Procedure: INSERTION, TISSUE EXPANDER, BREAST;  Surgeon: Rashawn James MD;  Location: [facility];  Service:  Plastics;  Laterality: Right;    MASTECTOMY      left    MASTECTOMY, SKIN SPARING, SIMPLE Right 12/12/2023    Procedure: MASTECTOMY, SKIN SPARING, SIMPLE;  Surgeon: Shannon Galvan MD;  Location: [facility];  Service: General;  Laterality: Right;    MEDIPORT INSERTION, SINGLE      PLACEMENT OF ACELLULAR HUMAN DERMAL ALLOGRAFT Right 12/12/2023    Procedure: APPLICATION, ACELLULAR HUMAN DERMAL ALLOGRAFT;  Surgeon: Rashawn James MD;  Location: [facility] OR;  Service: Plastics;  Laterality: Right;    ROBOT-ASSISTED LAPAROSCOPIC ABDOMINAL HYSTERECTOMY USING DA SILVINO XI N/A 12/4/2024    Procedure: XI ROBOTIC HYSTERECTOMY;  Surgeon: Sofiya Nguyen MD;  Location: [facility];  Service: Gynecology Oncology;  Laterality: N/A;  2.5 hr case / surgery admit due to insurance    ROBOT-ASSISTED LAPAROSCOPIC SALPINGO-OOPHORECTOMY USING DA SILVINO XI Bilateral 12/4/2024    Procedure: XI ROBOTIC SALPINGO-OOPHORECTOMY;  Surgeon: Sofiya Nguyen MD;  Location: [facility];  Service: Gynecology Oncology;  Laterality: Bilateral;    SENTINEL LYMPH NODE BIOPSY Right 12/12/2023    Procedure: BIOPSY, LYMPH NODE, SENTINEL;  Surgeon: Shannon Galvan MD;  Location: [facility];  Service: General;  Laterality: Right;    TISSUE EXPANDER REMOVAL Bilateral 4/1/2024    Procedure: REMOVAL, TISSUE EXPANDER;  Surgeon: Rashawn James MD;  Location: [facility];  Service: Plastics;  Laterality: Bilateral;       Social History[1]    Family History   Problem Relation Name Age of Onset    Diabetes Mother      Breast cancer Mother      Diabetes Father      Stroke Father      No Known Problems Sister      No Known Problems Brother      Cancer Maternal Aunt  55        breast cancer    Breast cancer Maternal Aunt      Stroke Paternal Aunt      No Known Problems Maternal Grandmother      No Known Problems Maternal Grandfather      No Known Problems Paternal Grandmother      Heart failure Paternal Grandfather      Cancer Maternal Cousin      Melanoma Neg Hx       Psoriasis Neg Hx      Lupus Neg Hx      Eczema Neg Hx         Patient's Medications   New Prescriptions    No medications on file   Previous Medications    ACETAMINOPHEN (TYLENOL) 500 MG TABLET    Take 2 tablets (1,000 mg total) by mouth every 8 (eight) hours as needed for Pain.    ASPIRIN (ECOTRIN) 81 MG EC TABLET    Take 1 tablet (81 mg total) by mouth 2 (two) times a day. for 14 days    CELECOXIB (CELEBREX) 200 MG CAPSULE    Take 1 capsule (200 mg total) by mouth 2 (two) times daily    DOCUSATE SODIUM (COLACE) 100 MG CAPSULE    Take 1 capsule (100 mg total) by mouth 2 (two) times daily.    METOPROLOL SUCCINATE (TOPROL-XL) 50 MG 24 HR TABLET    Take 1 tablet (50 mg total) by mouth once daily.    OXYCODONE (ROXICODONE) 5 MG IMMEDIATE RELEASE TABLET    Take 1 tablet (5 mg total) by mouth every 4 (four) hours as needed for Pain.    OXYCODONE (ROXICODONE) 5 MG IMMEDIATE RELEASE TABLET    Take 1 tablet (5 mg total) by mouth every 4 (four) hours as needed for pain (post-op pain)    SACUBITRIL-VALSARTAN (ENTRESTO)  MG PER TABLET    Take 1 tablet by mouth 2 (two) times daily.    TRAMADOL (ULTRAM) 50 MG TABLET    Take 1 tablet (50 mg total) by mouth every 4 to 6 hours as needed for pain   Modified Medications    No medications on file   Discontinued Medications    No medications on file       Review of Systems   Constitutional: Negative.    HENT: Negative.     Eyes: Negative.    Respiratory: Negative.     Cardiovascular: Negative.    Gastrointestinal: Negative.    Genitourinary: Negative.    Musculoskeletal:  Positive for joint pain.   Skin: Negative.    Neurological: Negative.    Endo/Heme/Allergies: Negative.    Psychiatric/Behavioral: Negative.     All 12 systems otherwise negative.      Wt Readings from Last 3 Encounters:   03/06/25 106.4 kg (234 lb 10.9 oz)   01/27/25 105 kg (231 lb 7.7 oz)   01/06/25 106.7 kg (235 lb 3.7 oz)     Temp Readings from Last 3 Encounters:   01/27/25 97.6 °F (36.4 °C) (Temporal)    01/06/25 98.7 °F (37.1 °C)   12/04/24 98 °F (36.7 °C) (Oral)     BP Readings from Last 3 Encounters:   03/06/25 118/82   01/27/25 (!) 141/71   01/06/25 130/80     Pulse Readings from Last 3 Encounters:   03/06/25 75   01/27/25 79   01/06/25 85       /82 (BP Location: Left arm, Patient Position: Sitting)   Pulse 75   Wt 106.4 kg (234 lb 10.9 oz)   SpO2 96%   BMI 40.28 kg/m²     Objective:   Physical Exam  Vitals and nursing note reviewed.   Constitutional:       General: She is not in acute distress.     Appearance: She is well-developed. She is obese. She is not diaphoretic.   HENT:      Head: Normocephalic and atraumatic.      Nose: Nose normal.   Eyes:      General: No scleral icterus.     Conjunctiva/sclera: Conjunctivae normal.   Neck:      Thyroid: No thyromegaly.      Vascular: No JVD.   Cardiovascular:      Rate and Rhythm: Normal rate and regular rhythm.      Heart sounds: S1 normal and S2 normal. Murmur heard.      No friction rub. No gallop. No S3 or S4 sounds.   Pulmonary:      Effort: Pulmonary effort is normal. No respiratory distress.      Breath sounds: Normal breath sounds. No stridor. No wheezing or rales.   Chest:      Chest wall: No tenderness.   Abdominal:      General: Bowel sounds are normal. There is no distension.      Palpations: Abdomen is soft. There is no mass.      Tenderness: There is no abdominal tenderness. There is no rebound.   Genitourinary:     Comments: Deferred  Musculoskeletal:         General: No tenderness or deformity. Normal range of motion.      Cervical back: Normal range of motion and neck supple.   Lymphadenopathy:      Cervical: No cervical adenopathy.   Skin:     General: Skin is warm and dry.      Coloration: Skin is not pale.      Findings: No erythema or rash.   Neurological:      Mental Status: She is alert and oriented to person, place, and time.      Motor: No abnormal muscle tone.      Coordination: Coordination normal.   Psychiatric:          Behavior: Behavior normal.         Thought Content: Thought content normal.         Judgment: Judgment normal.         Lab Results   Component Value Date     01/24/2025    K 4.3 01/24/2025     01/24/2025    CO2 25 01/24/2025    BUN 12 01/24/2025    CREATININE 0.8 01/24/2025     01/24/2025    HGBA1C 5.4 10/26/2006    AST 18 01/24/2025    ALT 18 01/24/2025    ALBUMIN 3.9 01/24/2025    PROT 7.7 01/24/2025    BILITOT 0.8 01/24/2025    WBC 4.40 01/24/2025    HGB 11.8 (L) 01/24/2025    HCT 39.9 01/24/2025    MCV 80 (L) 01/24/2025     01/24/2025    TSH 0.741 05/17/2022    CHOL 233 (H) 08/08/2024    HDL 84 (H) 08/08/2024    LDLCALC 138.0 08/08/2024    TRIG 55 08/08/2024    BNP 50 02/07/2017         BNP (pg/mL)   Date Value   02/07/2017 50          Assessment:      1. Chronic heart failure with preserved ejection fraction    2. Heart failure, unspecified HF chronicity, unspecified heart failure type    3. Morbid (severe) obesity due to excess calories    4. Class 3 severe obesity with serious comorbidity and body mass index (BMI) of 40.0 to 44.9 in adult, unspecified obesity type    5. Essential hypertension    6. Cardiomyopathy due to chemotherapy    7. Chronic systolic heart failure        Plan:   Chronic heart failure with preserved ejection fraction    Heart failure, unspecified HF chronicity, unspecified heart failure type    Morbid (severe) obesity due to excess calories    Class 3 severe obesity with serious comorbidity and body mass index (BMI) of 40.0 to 44.9 in adult, unspecified obesity type    Essential hypertension    Cardiomyopathy due to chemotherapy    Chronic systolic heart failure      HTN, h/o CMP improved to normal EF, HFPEF  - titrate meds  - cont BB  - cont Entresto  - ECHO and ECG stress test neg 5/2023.     2. Severe/morbid Obesity  - cont weight loss    3. H/o Breast CA - 2007  - cont tx and f/u hemeonc    4. HLD  - cont to monitor     Visit today included increased  complexity associated with the care of the episodic problem CHF addressed and managing the longitudinal care of the patient due to the serious and/or complex managed problem(s) .      Thank you for allowing me to participate in this patient's care. Please do not hesitate to contact me with any questions or concerns. Consult note has been forwarded to the referral physician.          [1]   Social History  Tobacco Use    Smoking status: Never     Passive exposure: Never    Smokeless tobacco: Never   Substance Use Topics    Alcohol use: Yes     Alcohol/week: 2.0 standard drinks of alcohol     Types: 2 Glasses of wine per week     Comment: Ocassionally    Drug use: No

## 2025-03-11 ENCOUNTER — OFFICE VISIT (OUTPATIENT)
Dept: SPORTS MEDICINE | Facility: CLINIC | Age: 70
End: 2025-03-11
Payer: MEDICARE

## 2025-03-11 VITALS — RESPIRATION RATE: 17 BRPM | HEIGHT: 64 IN | BODY MASS INDEX: 40.04 KG/M2 | WEIGHT: 234.56 LBS

## 2025-03-11 DIAGNOSIS — Z98.890 STATUS POST RIGHT ROTATOR CUFF REPAIR: Primary | ICD-10-CM

## 2025-03-11 PROCEDURE — 99999 PR PBB SHADOW E&M-EST. PATIENT-LVL III: CPT | Mod: PBBFAC,,, | Performed by: STUDENT IN AN ORGANIZED HEALTH CARE EDUCATION/TRAINING PROGRAM

## 2025-03-11 PROCEDURE — 99213 OFFICE O/P EST LOW 20 MIN: CPT | Mod: PBBFAC | Performed by: STUDENT IN AN ORGANIZED HEALTH CARE EDUCATION/TRAINING PROGRAM

## 2025-03-11 NOTE — PROGRESS NOTES
Orthopaedics  Post-operative follow-up    Procedure Performed:  1. Right shoulder arthroscopic rotator cuff repair including subscapularis and supraspinatus  2. Right shoulder arthroscopic biceps tenodesis  3. Right shoulder subacromial decompression     Date of Surgery: 1/27/25    Subjective: Josette Altamirano is now approximately 6 weeks out from her shoulder surgery.  She has been compliant with post-operative restrictions and has been progressing with PT at [facility].  Her pain and function continue to improve.    Exam:  Incision sites healed, C/D/I  No swelling or bruising noted  Fluid ROM with no crepitus  Supine Passive ROM: , , ER 40  Cuff strength intact on limited testing   Axillary nerve sensation and motor intact  Motor and sensory intact distally  Strong radial pulse, fingers warm and well perfused    Imaging:  No new imaging.     Impression:  S/p right shoulder arthroscopic rotator cuff repair including subscapularis and supraspinatus, biceps tenodesis, and subacromial decompression, second post-operative visit     Plan:  Discontinue sling, begin active assisted and active range of motion  Advance PT per protocol  Symptomatic treatment for pain / swelling  May advance activities as reviewed today: Discontinue sling, initiate progression of AAROM and AROM.   Instructed patient to call clinic if questions or concerns    Follow-up in 6 weeks at 3 months post-op (around 4/27/25)    Work status:  For weeks 0-6 from now:  1) Discontinue sling  2) Continue physical therapy  3) No lifting/pushing/pulling greater than 2 pounds  4) No overhead work  5) No repetitive motions        Griffin Shahid MD    I, Miky Osman, acted as a scribe for Griffin Shahid MD for the duration of this office visit.

## 2025-04-07 ENCOUNTER — OFFICE VISIT (OUTPATIENT)
Dept: UROLOGY | Facility: CLINIC | Age: 70
End: 2025-04-07
Payer: MEDICARE

## 2025-04-07 VITALS — SYSTOLIC BLOOD PRESSURE: 118 MMHG | HEART RATE: 73 BPM | DIASTOLIC BLOOD PRESSURE: 74 MMHG

## 2025-04-07 DIAGNOSIS — R31.29 MICROHEMATURIA: Primary | ICD-10-CM

## 2025-04-07 DIAGNOSIS — R31.0 GROSS HEMATURIA: ICD-10-CM

## 2025-04-07 DIAGNOSIS — N39.3 SUI (STRESS URINARY INCONTINENCE, FEMALE): ICD-10-CM

## 2025-04-07 LAB
BACTERIA #/AREA URNS HPF: ABNORMAL /HPF
MICROSCOPIC COMMENT: ABNORMAL
RBC #/AREA URNS HPF: 9 /HPF (ref 0–4)
WBC #/AREA URNS HPF: 3 /HPF (ref 0–5)

## 2025-04-07 PROCEDURE — 99999 PR PBB SHADOW E&M-EST. PATIENT-LVL III: CPT | Mod: PBBFAC,,, | Performed by: UROLOGY

## 2025-04-07 PROCEDURE — 99213 OFFICE O/P EST LOW 20 MIN: CPT | Mod: S$PBB,,, | Performed by: UROLOGY

## 2025-04-07 PROCEDURE — 99213 OFFICE O/P EST LOW 20 MIN: CPT | Mod: PBBFAC | Performed by: UROLOGY

## 2025-04-07 PROCEDURE — 81000 URINALYSIS NONAUTO W/SCOPE: CPT | Performed by: UROLOGY

## 2025-04-07 NOTE — PROGRESS NOTES
Chief Complaint   Patient presents with    Follow-up         History of Present Illness:   Josette Altamirano is a 69 y.o. female here for follow up.     4/7/25-no gross hematuria or dysuria. Recovering from rotator cuff surgery. Also had hysterectomy. Minor CLAYTON only. No nocturia.     10/7/24-Pt reports that she is having a D&C later this week for post-menopausal vaginal bleeding. No gross hematuria. No dysuria or incontinence. No abdominal or flank pain.     3/27/24-here for cysto. CT urogram showed indeterminate liver lesions and thickened bladder wall.     3/6/24-Pt had non-painful, gross hematuria last week. She did have some vaginal itching after she urinated at that time. She was empirically treated with omnicef, but culture was negative. She hasn't seen any blood since the antibiotics. No abdominal or flank pain.     8/29/22-renal US done 8/24/22 showing a mildly complex L upper pole renal cyst, now measuring 5.0cm. 2.8cm L lower pole simple cyst. I have personally reviewed these images. Had some pain in her left side a few weeks ago when she would lay down. No longer having any pain. No gross hematuria. Mild CLAYTON, but not bothersome. Doing kegels, which do seem to help. No interval change in H&P.     7/28/21-Renal US stable. Personally reviewed, showing 4.5cm L upper pole mildly complex cyst. No gross hematuria. No dysuria. Only has CLAYTON if she laughs. No abd or flank pain. No recent UTI.   Per Ruth:  6/18/20:  Indep assessment of imaging agree with report:  Left renal cyst about the same no sig changes mildly complex.  Reviewed history in detail. Doing well overall.   4/23/19: No hematuria in the interval and none today.  Stable 5cm left renal cyst on reassuring US.  Prefers annual followup with US.  Reviewed history in detail.  4/2/18: Returns for followup.  No hematuria in the interval.  MRI shows known renal cysts unchanged from 2015 to 2017.  10/19/15: CT Urogram normal except for benign left upper pole renal  cyst. Cysto normal.  10/12/15: 59 yo woman referred by RAYSHAWN Campuzano for gross hematuria presumed to be UTI and not resolving with continued microhematuria after UTI treated.  No abd/pelvic pain and no exac/rel factors; except some crampy discomfort sometime.  No prior hematuria.  No urolithiasis.  No urinary bother.  No  history.  Normal sexual function.  UA confirms hematuria with -UCx on two occasions.  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    Review of Systems   Constitutional:  Negative for chills and fever.   HENT:  Negative for nosebleeds.    Respiratory:  Negative for shortness of breath.    Cardiovascular:  Negative for chest pain.   Gastrointestinal:  Negative for blood in stool.   Genitourinary:  Negative for dysuria.   Neurological:  Negative for numbness.   Hematological:  Does not bruise/bleed easily.   All other systems reviewed and are negative.      Current Outpatient Medications   Medication Sig Dispense Refill    acetaminophen (TYLENOL) 500 MG tablet Take 2 tablets (1,000 mg total) by mouth every 8 (eight) hours as needed for Pain. 60 tablet 0    celecoxib (CELEBREX) 200 MG capsule Take 1 capsule (200 mg total) by mouth 2 (two) times daily 28 capsule 0    docusate sodium (COLACE) 100 MG capsule Take 1 capsule (100 mg total) by mouth 2 (two) times daily. 60 capsule 0    metoprolol succinate (TOPROL-XL) 50 MG 24 hr tablet Take 1 tablet (50 mg total) by mouth once daily. 30 tablet 6    oxyCODONE (ROXICODONE) 5 MG immediate release tablet Take 1 tablet (5 mg total) by mouth every 4 (four) hours as needed for Pain. 12 tablet 0    oxyCODONE (ROXICODONE) 5 MG immediate release tablet Take 1 tablet (5 mg total) by mouth every 4 (four) hours as needed for pain (post-op pain) 36 tablet 0    sacubitriL-valsartan (ENTRESTO)  mg per tablet Take 1 tablet by mouth 2 (two) times daily. 180 tablet 1    traMADoL (ULTRAM) 50 mg tablet Take 1 tablet (50 mg total) by mouth every 4 to 6 hours as needed for  pain 36 tablet 0    aspirin (ECOTRIN) 81 MG EC tablet Take 1 tablet (81 mg total) by mouth 2 (two) times a day. for 14 days 28 tablet 0     No current facility-administered medications for this visit.       Review of patient's allergies indicates:  No Known Allergies    Past medical, family, and social history reviewed as documented in chart with pertinent positive medical, family, and social history detailed in HPI.    Physical Exam  Vitals:    04/07/25 1226   BP: 118/74   Pulse: 73       General: Well-developed, Well Nourished in No acute distress  HEENT: Normocephalic, Atraumatic, Extraocular Movements intact  Neck: Supple, trachea midline, no cervical or supraclavicular lymphadenopathy  Respirations: Even and unlabored  Back: Midline spine without tenderness, no CVA tenderness  Abdomen: Soft, Non-tender, non-distended, no hepatosplenomegaly, no rebound or guarding, no palpable masses  Extremities: Moves all equally, atraumatic, no clubbing, cyanosis or edema  Skin: warm and dry  Psych: normal affect  Neuro: Alert and oriented x 3, Cranial nerves II-XII grossly intact      Assesment:   1. Microhematuria  Urinalysis Microscopic      2. CLAYTON (stress urinary incontinence, female)        3. Gross hematuria              Plan:  Microhematuria  -     Urinalysis Microscopic    CLAYTON (stress urinary incontinence, female)    Gross hematuria        Follow up in about 6 months (around 10/7/2025).

## 2025-04-08 ENCOUNTER — RESULTS FOLLOW-UP (OUTPATIENT)
Dept: UROLOGY | Facility: CLINIC | Age: 70
End: 2025-04-08

## 2025-04-16 ENCOUNTER — TELEPHONE (OUTPATIENT)
Dept: SURGERY | Facility: CLINIC | Age: 70
End: 2025-04-16
Payer: MEDICARE

## 2025-04-16 NOTE — TELEPHONE ENCOUNTER
Spoke with pt in ref to 4/29 appt cancellation.. and Mrs Campuzano's retie. Pt was sorry to hear. Informed pt that she wd see future breast exam appt on her mychart . She agreed to being seen by another provider w/I the dept for breast exam.

## 2025-04-29 ENCOUNTER — OFFICE VISIT (OUTPATIENT)
Dept: SPORTS MEDICINE | Facility: CLINIC | Age: 70
End: 2025-04-29
Payer: MEDICARE

## 2025-04-29 DIAGNOSIS — Z98.890 STATUS POST RIGHT ROTATOR CUFF REPAIR: Primary | ICD-10-CM

## 2025-04-29 PROCEDURE — 99024 POSTOP FOLLOW-UP VISIT: CPT | Mod: POP,,, | Performed by: STUDENT IN AN ORGANIZED HEALTH CARE EDUCATION/TRAINING PROGRAM

## 2025-04-29 PROCEDURE — 99212 OFFICE O/P EST SF 10 MIN: CPT | Mod: PBBFAC | Performed by: STUDENT IN AN ORGANIZED HEALTH CARE EDUCATION/TRAINING PROGRAM

## 2025-04-29 PROCEDURE — 99999 PR PBB SHADOW E&M-EST. PATIENT-LVL II: CPT | Mod: PBBFAC,,, | Performed by: STUDENT IN AN ORGANIZED HEALTH CARE EDUCATION/TRAINING PROGRAM

## 2025-04-29 NOTE — PROGRESS NOTES
Orthopaedics  Post-operative follow-up    Procedure Performed:  1. Right shoulder arthroscopic rotator cuff repair including subscapularis and supraspinatus  2. Right shoulder arthroscopic biceps tenodesis  3. Right shoulder subacromial decompression     Date of Surgery: 1/27/25    Subjective: Josette Altamirano is now approximately 3 months out from her shoulder surgery.  She has been compliant with post-operative restrictions and has been progressing with PT at [facility].  Her pain and function continue to improve. Still notes some weakness with raising arm against gravity.    Exam:  Incision sites healed, C/D/I  No swelling or bruising noted  Fluid ROM with no crepitus  Supine Active ROM:  , ER 40  Upright active ROM: FF 80  Cuff strength intact on limited testing   Axillary nerve sensation and motor intact  Motor and sensory intact distally  Strong radial pulse, fingers warm and well perfused    Imaging:  No new imaging.     Impression:  3 months s/p right shoulder arthroscopic rotator cuff repair including subscapularis and supraspinatus, biceps tenodesis, and subacromial decompression    Plan:  She is making progress in therapy, but has some weakness with upright range of motion against gravity.  She has good range of motion while supine.  I think this needs a little bit more time to gain strength.  Encouraged her to work on supine active range of motion as well as to get a pulley for her house to work on active assisted upright range of motion.  I think this will help with her overall progression of strength.  Advance PT per protocol  Symptomatic treatment for pain / swelling  May advance activities as reviewed today: Continue to progress strength and endurance of shoulder and periscapular musculature.   Instructed patient to call clinic if questions or concerns    Follow-up in 6 weeks    Work status:  1) Continue physical therapy  2) No lifting/pushing/pulling greater than 2  pounds  3) No overhead work  4) No repetitive motions        Griffin Shahid MD    I, Miky Osman, acted as a scribe for Griffin Shahid MD for the duration of this office visit.

## 2025-05-14 ENCOUNTER — OFFICE VISIT (OUTPATIENT)
Dept: SURGERY | Facility: CLINIC | Age: 70
End: 2025-05-14
Payer: MEDICARE

## 2025-05-14 VITALS — WEIGHT: 235 LBS | HEIGHT: 64 IN | BODY MASS INDEX: 40.12 KG/M2

## 2025-05-14 DIAGNOSIS — Z90.13 ACQUIRED ABSENCE OF BOTH BREASTS AND NIPPLES: ICD-10-CM

## 2025-05-14 DIAGNOSIS — C50.811 MALIGNANT NEOPLASM OF OVERLAPPING SITES OF RIGHT BREAST IN FEMALE, ESTROGEN RECEPTOR POSITIVE: ICD-10-CM

## 2025-05-14 DIAGNOSIS — Z17.0 MALIGNANT NEOPLASM OF OVERLAPPING SITES OF RIGHT BREAST IN FEMALE, ESTROGEN RECEPTOR POSITIVE: ICD-10-CM

## 2025-05-14 DIAGNOSIS — Z08 ENCOUNTER FOR FOLLOW-UP SURVEILLANCE OF BREAST CANCER: Primary | Chronic | ICD-10-CM

## 2025-05-14 DIAGNOSIS — Z85.3 ENCOUNTER FOR FOLLOW-UP SURVEILLANCE OF BREAST CANCER: Primary | Chronic | ICD-10-CM

## 2025-05-14 DIAGNOSIS — Z85.3 PERSONAL HISTORY OF MALIGNANT NEOPLASM OF BREAST: ICD-10-CM

## 2025-05-14 DIAGNOSIS — Z92.21 PERSONAL HISTORY OF ANTINEOPLASTIC CHEMOTHERAPY: ICD-10-CM

## 2025-05-14 PROCEDURE — 99212 OFFICE O/P EST SF 10 MIN: CPT | Mod: PBBFAC | Performed by: SURGERY

## 2025-05-14 PROCEDURE — 99999 PR PBB SHADOW E&M-EST. PATIENT-LVL II: CPT | Mod: PBBFAC,,, | Performed by: SURGERY

## 2025-05-14 NOTE — PROGRESS NOTES
Date of Service: 5/14/2025    Chief Complaint:   Josette Altamirano is a 69 y.o. female presenting today for  a new patient appointment to establish breast care. She is due for Physical Examination.  She reports no interval changes on her self-breast examination.     History of Present Illness:   Mrs. Josette Altamirano presents a consult due to her personal history of bilateral breast cancer.  The patient had a left breast cancer in 2006, treated with left mastectomy, axillary surgery, no reconstruction at that time.  In 2023, she had symmetry surgery on the contralateral right side and implant based reconstruction on the left side with Dr. Santosh James, revealing an incidental finding of multifocal invasive lobular carcinoma in the right reduction mammoplasty.  In December 2023, she underwent a right skin sparing mastectomy, right axillary sentinel lymph node biopsy with Dr. Shannon Galvan, implant reconstruction with Dr. Santosh James.  Her Oncotype DX was 14, she did not require chemotherapy or radiation therapy.  She declined endocrine therapy.  She has no complaints of breast pain, skin changes or nipple discharge.  She presents for further evaluation and for physical exam.    PATHOLOGY:  12/12/2023 [facility] [place]  FINAL PATHOLOGY:  1. RIGHT BREAST, SKIN SPARING MASTECTOMY:  Florid lobular carcinoma in situ (LCIS).  Background breast tissue with multiple intraductal papillomas, usual ductal hyperplasia, cysts and apocrine metaplasia.  One intramammary lymph node negative for malignancy (0/1).  Unremarkable nipple.    Negative for DCIS or residual invasive carcinoma.  Comment:  Extensive LCIS is identified throughout the specimen.  The patient's reported history of remote breast cancer and recent reconstructive breast surgery revealing invasive lobular carcinoma is noted, however invasive carcinoma is not identified  in this completion mastectomy.  Immunostains for P63, CK5/6 and E-cadherin are performed on multiple tissue blocks (1N,  1Q, 1R and 1T).  E-cadherin is negative or significantly reduced in lesional cells of LCIS.  CK5/6 highlights intermixed and  background usual ductal hyperplasia.  P63 staining highlights retained myoepithelial cells at the periphery of LCIS lesions as well as background benign ductal elements.  2. SENTINEL LYMPH NODE, EXCISION:  One lymph node negative for malignancy (0/1) by routine staining and epithelial immunostain panel (AE1/AE3, WSK, cam 5.2.  3. NONSENTINEL LYMPH NODE, EXCISION:  One lymph node negative for malignancy (0/1).             PATHOLOGY 8/2023:  RIGHT BREAST TISSUE (791 GRAMS) MAMMOPLASTIC REDUCTION:    3 foci of invasive lobular carcinoma, identified on 3 slides, measuring   0.9mm, 3mm, and 4.4 mm., predicted grade 2 based on the Nathaly   Histologic Scoring System, see comment. Associated pleomorphic lobular   carcinoma in-situ is identified.. Background of benign intraductal   papilloma(s) with sclerosis, papillomatosis, usual duct epithelial   hyperplasia, atypical lobular hyperplasia, cysts, apocrine metaplasia,   dilated ducts, stromal fibrosis, adenosis and focal fibroadenomatoid   change is noted.      Benign skin.    Benign intramammary lymph node.        COMMENT:  Ancillary studies show the following results:    BLOCK 1H:    ER by IHC (biomarker SP1): 95%, strong staining    ME by IHC (biomarker 1E2):  75%, strong staining    HER2/bob by IHC (biomarker 4B5): 1+    HER2/bob by FISH: Has been ordered and will be reported separately.    ki-67: 10%, favorable.        Cytokeratin A stains delineate the areas of invasive lobular carcinoma.    E-cadherin: Negative in areas of  invasive lobular carcinoma and lobular   carcinoma in-situ.    CK5/6: Positive, and ER: Patchy positivity, supporting the diagnosis of   associated usual ductal epithelial hyperplasia.    SMM: Negative in areas of invasive lobular carcinoma and positive in   other areas confirming benign processes.    CK A stain  performed on the axillary lymph node shows no evidence of   metastatic disease.          These three foci of  tumor are most likely contiguous and represent a   single focus of invasive lobular carcinoma based on the regross   examination of the specimen. However, a multifocal process cannot be   entirely excluded. Margins cannot be accurately assessed. The foci were   identified in the largest portion of tissue submitted.    Immunoperoxidase stains performed at [facility]   [id number]..         Consultation with: Dr..Junlin Pereira.    [employer] Dr Rashawn James, 9/1/23.        VONDA/amr       Past Medical History:   Diagnosis Date    Arthritis of right knee     Breast cancer     She has a history of a Stage IIA, 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 She subsequently underwent left mastectomy and sentinel node biopsy followed by axillary dissection March 23, 2007. Histopathologic evaluation demonstrated again multiple foci of intracystic papillary carcinoma, as well as DCIS noncomed    Cardiomyopathy     CHEMO INDUCED- RESOLVED    CHF (congestive heart failure)     systolic    Genetic testing 11/06/2023    [employer] Multi Cancer Panel (84 genes) - NEGATIVE, VUS in EGFR, POLE    Hypertension     Uterine fibroid       Past Surgical History:   Procedure Laterality Date    ARTHROSCOPIC REPAIR OF ROTATOR CUFF OF SHOULDER Right 1/27/2025    Procedure: REPAIR, ROTATOR CUFF, ARTHROSCOPIC;  Surgeon: Griffin Shahid MD;  Location: [facility] OR;  Service: Orthopedics;  Laterality: Right;    ARTHROSCOPY OF SHOULDER WITH DECOMPRESSION OF SUBACROMIAL SPACE Right 1/27/2025    Procedure: ARTHROSCOPY, SHOULDER, WITH SUBACROMIAL SPACE DECOMPRESSION;  Surgeon: Griffin Shahid MD;  Location: [facility] OR;  Service: Orthopedics;  Laterality: Right;    ARTHROSCOPY,SHOULDER,WITH BICEPS TENODESIS Right 1/27/2025    Procedure:  ARTHROSCOPY,SHOULDER,WITH BICEPS TENODESIS;  Surgeon: Griffin Shahid MD;  Location: [facility];  Service: Orthopedics;  Laterality: Right;    BREAST BIOPSY      BREAST CYST EXCISION Right     BREAST RECONSTRUCTION Bilateral 4/1/2024    Procedure: RECONSTRUCTION, BREAST;  Surgeon: Rashawn James MD;  Location: [facility];  Service: Plastics;  Laterality: Bilateral;    BREAST REVISION SURGERY Bilateral 4/1/2024    Procedure: BREAST REVISION SURGERY;  Surgeon: Rashawn James MD;  Location: [facility];  Service: Plastics;  Laterality: Bilateral;    BREAST SURGERY      CHOLECYSTECTOMY      COLONOSCOPY N/A 9/10/2018    Procedure: COLONOSCOPY;  Surgeon: Indra Ramos MD;  Location: [facility];  Service: Endoscopy;  Laterality: N/A;    COLONOSCOPY N/A 4/28/2020    Procedure: COLONOSCOPY;  Surgeon: Dulce Maria Siegel MD;  Location: [facility];  Service: Endoscopy;  Laterality: N/A;    CYSTOSCOPY N/A 12/4/2024    Procedure: CYSTOSCOPY;  Surgeon: Sofiya Nguyen MD;  Location: [facility];  Service: Gynecology Oncology;  Laterality: N/A;    ESOPHAGOGASTRODUODENOSCOPY N/A 4/28/2020    Procedure: EGD (ESOPHAGOGASTRODUODENOSCOPY);  Surgeon: Dulce Maria Siegel MD;  Location: [facility];  Service: Endoscopy;  Laterality: N/A;    HYSTEROSCOPIC POLYPECTOMY OF UTERUS N/A 10/10/2024    Procedure: POLYPECTOMY, UTERUS, HYSTEROSCOPIC;  Surgeon: Anamika Eller MD;  Location: [facility];  Service: OB/GYN;  Laterality: N/A;    HYSTEROSCOPY WITH DILATION AND CURETTAGE OF UTERUS N/A 10/10/2024    Procedure: HYSTEROSCOPY, WITH DILATION AND CURETTAGE OF UTERUS;  Surgeon: Anamika Eller MD;  Location: [facility];  Service: OB/GYN;  Laterality: N/A;    INJECTION FOR SENTINEL NODE IDENTIFICATION Right 12/12/2023    Procedure: INJECTION, FOR SENTINEL NODE IDENTIFICATION;  Surgeon: Shannon Galvan MD;  Location: [facility];  Service: General;  Laterality: Right;    INSERTION OF BREAST IMPLANT Bilateral 4/1/2024    Procedure: INSERTION, BREAST IMPLANT;   Surgeon: Rashawn James MD;  Location: [facility];  Service: Plastics;  Laterality: Bilateral;    INSERTION OF BREAST TISSUE EXPANDER Right 12/12/2023    Procedure: INSERTION, TISSUE EXPANDER, BREAST;  Surgeon: Rashawn James MD;  Location: [facility] OR;  Service: Plastics;  Laterality: Right;    MASTECTOMY      left    MASTECTOMY, SKIN SPARING, SIMPLE Right 12/12/2023    Procedure: MASTECTOMY, SKIN SPARING, SIMPLE;  Surgeon: Shannon Galvan MD;  Location: [facility];  Service: General;  Laterality: Right;    MEDIPORT INSERTION, SINGLE      PLACEMENT OF ACELLULAR HUMAN DERMAL ALLOGRAFT Right 12/12/2023    Procedure: APPLICATION, ACELLULAR HUMAN DERMAL ALLOGRAFT;  Surgeon: Rashawn James MD;  Location: [facility];  Service: Plastics;  Laterality: Right;    ROBOT-ASSISTED LAPAROSCOPIC ABDOMINAL HYSTERECTOMY USING DA SILVINO XI N/A 12/4/2024    Procedure: XI ROBOTIC HYSTERECTOMY;  Surgeon: Sofiya Nguyen MD;  Location: [facility];  Service: Gynecology Oncology;  Laterality: N/A;  2.5 hr case / surgery admit due to insurance    ROBOT-ASSISTED LAPAROSCOPIC SALPINGO-OOPHORECTOMY USING DA SILVINO XI Bilateral 12/4/2024    Procedure: XI ROBOTIC SALPINGO-OOPHORECTOMY;  Surgeon: Sofiya Nguyen MD;  Location: [facility];  Service: Gynecology Oncology;  Laterality: Bilateral;    SENTINEL LYMPH NODE BIOPSY Right 12/12/2023    Procedure: BIOPSY, LYMPH NODE, SENTINEL;  Surgeon: Shannon Galvan MD;  Location: [facility];  Service: General;  Laterality: Right;    TISSUE EXPANDER REMOVAL Bilateral 4/1/2024    Procedure: REMOVAL, TISSUE EXPANDER;  Surgeon: Rashawn James MD;  Location: [facility] OR;  Service: Plastics;  Laterality: Bilateral;      Current Medications[1]   Review of patient's allergies indicates:  No Known Allergies   Social History     Tobacco Use    Smoking status: Never     Passive exposure: Never    Smokeless tobacco: Never   Substance Use Topics    Alcohol use: Yes     Alcohol/week: 2.0 standard drinks of alcohol      Types: 2 Glasses of wine per week     Comment: Ocassionally      Family History   Problem Relation Name Age of Onset    Diabetes Mother      Breast cancer Mother      Diabetes Father      Stroke Father      No Known Problems Sister      No Known Problems Brother      Cancer Maternal Aunt  55        breast cancer    Breast cancer Maternal Aunt      Stroke Paternal Aunt      No Known Problems Maternal Grandmother      No Known Problems Maternal Grandfather      No Known Problems Paternal Grandmother      Heart failure Paternal Grandfather      Cancer Maternal Cousin      Melanoma Neg Hx      Psoriasis Neg Hx      Lupus Neg Hx      Eczema Neg Hx          Review of Systems   All other systems reviewed and are negative.       Physical Exam   Constitutional: She does not appear ill. No distress.   HENT:   Head: Normocephalic.   Nose: Nose normal. No nasal congestion.   Cardiovascular:  Normal rate, regular rhythm, normal heart sounds and normal pulses.            Pulmonary/Chest: Effort normal and breath sounds normal. Chest wall is not dull to percussion. She exhibits no mass (BILATERAL IMPLANT BASED RECONSTRUCTION), no tenderness, no bony tenderness, no laceration, no crepitus, no edema, no deformity, no swelling and no retraction. Right breast exhibits skin change (Mastectomy scars). Right breast exhibits no inverted nipple, no mass, no nipple discharge (no NAC) and no tenderness. Left breast exhibits skin change (Mastectomy and Lat flap scars). Left breast exhibits no inverted nipple, no mass, no nipple discharge (no NAC) and no tenderness. No breast swelling or bleeding. Breasts are symmetrical.   Abdominal: Soft. Normal appearance and bowel sounds are normal.   Genitourinary:    Vulva and rectum normal.   No breast swelling or bleeding.   Musculoskeletal: Normal range of motion.   Neurological: She is alert. She displays no weakness.   Skin: Skin is warm and dry. No lesion and no rash noted.              ASSESSMENT  and PLAN OF CARE     69-YEAR-OLD FEMALE WITH A PERSONAL HISTORY OF BILATERAL BREAST CANCER (LEFT BREAST CANCER 2007, RIGHT BREAST CANCER 2023).    #1.  RIGHT BREAST CANCER 2023- FOUND AT THE TIME OF REDUCTION MAMMOPLASTY, STAGE IA MULTIFOCAL [id number], ESTROGEN RECEPTOR 95%, PROGESTERONE RECEPTOR 75%, HER2 JAIDEN 1+, FISH NEGATIVE, GRADE 2, KI 67 10%, STATUS POST COMPLETION MASTECTOMY, AXILLARY SURGERY (BY DR. GRETTA NICKERSON), IMPLANT BASED RECONSTRUCTION(BY DR. JEFFREY SHAY) .  #2.  LEFT BREAST CANCER 2007 STATUS POST LEFT MASTECTOMY, LEFT AXILLARY SURGERY, NO RADIATION, CHEMOTHERAPY COMPLETED BY DR. QUEZADA.  SURGERY WAS BY DR. KEATING.  IMPLANT BASED RECONSTRUCTION PERFORMED IN 2023 BY DR. SHAY.  #3.  MEDICAL ONCOLOGY STATUS POST CHEMOTHERAPY IN 2007 FOR LEFT BREAST CANCER WITH DR. OVIDIO QUEZADA, ONCOTYPE DX WAS 23 FOR THE RIGHT BREAST CANCER IN 2023, NO CHEMOTHERAPY INDICATED, SHE DECLINED ENDOCRINE THERAPY.  #4.  RADIATION ONCOLOGY SHE NEVER REQUIRED ANY RADIATION.  #5.  HEALTHY NUTRITION, ROUTINE EXERCISE, MINIMAL ALCOHOL, NO SMOKING-RISK REDUCTION.  #6.  CONTINUE MONTHLY SELF RECONSTRUCTED BREAST EXAMS.  #7.  RECONSTRUCTIVE SURGERY-STATUS POST IMPLANT BASED RECONSTRUCTION BY DR. JEFFREY SHAY IN 2023.  THE PATIENT STILL HAS NOT HAD NIPPLE-AREOLAR COMPLEX TATTOOING, SHE MAY DESIRE THIS IN THE FUTURE, BUT NOT AT THIS TIME.  #8.  RETURN TO CLINIC NOVEMBER 2025 FOR PHYSICAL EXAM.  WE WILL CONTINUE TO FOLLOW HER IN CLINIC.         [1]   Current Outpatient Medications:     acetaminophen (TYLENOL) 500 MG tablet, Take 2 tablets (1,000 mg total) by mouth every 8 (eight) hours as needed for Pain., Disp: 60 tablet, Rfl: 0    aspirin (ECOTRIN) 81 MG EC tablet, Take 1 tablet (81 mg total) by mouth 2 (two) times a day. for 14 days, Disp: 28 tablet, Rfl: 0    celecoxib (CELEBREX) 200 MG capsule, Take 1 capsule (200 mg total) by mouth 2 (two) times daily, Disp: 28 capsule, Rfl: 0    docusate sodium (COLACE) 100 MG capsule, Take 1  capsule (100 mg total) by mouth 2 (two) times daily., Disp: 60 capsule, Rfl: 0    metoprolol succinate (TOPROL-XL) 50 MG 24 hr tablet, Take 1 tablet (50 mg total) by mouth once daily., Disp: 30 tablet, Rfl: 6    oxyCODONE (ROXICODONE) 5 MG immediate release tablet, Take 1 tablet (5 mg total) by mouth every 4 (four) hours as needed for Pain., Disp: 12 tablet, Rfl: 0    oxyCODONE (ROXICODONE) 5 MG immediate release tablet, Take 1 tablet (5 mg total) by mouth every 4 (four) hours as needed for pain (post-op pain), Disp: 36 tablet, Rfl: 0    sacubitriL-valsartan (ENTRESTO)  mg per tablet, Take 1 tablet by mouth 2 (two) times daily., Disp: 180 tablet, Rfl: 1    traMADoL (ULTRAM) 50 mg tablet, Take 1 tablet (50 mg total) by mouth every 4 to 6 hours as needed for pain, Disp: 36 tablet, Rfl: 0

## 2025-06-06 NOTE — PROGRESS NOTES
Orthopaedics  Post-operative follow-up    Procedure Performed:  1. Right shoulder arthroscopic rotator cuff repair including subscapularis and supraspinatus  2. Right shoulder arthroscopic biceps tenodesis  3. Right shoulder subacromial decompression     Date of Surgery: 1/27/25    Subjective: Josette Altamirano is now approximately 4.5 months out from her shoulder surgery.  She has been compliant with post-operative restrictions and has been progressing with PT at [facility].  Her pain and function continue to improve. She does note some difficulty with reaching up overhead, however she feels it is slowly improving.    Exam:  Incision sites healed, C/D/I  No swelling or bruising noted  Fluid ROM with no crepitus  Upright Active ROM: , ABD 90, ER 40  Supine Active ROM:   Able to touch the top of her head  Cuff strength intact on limited testing   Axillary nerve sensation and motor intact  Motor and sensory intact distally  Strong radial pulse, fingers warm and well perfused    Imaging:  No new imaging.     Impression:  4.5 months s/p right shoulder arthroscopic rotator cuff repair including subscapularis and supraspinatus, biceps tenodesis, and subacromial decompression- mild stiffness     Plan:  She has a little bit of stiffness on physical exam today but overall improved compared to prior visit. Discussed potential treatment options of continuing to progress her range of motion with physical therapy/home exercise program or possible glenohumeral CSI and continuing with physical therapy. Patient elected to proceed with intra-articular corticosteroid injection into the right glenohumeral joint.    Procedure performed today and patient tolerated the procedure well with no immediate complications.   Advance PT per protocol  Symptomatic treatment for pain / swelling  May advance activities as reviewed today: Continue to progress strength and endurance of shoulder and periscapular musculature.    Instructed patient to call clinic if questions or concerns    Follow-up with me in 6 weeks.          Griffin Shahid MD    I, Miky Osman, acted as a scribe for Griffin Shahid MD for the duration of this office visit.

## 2025-06-17 ENCOUNTER — OFFICE VISIT (OUTPATIENT)
Dept: SPORTS MEDICINE | Facility: CLINIC | Age: 70
End: 2025-06-17
Payer: MEDICARE

## 2025-06-17 VITALS — HEIGHT: 64 IN | BODY MASS INDEX: 40.12 KG/M2 | WEIGHT: 235 LBS

## 2025-06-17 DIAGNOSIS — Z98.890 STATUS POST RIGHT ROTATOR CUFF REPAIR: ICD-10-CM

## 2025-06-17 DIAGNOSIS — M75.01 ADHESIVE CAPSULITIS OF RIGHT SHOULDER: Primary | ICD-10-CM

## 2025-06-17 PROCEDURE — 20610 DRAIN/INJ JOINT/BURSA W/O US: CPT | Mod: PBBFAC,RT | Performed by: STUDENT IN AN ORGANIZED HEALTH CARE EDUCATION/TRAINING PROGRAM

## 2025-06-17 PROCEDURE — 99999 PR PBB SHADOW E&M-EST. PATIENT-LVL III: CPT | Mod: PBBFAC,,, | Performed by: STUDENT IN AN ORGANIZED HEALTH CARE EDUCATION/TRAINING PROGRAM

## 2025-06-17 PROCEDURE — 99999PBSHW PR PBB SHADOW TECHNICAL ONLY FILED TO HB: Mod: PBBFAC,,,

## 2025-06-17 PROCEDURE — 99214 OFFICE O/P EST MOD 30 MIN: CPT | Mod: 25,S$PBB,, | Performed by: STUDENT IN AN ORGANIZED HEALTH CARE EDUCATION/TRAINING PROGRAM

## 2025-06-17 PROCEDURE — 99213 OFFICE O/P EST LOW 20 MIN: CPT | Mod: PBBFAC | Performed by: STUDENT IN AN ORGANIZED HEALTH CARE EDUCATION/TRAINING PROGRAM

## 2025-06-17 RX ORDER — TRIAMCINOLONE ACETONIDE 40 MG/ML
40 INJECTION, SUSPENSION INTRA-ARTICULAR; INTRAMUSCULAR
Status: DISCONTINUED | OUTPATIENT
Start: 2025-06-17 | End: 2025-06-17 | Stop reason: HOSPADM

## 2025-06-17 RX ADMIN — TRIAMCINOLONE ACETONIDE 40 MG: 200 INJECTION, SUSPENSION INTRA-ARTICULAR; INTRAMUSCULAR at 11:06

## 2025-06-17 NOTE — PROCEDURES
Large Joint Aspiration/Injection: R glenohumeral    Date/Time: 6/17/2025 11:45 AM    Performed by: Griffin Shahid MD  Authorized by: Griffin Shahid MD    Consent Done?:  Yes (Verbal)  Indications:  Pain  Site marked: the procedure site was marked    Timeout: prior to procedure the correct patient, procedure, and site was verified    Prep: patient was prepped and draped in usual sterile fashion      Local anesthesia used?: Yes    Anesthesia:  Local infiltration  Local anesthetic:  Lidocaine 1% without epinephrine    Details:  Needle Size:  22 G  Ultrasonic Guidance for needle placement?: No    Approach:  Posterior  Location:  Shoulder  Site:  R glenohumeral  Medications:  40 mg triamcinolone acetonide 40 mg/mL  Patient tolerance:  Patient tolerated the procedure well with no immediate complications

## 2025-06-18 ENCOUNTER — PATIENT MESSAGE (OUTPATIENT)
Dept: SPORTS MEDICINE | Facility: CLINIC | Age: 70
End: 2025-06-18
Payer: MEDICARE

## 2025-07-26 ENCOUNTER — OFFICE VISIT (OUTPATIENT)
Dept: INTERNAL MEDICINE | Facility: CLINIC | Age: 70
End: 2025-07-26
Payer: MEDICARE

## 2025-07-26 ENCOUNTER — LAB VISIT (OUTPATIENT)
Dept: LAB | Facility: HOSPITAL | Age: 70
End: 2025-07-26
Attending: INTERNAL MEDICINE
Payer: MEDICARE

## 2025-07-26 VITALS
HEART RATE: 79 BPM | OXYGEN SATURATION: 98 % | RESPIRATION RATE: 18 BRPM | WEIGHT: 233.69 LBS | TEMPERATURE: 98 F | HEIGHT: 64 IN | BODY MASS INDEX: 39.9 KG/M2 | SYSTOLIC BLOOD PRESSURE: 122 MMHG | DIASTOLIC BLOOD PRESSURE: 80 MMHG

## 2025-07-26 DIAGNOSIS — N28.1 ACQUIRED RENAL CYST OF LEFT KIDNEY: ICD-10-CM

## 2025-07-26 DIAGNOSIS — R31.9 HEMATURIA, UNSPECIFIED TYPE: Primary | ICD-10-CM

## 2025-07-26 DIAGNOSIS — R31.9 HEMATURIA, UNSPECIFIED TYPE: ICD-10-CM

## 2025-07-26 LAB
BACTERIA #/AREA URNS HPF: ABNORMAL /HPF
BILIRUB UR QL STRIP.AUTO: NEGATIVE
CLARITY UR: ABNORMAL
COLOR UR AUTO: ABNORMAL
GLUCOSE UR QL STRIP: NEGATIVE
HGB UR QL STRIP: ABNORMAL
HYALINE CASTS #/AREA URNS LPF: 0 /LPF (ref 0–1)
KETONES UR QL STRIP: NEGATIVE
LEUKOCYTE ESTERASE UR QL STRIP: NEGATIVE
MICROSCOPIC COMMENT: ABNORMAL
NITRITE UR QL STRIP: NEGATIVE
PH UR STRIP: 7 [PH]
PROT UR QL STRIP: ABNORMAL
RBC #/AREA URNS HPF: >100 /HPF (ref 0–4)
SP GR UR STRIP: 1.02
SQUAMOUS #/AREA URNS HPF: 1 /HPF
WBC #/AREA URNS HPF: 4 /HPF (ref 0–5)

## 2025-07-26 PROCEDURE — 99213 OFFICE O/P EST LOW 20 MIN: CPT | Mod: PBBFAC | Performed by: INTERNAL MEDICINE

## 2025-07-26 PROCEDURE — 99214 OFFICE O/P EST MOD 30 MIN: CPT | Mod: S$PBB,,, | Performed by: INTERNAL MEDICINE

## 2025-07-26 PROCEDURE — 81003 URINALYSIS AUTO W/O SCOPE: CPT

## 2025-07-26 PROCEDURE — 99999 PR PBB SHADOW E&M-EST. PATIENT-LVL III: CPT | Mod: PBBFAC,,, | Performed by: INTERNAL MEDICINE

## 2025-07-26 NOTE — PROGRESS NOTES
Subjective     Patient ID: Josette Altamirano is a 69 y.o. female.    Chief Complaint: Hematuria      HPI  painless hematuria x a few days.  No clots.  No f/c/r/flank pain.  Has had this before.  Has seen Dr. Spears in past for evaluation.  Per pt, no known etiology found.  Otherwise, she feels well and is w/o complaints.  Urology chart r/w.    Recent Results (from the past 4 weeks)   Urinalysis, Reflex to Urine Culture Urine, Clean Catch    Collection Time: 07/26/25 12:46 PM    Specimen: Urine   Result Value Ref Range    Color, UA Vicky Straw, Vicky, Yellow, Light-Orange    Appearance, UA Hazy (A) Clear    pH, UA 7.0 5.0 - 8.0    Spec Grav UA 1.020 1.005 - 1.030    Protein, UA 1+ (A) Negative    Glucose, UA Negative Negative    Ketones, UA Negative Negative    Bilirubin, UA Negative Negative    Blood, UA 3+ (A) Negative    Nitrites, UA Negative Negative    Leukocyte Esterase, UA Negative Negative   Urinalysis Microscopic    Collection Time: 07/26/25 12:46 PM   Result Value Ref Range    RBC, UA >100 (H) 0 - 4 /HPF    WBC, UA 4 0 - 5 /HPF    Bacteria, UA Occasional None, Rare, Occasional /HPF    Squamous Epithelial Cells, UA 1 <=5 /HPF    Hyaline Casts, UA 0 0 - 1 /LPF    Microscopic Comment     ]  Past Medical History:   Diagnosis Date    Arthritis of right knee     Breast cancer     She has a history of a Stage IIA, 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 She subsequently underwent left mastectomy and sentinel node biopsy followed by axillary dissection March 23, 2007. Histopathologic evaluation demonstrated again multiple foci of intracystic papillary carcinoma, as well as DCIS noncomed    Cardiomyopathy     CHEMO INDUCED- RESOLVED    CHF (congestive heart failure)     systolic    Genetic testing 11/06/2023    [employer] Multi Cancer Panel (84 genes) - NEGATIVE, VUS in EGFR, POLE    Hypertension     Uterine fibroid      Review of  patient's allergies indicates:  No Known Allergies  Past Surgical History:   Procedure Laterality Date    ARTHROSCOPIC REPAIR OF ROTATOR CUFF OF SHOULDER Right 1/27/2025    Procedure: REPAIR, ROTATOR CUFF, ARTHROSCOPIC;  Surgeon: Griffin Shahid MD;  Location: [facility];  Service: Orthopedics;  Laterality: Right;    ARTHROSCOPY OF SHOULDER WITH DECOMPRESSION OF SUBACROMIAL SPACE Right 1/27/2025    Procedure: ARTHROSCOPY, SHOULDER, WITH SUBACROMIAL SPACE DECOMPRESSION;  Surgeon: Griffin Shahid MD;  Location: [facility];  Service: Orthopedics;  Laterality: Right;    ARTHROSCOPY,SHOULDER,WITH BICEPS TENODESIS Right 1/27/2025    Procedure: ARTHROSCOPY,SHOULDER,WITH BICEPS TENODESIS;  Surgeon: Griffin Shahid MD;  Location: [facility];  Service: Orthopedics;  Laterality: Right;    BREAST BIOPSY      BREAST CYST EXCISION Right     BREAST RECONSTRUCTION Bilateral 4/1/2024    Procedure: RECONSTRUCTION, BREAST;  Surgeon: Rashawn James MD;  Location: [facility];  Service: Plastics;  Laterality: Bilateral;    BREAST REVISION SURGERY Bilateral 4/1/2024    Procedure: BREAST REVISION SURGERY;  Surgeon: Rashawn James MD;  Location: [facility];  Service: Plastics;  Laterality: Bilateral;    BREAST SURGERY      CHOLECYSTECTOMY      COLONOSCOPY N/A 9/10/2018    Procedure: COLONOSCOPY;  Surgeon: Indra Ramos MD;  Location: [facility];  Service: Endoscopy;  Laterality: N/A;    COLONOSCOPY N/A 4/28/2020    Procedure: COLONOSCOPY;  Surgeon: Dulce Maria Siegel MD;  Location: [facility];  Service: Endoscopy;  Laterality: N/A;    CYSTOSCOPY N/A 12/4/2024    Procedure: CYSTOSCOPY;  Surgeon: Sofiya Nguyen MD;  Location: [facility] OR;  Service: Gynecology Oncology;  Laterality: N/A;    ESOPHAGOGASTRODUODENOSCOPY N/A 4/28/2020    Procedure: EGD (ESOPHAGOGASTRODUODENOSCOPY);  Surgeon: Dulce Maria Siegel MD;  Location: [facility];  Service: Endoscopy;  Laterality: N/A;    HYSTEROSCOPIC POLYPECTOMY OF UTERUS N/A 10/10/2024     Procedure: POLYPECTOMY, UTERUS, HYSTEROSCOPIC;  Surgeon: Anamika lEler MD;  Location: [facility] OR;  Service: OB/GYN;  Laterality: N/A;    HYSTEROSCOPY WITH DILATION AND CURETTAGE OF UTERUS N/A 10/10/2024    Procedure: HYSTEROSCOPY, WITH DILATION AND CURETTAGE OF UTERUS;  Surgeon: Anamika Eller MD;  Location: [facility] OR;  Service: OB/GYN;  Laterality: N/A;    INJECTION FOR SENTINEL NODE IDENTIFICATION Right 12/12/2023    Procedure: INJECTION, FOR SENTINEL NODE IDENTIFICATION;  Surgeon: Shannon Galvan MD;  Location: [facility] OR;  Service: General;  Laterality: Right;    INSERTION OF BREAST IMPLANT Bilateral 4/1/2024    Procedure: INSERTION, BREAST IMPLANT;  Surgeon: Rashawn James MD;  Location: [facility] OR;  Service: Plastics;  Laterality: Bilateral;    INSERTION OF BREAST TISSUE EXPANDER Right 12/12/2023    Procedure: INSERTION, TISSUE EXPANDER, BREAST;  Surgeon: Rashawn James MD;  Location: [facility] OR;  Service: Plastics;  Laterality: Right;    MASTECTOMY      left    MASTECTOMY, SKIN SPARING, SIMPLE Right 12/12/2023    Procedure: MASTECTOMY, SKIN SPARING, SIMPLE;  Surgeon: Shannon Galvan MD;  Location: [facility] OR;  Service: General;  Laterality: Right;    MEDIPORT INSERTION, SINGLE      PLACEMENT OF ACELLULAR HUMAN DERMAL ALLOGRAFT Right 12/12/2023    Procedure: APPLICATION, ACELLULAR HUMAN DERMAL ALLOGRAFT;  Surgeon: Rashawn James MD;  Location: [facility] OR;  Service: Plastics;  Laterality: Right;    ROBOT-ASSISTED LAPAROSCOPIC ABDOMINAL HYSTERECTOMY USING DA SIVLINO XI N/A 12/4/2024    Procedure: XI ROBOTIC HYSTERECTOMY;  Surgeon: Sofiya Nguyen MD;  Location: [facility] OR;  Service: Gynecology Oncology;  Laterality: N/A;  2.5 hr case / surgery admit due to insurance    ROBOT-ASSISTED LAPAROSCOPIC SALPINGO-OOPHORECTOMY USING DA SILVINO XI Bilateral 12/4/2024    Procedure: XI ROBOTIC SALPINGO-OOPHORECTOMY;  Surgeon: Sofiya Nguyen MD;  Location: [facility] OR;  Service: Gynecology Oncology;  Laterality: Bilateral;    SENTINEL  "LYMPH NODE BIOPSY Right 12/12/2023    Procedure: BIOPSY, LYMPH NODE, SENTINEL;  Surgeon: Shannon Galvan MD;  Location: [facility] OR;  Service: General;  Laterality: Right;    TISSUE EXPANDER REMOVAL Bilateral 4/1/2024    Procedure: REMOVAL, TISSUE EXPANDER;  Surgeon: Rashawn James MD;  Location: [facility] OR;  Service: Plastics;  Laterality: Bilateral;     Family History   Problem Relation Name Age of Onset    Diabetes Mother      Breast cancer Mother      Diabetes Father      Stroke Father      No Known Problems Sister      No Known Problems Brother      Cancer Maternal Aunt  55        breast cancer    Breast cancer Maternal Aunt      Stroke Paternal Aunt      No Known Problems Maternal Grandmother      No Known Problems Maternal Grandfather      No Known Problems Paternal Grandmother      Heart failure Paternal Grandfather      Breast cancer Maternal Cousin      Melanoma Neg Hx      Psoriasis Neg Hx      Lupus Neg Hx      Eczema Neg Hx       Social History[1]      /80   Pulse 79   Temp 97.6 °F (36.4 °C) (Tympanic)   Resp 18   Ht 5' 4" (1.626 m)   Wt 106 kg (233 lb 11 oz)   SpO2 98%   BMI 40.11 kg/m²   Outpatient Medications as of 7/26/2025   Medication Sig Dispense Refill    acetaminophen (TYLENOL) 500 MG tablet Take 2 tablets (1,000 mg total) by mouth every 8 (eight) hours as needed for Pain. 60 tablet 0    metoprolol succinate (TOPROL-XL) 50 MG 24 hr tablet Take 1 tablet (50 mg total) by mouth once daily. 30 tablet 6    sacubitriL-valsartan (ENTRESTO)  mg per tablet Take 1 tablet by mouth 2 (two) times daily. 180 tablet 1    aspirin (ECOTRIN) 81 MG EC tablet Take 1 tablet (81 mg total) by mouth 2 (two) times a day. for 14 days 28 tablet 0     No current facility-administered medications on file as of 7/26/2025.       Review of Systems   All other systems reviewed and are negative.         Objective     Physical Exam  Constitutional:       Appearance: Normal appearance.   HENT:      Head: " Normocephalic and atraumatic.   Pulmonary:      Effort: No respiratory distress.   Musculoskeletal:      Cervical back: Neck supple.   Neurological:      Mental Status: She is alert and oriented to person, place, and time.   Psychiatric:         Mood and Affect: Mood normal.         Behavior: Behavior normal.     No CVA tenderness       Assessment and Plan     1. Hematuria, unspecified type  -     Urinalysis, Reflex to Urine Culture Urine, Clean Catch; Future; Expected date: 07/26/2025    2. Acquired renal cyst of left kidney             Immunization History   Administered Date(s) Administered    COVID-19, MRNA, LN-S, PF (Pfizer) (Gray Cap) 05/17/2022    COVID-19, MRNA, LN-S, PF (Pfizer) (Purple Cap) 11/23/2021    Influenza - Quadrivalent - PF *Preferred* (6 months and older) 10/21/2020    Influenza - Trivalent - Afluria, Fluzone MDV 10/10/2011, 10/14/2011    PPD Test 11/28/2023    Pneumococcal Polysaccharide - 23 Valent 11/30/2007    Tdap 11/22/2023     This includes face to face time and non-face to face time preparing to see the patient (eg, review of tests), obtaining and/or reviewing separately obtained history, documenting clinical information in the electronic or other health record, independently interpreting results and communicating results to the patient/family/caregiver, or care coordinator.  R/w of urology notes/imaging and communication with patient's urologist.                [1]   Social History  Socioeconomic History    Marital status:    Tobacco Use    Smoking status: Never     Passive exposure: Never    Smokeless tobacco: Never   Substance and Sexual Activity    Alcohol use: Yes     Alcohol/week: 2.0 standard drinks of alcohol     Types: 2 Glasses of wine per week     Comment: Ocassionally    Drug use: No    Sexual activity: Yes     Partners: Male     Birth control/protection: None, Post-menopausal     Social Drivers of Health     Financial Resource Strain: Low Risk  (7/26/2025)    Overall  Financial Resource Strain (CARDIA)     Difficulty of Paying Living Expenses: Not hard at all   Food Insecurity: No Food Insecurity (7/26/2025)    Hunger Vital Sign     Worried About Running Out of Food in the Last Year: Never true     Ran Out of Food in the Last Year: Never true   Transportation Needs: No Transportation Needs (7/26/2025)    PRAPARE - Transportation     Lack of Transportation (Medical): No     Lack of Transportation (Non-Medical): No   Physical Activity: Insufficiently Active (7/26/2025)    Exercise Vital Sign     Days of Exercise per Week: 4 days     Minutes of Exercise per Session: 30 min   Stress: No Stress Concern Present (7/26/2025)    [employer] [place] [employer] - Occupational Stress Questionnaire     Feeling of Stress : Only a little   Housing Stability: Low Risk  (7/26/2025)    Housing Stability Vital Sign     Unable to Pay for Housing in the Last Year: No     Number of Times Moved in the Last Year: 0     Homeless in the Last Year: No

## 2025-08-05 ENCOUNTER — OFFICE VISIT (OUTPATIENT)
Dept: SPORTS MEDICINE | Facility: CLINIC | Age: 70
End: 2025-08-05
Payer: MEDICARE

## 2025-08-05 DIAGNOSIS — M75.01 ADHESIVE CAPSULITIS OF RIGHT SHOULDER: Primary | ICD-10-CM

## 2025-08-05 DIAGNOSIS — Z98.890 STATUS POST RIGHT ROTATOR CUFF REPAIR: ICD-10-CM

## 2025-08-05 PROCEDURE — 99999 PR PBB SHADOW E&M-EST. PATIENT-LVL II: CPT | Mod: PBBFAC,,, | Performed by: STUDENT IN AN ORGANIZED HEALTH CARE EDUCATION/TRAINING PROGRAM

## 2025-08-05 PROCEDURE — 99213 OFFICE O/P EST LOW 20 MIN: CPT | Mod: S$PBB,,, | Performed by: STUDENT IN AN ORGANIZED HEALTH CARE EDUCATION/TRAINING PROGRAM

## 2025-08-05 PROCEDURE — 99212 OFFICE O/P EST SF 10 MIN: CPT | Mod: PBBFAC | Performed by: STUDENT IN AN ORGANIZED HEALTH CARE EDUCATION/TRAINING PROGRAM

## 2025-08-05 NOTE — PROGRESS NOTES
Orthopaedics  Post-operative follow-up    Procedure Performed:  1. Right shoulder arthroscopic rotator cuff repair including subscapularis and supraspinatus  2. Right shoulder arthroscopic biceps tenodesis  3. Right shoulder subacromial decompression     Date of Surgery: 1/27/25    Subjective: Josette Altamirano is now approximately 6 months out from her shoulder surgery. At prior visit she had a little bit of stiffness on physical exam today but overall improved compared to prior visit. Discussed potential treatment options of continuing to progress her range of motion with physical therapy/home exercise program or possible glenohumeral CSI and continuing with physical therapy. Patient elected to proceed with intra-articular corticosteroid injection into the right glenohumeral joint. The patient returns today reporting improvement of stiffness.       Exam:  Incision sites healed, C/D/I  No swelling or bruising noted  Fluid  functional ROM with no crepitus  Upright Active ROM: , , ER 40  Able to touch the top of her head : yes   Cuff strength intact  Axillary nerve sensation and motor intact  Motor and sensory intact distally  Strong radial pulse, fingers warm and well perfused    Imaging:  No new imaging.     Impression:  6 months s/p right shoulder arthroscopic rotator cuff repair including subscapularis and supraspinatus, biceps tenodesis, and subacromial decompression- stiffness improving     Plan:  mild stiffness has improved, she can now complete ADLs without issue   Continue home exercises   Symptomatic treatment for pain / swelling  May advance activities as reviewed today: Continue to progress strength and endurance of shoulder and periscapular musculature.   Instructed patient to call clinic if questions or concerns    Follow-up with me as needed         Griffin Shahid MD    I, Miky Osman, acted as a scribe for Griffin Shahid MD for the duration of this office visit.

## 2025-08-20 ENCOUNTER — OFFICE VISIT (OUTPATIENT)
Dept: CARDIOLOGY | Facility: CLINIC | Age: 70
End: 2025-08-20
Payer: MEDICARE

## 2025-08-20 VITALS
DIASTOLIC BLOOD PRESSURE: 73 MMHG | OXYGEN SATURATION: 99 % | SYSTOLIC BLOOD PRESSURE: 125 MMHG | WEIGHT: 233.25 LBS | HEART RATE: 84 BPM | BODY MASS INDEX: 39.82 KG/M2 | HEIGHT: 64 IN

## 2025-08-20 DIAGNOSIS — I50.22 NYHA CLASS 2 AND ACC/AHA STAGE C CHRONIC SYSTOLIC CONGESTIVE HEART FAILURE: ICD-10-CM

## 2025-08-20 DIAGNOSIS — I50.32 CHRONIC HEART FAILURE WITH PRESERVED EJECTION FRACTION: Primary | ICD-10-CM

## 2025-08-20 DIAGNOSIS — E66.01 MORBID OBESITY WITH BMI OF 40.0-44.9, ADULT: ICD-10-CM

## 2025-08-20 DIAGNOSIS — G47.30 SLEEP APNEA, UNSPECIFIED TYPE: ICD-10-CM

## 2025-08-20 DIAGNOSIS — I10 ESSENTIAL HYPERTENSION: ICD-10-CM

## 2025-08-20 DIAGNOSIS — E66.813 CLASS 3 SEVERE OBESITY WITH SERIOUS COMORBIDITY AND BODY MASS INDEX (BMI) OF 40.0 TO 44.9 IN ADULT, UNSPECIFIED OBESITY TYPE: ICD-10-CM

## 2025-08-20 DIAGNOSIS — T45.1X5A CARDIOMYOPATHY DUE TO CHEMOTHERAPY: ICD-10-CM

## 2025-08-20 DIAGNOSIS — R60.0 LOCALIZED EDEMA: ICD-10-CM

## 2025-08-20 DIAGNOSIS — I42.7 CARDIOMYOPATHY DUE TO CHEMOTHERAPY: ICD-10-CM

## 2025-08-20 PROCEDURE — 99214 OFFICE O/P EST MOD 30 MIN: CPT | Mod: S$PBB,,, | Performed by: INTERNAL MEDICINE

## 2025-08-20 PROCEDURE — G2211 COMPLEX E/M VISIT ADD ON: HCPCS | Mod: ,,, | Performed by: INTERNAL MEDICINE

## 2025-08-20 PROCEDURE — 99213 OFFICE O/P EST LOW 20 MIN: CPT | Mod: PBBFAC | Performed by: INTERNAL MEDICINE

## 2025-08-20 PROCEDURE — 99999 PR PBB SHADOW E&M-EST. PATIENT-LVL III: CPT | Mod: PBBFAC,,, | Performed by: INTERNAL MEDICINE

## 2025-08-20 RX ORDER — TIRZEPATIDE 2.5 MG/.5ML
2.5 INJECTION, SOLUTION SUBCUTANEOUS
Qty: 4 PEN | Refills: 1 | Status: SHIPPED | OUTPATIENT
Start: 2025-08-20

## (undated) DEVICE — GOWN POLY REINF BRTH SLV XL

## (undated) DEVICE — SOL POVIDONE PREP IODINE 4 OZ

## (undated) DEVICE — DRAPE SURG W/TWL 17 5/8X23

## (undated) DEVICE — SYR 3CC LUER LOC

## (undated) DEVICE — COVER LIGHT HANDLE 80/CA

## (undated) DEVICE — SUT VICRYL PLUS 2-0 CT1 18

## (undated) DEVICE — PACK FLUENT DISPOSABLE

## (undated) DEVICE — ELECTRODE REM PLYHSV RETURN 9

## (undated) DEVICE — GAUZE SPONGE BULKEE 6X6.75IN

## (undated) DEVICE — SPONGE COTTON TRAY 4X4IN

## (undated) DEVICE — JELLY SURGILUBE LUBE PKT 3GM

## (undated) DEVICE — GLOVE SENSICARE PI SURG 6

## (undated) DEVICE — SYS SEE SHARP SCP ANTIFG LNG

## (undated) DEVICE — APPLICATOR ARISTA FLEX XL

## (undated) DEVICE — ELECTRODE BLADE W/SLEEVE 2.75

## (undated) DEVICE — APPLICATOR CHLORAPREP ORN 26ML

## (undated) DEVICE — NDL HYPO REG 25G X 1 1/2

## (undated) DEVICE — STOPCOCK DISCOFIX 3 WAY

## (undated) DEVICE — TOWEL OR DISP STRL BLUE 4/PK

## (undated) DEVICE — SYR 10CC LUER LOCK

## (undated) DEVICE — DRAPE COLUMN DAVINCI XI

## (undated) DEVICE — SEAL CANN UNIVERSAL 5-12MM

## (undated) DEVICE — COVER SURG LIGHT HANDLE

## (undated) DEVICE — OBTURATOR BLADELESS 8MM XI CLR

## (undated) DEVICE — SET CASSETTE TUBE DW OUTFLOW

## (undated) DEVICE — DRAPE CHEST FEN 15X10IN

## (undated) DEVICE — PACK BASIC SETUP SC BR

## (undated) DEVICE — DRAPE ARM DAVINCI XI

## (undated) DEVICE — ADHESIVE DERMABOND ADVANCED

## (undated) DEVICE — SUT SUTURETAPE TIGERLINK 1.3MM

## (undated) DEVICE — DRESSING GAUZE XEROFORM 5X9

## (undated) DEVICE — MANIPULATOR VCARE PLUS 34MM

## (undated) DEVICE — SPONGE GAUZE 16PLY 4X4

## (undated) DEVICE — CONTAINER SPEC OR STRL 4.5OZ

## (undated) DEVICE — POWDER ARISTA AH 3G

## (undated) DEVICE — GLOVE BIOGEL ECLIPSE SZ 6.5

## (undated) DEVICE — TRAY DO THE ROBOT

## (undated) DEVICE — MANIFOLD 4 PORT

## (undated) DEVICE — BELLOW CANN HEMOBLAST 1.65GR

## (undated) DEVICE — GLOVE SENSICARE PI GRN 6.5

## (undated) DEVICE — SOL 9P NACL IRR PIC IL

## (undated) DEVICE — BLADE COOLCUT EXCALIBER 4X13

## (undated) DEVICE — SOL POVIDONE SCRUB IODINE 4 OZ

## (undated) DEVICE — STAPLER SKIN PROXIMATE WIDE

## (undated) DEVICE — GLOVE SENSICARE PI MICRO 5.5

## (undated) DEVICE — DRAPE STERI U-SHAPED 47X51IN

## (undated) DEVICE — PACK DRAPE UNIVERSAL CONVERTOR

## (undated) DEVICE — DRAPE UTILITY W/ TAPE 20X30IN

## (undated) DEVICE — DRAPE ULTRASOUND PROBE HEAD

## (undated) DEVICE — SUT MONOCRYL 4-0 PS-1 UND

## (undated) DEVICE — COVER TIP CURVED SCISSORS XI

## (undated) DEVICE — GLOVE BIOGEL ORTHOPEDIC 7.5

## (undated) DEVICE — BRA MAMMARY SUPPORT XLARGE

## (undated) DEVICE — CATH UROLOGICAL 18FR RED

## (undated) DEVICE — DRESSING CURITY WOUND 10X30IN

## (undated) DEVICE — SEALER LIGASURE MARYLAND 37CM

## (undated) DEVICE — DRESSING XEROFORM NONADH 1X8IN

## (undated) DEVICE — TAPE SURGICAL MICROFOAM 4IN

## (undated) DEVICE — DRAIN CHANNEL ROUND 15FR

## (undated) DEVICE — BNDG COFLEX FOAM LF2 ST 4X5YD

## (undated) DEVICE — IRRIGATOR ENDOSCOPY DISP.

## (undated) DEVICE — GLOVE SENSICARE PI ALOE 6

## (undated) DEVICE — GLOVE SENSICARE PI SURG 6.5

## (undated) DEVICE — TUBING PUMP ARTHROSCOPY STRL

## (undated) DEVICE — GLOVE SENSICARE PI ORTHO 7.5

## (undated) DEVICE — PAD ABDOMINAL STERILE 8X10IN

## (undated) DEVICE — SUPPORT ULNA NERVE PROTECTOR

## (undated) DEVICE — COVER CAMERA OPERATING ROOM

## (undated) DEVICE — PACK SURG LITHOTOMY 3 SIRUS

## (undated) DEVICE — SUT V-LOC 180 GRN GS-21 12IN

## (undated) DEVICE — SUT MCRYL PLUS 4-0 PS2 27IN

## (undated) DEVICE — STOCKINETTE TUBULAR 2PL 6 X 4

## (undated) DEVICE — Device

## (undated) DEVICE — APPLICATOR HEMOBLAST LAPSCP

## (undated) DEVICE — SUT 2/0 36IN ETHIBOND EXCE

## (undated) DEVICE — STAPLER SKIN SUBCUTICULAR

## (undated) DEVICE — EVACUATOR WOUND BULB 100CC

## (undated) DEVICE — GLOVE SENSICARE PI GRN 7.5

## (undated) DEVICE — BURR OVAL 8 FLUTE 4MMX13CM

## (undated) DEVICE — BLADE SURG #15 CARBON STEEL

## (undated) DEVICE — SYR 50CC LL

## (undated) DEVICE — KIT TRIMANO

## (undated) DEVICE — SOL NORMAL USPCA 0.9%

## (undated) DEVICE — JELLY SURGILUBE 5GR

## (undated) DEVICE — KIT TURNOVER

## (undated) DEVICE — HANDSWITCH SUCTION COAG

## (undated) DEVICE — SUT ETHILON 3/0 18IN PS-1

## (undated) DEVICE — NDL SPINAL 18GX3.5 SPINOCAN

## (undated) DEVICE — DRESSING TELFA N ADH 3X8

## (undated) DEVICE — SOL ELECTROLUBE ANTI-STIC

## (undated) DEVICE — SUT 2/0 30IN SILK BLK BRAI

## (undated) DEVICE — COVER PROXIMA MAYO STAND

## (undated) DEVICE — SUT VICRYL+ 27 UR-6 VIOL

## (undated) DEVICE — DRAPE UINDERBUT GRAD PCH

## (undated) DEVICE — GLOVE SURG BIOGEL LATEX SZ 7.5

## (undated) DEVICE — NDL INSUFFLATION VERRES 120MM

## (undated) DEVICE — DEVICE SNAPSECURE FOL CATH

## (undated) DEVICE — SOL IRRI STRL WATER 1000ML

## (undated) DEVICE — GOWN SMARTGOWN LVL4 X-LONG XL

## (undated) DEVICE — TUBING SUCTION STRAIGHT .25X20

## (undated) DEVICE — SUT VICRYL PLUS 3-0 SH 18IN

## (undated) DEVICE — APPLIER LIGACLIP MED 11IN

## (undated) DEVICE — NDL SAFETY 25G X 1.5 ECLIPSE

## (undated) DEVICE — DRAPE INCISE IOBAN 2 23X17IN

## (undated) DEVICE — TRAY SKIN SCRUB WET 4 COMPART

## (undated) DEVICE — SET CYSTO IRR DRP CHMBR 84IN

## (undated) DEVICE — INSERT CUSHIONPRONE VIEW LARGE

## (undated) DEVICE — SOL WATER STRL IRR 1000ML

## (undated) DEVICE — SUT 1 48IN PDS II VIO MONO

## (undated) DEVICE — DRAPE THREE-QTR REINF 53X77IN

## (undated) DEVICE — GLOVE SENSICARE PI GRN 6

## (undated) DEVICE — CANNULA PASSPORT 8 MM X 4CM.

## (undated) DEVICE — KIT WING PAD POSITIONING

## (undated) DEVICE — DRAPE CORETEMP FLD WRM 56X62IN

## (undated) DEVICE — GOWN NONREINF SET-IN SLV XL

## (undated) DEVICE — GLOVE SENSICARE PI GRN 8

## (undated) DEVICE — SET EXT W/2 VLVE PORTS 40

## (undated) DEVICE — DRAPE HIP PCH 112X137X89IN

## (undated) DEVICE — DEVICE MYOSURE REACH SYS

## (undated) DEVICE — SET TRI-LUMEN FILTERED TUBE

## (undated) DEVICE — DRAPE U SPLIT SHEET 54X76IN

## (undated) DEVICE — NDL SAFETY 22G X 1.5 ECLIPSE

## (undated) DEVICE — SET SECONDARY UNIVERSAL

## (undated) DEVICE — SKIN MARKER DEVON 160

## (undated) DEVICE — CONTAINER SPECIMEN OR STER 4OZ

## (undated) DEVICE — PAD ABD 8X10 STERILE

## (undated) DEVICE — SUT MONOCRYL 3-0 SH U/D

## (undated) DEVICE — GLOVE SENSICARE PI GRN 7

## (undated) DEVICE — TUBING MEDI-VAC 20FT .25IN

## (undated) DEVICE — BOWL STERILE LARGE 32OZ

## (undated) DEVICE — COVER PROBE US 5.5X58L NON LTX

## (undated) DEVICE — GEL AQUASONIC 100 STERILE20GM